# Patient Record
Sex: FEMALE | Race: WHITE | NOT HISPANIC OR LATINO | Employment: OTHER | URBAN - METROPOLITAN AREA
[De-identification: names, ages, dates, MRNs, and addresses within clinical notes are randomized per-mention and may not be internally consistent; named-entity substitution may affect disease eponyms.]

---

## 2017-01-13 ENCOUNTER — OFFICE VISIT (OUTPATIENT)
Dept: INTERNAL MEDICINE | Facility: CLINIC | Age: 72
End: 2017-01-13

## 2017-01-13 VITALS
DIASTOLIC BLOOD PRESSURE: 78 MMHG | WEIGHT: 191 LBS | BODY MASS INDEX: 31.82 KG/M2 | RESPIRATION RATE: 24 BRPM | SYSTOLIC BLOOD PRESSURE: 130 MMHG | HEART RATE: 98 BPM | HEIGHT: 65 IN | OXYGEN SATURATION: 98 % | TEMPERATURE: 97.9 F

## 2017-01-13 DIAGNOSIS — J06.9 UPPER RESPIRATORY TRACT INFECTION, UNSPECIFIED TYPE: Primary | ICD-10-CM

## 2017-01-13 LAB
EXPIRATION DATE: NORMAL
FLUAV AG NPH QL: NEGATIVE
FLUBV AG NPH QL: NEGATIVE
INTERNAL CONTROL: NORMAL
Lab: NORMAL

## 2017-01-13 PROCEDURE — 87804 INFLUENZA ASSAY W/OPTIC: CPT | Performed by: PHYSICIAN ASSISTANT

## 2017-01-13 PROCEDURE — 99213 OFFICE O/P EST LOW 20 MIN: CPT | Performed by: PHYSICIAN ASSISTANT

## 2017-01-13 RX ORDER — AZITHROMYCIN 250 MG/1
TABLET, FILM COATED ORAL
Qty: 6 TABLET | Refills: 0 | Status: SHIPPED | OUTPATIENT
Start: 2017-01-13 | End: 2017-03-29

## 2017-01-13 NOTE — PATIENT INSTRUCTIONS
Take mucinex with plenty water to thin out chest secretions.  Can start zithromax when sputum becomes colored, otherwise this is likely a viral illness at this stage.

## 2017-01-13 NOTE — PROGRESS NOTES
Subjective   Nely Grider is a 71 y.o. female.   Chief Complaint   Patient presents with   • URI     States symptoms started yesterday        History of Present Illness   Pt complains of chest congestion since yesterday and cough is productive but is clear.  Felt hot last night.    Drank tea but no other OTC taken.  Son had flu 2-3 weeks ago.  The following portions of the patient's history were reviewed and updated as appropriate: allergies, current medications and problem list.    Review of Systems   Constitutional: Positive for chills and diaphoresis. Negative for fever.   HENT: Negative for congestion and sore throat.    Respiratory: Positive for cough (sputum is clear) and shortness of breath (when laying down).    Psychiatric/Behavioral: Positive for sleep disturbance.       Objective   Physical Exam   Constitutional: She appears well-developed and well-nourished.   HENT:   Head: Normocephalic and atraumatic.   Right Ear: Tympanic membrane and external ear normal.   Left Ear: Tympanic membrane and external ear normal.   Nose: Right sinus exhibits no maxillary sinus tenderness and no frontal sinus tenderness. Left sinus exhibits no maxillary sinus tenderness and no frontal sinus tenderness.   Mouth/Throat: Oropharynx is clear and moist.   Eyes: Conjunctivae are normal.   Cardiovascular: Normal rate, regular rhythm and normal heart sounds.  Exam reveals no gallop and no friction rub.    No murmur heard.  Pulmonary/Chest: Effort normal and breath sounds normal. No respiratory distress. She has no wheezes. She has no rales.   Musculoskeletal: She exhibits no edema.   Psychiatric: She has a normal mood and affect.   Vitals reviewed.      Assessment/Plan   Nely was seen today for uri.    Diagnoses and all orders for this visit:    Upper respiratory tract infection, unspecified type  -     POC Influenza A / B

## 2017-01-13 NOTE — MR AVS SNAPSHOT
Nely MCELROY Loth[place]   1/13/2017 10:30 AM   Office Visit    Provider:  VALERY Eldridge   Department:  [facility] INTER[facility] MEDICINE AND PED[facility]ATRICS   Dept Phone:  [phone]                Your Full Care Plan              Today's Medication Changes          These changes are accurate as of: 1/13/17 11:15 AM.  If you have any questions, ask your nurse or doctor.               New Medication(s)Ordered:     azithromycin 250 MG tablet   Commonly known as:  ZITHROMAX   Take 2 tablets the first day, then 1 tablet daily for 4 days.   Started by:  VALERY Eldridge            Where to Get Your Medications      These medications were sent to [address] - [phone] [place] [id number] [address]     Phone:  [phone]     azithromycin 250 MG tablet                  Your Updated Medication List          This list is accurate as of: 1/13/17 11:15 AM.  Always use your most recent med list.                ACCU-CHEK CARLTON device       ACCU-CHEK CARLTON PLUS test strip   Generic drug:  glucose blood   TEST TWO TIMES DAILY       ACCU-CHEK SOFTCLIX LANCETS lancets   USE AS DIRECTED       azithromycin 250 MG tablet   Commonly known as:  ZITHROMAX   Take 2 tablets the first day, then 1 tablet daily for 4 days.       gabapentin 400 MG capsule   Commonly known as:  NEURONTIN   TAKE 1 CAPSULE THREE TIMES DAILY       glimepiride 2 MG tablet   Commonly known as:  AMARYL   Take 1 tablet by mouth every morning before breakfast.       losartan-hydrochlorothiazide 100-25 MG per tablet   Commonly known as:  HYZAAR       omeprazole 20 MG capsule   Commonly known as:  priLOSEC   TAKE 1 CAPSULE TWICE DAILY       simvastatin 10 MG tablet   Commonly known as:  ZOCOR       vitamin E 1000 UNIT capsule               We Performed the Following     POC Influenza A / B       You Were Diagnosed With        Codes Comments    "Upper respiratory tract infection, unspecified type    -  Primary ICD-10-CM: J06.9  ICD-9-CM: 465.9       Instructions    Take mucinex with plenty water to thin out chest secretions.  Can start zithromax when sputum becomes colored, otherwise this is likely a viral illness at this stage.     Patient Instructions History      MyChart Signup     Our records indicate that you have declined [facility] MyChart signup. If you would like to sign up for [email or web address], please email [email or web address] or call [phone] to obtain an activation code.             Other Info from Your Visit           Your Appointments     Apr 28, 2017  8:45 AM EDT   Follow Up with Raimundo Ibarra MD   [facility] INTER[facility]AT[facility] (--)    [address] [place] [id number] [phone]           Arrive 15 minutes prior to appointment.              Allergies     Codeine        Reason for Visit     URI States symptoms started yesterday       Vital Signs     Blood Pressure Pulse Temperature Respirations Height Weight    130/78 (BP Location: Left arm, Patient Position: Sitting) 98 97.9 °F (36.6 °C) (Temporal Artery ) 24 65\" (165.1 cm) 191 lb (86.6 kg)    Oxygen Saturation Body Mass Index Smoking Status             98% 31.78 kg/m2 Never Smoker         Problems and Diagnoses Noted     Upper respiratory infection      Results     POC Influenza A / B      Component Value Standard Range & Units    Rapid Influenza A Ag NEGATIVE     Rapid Influenza B Ag NEGATIVE     Internal Control Passed Passed    Lot Number [id number]     Expiration Date 4-17                   "

## 2017-01-24 RX ORDER — LOSARTAN POTASSIUM AND HYDROCHLOROTHIAZIDE 25; 100 MG/1; MG/1
TABLET ORAL
Qty: 90 TABLET | Refills: 3 | Status: SHIPPED | OUTPATIENT
Start: 2017-01-24 | End: 2017-11-27 | Stop reason: SDUPTHER

## 2017-01-24 RX ORDER — GABAPENTIN 400 MG/1
CAPSULE ORAL
Qty: 270 CAPSULE | Refills: 1 | Status: SHIPPED | OUTPATIENT
Start: 2017-01-24 | End: 2017-07-05 | Stop reason: SDUPTHER

## 2017-02-07 RX ORDER — LANCETS
EACH MISCELLANEOUS
Qty: 200 EACH | Refills: 3 | Status: SHIPPED | OUTPATIENT
Start: 2017-02-07 | End: 2017-02-08 | Stop reason: SDUPTHER

## 2017-02-08 RX ORDER — LANCETS
200 EACH MISCELLANEOUS DAILY
Qty: 200 EACH | Refills: 3 | Status: SHIPPED | OUTPATIENT
Start: 2017-02-08 | End: 2019-01-28

## 2017-02-21 ENCOUNTER — TELEPHONE (OUTPATIENT)
Dept: INTERNAL MEDICINE | Facility: CLINIC | Age: 72
End: 2017-02-21

## 2017-02-21 RX ORDER — SIMVASTATIN 10 MG
10 TABLET ORAL NIGHTLY
Qty: 90 TABLET | Refills: 1 | Status: SHIPPED | OUTPATIENT
Start: 2017-02-21 | End: 2017-07-10 | Stop reason: SDUPTHER

## 2017-02-21 NOTE — TELEPHONE ENCOUNTER
----- Message from Ilana Smith sent at 2/21/2017 10:44 AM EST -----  Contact: BRIGITTE CHOW PH:[phone]  BRIGITTE CHOW CALLING FOR A REFILL FOR SIMVASTATIN. SHE USES THE [employer] MAIL ORDER, SHE ALSO SAID SHE NO LONGER USES THE METFORMIN AND WOULD LIKE THIS TAKEN OFF HER LIST SO THEY WILL STOP SENDING IT. SHE CAN BE REACHED [phone]

## 2017-02-27 RX ORDER — BLOOD SUGAR DIAGNOSTIC
STRIP MISCELLANEOUS
Qty: 100 EACH | Refills: 5 | Status: SHIPPED | OUTPATIENT
Start: 2017-02-27 | End: 2017-09-13 | Stop reason: SDUPTHER

## 2017-03-29 ENCOUNTER — OFFICE VISIT (OUTPATIENT)
Dept: INTERNAL MEDICINE | Facility: CLINIC | Age: 72
End: 2017-03-29

## 2017-03-29 VITALS
DIASTOLIC BLOOD PRESSURE: 60 MMHG | SYSTOLIC BLOOD PRESSURE: 98 MMHG | WEIGHT: 191 LBS | TEMPERATURE: 98.4 F | BODY MASS INDEX: 31.78 KG/M2 | RESPIRATION RATE: 16 BRPM | OXYGEN SATURATION: 97 % | HEART RATE: 74 BPM

## 2017-03-29 DIAGNOSIS — H10.9 CONJUNCTIVITIS, UNSPECIFIED CONJUNCTIVITIS TYPE, UNSPECIFIED LATERALITY: ICD-10-CM

## 2017-03-29 DIAGNOSIS — J20.9 ACUTE BRONCHITIS, UNSPECIFIED ORGANISM: Primary | ICD-10-CM

## 2017-03-29 PROCEDURE — 99213 OFFICE O/P EST LOW 20 MIN: CPT | Performed by: PHYSICIAN ASSISTANT

## 2017-03-29 RX ORDER — AZITHROMYCIN 250 MG/1
TABLET, FILM COATED ORAL
Qty: 6 TABLET | Refills: 0 | Status: SHIPPED | OUTPATIENT
Start: 2017-03-29 | End: 2017-08-31

## 2017-03-29 RX ORDER — POLYMYXIN B SULFATE AND TRIMETHOPRIM 1; 10000 MG/ML; [USP'U]/ML
1 SOLUTION OPHTHALMIC EVERY 4 HOURS
Qty: 10 ML | Refills: 0 | Status: SHIPPED | OUTPATIENT
Start: 2017-03-29 | End: 2018-01-18

## 2017-03-29 NOTE — PROGRESS NOTES
Subjective   Nely Grider is a 71 y.o. female.   Chief Complaint   Patient presents with   • URI   • Cough   • Eye Drainage     History of Present Illness   PT complains of productive cough, nasal congestion, right eye redness x several days.  Using mucinex  The following portions of the patient's history were reviewed and updated as appropriate: allergies, current medications and problem list.    Review of Systems   Constitutional: Positive for fatigue. Negative for chills and fever.   HENT: Positive for congestion.    Respiratory: Positive for cough. Negative for shortness of breath.    Musculoskeletal: Positive for myalgias.   Neurological: Positive for headaches.       Objective   Physical Exam   Constitutional: She appears well-developed and well-nourished.   HENT:   Head: Normocephalic and atraumatic.   Right Ear: Tympanic membrane and external ear normal.   Left Ear: Tympanic membrane and external ear normal.   Nose: Right sinus exhibits no maxillary sinus tenderness and no frontal sinus tenderness. Left sinus exhibits no maxillary sinus tenderness and no frontal sinus tenderness.   Mouth/Throat: No posterior oropharyngeal erythema. No tonsillar exudate.   Eyes: Conjunctivae are normal.   Cardiovascular: Normal rate, regular rhythm and normal heart sounds.  Exam reveals no gallop and no friction rub.    No murmur heard.  Pulmonary/Chest: Effort normal and breath sounds normal.   Lymphadenopathy:     She has no cervical adenopathy.   Psychiatric: She has a normal mood and affect.   Vitals reviewed.      Assessment/Plan   Nely was seen today for uri, cough and eye drainage.    Diagnoses and all orders for this visit:    Acute bronchitis, unspecified organism  -     azithromycin (ZITHROMAX) 250 MG tablet; Take 2 tablets the first day, then 1 tablet daily for 4 days.    Conjunctivitis, unspecified conjunctivitis type, unspecified laterality  -     trimethoprim-polymyxin b (POLYTRIM) 00627-6.1 UNIT/ML-%  ophthalmic solution; Administer 1 drop to the right eye Every 4 (Four) Hours.

## 2017-04-28 ENCOUNTER — OFFICE VISIT (OUTPATIENT)
Dept: INTERNAL MEDICINE | Facility: CLINIC | Age: 72
End: 2017-04-28

## 2017-04-28 VITALS
HEART RATE: 72 BPM | BODY MASS INDEX: 31.95 KG/M2 | WEIGHT: 192 LBS | RESPIRATION RATE: 21 BRPM | DIASTOLIC BLOOD PRESSURE: 66 MMHG | SYSTOLIC BLOOD PRESSURE: 128 MMHG

## 2017-04-28 DIAGNOSIS — N18.30 CHRONIC KIDNEY DISEASE (CKD), STAGE III (MODERATE) (HCC): Chronic | ICD-10-CM

## 2017-04-28 DIAGNOSIS — I10 ESSENTIAL HYPERTENSION: Primary | ICD-10-CM

## 2017-04-28 DIAGNOSIS — E11.9 CONTROLLED TYPE 2 DIABETES MELLITUS WITHOUT COMPLICATION, WITHOUT LONG-TERM CURRENT USE OF INSULIN (HCC): ICD-10-CM

## 2017-04-28 DIAGNOSIS — E78.2 MIXED HYPERLIPIDEMIA: ICD-10-CM

## 2017-04-28 DIAGNOSIS — E11.42 DIABETIC POLYNEUROPATHY ASSOCIATED WITH TYPE 2 DIABETES MELLITUS (HCC): ICD-10-CM

## 2017-04-28 LAB
EXPIRATION DATE: NORMAL
HBA1C MFR BLD: 7.8 %
Lab: NORMAL

## 2017-04-28 PROCEDURE — 83036 HEMOGLOBIN GLYCOSYLATED A1C: CPT | Performed by: INTERNAL MEDICINE

## 2017-04-28 PROCEDURE — 99214 OFFICE O/P EST MOD 30 MIN: CPT | Performed by: INTERNAL MEDICINE

## 2017-04-28 RX ORDER — GLIMEPIRIDE 4 MG/1
4 TABLET ORAL
Qty: 90 TABLET | Refills: 3 | Status: SHIPPED | OUTPATIENT
Start: 2017-04-28 | End: 2018-01-31 | Stop reason: SDUPTHER

## 2017-04-28 NOTE — PROGRESS NOTES
Subjective   Nely Grider is a 71 y.o. female.     History of Present Illness   For follow up of  HTN on meds no chest pain, no sob, no headaches.  NIDDM she checks blood sugars in am only and mostly are below 150.  Renal insufficiency is stable.  Hyperlipidemia is stable on meds, no muscle aches.        The following portions of the patient's history were reviewed and updated as appropriate: allergies, current medications, past medical history and problem list.    Review of Systems   Constitutional: Negative.    Eyes: Negative.    Respiratory: Negative.  Negative for cough, chest tightness, shortness of breath and wheezing.    Cardiovascular: Negative.  Negative for chest pain and leg swelling.   Gastrointestinal: Negative.  Negative for abdominal distention and abdominal pain.   Genitourinary: Negative.    Neurological: Positive for numbness.       Objective   Physical Exam   Constitutional: She appears well-developed and well-nourished.   Neck: Normal range of motion. Neck supple.   Cardiovascular: Normal rate, regular rhythm and normal heart sounds.  Exam reveals no gallop and no friction rub.    No murmur heard.  Pulmonary/Chest: Effort normal and breath sounds normal. No respiratory distress. She has no wheezes. She has no rales. She exhibits no tenderness.   Abdominal: Soft. Bowel sounds are normal. She exhibits no distension. There is no tenderness.   Nursing note and vitals reviewed.      Assessment/Plan   Nely was seen today for hypertension.    Diagnoses and all orders for this visit:    Essential hypertension    Mixed hyperlipidemia    Controlled type 2 diabetes mellitus without complication, without long-term current use of insulin    Diabetic polyneuropathy associated with type 2 diabetes mellitus    Chronic kidney disease (CKD), stage III (moderate)    Other orders  -     glimepiride (AMARYL) 4 MG tablet; Take 1 tablet by mouth Every Morning Before Breakfast.    A1c = 7.8.  Will increase  amaryl.  Watch diet, check sugars.  Rest of problems are stable.  Same meds.  Recheck 4 months.

## 2017-07-06 RX ORDER — OMEPRAZOLE 20 MG/1
CAPSULE, DELAYED RELEASE ORAL
Qty: 180 CAPSULE | Refills: 3 | Status: SHIPPED | OUTPATIENT
Start: 2017-07-06 | End: 2018-04-18 | Stop reason: SDUPTHER

## 2017-07-06 RX ORDER — GABAPENTIN 400 MG/1
CAPSULE ORAL
Qty: 270 CAPSULE | Refills: 1 | Status: SHIPPED | OUTPATIENT
Start: 2017-07-06 | End: 2017-07-13 | Stop reason: SDUPTHER

## 2017-07-06 RX ORDER — OMEPRAZOLE 20 MG/1
CAPSULE, DELAYED RELEASE ORAL
Qty: 180 CAPSULE | Refills: 3 | Status: SHIPPED | OUTPATIENT
Start: 2017-07-06 | End: 2017-07-06 | Stop reason: SDUPTHER

## 2017-07-11 RX ORDER — SIMVASTATIN 10 MG
TABLET ORAL
Qty: 90 TABLET | Refills: 1 | Status: SHIPPED | OUTPATIENT
Start: 2017-07-11 | End: 2017-11-27 | Stop reason: SDUPTHER

## 2017-07-13 ENCOUNTER — TELEPHONE (OUTPATIENT)
Dept: INTERNAL MEDICINE | Facility: CLINIC | Age: 72
End: 2017-07-13

## 2017-07-13 RX ORDER — GABAPENTIN 400 MG/1
400 CAPSULE ORAL 3 TIMES DAILY
Qty: 270 CAPSULE | Refills: 1 | OUTPATIENT
Start: 2017-07-13 | End: 2019-01-02 | Stop reason: SDUPTHER

## 2017-07-13 NOTE — TELEPHONE ENCOUNTER
Called and spoke with [facility] pharmacy. They stated they recevied Rx via electronically after it went controlled in our state and the Rx stated there would be a new Rx to follow but never received one. I have called in to pharmacist with [facility].

## 2017-07-13 NOTE — TELEPHONE ENCOUNTER
----- Message from Kala Bai sent at 7/13/2017 10:24 AM EDT -----  [facility] [phone]  GABAPENTIN WAS DENIED AND NEW RX TO FOLLOW BUT THEY HAVEN'T RECEIVED ANYTHING

## 2017-08-31 ENCOUNTER — OFFICE VISIT (OUTPATIENT)
Dept: INTERNAL MEDICINE | Facility: CLINIC | Age: 72
End: 2017-08-31

## 2017-08-31 VITALS
RESPIRATION RATE: 20 BRPM | DIASTOLIC BLOOD PRESSURE: 68 MMHG | BODY MASS INDEX: 31.65 KG/M2 | HEART RATE: 70 BPM | HEIGHT: 65 IN | SYSTOLIC BLOOD PRESSURE: 126 MMHG | WEIGHT: 190 LBS

## 2017-08-31 DIAGNOSIS — N18.30 CHRONIC KIDNEY DISEASE (CKD), STAGE III (MODERATE) (HCC): Chronic | ICD-10-CM

## 2017-08-31 DIAGNOSIS — I10 ESSENTIAL HYPERTENSION: Primary | ICD-10-CM

## 2017-08-31 DIAGNOSIS — E78.2 MIXED HYPERLIPIDEMIA: ICD-10-CM

## 2017-08-31 DIAGNOSIS — E11.9 CONTROLLED TYPE 2 DIABETES MELLITUS WITHOUT COMPLICATION, WITHOUT LONG-TERM CURRENT USE OF INSULIN (HCC): ICD-10-CM

## 2017-08-31 DIAGNOSIS — E11.42 DIABETIC POLYNEUROPATHY ASSOCIATED WITH TYPE 2 DIABETES MELLITUS (HCC): ICD-10-CM

## 2017-08-31 DIAGNOSIS — M25.551 PAIN OF RIGHT HIP JOINT: ICD-10-CM

## 2017-08-31 DIAGNOSIS — K21.9 GASTROESOPHAGEAL REFLUX DISEASE WITHOUT ESOPHAGITIS: ICD-10-CM

## 2017-08-31 LAB
ALBUMIN SERPL-MCNC: 4.2 G/DL (ref 3.2–4.8)
ALBUMIN/GLOB SERPL: 1.4 G/DL (ref 1.5–2.5)
ALP SERPL-CCNC: 98 U/L (ref 25–100)
ALT SERPL-CCNC: 23 U/L (ref 7–40)
AST SERPL-CCNC: 24 U/L (ref 0–33)
BILIRUB SERPL-MCNC: 0.4 MG/DL (ref 0.3–1.2)
BUN SERPL-MCNC: 25 MG/DL (ref 9–23)
BUN/CREAT SERPL: 19.2 (ref 7–25)
CALCIUM SERPL-MCNC: 10 MG/DL (ref 8.7–10.4)
CHLORIDE SERPL-SCNC: 104 MMOL/L (ref 99–109)
CHOLEST SERPL-MCNC: 190 MG/DL (ref 0–200)
CO2 SERPL-SCNC: 29 MMOL/L (ref 20–31)
CREAT SERPL-MCNC: 1.3 MG/DL (ref 0.6–1.3)
ERYTHROCYTE [DISTWIDTH] IN BLOOD BY AUTOMATED COUNT: 13.1 % (ref 11.3–14.5)
GLOBULIN SER CALC-MCNC: 2.9 GM/DL
GLUCOSE SERPL-MCNC: 146 MG/DL (ref 70–100)
HBA1C MFR BLD: 8.4 % (ref 4.8–5.6)
HCT VFR BLD AUTO: 39.4 % (ref 34.5–44)
HDLC SERPL-MCNC: 38 MG/DL (ref 40–60)
HGB BLD-MCNC: 12.4 G/DL (ref 11.5–15.5)
LDLC SERPL CALC-MCNC: 103 MG/DL (ref 0–100)
MCH RBC QN AUTO: 29.9 PG (ref 27–31)
MCHC RBC AUTO-ENTMCNC: 31.5 G/DL (ref 32–36)
MCV RBC AUTO: 94.9 FL (ref 80–99)
PLATELET # BLD AUTO: 441 10*3/MM3 (ref 150–450)
POTASSIUM SERPL-SCNC: 4.2 MMOL/L (ref 3.5–5.5)
PROT SERPL-MCNC: 7.1 G/DL (ref 5.7–8.2)
RBC # BLD AUTO: 4.15 10*6/MM3 (ref 3.89–5.14)
SODIUM SERPL-SCNC: 138 MMOL/L (ref 132–146)
TRIGL SERPL-MCNC: 247 MG/DL (ref 0–150)
VLDLC SERPL CALC-MCNC: 49.4 MG/DL
WBC # BLD AUTO: 9.82 10*3/MM3 (ref 3.5–10.8)

## 2017-08-31 PROCEDURE — G0438 PPPS, INITIAL VISIT: HCPCS | Performed by: INTERNAL MEDICINE

## 2017-08-31 PROCEDURE — 99397 PER PM REEVAL EST PAT 65+ YR: CPT | Performed by: INTERNAL MEDICINE

## 2017-08-31 PROCEDURE — 96160 PT-FOCUSED HLTH RISK ASSMT: CPT | Performed by: INTERNAL MEDICINE

## 2017-08-31 PROCEDURE — 36415 COLL VENOUS BLD VENIPUNCTURE: CPT | Performed by: INTERNAL MEDICINE

## 2017-09-13 RX ORDER — BLOOD SUGAR DIAGNOSTIC
STRIP MISCELLANEOUS
Qty: 200 EACH | Refills: 5 | Status: SHIPPED | OUTPATIENT
Start: 2017-09-13 | End: 2019-01-28

## 2017-10-05 ENCOUNTER — OFFICE VISIT (OUTPATIENT)
Dept: INTERNAL MEDICINE | Facility: CLINIC | Age: 72
End: 2017-10-05

## 2017-10-05 VITALS
HEART RATE: 70 BPM | TEMPERATURE: 97.8 F | DIASTOLIC BLOOD PRESSURE: 74 MMHG | SYSTOLIC BLOOD PRESSURE: 122 MMHG | WEIGHT: 190 LBS | RESPIRATION RATE: 19 BRPM | BODY MASS INDEX: 31.62 KG/M2

## 2017-10-05 DIAGNOSIS — Z23 NEED FOR INFLUENZA VACCINATION: Primary | ICD-10-CM

## 2017-10-05 DIAGNOSIS — H83.09 ACUTE VIRAL LABYRINTHITIS, UNSPECIFIED LATERALITY: ICD-10-CM

## 2017-10-05 PROCEDURE — 99213 OFFICE O/P EST LOW 20 MIN: CPT | Performed by: INTERNAL MEDICINE

## 2017-10-05 PROCEDURE — G0008 ADMIN INFLUENZA VIRUS VAC: HCPCS | Performed by: INTERNAL MEDICINE

## 2017-10-05 PROCEDURE — 90662 IIV NO PRSV INCREASED AG IM: CPT | Performed by: INTERNAL MEDICINE

## 2017-10-05 RX ORDER — NEOMYCIN SULFATE, POLYMYXIN B SULFATE AND DEXAMETHASONE 3.5; 10000; 1 MG/ML; [USP'U]/ML; MG/ML
SUSPENSION/ DROPS OPHTHALMIC
COMMUNITY
Start: 2017-10-04 | End: 2018-01-18

## 2017-10-05 NOTE — PROGRESS NOTES
Subjective   Nely Grider is a 71 y.o. female.     History of Present Illness   Has been dizzy since yesterday.  Worse if she turns head or gets up.  Is some better today. No nausea, no fever, no sore throat or ear ache.      The following portions of the patient's history were reviewed and updated as appropriate: allergies, current medications, past medical history and problem list.    Review of Systems   Constitutional: Negative.  Negative for chills and fever.   HENT: Negative for congestion, ear discharge, ear pain and sore throat.    Eyes: Negative.    Respiratory: Negative.  Negative for cough, chest tightness, shortness of breath and wheezing.    Cardiovascular: Negative.  Negative for chest pain, palpitations and leg swelling.   Gastrointestinal: Negative for abdominal distention, abdominal pain and nausea.   Neurological: Positive for dizziness.       Objective   Physical Exam   Constitutional: She appears well-developed and well-nourished.   HENT:   Right Ear: External ear normal.   Left Ear: External ear normal.   Mouth/Throat: Oropharynx is clear and moist.   TMs are normal.     Eyes:   No nystagmus.   Neck: Normal range of motion. Neck supple.   Cardiovascular: Normal rate, regular rhythm and normal heart sounds.    Pulmonary/Chest: Effort normal and breath sounds normal. She has no wheezes. She has no rales.   Lymphadenopathy:     She has no cervical adenopathy.   Neurological: No cranial nerve deficit.   Nursing note and vitals reviewed.      Assessment/Plan   Nely was seen today for dizziness.    Diagnoses and all orders for this visit:    Need for influenza vaccination  -     Flu Vaccine High Dose PF 65YR+    Acute viral labyrinthitis, unspecified laterality    She will take meclizine OTC.  Call if not better or worse.

## 2017-10-09 ENCOUNTER — TRANSCRIBE ORDERS (OUTPATIENT)
Dept: INTERNAL MEDICINE | Facility: CLINIC | Age: 72
End: 2017-10-09

## 2017-10-09 DIAGNOSIS — Z12.31 VISIT FOR SCREENING MAMMOGRAM: Primary | ICD-10-CM

## 2017-11-15 ENCOUNTER — HOSPITAL ENCOUNTER (OUTPATIENT)
Dept: MAMMOGRAPHY | Facility: HOSPITAL | Age: 72
Discharge: HOME | End: 2017-11-15
Attending: INTERNAL MEDICINE | Admitting: INTERNAL MEDICINE

## 2017-11-15 DIAGNOSIS — Z12.31 VISIT FOR SCREENING MAMMOGRAM: ICD-10-CM

## 2017-11-15 PROCEDURE — 77063 BREAST TOMOSYNTHESIS BI: CPT

## 2017-11-15 PROCEDURE — G0202 SCR MAMMO BI INCL CAD: HCPCS

## 2017-11-16 PROCEDURE — G0202 SCR MAMMO BI INCL CAD: HCPCS | Performed by: RADIOLOGY

## 2017-11-16 PROCEDURE — 77063 BREAST TOMOSYNTHESIS BI: CPT | Performed by: RADIOLOGY

## 2017-11-27 ENCOUNTER — OFFICE VISIT (OUTPATIENT)
Dept: INTERNAL MEDICINE | Facility: CLINIC | Age: 72
End: 2017-11-27

## 2017-11-27 VITALS
RESPIRATION RATE: 19 BRPM | BODY MASS INDEX: 31.45 KG/M2 | SYSTOLIC BLOOD PRESSURE: 126 MMHG | HEART RATE: 80 BPM | WEIGHT: 189 LBS | DIASTOLIC BLOOD PRESSURE: 72 MMHG

## 2017-11-27 DIAGNOSIS — E78.2 MIXED HYPERLIPIDEMIA: ICD-10-CM

## 2017-11-27 DIAGNOSIS — E11.9 CONTROLLED TYPE 2 DIABETES MELLITUS WITHOUT COMPLICATION, WITHOUT LONG-TERM CURRENT USE OF INSULIN (HCC): Primary | ICD-10-CM

## 2017-11-27 DIAGNOSIS — E11.42 DIABETIC POLYNEUROPATHY ASSOCIATED WITH TYPE 2 DIABETES MELLITUS (HCC): ICD-10-CM

## 2017-11-27 DIAGNOSIS — I10 ESSENTIAL HYPERTENSION: ICD-10-CM

## 2017-11-27 DIAGNOSIS — R05.9 COUGH: ICD-10-CM

## 2017-11-27 LAB
EXPIRATION DATE: NORMAL
HBA1C MFR BLD: 8.7 %
Lab: NORMAL

## 2017-11-27 PROCEDURE — 99214 OFFICE O/P EST MOD 30 MIN: CPT | Performed by: INTERNAL MEDICINE

## 2017-11-27 PROCEDURE — 83036 HEMOGLOBIN GLYCOSYLATED A1C: CPT | Performed by: INTERNAL MEDICINE

## 2017-11-27 NOTE — PROGRESS NOTES
Subjective   Nely Grider is a 71 y.o. female.     History of Present Illness   For follow up of  HTN on meds, no chest pain, sob or headaches.  NIDDM she is not watching well.  Diabetic neuropathy is about the same on meds.  GERD is stable on meds.    The following portions of the patient's history were reviewed and updated as appropriate: allergies, current medications, past medical history and problem list.    Review of Systems   Constitutional: Negative.    HENT: Negative.    Eyes: Negative.    Respiratory: Negative.  Negative for cough, chest tightness and wheezing.    Cardiovascular: Negative.  Negative for chest pain, palpitations and leg swelling.   Gastrointestinal: Negative.  Negative for abdominal distention and abdominal pain.   Genitourinary: Negative.        Objective   Physical Exam   Constitutional: She appears well-developed and well-nourished.   Neck: Normal range of motion. Neck supple.   Cardiovascular: Normal rate, regular rhythm and normal heart sounds.  Exam reveals no gallop and no friction rub.    No murmur heard.  Pulmonary/Chest: Effort normal and breath sounds normal. No respiratory distress. She has no wheezes. She has no rales.   Abdominal: Soft. Bowel sounds are normal. She exhibits no distension and no mass. There is no tenderness.   Musculoskeletal: She exhibits no edema.   Nursing note and vitals reviewed.      Assessment/Plan   Nely was seen today for diabetes.    Diagnoses and all orders for this visit:    Controlled type 2 diabetes mellitus without complication, without long-term current use of insulin  -     POC Glycosylated Hemoglobin (Hb A1C)  -     metFORMIN (GLUCOPHAGE) 500 MG tablet; Take 1 tablet by mouth 2 (Two) Times a Day With Meals.    Essential hypertension    Mixed hyperlipidemia    Cough    Diabetic polyneuropathy associated with type 2 diabetes mellitus    A1C = 8.7.  Discussed diet with her and will add back the metformin.  Rest of problems are stable.  Same  meds.  Recheck 4 months.

## 2017-11-28 RX ORDER — SIMVASTATIN 10 MG
TABLET ORAL
Qty: 90 TABLET | Refills: 1 | Status: SHIPPED | OUTPATIENT
Start: 2017-11-28 | End: 2018-04-18 | Stop reason: SDUPTHER

## 2017-11-28 RX ORDER — LOSARTAN POTASSIUM AND HYDROCHLOROTHIAZIDE 25; 100 MG/1; MG/1
TABLET ORAL
Qty: 90 TABLET | Refills: 3 | Status: SHIPPED | OUTPATIENT
Start: 2017-11-28 | End: 2018-04-21 | Stop reason: HOSPADM

## 2018-01-18 ENCOUNTER — OFFICE VISIT (OUTPATIENT)
Dept: INTERNAL MEDICINE | Facility: CLINIC | Age: 73
End: 2018-01-18

## 2018-01-18 VITALS
SYSTOLIC BLOOD PRESSURE: 92 MMHG | TEMPERATURE: 97.6 F | WEIGHT: 188.2 LBS | HEIGHT: 65 IN | HEART RATE: 80 BPM | BODY MASS INDEX: 31.36 KG/M2 | DIASTOLIC BLOOD PRESSURE: 60 MMHG | RESPIRATION RATE: 20 BRPM

## 2018-01-18 DIAGNOSIS — E11.9 CONTROLLED TYPE 2 DIABETES MELLITUS WITHOUT COMPLICATION, WITHOUT LONG-TERM CURRENT USE OF INSULIN (HCC): ICD-10-CM

## 2018-01-18 DIAGNOSIS — Z00.00 INITIAL MEDICARE ANNUAL WELLNESS VISIT: Primary | ICD-10-CM

## 2018-01-18 PROCEDURE — G0439 PPPS, SUBSEQ VISIT: HCPCS | Performed by: NURSE PRACTITIONER

## 2018-01-18 PROCEDURE — 96160 PT-FOCUSED HLTH RISK ASSMT: CPT | Performed by: NURSE PRACTITIONER

## 2018-01-18 RX ORDER — DIPHENHYDRAMINE HYDROCHLORIDE 25 MG/1
1 TABLET ORAL DAILY
COMMUNITY
End: 2018-12-17

## 2018-01-18 NOTE — PROGRESS NOTES
QUICK REFERENCE INFORMATION:  The ABCs of the Annual Wellness Visit    Initial Medicare Wellness Visit    HEALTH RISK ASSESSMENT    1945    Recent Hospitalizations:  No hospitalization(s) within the last year..        Current Medical Providers:  Patient Care Team:  Raimundo Ibarra MD as PCP - General        Smoking Status:  History   Smoking Status   • Never Smoker   Smokeless Tobacco   • Never Used       Alcohol Consumption:  History   Alcohol Use No       Depression Screen:   PHQ-2/PHQ-9 Depression Screening 1/18/2018   Little interest or pleasure in doing things 0   Feeling down, depressed, or hopeless 0   Total Score 0       Health Habits and Functional and Cognitive Screening:  Functional & Cognitive Status 1/18/2018   Do you have difficulty preparing food and eating? No   Do you have difficulty bathing yourself, getting dressed or grooming yourself? No   Do you have difficulty using the toilet? No   Do you have difficulty moving around from place to place? Yes   Do you have trouble with steps or getting out of a bed or a chair? No   In the past year have you fallen or experienced a near fall? No   Current Diet Unhealthy Diet   Exercise (times per week) 0 times per week   Current Exercise Activities Include None   Do you need help using the phone?  No   Are you deaf or do you have serious difficulty hearing?  No   Do you need help with transportation? Yes   Do you need help preparing meals?  Yes   Do you need help with housework?  No   Do you need help with laundry? No   Do you need help taking your medications? No   Do you need help managing money? No   Have you felt unusual stress, anger or loneliness in the last month? No   Who do you live with? Spouse   If you need help, do you have trouble finding someone available to you? No   Have you been bothered in the last four weeks by sexual problems? No   Do you have difficulty concentrating, remembering or making decisions? No           Does the patient  have evidence of cognitive impairment? No    Asiprin use counseling: Does not need ASA (and currently is not on it)      Recent Lab Results:    Visual Acuity:  No exam data present    Age-appropriate Screening Schedule:  Refer to the list below for future screening recommendations based on patient's age, sex and/or medical conditions. Orders for these recommended tests are listed in the plan section. The patient has been provided with a written plan.    Health Maintenance   Topic Date Due   • TDAP/TD VACCINES (1 - Tdap) 12/30/1962   • URINE MICROALBUMIN  05/25/2016   • COLONOSCOPY  05/25/2016   • PNEUMOCOCCAL VACCINES (65+ LOW/MEDIUM RISK) (2 of 2 - PPSV23) 11/03/2016   • DIABETIC EYE EXAM  12/01/2017   • DIABETIC FOOT EXAM  03/15/2018   • HEMOGLOBIN A1C  05/27/2018   • LIPID PANEL  08/31/2018   • MAMMOGRAM  11/15/2019   • INFLUENZA VACCINE  Completed   • ZOSTER VACCINE  Completed        Subjective   History of Present Illness    Nely Grider is a 72 y.o. female who presents for an Annual Wellness Visit.    The following portions of the patient's history were reviewed and updated as appropriate: allergies, current medications, past family history, past medical history, past social history, past surgical history and problem list.    Outpatient Medications Prior to Visit   Medication Sig Dispense Refill   • ACCU-CHEK CARLTON PLUS test strip TEST TWO TIMES DAILY 200 each 5   • ACCU-CHEK SOFTCLIX LANCETS lancets 200 each by Other route Daily. Use as instructed 200 each 3   • Blood Glucose Monitoring Suppl (ACCU-CHEK CARLTON) device Use as directed; For: Diabetes mellitus     • gabapentin (NEURONTIN) 400 MG capsule Take 1 capsule by mouth 3 (Three) Times a Day. 270 capsule 1   • glimepiride (AMARYL) 4 MG tablet Take 1 tablet by mouth Every Morning Before Breakfast. 90 tablet 3   • losartan-hydrochlorothiazide (HYZAAR) 100-25 MG per tablet TAKE 1 TABLET EVERY DAY 90 tablet 3   • metFORMIN (GLUCOPHAGE) 500 MG tablet Take 1  "tablet by mouth 2 (Two) Times a Day With Meals. 180 tablet 3   • omeprazole (priLOSEC) 20 MG capsule TAKE 1 CAPSULE TWICE DAILY 180 capsule 3   • simvastatin (ZOCOR) 10 MG tablet TAKE 1 TABLET AT BEDTIME 90 tablet 1   • neomycin-polymyxin-dexamethasone (MAXITROL) 3.5-56729-0.1 ophthalmic suspension      • trimethoprim-polymyxin b (POLYTRIM) 69107-5.1 UNIT/ML-% ophthalmic solution Administer 1 drop to the right eye Every 4 (Four) Hours. 10 mL 0   • vitamin E 1000 UNIT capsule Take 1,000 Units by mouth daily.       No facility-administered medications prior to visit.        Patient Active Problem List   Diagnosis   • Atypical chest pain   • Neck sprain   • Cough   • Dysuria   • Gastroesophageal reflux disease without esophagitis   • Mixed hyperlipidemia   • Essential hypertension   • Pain of right hip joint   • Sebaceous cyst   • Cervical sprain   • Controlled type 2 diabetes mellitus without complication, without long-term current use of insulin   • Sepsis   • Chronic kidney disease (CKD), stage III (moderate)   • Elevated LFTs   • Leukocytosis   • Mass of right hip region   • Anemia   • Constipation   • Diabetic polyneuropathy associated with type 2 diabetes mellitus       Advance Care Planning:  has an advance directive - a copy HAS NOT been provided. Have asked the patient to send this to us to add to record.  Living will at [facility]  Identification of Risk Factors:  Risk factors include: weight , increased fall risk and polypharmacy.    Review of Systems    Compared to one year ago, the patient feels her physical health is worse.  Compared to one year ago, the patient feels her mental health is the same.    Objective     Physical Exam    Vitals:    01/18/18 0924   BP: 92/60   BP Location: Right arm   Pulse: 80   Resp: 20   Temp: 97.6 °F (36.4 °C)   TempSrc: Temporal Artery    Weight: 85.4 kg (188 lb 3.2 oz)   Height: 165.1 cm (65\")   PainSc:   6   PainLoc: Foot       Body mass index is 31.32 kg/(m^2).  Discussed " the patient's BMI with her. BMI is above normal parameters. Follow-up plan includes:  referral to a nutritionist, exercise counseling and nutrition counseling.    Finger Rub Hearing{Test (right ear):passed  Finger Rub Hearing{Test (left ear):passed        Assessment/Plan   Patient Self-Management and Personalized Health Advice  The patient has been provided with information about: diet, exercise, weight management, fall prevention and supplements and preventive services including:   · none.    Visit Diagnoses:    ICD-10-CM ICD-9-CM   1. Initial Medicare annual wellness visit Z00.00 V70.0   2. Controlled type 2 diabetes mellitus without complication, without long-term current use of insulin E11.9 250.00       Orders Placed This Encounter   Procedures   • Ambulatory Referral to Diabetic Education     Referral Priority:   Routine     Referral Type:   Health Education     Referral Reason:   Specialty Services Required     Requested Specialty:   Dietitian     Number of Visits Requested:   1       Outpatient Encounter Prescriptions as of 1/18/2018   Medication Sig Dispense Refill   • ACCU-CHEK CARLTON PLUS test strip TEST TWO TIMES DAILY 200 each 5   • ACCU-CHEK SOFTCLIX LANCETS lancets 200 each by Other route Daily. Use as instructed 200 each 3   • Biotin (BIOTIN 5000) 5 MG capsule Take 1 tablet by mouth.     • Blood Glucose Monitoring Suppl (ACCU-CHEK CARLTON) device Use as directed; For: Diabetes mellitus     • gabapentin (NEURONTIN) 400 MG capsule Take 1 capsule by mouth 3 (Three) Times a Day. 270 capsule 1   • glimepiride (AMARYL) 4 MG tablet Take 1 tablet by mouth Every Morning Before Breakfast. 90 tablet 3   • losartan-hydrochlorothiazide (HYZAAR) 100-25 MG per tablet TAKE 1 TABLET EVERY DAY 90 tablet 3   • metFORMIN (GLUCOPHAGE) 500 MG tablet Take 1 tablet by mouth 2 (Two) Times a Day With Meals. 180 tablet 3   • omeprazole (priLOSEC) 20 MG capsule TAKE 1 CAPSULE TWICE DAILY 180 capsule 3   • simvastatin (ZOCOR) 10 MG  tablet TAKE 1 TABLET AT BEDTIME 90 tablet 1   • [DISCONTINUED] neomycin-polymyxin-dexamethasone (MAXITROL) 3.5-62007-5.1 ophthalmic suspension      • [DISCONTINUED] trimethoprim-polymyxin b (POLYTRIM) 16846-6.1 UNIT/ML-% ophthalmic solution Administer 1 drop to the right eye Every 4 (Four) Hours. 10 mL 0   • [DISCONTINUED] vitamin E 1000 UNIT capsule Take 1,000 Units by mouth daily.       No facility-administered encounter medications on file as of 1/18/2018.      +++Need colon report [facility] - sent by prior pcp Dr Amaro opt 12/17--requested records  Reviewed use of high risk medication in the elderly: yes  Reviewed for potential of harmful drug interactions in the elderly: yes    Follow Up:  Return in about 1 year (around 1/18/2019) for Medicare Wellness subseq.     An After Visit Summary and PPPS with all of these plans were given to the patient.

## 2018-01-31 ENCOUNTER — OFFICE VISIT (OUTPATIENT)
Dept: ORTHOPEDIC SURGERY | Facility: CLINIC | Age: 73
End: 2018-01-31

## 2018-01-31 VITALS
HEIGHT: 65 IN | SYSTOLIC BLOOD PRESSURE: 161 MMHG | WEIGHT: 185.19 LBS | BODY MASS INDEX: 30.85 KG/M2 | DIASTOLIC BLOOD PRESSURE: 93 MMHG | HEART RATE: 73 BPM

## 2018-01-31 DIAGNOSIS — M16.0 PRIMARY OSTEOARTHRITIS OF BOTH HIPS: Primary | ICD-10-CM

## 2018-01-31 PROCEDURE — 99203 OFFICE O/P NEW LOW 30 MIN: CPT | Performed by: ORTHOPAEDIC SURGERY

## 2018-01-31 RX ORDER — MELOXICAM 7.5 MG/1
15 TABLET ORAL ONCE
Status: CANCELLED | OUTPATIENT
Start: 2018-01-31 | End: 2018-01-31

## 2018-01-31 RX ORDER — GLIMEPIRIDE 4 MG/1
TABLET ORAL
Qty: 90 TABLET | Refills: 3 | Status: ON HOLD | OUTPATIENT
Start: 2018-01-31 | End: 2018-04-21

## 2018-01-31 RX ORDER — ACETAMINOPHEN 325 MG/1
1000 TABLET ORAL ONCE
Status: CANCELLED | OUTPATIENT
Start: 2018-01-31 | End: 2018-01-31

## 2018-01-31 RX ORDER — PREGABALIN 150 MG/1
150 CAPSULE ORAL ONCE
Status: CANCELLED | OUTPATIENT
Start: 2018-01-31 | End: 2018-01-31

## 2018-01-31 NOTE — PROGRESS NOTES
[facility]    Subjective     Chief Complaint   Patient presents with   • Left Hip - Pain   • Right Hip - Follow-up, Pain        HPI    Nely Grider is a 72 y.o. female. She presents today for evaluation of bilateral hip pain, right greater than left.  The pain is been present for several years, worsening over the past year.  Previous injection in the right hip with brief relief.  The pain is sharp and stabbing, moderate to severe, no history of trauma, worse with walking, standing, sitting and climbing stairs.  She would like to proceed with right total hip arthroplasty surgery at this time.    The patient has been considering right hip total joint replacement surgery.  The pain has been severe and has been worsening in spite of medications, physical therapy, and joint injections (steroid).The pain interferes with walking, sleeping, work and leisure activities, and the patient uses a cane and walker as an ambulatory aid.  No previous history of blood clots, but 2 family members had blood clots, to include his sister and her nephew.      Patient Active Problem List   Diagnosis   • Atypical chest pain   • Neck sprain   • Cough   • Dysuria   • Gastroesophageal reflux disease without esophagitis   • Mixed hyperlipidemia   • Essential hypertension   • Pain of right hip joint   • Sebaceous cyst   • Cervical sprain   • Controlled type 2 diabetes mellitus without complication, without long-term current use of insulin   • Sepsis   • Chronic kidney disease (CKD), stage III (moderate)   • Elevated LFTs   • Leukocytosis   • Mass of right hip region   • Anemia   • Constipation   • Diabetic polyneuropathy associated with type 2 diabetes mellitus     Past Medical History:   Diagnosis Date   • Arthritis    • Cancer     cervical   • Cataract    • Diabetes mellitus    • Hypertension    • Neuropathy       Past Surgical History:   Procedure Laterality Date   • CARDIAC CATHETERIZATION     •  CHOLECYSTECTOMY     • EYE SURGERY     • HYSTERECTOMY      partial   • OOPHORECTOMY      one removed   • TONSILLECTOMY     • WRIST SURGERY      metal plate      Family History   Problem Relation Age of Onset   • Arthritis Mother    • Heart attack Mother    • Hypertension Mother    • Hyperlipidemia Mother    • Osteoporosis Mother    • Heart disease Sister    • Diabetes Sister    • Arthritis Sister    • Heart attack Sister    • Hypertension Sister    • Hyperlipidemia Sister    • Bleeding Disorder Sister    • Heart disease Brother    • Cancer Brother    • Diabetes Brother    • Arthritis Brother    • Heart attack Brother    • Hypertension Brother    • Hyperlipidemia Brother    • Ovarian cancer Daughter    • Cancer Daughter    • Ovarian cancer Other      grandaughter   • Ovarian cancer Other      granddaughter   • Heart attack Father    • Hypertension Father    • Stroke Father    • Kidney disease Paternal Uncle    • Liver disease Paternal Uncle    • Cancer Other    • Stroke Other    • Diabetes Other    • Breast cancer Neg Hx      Social History     Social History   • Marital status:      Spouse name: N/A   • Number of children: N/A   • Years of education: N/A     Occupational History   • Not on file.     Social History Main Topics   • Smoking status: Never Smoker   • Smokeless tobacco: Never Used   • Alcohol use No   • Drug use: No   • Sexual activity: Defer      Comment:      Other Topics Concern   • Not on file     Social History Narrative      Current Outpatient Prescriptions on File Prior to Visit   Medication Sig Dispense Refill   • ACCU-CHEK CARLTON PLUS test strip TEST TWO TIMES DAILY 200 each 5   • ACCU-CHEK SOFTCLIX LANCETS lancets 200 each by Other route Daily. Use as instructed 200 each 3   • Biotin (BIOTIN 5000) 5 MG capsule Take 1 tablet by mouth.     • Blood Glucose Monitoring Suppl (ACCU-CHEK CARLTON) device Use as directed; For: Diabetes mellitus     • gabapentin (NEURONTIN) 400 MG capsule Take 1  capsule by mouth 3 (Three) Times a Day. 270 capsule 1   • glimepiride (AMARYL) 4 MG tablet Take 1 tablet by mouth Every Morning Before Breakfast. 90 tablet 3   • losartan-hydrochlorothiazide (HYZAAR) 100-25 MG per tablet TAKE 1 TABLET EVERY DAY 90 tablet 3   • metFORMIN (GLUCOPHAGE) 500 MG tablet Take 1 tablet by mouth 2 (Two) Times a Day With Meals. 180 tablet 3   • omeprazole (priLOSEC) 20 MG capsule TAKE 1 CAPSULE TWICE DAILY 180 capsule 3   • simvastatin (ZOCOR) 10 MG tablet TAKE 1 TABLET AT BEDTIME 90 tablet 1     No current facility-administered medications on file prior to visit.       Allergies   Allergen Reactions   • Codeine         Review of Systems   Constitutional: Positive for activity change. Negative for appetite change, chills, diaphoresis, fatigue, fever and unexpected weight change.   HENT: Negative for congestion, dental problem, drooling, ear discharge, ear pain, facial swelling, hearing loss, mouth sores, nosebleeds, postnasal drip, rhinorrhea, sinus pressure, sneezing, sore throat, tinnitus, trouble swallowing and voice change.    Eyes: Positive for itching. Negative for photophobia, pain, discharge, redness and visual disturbance.   Respiratory: Positive for cough. Negative for apnea, choking, chest tightness, shortness of breath, wheezing and stridor.    Cardiovascular: Positive for palpitations. Negative for chest pain and leg swelling.   Gastrointestinal: Negative for abdominal distention, abdominal pain, anal bleeding, blood in stool, constipation, diarrhea, nausea, rectal pain and vomiting.   Endocrine: Negative for cold intolerance, heat intolerance, polydipsia, polyphagia and polyuria.   Genitourinary: Positive for pelvic pain. Negative for decreased urine volume, difficulty urinating, dysuria, enuresis, flank pain, frequency, genital sores, hematuria and urgency.   Musculoskeletal: Positive for back pain. Negative for arthralgias, gait problem, joint swelling, myalgias, neck pain and  "neck stiffness.        Joint Pain    Skin: Negative for color change, pallor, rash and wound.   Allergic/Immunologic: Negative for environmental allergies, food allergies and immunocompromised state.   Neurological: Negative for dizziness, tremors, seizures, syncope, facial asymmetry, speech difficulty, weakness, light-headedness, numbness and headaches.   Hematological: Negative for adenopathy. Does not bruise/bleed easily.   Psychiatric/Behavioral: Negative for agitation, behavioral problems, confusion, decreased concentration, dysphoric mood, hallucinations, self-injury, sleep disturbance and suicidal ideas. The patient is not nervous/anxious and is not hyperactive.         Objective      Physical Exam  /93  Pulse 73  Ht 165 cm (64.96\")  Wt 84 kg (185 lb 3 oz)  BMI 30.85 kg/m2    Body mass index is 30.85 kg/(m^2).    General:   Mental Status:  Alert   Appearance: Cooperative, in no acute distress   Build and Nutrition: Well-nourished and well developed female   Orientation: Alert and oriented to person, place and time   Posture: Normal   Gait: Limping on both lower extremities    Integument:   Right hip: No skin lesions, no rash, no ecchymosis   Left hip: No skin lesions, no rash, no ecchymosis    Neurologic:   Sensation:    Right foot: Intact to light touch on the dorsal and plantar aspect    Left foot: Intact to light touch on the dorsal and plantar aspect   Motor:  Right lower extremity: 5/5 quadriceps, hamstrings, ankle dorsiflexors, and ankle plantar flexors  Left lower extremity: 5/5 quadriceps, hamstrings, ankle dorsiflexors, and ankle plantar flexors  Vascular:   Right lower extremity: 2+ dorsalis pedis pulse, prompt capillary refill   Left lower extremity: 2+ dorsalis pedis pulse, prompt capillary refill    Lower Extremities:   Right Hip:    Tenderness:  None    Swelling:  None    Crepitus: Positive    Atrophy:  None    Range of motion:  External Rotation: 30°       Internal " Rotation: 20°       Flexion:  100°       Extension:  0°   Instability:  None  Deformities:  None  Functional testing: Negative Stinchfield  No leg length discrepancy   Left Hip:    Tenderness:  None    Swelling:  None    Crepitus:  None    Atrophy:  None    Range of motion:  External Rotation: 30°       Internal Rotation: 20°       Flexion:  100°       Extension:  0°   Instability:  None  Deformities:  None  Functional testing: Negative Stinchfield    No leg length discrepancy      Imaging/Studies  Imaging Results (last 24 hours)     Procedure Component Value Units Date/Time    XR Hips Bilateral With or Without Pelvis 2 View [[id number]] Resulted:  01/31/18 1022     Updated:  01/31/18 1023    Narrative:       Right Hip Radiographs  Indication: right hip pain  Views: low AP pelvis and lateral of the right hip    Comparison: 6/23/2016    Findings:   Advanced arthritic changes are seen, with bone-on-bone contact, and   osteophytes on both femoral and acetabular aspects.    Left Hip Radiographs  Indication: left hip pain  Views: low AP pelvis and lateral of the left hip    Comparison: 6/23/2016    Findings:   Advanced arthritic changes are seen, with bone-on-bone contact, and   osteophytes on both femoral and acetabular aspects.              Assessment and Plan     Nely was seen today for pain, follow-up and pain.    Diagnoses and all orders for this visit:    Primary osteoarthritis of both hips  -     XR Hips Bilateral With or Without Pelvis 2 View  -     Case Request; Standing  -     CBC and Differential; Future  -     Comprehensive metabolic panel; Future  -     Protime-INR; Future  -     APTT; Future  -     Hemoglobin A1c; Future  -     Sedimentation rate; Future  -     C-reactive protein; Future  -     Urinalysis With / Culture If Indicated - Urine, Clean Catch; Future  -     ECG 12 Lead; Future  -     ceFAZolin (ANCEF) 2 g in sodium chloride 0.9 % 100 mL IVPB; Infuse 2 g into a venous catheter 1 (One) Time.  -      acetaminophen (TYLENOL) tablet 975 mg; Take 3 tablets by mouth 1 (One) Time.  -     meloxicam (MOBIC) tablet 15 mg; Take 2 tablets by mouth 1 (One) Time.  -     pregabalin (LYRICA) capsule 150 mg; Take 1 capsule by mouth 1 (One) Time.  -     mupirocin (BACTROBAN) 2 % nasal ointment 1 application; 1 application by Each Nare route 1 (One) Time.  -     Tranexamic Acid 1,000 mg in sodium chloride 0.9 % 100 mL; Infuse 1,000 mg into a venous catheter 1 (One) Time.  -     Tranexamic Acid 1,000 mg in sodium chloride 0.9 % 100 mL; Infuse 1,000 mg into a venous catheter 1 (One) Time.  -     Case Request    Other orders  -     Cancel: XR Hip With or Without Pelvis 1 View Left  -     Cancel: XR Hip With or Without Pelvis 1 View Right  -     mupirocin (BACTROBAN NASAL) 2 % nasal ointment; into each nostril 2 (Two) Times a Day. Apply pea-sized amount tot each nostril twice daily for 5 days prior to surgery  -     chlorhexidine (HIBICLENS) 4 % external liquid; Apply  topically Daily. Shower with hibiclens solution as directed for 5 days prior to surgery  -     Inpatient Admission; Standing  -     Follow Anesthesia Guidelines / Standing Orders; Future  -     Orthopedic Discharge Planning for PT & Case Management - Inpatient With Post-Op Day 2 or 3 Discharge  -     Obtain informed consent  -     Provide instructions to patient regarding NPO status  -     Clorhexidine skin prep  -     Follow Anesthesia Guidelines / Standing Orders; Standing  -     Verify NPO Status; Standing  -     NILDA hose- To be placed on patient in pre-op; Standing  -     SCD (sequential compression device)- to be placed on patient in Pre-op; Standing  -     Clip operative site; Standing  -     Obtain informed consent (if not collected inpatient or PAT); Standing  -     Notify Physician - Standard; Standing        I reviewed my findings with patient today.  She has advanced bilateral hip arthritis, and would like to proceed with a right total hip arthroplasty at  this time.  Please see my counseling note for details.  She has failed conservative treatment long-term.    Surgical Counseling     I have informed the patient of the diagnosis and the prognosis.  Exhaustive conservative treatment modalities have not resulted in long term pain relief.  The symptoms have progressed to the point of daily pain and inability to perform activities of daily living without significant pain.  The patient has reached the point of desiring to proceed with total hip arthroplasty after discussing the risks, benefits and alternatives to the procedure.  The surgical procedure itself was discussed in detail.  Risks of the procedure were discussed, which included but are not limited to, bleeding, infection, damage to blood vessels and nerves, incomplete pain relief, loosening of the prosthesis, deep infection, need for further surgery, leg length discrepancy, hip dislocation, loss of limb, deep venous thrombosis, pulmonary embolus, death, heart attack, stroke, kidney failure, liver failure, and anesthetic complications.  In addition, the potential for deep infection developing in the future was discussed, which could require further surgery.  The hip would have to be re-opened, debrided, and potentially remove the prosthesis, which may or may not be replaced in the future.  Also, the possibility for loosening of the prosthesis has been mentioned.  If the prosthesis loosened, a revision arthroplasty could be performed, with results that are not as predictable compared to the original procedure.  The typical rehabilitative course has also been discussed, and full recovery may take up to a year to see the maximum benefit.  The importance of patient cooperation in the rehabilitative efforts has also been discussed.  No guarantees whatsoever were given.  The patient understands the potential risks versus the benefits and desires to proceed with total hip arthroplasty at a mutually convenient  time.    Return for For surgery as planned.      Medical Decision Making  Management Options : major surgery with risk factors  Data/Risk: radiology tests and independent visualization of imaging, lab tests, or EMG/NCV      Carlos Pollack MD  01/31/18  11:09 AM

## 2018-02-07 ENCOUNTER — APPOINTMENT (OUTPATIENT)
Dept: DIABETES SERVICES | Facility: HOSPITAL | Age: 73
End: 2018-02-07

## 2018-03-01 ENCOUNTER — APPOINTMENT (OUTPATIENT)
Dept: PREADMISSION TESTING | Facility: HOSPITAL | Age: 73
End: 2018-03-01

## 2018-03-01 VITALS — HEIGHT: 66 IN | BODY MASS INDEX: 30.12 KG/M2 | WEIGHT: 187.39 LBS

## 2018-03-01 DIAGNOSIS — M16.0 PRIMARY OSTEOARTHRITIS OF BOTH HIPS: ICD-10-CM

## 2018-03-01 LAB
ALBUMIN SERPL-MCNC: 3.8 G/DL (ref 3.2–4.8)
ALBUMIN/GLOB SERPL: 1.3 G/DL (ref 1.5–2.5)
ALP SERPL-CCNC: 78 U/L (ref 25–100)
ALT SERPL W P-5'-P-CCNC: 17 U/L (ref 7–40)
ANION GAP SERPL CALCULATED.3IONS-SCNC: 7 MMOL/L (ref 3–11)
APTT PPP: 33 SECONDS (ref 24–31)
AST SERPL-CCNC: 21 U/L (ref 0–33)
BASOPHILS # BLD AUTO: 0.03 10*3/MM3 (ref 0–0.2)
BASOPHILS NFR BLD AUTO: 0.3 % (ref 0–1)
BILIRUB SERPL-MCNC: 0.2 MG/DL (ref 0.3–1.2)
BILIRUB UR QL STRIP: NEGATIVE
BUN BLD-MCNC: 25 MG/DL (ref 9–23)
BUN/CREAT SERPL: 12.5 (ref 7–25)
CALCIUM SPEC-SCNC: 9.1 MG/DL (ref 8.7–10.4)
CHLORIDE SERPL-SCNC: 101 MMOL/L (ref 99–109)
CLARITY UR: CLEAR
CO2 SERPL-SCNC: 28 MMOL/L (ref 20–31)
COLOR UR: YELLOW
CREAT BLD-MCNC: 2 MG/DL (ref 0.6–1.3)
CRP SERPL-MCNC: 5.64 MG/DL (ref 0–1)
DEPRECATED RDW RBC AUTO: 44.5 FL (ref 37–54)
EOSINOPHIL # BLD AUTO: 0.09 10*3/MM3 (ref 0–0.3)
EOSINOPHIL NFR BLD AUTO: 0.8 % (ref 0–3)
ERYTHROCYTE [DISTWIDTH] IN BLOOD BY AUTOMATED COUNT: 13.1 % (ref 11.3–14.5)
ERYTHROCYTE [SEDIMENTATION RATE] IN BLOOD: 55 MM/HR (ref 0–30)
GFR SERPL CREATININE-BSD FRML MDRD: 24 ML/MIN/1.73
GLOBULIN UR ELPH-MCNC: 3 GM/DL
GLUCOSE BLD-MCNC: 190 MG/DL (ref 70–100)
GLUCOSE UR STRIP-MCNC: NEGATIVE MG/DL
HBA1C MFR BLD: 7 % (ref 4.8–5.6)
HCT VFR BLD AUTO: 33.7 % (ref 34.5–44)
HGB BLD-MCNC: 10.8 G/DL (ref 11.5–15.5)
HGB UR QL STRIP.AUTO: NEGATIVE
IMM GRANULOCYTES # BLD: 0.02 10*3/MM3 (ref 0–0.03)
IMM GRANULOCYTES NFR BLD: 0.2 % (ref 0–0.6)
INR PPP: 1.02 (ref 0.91–1.09)
KETONES UR QL STRIP: NEGATIVE
LEUKOCYTE ESTERASE UR QL STRIP.AUTO: NEGATIVE
LYMPHOCYTES # BLD AUTO: 3.23 10*3/MM3 (ref 0.6–4.8)
LYMPHOCYTES NFR BLD AUTO: 27.3 % (ref 24–44)
MCH RBC QN AUTO: 29.9 PG (ref 27–31)
MCHC RBC AUTO-ENTMCNC: 32 G/DL (ref 32–36)
MCV RBC AUTO: 93.4 FL (ref 80–99)
MONOCYTES # BLD AUTO: 0.8 10*3/MM3 (ref 0–1)
MONOCYTES NFR BLD AUTO: 6.8 % (ref 0–12)
NEUTROPHILS # BLD AUTO: 7.66 10*3/MM3 (ref 1.5–8.3)
NEUTROPHILS NFR BLD AUTO: 64.6 % (ref 41–71)
NITRITE UR QL STRIP: NEGATIVE
PH UR STRIP.AUTO: 5.5 [PH] (ref 5–8)
PLATELET # BLD AUTO: 477 10*3/MM3 (ref 150–450)
PMV BLD AUTO: 9.2 FL (ref 6–12)
POTASSIUM BLD-SCNC: 4.3 MMOL/L (ref 3.5–5.5)
PROT SERPL-MCNC: 6.8 G/DL (ref 5.7–8.2)
PROT UR QL STRIP: NEGATIVE
PROTHROMBIN TIME: 10.7 SECONDS (ref 9.6–11.5)
RBC # BLD AUTO: 3.61 10*6/MM3 (ref 3.89–5.14)
SODIUM BLD-SCNC: 136 MMOL/L (ref 132–146)
SP GR UR STRIP: 1.01 (ref 1–1.03)
UROBILINOGEN UR QL STRIP: NORMAL
WBC NRBC COR # BLD: 11.83 10*3/MM3 (ref 3.5–10.8)

## 2018-03-01 PROCEDURE — 85025 COMPLETE CBC W/AUTO DIFF WBC: CPT | Performed by: ORTHOPAEDIC SURGERY

## 2018-03-01 PROCEDURE — 36415 COLL VENOUS BLD VENIPUNCTURE: CPT

## 2018-03-01 PROCEDURE — 85652 RBC SED RATE AUTOMATED: CPT | Performed by: ORTHOPAEDIC SURGERY

## 2018-03-01 PROCEDURE — 86140 C-REACTIVE PROTEIN: CPT | Performed by: ORTHOPAEDIC SURGERY

## 2018-03-01 PROCEDURE — 93010 ELECTROCARDIOGRAM REPORT: CPT | Performed by: INTERNAL MEDICINE

## 2018-03-01 PROCEDURE — 81003 URINALYSIS AUTO W/O SCOPE: CPT | Performed by: ORTHOPAEDIC SURGERY

## 2018-03-01 PROCEDURE — 85610 PROTHROMBIN TIME: CPT | Performed by: ORTHOPAEDIC SURGERY

## 2018-03-01 PROCEDURE — 80053 COMPREHEN METABOLIC PANEL: CPT | Performed by: ORTHOPAEDIC SURGERY

## 2018-03-01 PROCEDURE — 85730 THROMBOPLASTIN TIME PARTIAL: CPT | Performed by: ORTHOPAEDIC SURGERY

## 2018-03-01 PROCEDURE — 83036 HEMOGLOBIN GLYCOSYLATED A1C: CPT | Performed by: ORTHOPAEDIC SURGERY

## 2018-03-01 PROCEDURE — 93005 ELECTROCARDIOGRAM TRACING: CPT

## 2018-03-01 RX ORDER — SODIUM PHOSPHATE,MONO-DIBASIC 19G-7G/118
1 ENEMA (ML) RECTAL 2 TIMES DAILY
COMMUNITY
End: 2018-03-07

## 2018-03-01 ASSESSMENT — HOOS JR
HOOS JR SCORE: 25.103
HOOS JR SCORE: 20

## 2018-03-01 NOTE — PAT
MEASURED FOR TEDS/SCDS IN PAT    CALF MEASUREMENT : 12.75    LENGTH MEASUREMENT: 17    Patient instructed to drink 20 ounces (or until full) of Gatorade or 20 ounces of G2 (if diabetic) and complete 3 hours before your surgery start time. (NO RED Gatorade or G2)    Patient verbalized understanding.     Bactroban and Chlorhexidine Prescription given during PAT visit, as well as written and verbal instructions given to patient during PAT visit.

## 2018-03-05 ENCOUNTER — TELEPHONE (OUTPATIENT)
Dept: INTERNAL MEDICINE | Facility: CLINIC | Age: 73
End: 2018-03-05

## 2018-03-05 NOTE — TELEPHONE ENCOUNTER
----- Message from Cha Montes V sent at 3/2/2018  3:38 PM EST -----  PT CALLED STATING SHE IS GETTING HIP REPLACEMENT ON 3/15 AND NEEDS TO BE SEEN BEFORE THAT.     SHE CAN BE REACHED [phone]

## 2018-03-07 ENCOUNTER — OFFICE VISIT (OUTPATIENT)
Dept: INTERNAL MEDICINE | Facility: CLINIC | Age: 73
End: 2018-03-07

## 2018-03-07 VITALS
SYSTOLIC BLOOD PRESSURE: 104 MMHG | HEART RATE: 78 BPM | RESPIRATION RATE: 19 BRPM | WEIGHT: 184 LBS | DIASTOLIC BLOOD PRESSURE: 62 MMHG | BODY MASS INDEX: 30.15 KG/M2

## 2018-03-07 DIAGNOSIS — K21.9 GASTROESOPHAGEAL REFLUX DISEASE WITHOUT ESOPHAGITIS: ICD-10-CM

## 2018-03-07 DIAGNOSIS — N18.30 CHRONIC KIDNEY DISEASE (CKD), STAGE III (MODERATE) (HCC): Chronic | ICD-10-CM

## 2018-03-07 DIAGNOSIS — I10 ESSENTIAL HYPERTENSION: ICD-10-CM

## 2018-03-07 DIAGNOSIS — M16.0 PRIMARY OSTEOARTHRITIS OF BOTH HIPS: ICD-10-CM

## 2018-03-07 DIAGNOSIS — E78.2 MIXED HYPERLIPIDEMIA: ICD-10-CM

## 2018-03-07 DIAGNOSIS — N28.9 RENAL INSUFFICIENCY: Primary | ICD-10-CM

## 2018-03-07 DIAGNOSIS — E11.9 CONTROLLED TYPE 2 DIABETES MELLITUS WITHOUT COMPLICATION, WITHOUT LONG-TERM CURRENT USE OF INSULIN (HCC): ICD-10-CM

## 2018-03-07 DIAGNOSIS — E11.42 DIABETIC POLYNEUROPATHY ASSOCIATED WITH TYPE 2 DIABETES MELLITUS (HCC): ICD-10-CM

## 2018-03-07 PROCEDURE — 99214 OFFICE O/P EST MOD 30 MIN: CPT | Performed by: INTERNAL MEDICINE

## 2018-03-07 PROCEDURE — 36415 COLL VENOUS BLD VENIPUNCTURE: CPT | Performed by: INTERNAL MEDICINE

## 2018-03-07 NOTE — PROGRESS NOTES
Subjective   Nely Grider is a 72 y.o. female.     History of Present Illness   She was sent here by Dr. Pollack's office because she needs hip replacement and pre op labs showed a creatinine of 2.0 which is significantly higher than previous levels.  She has a hx of fairly well controlled diabetes, HTN and hyperlipidemia.  She feels well but for her hips. No chest pain, sob or edema.  Rest of labs did show elevation of sed rate and CRP.  No protein on urinalysis.  CBC did show a mild anemia.    The following portions of the patient's history were reviewed and updated as appropriate: allergies, current medications, past medical history and problem list.    Review of Systems   Constitutional: Negative.    Respiratory: Negative for cough, chest tightness, shortness of breath and wheezing.    Cardiovascular: Negative.  Negative for chest pain, palpitations and leg swelling.   Gastrointestinal: Negative.  Negative for abdominal distention and abdominal pain.   Musculoskeletal:        As noted.       Objective   Physical Exam   Neck: Normal range of motion. Neck supple.   Cardiovascular: Normal rate, regular rhythm, normal heart sounds and intact distal pulses.  Exam reveals no gallop and no friction rub.    No murmur heard.  Pulmonary/Chest: Effort normal and breath sounds normal. No respiratory distress. She has no wheezes. She has no rales.   Abdominal: Soft. Bowel sounds are normal. She exhibits no distension. There is no tenderness.   Musculoskeletal: She exhibits no edema.   Nursing note and vitals reviewed.      Assessment/Plan   Nely was seen today for discuss bloodwork.    Diagnoses and all orders for this visit:    Renal insufficiency  -     Renal Function Panel  -     Comprehensive Metabolic Panel  -     CBC & Differential    Mixed hyperlipidemia    Essential hypertension    Gastroesophageal reflux disease without esophagitis    Controlled type 2 diabetes mellitus without complication, without long-term  current use of insulin    Diabetic polyneuropathy associated with type 2 diabetes mellitus    Primary osteoarthritis of both hips    Chronic kidney disease (CKD), stage III (moderate)    I cannot explain the new elevation of her creatinine and inflammatory markers.  Will repeat some labs.  If still show renal insufficiency, will send to nephrology.  Spent 30 minutes with patient 1/2 of time in discussion with patient and her daughter.

## 2018-03-08 LAB
ALBUMIN SERPL-MCNC: 4 G/DL (ref 3.2–4.8)
ALBUMIN/GLOB SERPL: 1.4 G/DL (ref 1.5–2.5)
ALP SERPL-CCNC: 78 U/L (ref 25–100)
ALT SERPL-CCNC: 23 U/L (ref 7–40)
AST SERPL-CCNC: 21 U/L (ref 0–33)
BASOPHILS # BLD AUTO: 0.04 10*3/MM3 (ref 0–0.2)
BASOPHILS NFR BLD AUTO: 0.3 % (ref 0–1)
BILIRUB SERPL-MCNC: 0.2 MG/DL (ref 0.3–1.2)
BUN SERPL-MCNC: 23 MG/DL (ref 9–23)
BUN/CREAT SERPL: 17.7 (ref 7–25)
CALCIUM SERPL-MCNC: 9.3 MG/DL (ref 8.7–10.4)
CHLORIDE SERPL-SCNC: 106 MMOL/L (ref 99–109)
CO2 SERPL-SCNC: 28 MMOL/L (ref 20–31)
CREAT SERPL-MCNC: 1.3 MG/DL (ref 0.6–1.3)
EOSINOPHIL # BLD AUTO: 0.19 10*3/MM3 (ref 0–0.3)
EOSINOPHIL NFR BLD AUTO: 1.6 % (ref 0–3)
ERYTHROCYTE [DISTWIDTH] IN BLOOD BY AUTOMATED COUNT: 13.3 % (ref 11.3–14.5)
GFR SERPLBLD CREATININE-BSD FMLA CKD-EPI: 40 ML/MIN/1.73
GFR SERPLBLD CREATININE-BSD FMLA CKD-EPI: 49 ML/MIN/1.73
GLOBULIN SER CALC-MCNC: 2.8 GM/DL
GLUCOSE SERPL-MCNC: 150 MG/DL (ref 70–100)
HCT VFR BLD AUTO: 36 % (ref 34.5–44)
HGB BLD-MCNC: 11 G/DL (ref 11.5–15.5)
IMM GRANULOCYTES # BLD: 0.04 10*3/MM3 (ref 0–0.03)
IMM GRANULOCYTES NFR BLD: 0.3 % (ref 0–0.6)
LYMPHOCYTES # BLD AUTO: 4.36 10*3/MM3 (ref 0.6–4.8)
LYMPHOCYTES NFR BLD AUTO: 36.9 % (ref 24–44)
MCH RBC QN AUTO: 29.5 PG (ref 27–31)
MCHC RBC AUTO-ENTMCNC: 30.6 G/DL (ref 32–36)
MCV RBC AUTO: 96.5 FL (ref 80–99)
MONOCYTES # BLD AUTO: 0.91 10*3/MM3 (ref 0–1)
MONOCYTES NFR BLD AUTO: 7.7 % (ref 0–12)
NEUTROPHILS # BLD AUTO: 6.29 10*3/MM3 (ref 1.5–8.3)
NEUTROPHILS NFR BLD AUTO: 53.2 % (ref 41–71)
PHOSPHATE SERPL-MCNC: 3 MG/DL (ref 2.4–5.1)
PLATELET # BLD AUTO: 519 10*3/MM3 (ref 150–450)
POTASSIUM SERPL-SCNC: 4.7 MMOL/L (ref 3.5–5.5)
PROT SERPL-MCNC: 6.8 G/DL (ref 5.7–8.2)
RBC # BLD AUTO: 3.73 10*6/MM3 (ref 3.89–5.14)
SODIUM SERPL-SCNC: 140 MMOL/L (ref 132–146)
WBC # BLD AUTO: 11.83 10*3/MM3 (ref 3.5–10.8)

## 2018-03-30 ENCOUNTER — TELEPHONE (OUTPATIENT)
Dept: INTERNAL MEDICINE | Facility: CLINIC | Age: 73
End: 2018-03-30

## 2018-03-30 NOTE — TELEPHONE ENCOUNTER
----- Message from Irene Dong sent at 3/27/2018  1:53 PM EDT -----  Contact: BRIGITTE  PATIENT CALLED AND STATED THE [facility] CALLED HER TWICE STATING THAT ONE OF HER MEDS NEEDS A REFILL. THEY TOLD HER THEY HAD FAXED OVER REFILL REQUEST TWICE BUT HAVE NOT RECEIVED ANYTHING. PATIENT DOES NOT REMEMBER WHICH MEDICINE THEY CALLED ABOUT NEEDING A REFILL. THEY GAVE HER A FAX NUMBER OF [phone].     CALL THE PATIENT [phone] IF YOU HAVE ANY QUESTIONS

## 2018-03-30 NOTE — TELEPHONE ENCOUNTER
LMOV @ 12:15 FOR PT TO RETURN CALL. NEED PT TO CHECK MED BOTTLE AND SEE WHICH SAYS SHE HAS NO MORE REFILLS.

## 2018-04-06 ENCOUNTER — APPOINTMENT (OUTPATIENT)
Dept: PREADMISSION TESTING | Facility: HOSPITAL | Age: 73
End: 2018-04-06

## 2018-04-06 ENCOUNTER — HOSPITAL ENCOUNTER (OUTPATIENT)
Dept: GENERAL RADIOLOGY | Facility: HOSPITAL | Age: 73
Discharge: HOME | End: 2018-04-06
Admitting: ORTHOPAEDIC SURGERY

## 2018-04-06 VITALS — HEIGHT: 66 IN | BODY MASS INDEX: 29.57 KG/M2 | WEIGHT: 184 LBS

## 2018-04-06 DIAGNOSIS — Z01.89 LABORATORY TEST: Primary | ICD-10-CM

## 2018-04-06 LAB
ABO GROUP BLD: NORMAL
ANION GAP SERPL CALCULATED.3IONS-SCNC: 7 MMOL/L (ref 3–11)
APTT PPP: 31.9 SECONDS (ref 24–31)
BACTERIA UR QL AUTO: ABNORMAL /HPF
BASOPHILS # BLD AUTO: 0.03 10*3/MM3 (ref 0–0.2)
BASOPHILS NFR BLD AUTO: 0.2 % (ref 0–1)
BILIRUB UR QL STRIP: NEGATIVE
BILIRUB UR QL STRIP: NEGATIVE
BUN BLD-MCNC: 22 MG/DL (ref 9–23)
BUN/CREAT SERPL: 14.7 (ref 7–25)
CALCIUM SPEC-SCNC: 9.6 MG/DL (ref 8.7–10.4)
CHLORIDE SERPL-SCNC: 101 MMOL/L (ref 99–109)
CLARITY UR: ABNORMAL
CLARITY UR: CLEAR
CO2 SERPL-SCNC: 28 MMOL/L (ref 20–31)
COLOR UR: YELLOW
COLOR UR: YELLOW
CREAT BLD-MCNC: 1.5 MG/DL (ref 0.6–1.3)
CRP SERPL-MCNC: 9 MG/DL (ref 0–1)
DEPRECATED RDW RBC AUTO: 48 FL (ref 37–54)
EOSINOPHIL # BLD AUTO: 0.05 10*3/MM3 (ref 0–0.3)
EOSINOPHIL NFR BLD AUTO: 0.3 % (ref 0–3)
ERYTHROCYTE [DISTWIDTH] IN BLOOD BY AUTOMATED COUNT: 13.8 % (ref 11.3–14.5)
ERYTHROCYTE [SEDIMENTATION RATE] IN BLOOD: 78 MM/HR (ref 0–30)
GFR SERPL CREATININE-BSD FRML MDRD: 34 ML/MIN/1.73
GLUCOSE BLD-MCNC: 287 MG/DL (ref 70–100)
GLUCOSE UR STRIP-MCNC: ABNORMAL MG/DL
GLUCOSE UR STRIP-MCNC: NEGATIVE MG/DL
HBA1C MFR BLD: 7 % (ref 4.8–5.6)
HCT VFR BLD AUTO: 33.5 % (ref 34.5–44)
HGB BLD-MCNC: 10.6 G/DL (ref 11.5–15.5)
HGB UR QL STRIP.AUTO: NEGATIVE
HGB UR QL STRIP.AUTO: NEGATIVE
HYALINE CASTS UR QL AUTO: ABNORMAL /LPF
IMM GRANULOCYTES # BLD: 0.04 10*3/MM3 (ref 0–0.03)
IMM GRANULOCYTES NFR BLD: 0.3 % (ref 0–0.6)
INR PPP: 1.05 (ref 0.91–1.09)
KETONES UR QL STRIP: ABNORMAL
KETONES UR QL STRIP: NEGATIVE
LEUKOCYTE ESTERASE UR QL STRIP.AUTO: ABNORMAL
LEUKOCYTE ESTERASE UR QL STRIP.AUTO: NEGATIVE
LYMPHOCYTES # BLD AUTO: 2.5 10*3/MM3 (ref 0.6–4.8)
LYMPHOCYTES NFR BLD AUTO: 16.7 % (ref 24–44)
MCH RBC QN AUTO: 29.8 PG (ref 27–31)
MCHC RBC AUTO-ENTMCNC: 31.6 G/DL (ref 32–36)
MCV RBC AUTO: 94.1 FL (ref 80–99)
MONOCYTES # BLD AUTO: 0.73 10*3/MM3 (ref 0–1)
MONOCYTES NFR BLD AUTO: 4.9 % (ref 0–12)
NEUTROPHILS # BLD AUTO: 11.64 10*3/MM3 (ref 1.5–8.3)
NEUTROPHILS NFR BLD AUTO: 77.6 % (ref 41–71)
NITRITE UR QL STRIP: NEGATIVE
NITRITE UR QL STRIP: NEGATIVE
PH UR STRIP.AUTO: 5.5 [PH] (ref 5–8)
PH UR STRIP.AUTO: 6 [PH] (ref 5–8)
PLAT MORPH BLD: NORMAL
PLATELET # BLD AUTO: 424 10*3/MM3 (ref 150–450)
PMV BLD AUTO: 9.2 FL (ref 6–12)
POTASSIUM BLD-SCNC: 4.3 MMOL/L (ref 3.5–5.5)
PROT UR QL STRIP: NEGATIVE
PROT UR QL STRIP: NEGATIVE
PROTHROMBIN TIME: 11 SECONDS (ref 9.6–11.5)
RBC # BLD AUTO: 3.56 10*6/MM3 (ref 3.89–5.14)
RBC # UR: ABNORMAL /HPF
RBC MORPH BLD: NORMAL
REF LAB TEST METHOD: ABNORMAL
RH BLD: POSITIVE
SODIUM BLD-SCNC: 136 MMOL/L (ref 132–146)
SP GR UR STRIP: 1.01 (ref 1–1.03)
SP GR UR STRIP: 1.02 (ref 1–1.03)
SQUAMOUS #/AREA URNS HPF: ABNORMAL /HPF
UROBILINOGEN UR QL STRIP: ABNORMAL
UROBILINOGEN UR QL STRIP: NORMAL
WBC MORPH BLD: NORMAL
WBC NRBC COR # BLD: 14.99 10*3/MM3 (ref 3.5–10.8)
WBC UR QL AUTO: ABNORMAL /HPF

## 2018-04-06 PROCEDURE — 86140 C-REACTIVE PROTEIN: CPT | Performed by: ORTHOPAEDIC SURGERY

## 2018-04-06 PROCEDURE — 85730 THROMBOPLASTIN TIME PARTIAL: CPT | Performed by: ORTHOPAEDIC SURGERY

## 2018-04-06 PROCEDURE — 81003 URINALYSIS AUTO W/O SCOPE: CPT | Performed by: ORTHOPAEDIC SURGERY

## 2018-04-06 PROCEDURE — 83036 HEMOGLOBIN GLYCOSYLATED A1C: CPT | Performed by: ORTHOPAEDIC SURGERY

## 2018-04-06 PROCEDURE — 86901 BLOOD TYPING SEROLOGIC RH(D): CPT

## 2018-04-06 PROCEDURE — 85025 COMPLETE CBC W/AUTO DIFF WBC: CPT | Performed by: ORTHOPAEDIC SURGERY

## 2018-04-06 PROCEDURE — 36415 COLL VENOUS BLD VENIPUNCTURE: CPT

## 2018-04-06 PROCEDURE — 85652 RBC SED RATE AUTOMATED: CPT | Performed by: ORTHOPAEDIC SURGERY

## 2018-04-06 PROCEDURE — 86900 BLOOD TYPING SEROLOGIC ABO: CPT

## 2018-04-06 PROCEDURE — 85610 PROTHROMBIN TIME: CPT | Performed by: ORTHOPAEDIC SURGERY

## 2018-04-06 PROCEDURE — 85007 BL SMEAR W/DIFF WBC COUNT: CPT | Performed by: ORTHOPAEDIC SURGERY

## 2018-04-06 PROCEDURE — 81001 URINALYSIS AUTO W/SCOPE: CPT | Performed by: ORTHOPAEDIC SURGERY

## 2018-04-06 PROCEDURE — 80048 BASIC METABOLIC PNL TOTAL CA: CPT | Performed by: ORTHOPAEDIC SURGERY

## 2018-04-06 PROCEDURE — 71046 X-RAY EXAM CHEST 2 VIEWS: CPT

## 2018-04-06 ASSESSMENT — HOOS JR
HOOS JR SCORE: 20.805
HOOS JR SCORE: 21

## 2018-04-06 NOTE — PAT
Dr Pollack notified of WBC 14.99,ESR 78 and A1C of 7.00. He will contact her to have cbc repeated prior to surgery

## 2018-04-06 NOTE — PAT
Patient to apply to surgical area (as instructed) the night before procedure and the AM of procedure.  ERAS instructions given to patient,  Bactroban and Chlorhexidine Prescription given during PAT visit, as well as written and verbal instructions given to patient during PAT visit.

## 2018-04-06 NOTE — DISCHARGE INSTRUCTIONS

## 2018-04-12 ENCOUNTER — OFFICE VISIT (OUTPATIENT)
Dept: INTERNAL MEDICINE | Facility: CLINIC | Age: 73
End: 2018-04-12

## 2018-04-12 VITALS
SYSTOLIC BLOOD PRESSURE: 100 MMHG | RESPIRATION RATE: 19 BRPM | DIASTOLIC BLOOD PRESSURE: 68 MMHG | HEART RATE: 88 BPM | WEIGHT: 184 LBS | BODY MASS INDEX: 30.15 KG/M2

## 2018-04-12 DIAGNOSIS — I10 ESSENTIAL HYPERTENSION: ICD-10-CM

## 2018-04-12 DIAGNOSIS — K21.9 GASTROESOPHAGEAL REFLUX DISEASE WITHOUT ESOPHAGITIS: ICD-10-CM

## 2018-04-12 DIAGNOSIS — M25.551 PAIN OF RIGHT HIP JOINT: ICD-10-CM

## 2018-04-12 DIAGNOSIS — N18.30 CHRONIC KIDNEY DISEASE (CKD), STAGE III (MODERATE) (HCC): Chronic | ICD-10-CM

## 2018-04-12 DIAGNOSIS — E78.2 MIXED HYPERLIPIDEMIA: ICD-10-CM

## 2018-04-12 DIAGNOSIS — E11.42 DIABETIC POLYNEUROPATHY ASSOCIATED WITH TYPE 2 DIABETES MELLITUS (HCC): ICD-10-CM

## 2018-04-12 DIAGNOSIS — M16.0 PRIMARY OSTEOARTHRITIS OF BOTH HIPS: ICD-10-CM

## 2018-04-12 DIAGNOSIS — E11.9 CONTROLLED TYPE 2 DIABETES MELLITUS WITHOUT COMPLICATION, WITHOUT LONG-TERM CURRENT USE OF INSULIN (HCC): Primary | ICD-10-CM

## 2018-04-12 LAB
EXPIRATION DATE: NORMAL
HBA1C MFR BLD: 6.8 %
Lab: NORMAL

## 2018-04-12 PROCEDURE — 99214 OFFICE O/P EST MOD 30 MIN: CPT | Performed by: INTERNAL MEDICINE

## 2018-04-12 PROCEDURE — 83036 HEMOGLOBIN GLYCOSYLATED A1C: CPT | Performed by: INTERNAL MEDICINE

## 2018-04-12 NOTE — PROGRESS NOTES
Subjective   Nely Grider is a 72 y.o. female.     History of Present Illness   For follow up of  HTN on meds, no chest pain sob or headaches.  Hyperlipidemia on meds, no muscle aches.  GERD is fine on meds.  Mild renal insufficiency with the last creatinine of 1.5.  Mild anemia secondary.  NIDDM with fairly good control last A1C  = 7.0.  Diabetic peripheral neuropathy.  Has severe DJD of right hip and is to have surgery soon.    The following portions of the patient's history were reviewed and updated as appropriate: allergies, current medications, past medical history and problem list.    Review of Systems   Constitutional: Negative.    Eyes: Negative.    Respiratory: Negative.  Negative for cough, chest tightness, shortness of breath and wheezing.    Cardiovascular: Negative.  Negative for chest pain, palpitations and leg swelling.   Gastrointestinal: Negative.  Negative for abdominal distention and abdominal pain.   Genitourinary: Negative.    Musculoskeletal: Positive for arthralgias.       Objective   Physical Exam   Constitutional: She appears well-developed and well-nourished.   Neck: Normal range of motion. Neck supple.   Cardiovascular: Normal rate, regular rhythm and normal heart sounds.  Exam reveals no gallop and no friction rub.    No murmur heard.  Pulmonary/Chest: Effort normal and breath sounds normal. No respiratory distress. She has no wheezes. She has no rales. She exhibits no tenderness.   Abdominal: Soft. Bowel sounds are normal. She exhibits no distension and no mass. There is no tenderness. There is no guarding.   Musculoskeletal:   Hip does have limited motion.   Nursing note and vitals reviewed.        Assessment/Plan   Nely was seen today for diabetes.    Diagnoses and all orders for this visit:    Controlled type 2 diabetes mellitus without complication, without long-term current use of insulin  -     POC Glycosylated Hemoglobin (Hb A1C)    Mixed hyperlipidemia    Essential  hypertension    Gastroesophageal reflux disease without esophagitis    Diabetic polyneuropathy associated with type 2 diabetes mellitus    Pain of right hip joint    Primary osteoarthritis of both hips    Chronic kidney disease (CKD), stage III (moderate)    All problems are stable.  Labs recently are stable, creatinine = 1.5.  Same meds.  Recheck 4 months.

## 2018-04-18 RX ORDER — OMEPRAZOLE 20 MG/1
CAPSULE, DELAYED RELEASE ORAL
Qty: 180 CAPSULE | Refills: 3 | Status: SHIPPED | OUTPATIENT
Start: 2018-04-18 | End: 2019-02-04 | Stop reason: SDUPTHER

## 2018-04-18 RX ORDER — SIMVASTATIN 10 MG
TABLET ORAL
Qty: 90 TABLET | Refills: 1 | Status: SHIPPED | OUTPATIENT
Start: 2018-04-18 | End: 2018-09-05 | Stop reason: SDUPTHER

## 2018-04-19 ENCOUNTER — APPOINTMENT (OUTPATIENT)
Dept: GENERAL RADIOLOGY | Facility: HOSPITAL | Age: 73
End: 2018-04-19

## 2018-04-19 ENCOUNTER — HOSPITAL ENCOUNTER (INPATIENT)
Facility: HOSPITAL | Age: 73
LOS: 2 days | Discharge: HOME | End: 2018-04-21
Attending: ORTHOPAEDIC SURGERY | Admitting: ORTHOPAEDIC SURGERY

## 2018-04-19 ENCOUNTER — ANESTHESIA EVENT (OUTPATIENT)
Dept: PERIOP | Facility: HOSPITAL | Age: 73
End: 2018-04-19

## 2018-04-19 ENCOUNTER — ANESTHESIA (OUTPATIENT)
Dept: PERIOP | Facility: HOSPITAL | Age: 73
End: 2018-04-19

## 2018-04-19 DIAGNOSIS — Z78.9 IMPAIRED MOBILITY AND ADLS: ICD-10-CM

## 2018-04-19 DIAGNOSIS — M16.0 PRIMARY OSTEOARTHRITIS OF BOTH HIPS: ICD-10-CM

## 2018-04-19 DIAGNOSIS — Z96.641 STATUS POST TOTAL REPLACEMENT OF RIGHT HIP: ICD-10-CM

## 2018-04-19 DIAGNOSIS — Z74.09 IMPAIRED MOBILITY AND ADLS: ICD-10-CM

## 2018-04-19 DIAGNOSIS — Z74.09 IMPAIRED FUNCTIONAL MOBILITY, BALANCE, GAIT, AND ENDURANCE: Primary | ICD-10-CM

## 2018-04-19 DIAGNOSIS — M25.551 PAIN OF RIGHT HIP JOINT: ICD-10-CM

## 2018-04-19 DIAGNOSIS — R13.10 DYSPHAGIA, UNSPECIFIED TYPE: ICD-10-CM

## 2018-04-19 DIAGNOSIS — E11.9 CONTROLLED TYPE 2 DIABETES MELLITUS WITHOUT COMPLICATION, WITHOUT LONG-TERM CURRENT USE OF INSULIN (HCC): ICD-10-CM

## 2018-04-19 LAB
DEPRECATED RDW RBC AUTO: 44.8 FL (ref 37–54)
ERYTHROCYTE [DISTWIDTH] IN BLOOD BY AUTOMATED COUNT: 13.5 % (ref 11.3–14.5)
GLUCOSE BLDC GLUCOMTR-MCNC: 105 MG/DL (ref 70–130)
GLUCOSE BLDC GLUCOMTR-MCNC: 129 MG/DL (ref 70–130)
GLUCOSE BLDC GLUCOMTR-MCNC: 327 MG/DL (ref 70–130)
GLUCOSE BLDC GLUCOMTR-MCNC: 387 MG/DL (ref 70–130)
HCT VFR BLD AUTO: 33.2 % (ref 34.5–44)
HGB BLD-MCNC: 10.6 G/DL (ref 11.5–15.5)
MCH RBC QN AUTO: 29.1 PG (ref 27–31)
MCHC RBC AUTO-ENTMCNC: 31.9 G/DL (ref 32–36)
MCV RBC AUTO: 91.2 FL (ref 80–99)
PLATELET # BLD AUTO: 553 10*3/MM3 (ref 150–450)
PMV BLD AUTO: 8.6 FL (ref 6–12)
POTASSIUM BLDA-SCNC: 4.13 MMOL/L (ref 3.5–5.3)
RBC # BLD AUTO: 3.64 10*6/MM3 (ref 3.89–5.14)
WBC NRBC COR # BLD: 13.53 10*3/MM3 (ref 3.5–10.8)

## 2018-04-19 PROCEDURE — C1776 JOINT DEVICE (IMPLANTABLE): HCPCS | Performed by: ORTHOPAEDIC SURGERY

## 2018-04-19 PROCEDURE — 25010000003 CEFAZOLIN IN DEXTROSE 2-4 GM/100ML-% SOLUTION: Performed by: ORTHOPAEDIC SURGERY

## 2018-04-19 PROCEDURE — 85027 COMPLETE CBC AUTOMATED: CPT | Performed by: ORTHOPAEDIC SURGERY

## 2018-04-19 PROCEDURE — 63710000001 INSULIN LISPRO (HUMAN) PER 5 UNITS: Performed by: NURSE PRACTITIONER

## 2018-04-19 PROCEDURE — 84132 ASSAY OF SERUM POTASSIUM: CPT | Performed by: ANESTHESIOLOGY

## 2018-04-19 PROCEDURE — 25010000002 FENTANYL CITRATE (PF) 100 MCG/2ML SOLUTION: Performed by: NURSE ANESTHETIST, CERTIFIED REGISTERED

## 2018-04-19 PROCEDURE — 27130 TOTAL HIP ARTHROPLASTY: CPT | Performed by: ORTHOPAEDIC SURGERY

## 2018-04-19 PROCEDURE — 25010000002 ONDANSETRON PER 1 MG: Performed by: NURSE ANESTHETIST, CERTIFIED REGISTERED

## 2018-04-19 PROCEDURE — 82962 GLUCOSE BLOOD TEST: CPT

## 2018-04-19 PROCEDURE — 25010000002 PHENYLEPHRINE PER 1 ML: Performed by: NURSE ANESTHETIST, CERTIFIED REGISTERED

## 2018-04-19 PROCEDURE — 0SR904Z REPLACEMENT OF RIGHT HIP JOINT WITH CERAMIC ON POLYETHYLENE SYNTHETIC SUBSTITUTE, OPEN APPROACH: ICD-10-PCS | Performed by: ORTHOPAEDIC SURGERY

## 2018-04-19 PROCEDURE — 25010000002 PROPOFOL 1000 MG/ML EMULSION: Performed by: NURSE ANESTHETIST, CERTIFIED REGISTERED

## 2018-04-19 PROCEDURE — 97161 PT EVAL LOW COMPLEX 20 MIN: CPT

## 2018-04-19 PROCEDURE — 87075 CULTR BACTERIA EXCEPT BLOOD: CPT | Performed by: ORTHOPAEDIC SURGERY

## 2018-04-19 PROCEDURE — 87205 SMEAR GRAM STAIN: CPT | Performed by: ORTHOPAEDIC SURGERY

## 2018-04-19 PROCEDURE — 87176 TISSUE HOMOGENIZATION CULTR: CPT | Performed by: ORTHOPAEDIC SURGERY

## 2018-04-19 PROCEDURE — 87102 FUNGUS ISOLATION CULTURE: CPT | Performed by: ORTHOPAEDIC SURGERY

## 2018-04-19 PROCEDURE — 88307 TISSUE EXAM BY PATHOLOGIST: CPT | Performed by: ORTHOPAEDIC SURGERY

## 2018-04-19 PROCEDURE — 25010000002 DEXAMETHASONE PER 1 MG: Performed by: NURSE ANESTHETIST, CERTIFIED REGISTERED

## 2018-04-19 PROCEDURE — 87206 SMEAR FLUORESCENT/ACID STAI: CPT | Performed by: ORTHOPAEDIC SURGERY

## 2018-04-19 PROCEDURE — 88331 PATH CONSLTJ SURG 1 BLK 1SPC: CPT | Performed by: PATHOLOGY

## 2018-04-19 PROCEDURE — 25010000002 ROPIVACAINE PER 1 MG: Performed by: ORTHOPAEDIC SURGERY

## 2018-04-19 PROCEDURE — 25010000002 HYDROMORPHONE PER 4 MG: Performed by: INTERNAL MEDICINE

## 2018-04-19 PROCEDURE — 88311 DECALCIFY TISSUE: CPT | Performed by: ORTHOPAEDIC SURGERY

## 2018-04-19 PROCEDURE — 87116 MYCOBACTERIA CULTURE: CPT | Performed by: ORTHOPAEDIC SURGERY

## 2018-04-19 PROCEDURE — 87070 CULTURE OTHR SPECIMN AEROBIC: CPT | Performed by: ORTHOPAEDIC SURGERY

## 2018-04-19 PROCEDURE — 97116 GAIT TRAINING THERAPY: CPT

## 2018-04-19 PROCEDURE — 73502 X-RAY EXAM HIP UNI 2-3 VIEWS: CPT

## 2018-04-19 PROCEDURE — C1755 CATHETER, INTRASPINAL: HCPCS | Performed by: ORTHOPAEDIC SURGERY

## 2018-04-19 DEVICE — SUMMIT FEMORAL STEM 12/14 TAPER TAPER ED W/POROCOAT SIZE 5 HI 145MM
Type: IMPLANTABLE DEVICE | Site: HIP | Status: FUNCTIONAL
Brand: SUMMIT POROCOAT

## 2018-04-19 DEVICE — BIOLOX DELTA CERAMIC FEMORAL HEAD 32MM DIA +1 12/14 TAPER
Type: IMPLANTABLE DEVICE | Site: HIP | Status: FUNCTIONAL
Brand: BIOLOX DELTA

## 2018-04-19 DEVICE — TOTL HIP COA DEPUY 9641334: Type: IMPLANTABLE DEVICE | Site: HIP | Status: FUNCTIONAL

## 2018-04-19 DEVICE — PINNACLE HIP SOLUTIONS ALTRX POLYETHYLENE ACETABULAR LINER +4 NEUTRAL 32MM ID 50MM OD
Type: IMPLANTABLE DEVICE | Site: HIP | Status: FUNCTIONAL
Brand: PINNACLE ALTRX

## 2018-04-19 DEVICE — PINNACLE GRIPTION ACETABULAR SHELL SECTOR 50MM OD
Type: IMPLANTABLE DEVICE | Site: HIP | Status: FUNCTIONAL
Brand: PINNACLE GRIPTION

## 2018-04-19 DEVICE — TOTL HIP GRIPTION CUP DEPUY UPCHRG: Type: IMPLANTABLE DEVICE | Site: HIP | Status: FUNCTIONAL

## 2018-04-19 RX ORDER — DEXAMETHASONE SODIUM PHOSPHATE 4 MG/ML
INJECTION, SOLUTION INTRA-ARTICULAR; INTRALESIONAL; INTRAMUSCULAR; INTRAVENOUS; SOFT TISSUE AS NEEDED
Status: DISCONTINUED | OUTPATIENT
Start: 2018-04-19 | End: 2018-04-19 | Stop reason: SURG

## 2018-04-19 RX ORDER — PANTOPRAZOLE SODIUM 40 MG/1
40 TABLET, DELAYED RELEASE ORAL EVERY MORNING
Status: DISCONTINUED | OUTPATIENT
Start: 2018-04-19 | End: 2018-04-21 | Stop reason: HOSPADM

## 2018-04-19 RX ORDER — PREGABALIN 150 MG/1
150 CAPSULE ORAL ONCE
Status: COMPLETED | OUTPATIENT
Start: 2018-04-19 | End: 2018-04-19

## 2018-04-19 RX ORDER — MELOXICAM 7.5 MG/1
15 TABLET ORAL DAILY
Status: DISCONTINUED | OUTPATIENT
Start: 2018-04-19 | End: 2018-04-20

## 2018-04-19 RX ORDER — FAMOTIDINE 20 MG/1
20 TABLET, FILM COATED ORAL
Status: DISCONTINUED | OUTPATIENT
Start: 2018-04-19 | End: 2018-04-19 | Stop reason: HOSPADM

## 2018-04-19 RX ORDER — BISACODYL 5 MG/1
10 TABLET, DELAYED RELEASE ORAL DAILY PRN
Status: DISCONTINUED | OUTPATIENT
Start: 2018-04-19 | End: 2018-04-21 | Stop reason: HOSPADM

## 2018-04-19 RX ORDER — SODIUM CHLORIDE 0.9 % (FLUSH) 0.9 %
1-10 SYRINGE (ML) INJECTION AS NEEDED
Status: DISCONTINUED | OUTPATIENT
Start: 2018-04-19 | End: 2018-04-21 | Stop reason: HOSPADM

## 2018-04-19 RX ORDER — ONDANSETRON 2 MG/ML
4 INJECTION INTRAMUSCULAR; INTRAVENOUS ONCE AS NEEDED
Status: DISCONTINUED | OUTPATIENT
Start: 2018-04-19 | End: 2018-04-19 | Stop reason: HOSPADM

## 2018-04-19 RX ORDER — MAGNESIUM HYDROXIDE 1200 MG/15ML
LIQUID ORAL AS NEEDED
Status: DISCONTINUED | OUTPATIENT
Start: 2018-04-19 | End: 2018-04-19 | Stop reason: HOSPADM

## 2018-04-19 RX ORDER — EPHEDRINE SULFATE 50 MG/ML
5 INJECTION, SOLUTION INTRAVENOUS ONCE AS NEEDED
Status: DISCONTINUED | OUTPATIENT
Start: 2018-04-19 | End: 2018-04-19 | Stop reason: HOSPADM

## 2018-04-19 RX ORDER — NALOXONE HCL 0.4 MG/ML
0.4 VIAL (ML) INJECTION AS NEEDED
Status: DISCONTINUED | OUTPATIENT
Start: 2018-04-19 | End: 2018-04-19 | Stop reason: HOSPADM

## 2018-04-19 RX ORDER — SODIUM CHLORIDE 0.9 % (FLUSH) 0.9 %
1-10 SYRINGE (ML) INJECTION AS NEEDED
Status: DISCONTINUED | OUTPATIENT
Start: 2018-04-19 | End: 2018-04-19 | Stop reason: HOSPADM

## 2018-04-19 RX ORDER — SODIUM CHLORIDE 9 MG/ML
120 INJECTION, SOLUTION INTRAVENOUS CONTINUOUS
Status: DISCONTINUED | OUTPATIENT
Start: 2018-04-19 | End: 2018-04-21 | Stop reason: HOSPADM

## 2018-04-19 RX ORDER — HYDRALAZINE HYDROCHLORIDE 20 MG/ML
10 INJECTION INTRAMUSCULAR; INTRAVENOUS EVERY 6 HOURS PRN
Status: DISCONTINUED | OUTPATIENT
Start: 2018-04-19 | End: 2018-04-21 | Stop reason: HOSPADM

## 2018-04-19 RX ORDER — MELOXICAM 7.5 MG/1
15 TABLET ORAL ONCE
Status: COMPLETED | OUTPATIENT
Start: 2018-04-19 | End: 2018-04-19

## 2018-04-19 RX ORDER — LABETALOL HYDROCHLORIDE 5 MG/ML
5 INJECTION, SOLUTION INTRAVENOUS
Status: DISCONTINUED | OUTPATIENT
Start: 2018-04-19 | End: 2018-04-19 | Stop reason: HOSPADM

## 2018-04-19 RX ORDER — FENTANYL CITRATE 50 UG/ML
INJECTION, SOLUTION INTRAMUSCULAR; INTRAVENOUS AS NEEDED
Status: DISCONTINUED | OUTPATIENT
Start: 2018-04-19 | End: 2018-04-19 | Stop reason: SURG

## 2018-04-19 RX ORDER — BISACODYL 10 MG
10 SUPPOSITORY, RECTAL RECTAL DAILY PRN
Status: DISCONTINUED | OUTPATIENT
Start: 2018-04-19 | End: 2018-04-21 | Stop reason: HOSPADM

## 2018-04-19 RX ORDER — FAMOTIDINE 20 MG/1
20 TABLET, FILM COATED ORAL ONCE
Status: DISCONTINUED | OUTPATIENT
Start: 2018-04-19 | End: 2018-04-19 | Stop reason: HOSPADM

## 2018-04-19 RX ORDER — HYDROCODONE BITARTRATE AND ACETAMINOPHEN 7.5; 325 MG/1; MG/1
1 TABLET ORAL EVERY 4 HOURS PRN
Status: DISCONTINUED | OUTPATIENT
Start: 2018-04-19 | End: 2018-04-19

## 2018-04-19 RX ORDER — OXYCODONE AND ACETAMINOPHEN 7.5; 325 MG/1; MG/1
1 TABLET ORAL EVERY 4 HOURS PRN
Status: DISCONTINUED | OUTPATIENT
Start: 2018-04-19 | End: 2018-04-21

## 2018-04-19 RX ORDER — DOCUSATE SODIUM 100 MG/1
100 CAPSULE, LIQUID FILLED ORAL 2 TIMES DAILY PRN
Status: DISCONTINUED | OUTPATIENT
Start: 2018-04-19 | End: 2018-04-21 | Stop reason: HOSPADM

## 2018-04-19 RX ORDER — MEPERIDINE HYDROCHLORIDE 25 MG/ML
12.5 INJECTION INTRAMUSCULAR; INTRAVENOUS; SUBCUTANEOUS
Status: DISCONTINUED | OUTPATIENT
Start: 2018-04-19 | End: 2018-04-19 | Stop reason: HOSPADM

## 2018-04-19 RX ORDER — SODIUM CHLORIDE, SODIUM LACTATE, POTASSIUM CHLORIDE, CALCIUM CHLORIDE 600; 310; 30; 20 MG/100ML; MG/100ML; MG/100ML; MG/100ML
100 INJECTION, SOLUTION INTRAVENOUS CONTINUOUS
Status: DISCONTINUED | OUTPATIENT
Start: 2018-04-19 | End: 2018-04-21 | Stop reason: HOSPADM

## 2018-04-19 RX ORDER — BUPIVACAINE HYDROCHLORIDE 5 MG/ML
INJECTION, SOLUTION EPIDURAL; INTRACAUDAL AS NEEDED
Status: DISCONTINUED | OUTPATIENT
Start: 2018-04-19 | End: 2018-04-19 | Stop reason: SURG

## 2018-04-19 RX ORDER — CEFAZOLIN SODIUM 2 G/100ML
2 INJECTION, SOLUTION INTRAVENOUS EVERY 8 HOURS
Status: COMPLETED | OUTPATIENT
Start: 2018-04-19 | End: 2018-04-20

## 2018-04-19 RX ORDER — SODIUM CHLORIDE, SODIUM LACTATE, POTASSIUM CHLORIDE, CALCIUM CHLORIDE 600; 310; 30; 20 MG/100ML; MG/100ML; MG/100ML; MG/100ML
9 INJECTION, SOLUTION INTRAVENOUS CONTINUOUS PRN
Status: DISCONTINUED | OUTPATIENT
Start: 2018-04-19 | End: 2018-04-19 | Stop reason: HOSPADM

## 2018-04-19 RX ORDER — ONDANSETRON 4 MG/1
4 TABLET, FILM COATED ORAL EVERY 6 HOURS PRN
Status: DISCONTINUED | OUTPATIENT
Start: 2018-04-19 | End: 2018-04-21 | Stop reason: HOSPADM

## 2018-04-19 RX ORDER — ROPIVACAINE HYDROCHLORIDE 5 MG/ML
INJECTION, SOLUTION EPIDURAL; INFILTRATION; PERINEURAL AS NEEDED
Status: DISCONTINUED | OUTPATIENT
Start: 2018-04-19 | End: 2018-04-19 | Stop reason: HOSPADM

## 2018-04-19 RX ORDER — LIDOCAINE HYDROCHLORIDE 10 MG/ML
0.5 INJECTION, SOLUTION EPIDURAL; INFILTRATION; INTRACAUDAL; PERINEURAL ONCE AS NEEDED
Status: COMPLETED | OUTPATIENT
Start: 2018-04-19 | End: 2018-04-19

## 2018-04-19 RX ORDER — ACETAMINOPHEN 500 MG
1000 TABLET ORAL ONCE
Status: COMPLETED | OUTPATIENT
Start: 2018-04-19 | End: 2018-04-19

## 2018-04-19 RX ORDER — CEFAZOLIN SODIUM 2 G/100ML
2 INJECTION, SOLUTION INTRAVENOUS ONCE
Status: COMPLETED | OUTPATIENT
Start: 2018-04-19 | End: 2018-04-19

## 2018-04-19 RX ORDER — SODIUM CHLORIDE, SODIUM LACTATE, POTASSIUM CHLORIDE, CALCIUM CHLORIDE 600; 310; 30; 20 MG/100ML; MG/100ML; MG/100ML; MG/100ML
9 INJECTION, SOLUTION INTRAVENOUS CONTINUOUS
Status: DISCONTINUED | OUTPATIENT
Start: 2018-04-19 | End: 2018-04-19

## 2018-04-19 RX ORDER — LOSARTAN POTASSIUM 25 MG/1
100 TABLET ORAL
Status: DISCONTINUED | OUTPATIENT
Start: 2018-04-19 | End: 2018-04-20

## 2018-04-19 RX ORDER — ONDANSETRON 2 MG/ML
4 INJECTION INTRAMUSCULAR; INTRAVENOUS EVERY 6 HOURS PRN
Status: DISCONTINUED | OUTPATIENT
Start: 2018-04-19 | End: 2018-04-21 | Stop reason: HOSPADM

## 2018-04-19 RX ORDER — ONDANSETRON 2 MG/ML
INJECTION INTRAMUSCULAR; INTRAVENOUS AS NEEDED
Status: DISCONTINUED | OUTPATIENT
Start: 2018-04-19 | End: 2018-04-19 | Stop reason: SURG

## 2018-04-19 RX ORDER — ATORVASTATIN CALCIUM 10 MG/1
10 TABLET, FILM COATED ORAL NIGHTLY
Status: DISCONTINUED | OUTPATIENT
Start: 2018-04-19 | End: 2018-04-21 | Stop reason: HOSPADM

## 2018-04-19 RX ORDER — FENTANYL CITRATE 50 UG/ML
50 INJECTION, SOLUTION INTRAMUSCULAR; INTRAVENOUS
Status: DISCONTINUED | OUTPATIENT
Start: 2018-04-19 | End: 2018-04-19 | Stop reason: HOSPADM

## 2018-04-19 RX ORDER — NICOTINE POLACRILEX 4 MG
15 LOZENGE BUCCAL
Status: DISCONTINUED | OUTPATIENT
Start: 2018-04-19 | End: 2018-04-21 | Stop reason: HOSPADM

## 2018-04-19 RX ORDER — HYDROMORPHONE HYDROCHLORIDE 1 MG/ML
0.5 INJECTION, SOLUTION INTRAMUSCULAR; INTRAVENOUS; SUBCUTANEOUS
Status: DISCONTINUED | OUTPATIENT
Start: 2018-04-19 | End: 2018-04-21

## 2018-04-19 RX ORDER — DEXTROSE MONOHYDRATE 25 G/50ML
25 INJECTION, SOLUTION INTRAVENOUS
Status: DISCONTINUED | OUTPATIENT
Start: 2018-04-19 | End: 2018-04-21 | Stop reason: HOSPADM

## 2018-04-19 RX ADMIN — OXYCODONE HYDROCHLORIDE AND ACETAMINOPHEN 1 TABLET: 7.5; 325 TABLET ORAL at 15:36

## 2018-04-19 RX ADMIN — ONDANSETRON 4 MG: 2 INJECTION INTRAMUSCULAR; INTRAVENOUS at 11:48

## 2018-04-19 RX ADMIN — MELOXICAM 15 MG: 7.5 TABLET ORAL at 14:37

## 2018-04-19 RX ADMIN — LIDOCAINE HYDROCHLORIDE 0.5 ML: 10 INJECTION, SOLUTION EPIDURAL; INFILTRATION; INTRACAUDAL; PERINEURAL at 08:00

## 2018-04-19 RX ADMIN — SODIUM CHLORIDE, POTASSIUM CHLORIDE, SODIUM LACTATE AND CALCIUM CHLORIDE: 600; 310; 30; 20 INJECTION, SOLUTION INTRAVENOUS at 10:26

## 2018-04-19 RX ADMIN — TRANEXAMIC ACID 1000 MG: 100 INJECTION, SOLUTION INTRAVENOUS at 10:41

## 2018-04-19 RX ADMIN — ACETAMINOPHEN 1000 MG: 500 TABLET, FILM COATED ORAL at 08:03

## 2018-04-19 RX ADMIN — FENTANYL CITRATE 50 MCG: 50 INJECTION, SOLUTION INTRAMUSCULAR; INTRAVENOUS at 13:02

## 2018-04-19 RX ADMIN — CEFAZOLIN SODIUM 2 G: 2 INJECTION, SOLUTION INTRAVENOUS at 18:17

## 2018-04-19 RX ADMIN — CEFAZOLIN SODIUM 2 G: 2 INJECTION, SOLUTION INTRAVENOUS at 10:28

## 2018-04-19 RX ADMIN — MELOXICAM 15 MG: 7.5 TABLET ORAL at 08:03

## 2018-04-19 RX ADMIN — DEXAMETHASONE SODIUM PHOSPHATE 4 MG: 4 INJECTION, SOLUTION INTRAMUSCULAR; INTRAVENOUS at 11:48

## 2018-04-19 RX ADMIN — TRANEXAMIC ACID 1000 MG: 100 INJECTION, SOLUTION INTRAVENOUS at 11:42

## 2018-04-19 RX ADMIN — PHENYLEPHRINE HYDROCHLORIDE 100 MCG: 10 INJECTION INTRAVENOUS at 11:10

## 2018-04-19 RX ADMIN — LIDOCAINE HYDROCHLORIDE 3 ML: 10 INJECTION, SOLUTION EPIDURAL; INFILTRATION; INTRACAUDAL; PERINEURAL at 10:40

## 2018-04-19 RX ADMIN — OXYCODONE HYDROCHLORIDE AND ACETAMINOPHEN 1 TABLET: 7.5; 325 TABLET ORAL at 19:34

## 2018-04-19 RX ADMIN — PREGABALIN 150 MG: 150 CAPSULE ORAL at 08:03

## 2018-04-19 RX ADMIN — PHENYLEPHRINE HYDROCHLORIDE 100 MCG: 10 INJECTION INTRAVENOUS at 11:22

## 2018-04-19 RX ADMIN — FENTANYL CITRATE 100 MCG: 50 INJECTION, SOLUTION INTRAMUSCULAR; INTRAVENOUS at 10:29

## 2018-04-19 RX ADMIN — PHENYLEPHRINE HYDROCHLORIDE 100 MCG: 10 INJECTION INTRAVENOUS at 11:26

## 2018-04-19 RX ADMIN — FAMOTIDINE 20 MG: 20 TABLET ORAL at 08:03

## 2018-04-19 RX ADMIN — BUPIVACAINE HYDROCHLORIDE 2 ML: 5 INJECTION, SOLUTION EPIDURAL; INTRACAUDAL at 10:39

## 2018-04-19 RX ADMIN — FENTANYL CITRATE 50 MCG: 50 INJECTION, SOLUTION INTRAMUSCULAR; INTRAVENOUS at 12:40

## 2018-04-19 RX ADMIN — ATORVASTATIN CALCIUM 10 MG: 10 TABLET, FILM COATED ORAL at 19:34

## 2018-04-19 RX ADMIN — INSULIN LISPRO 7 UNITS: 100 INJECTION, SOLUTION INTRAVENOUS; SUBCUTANEOUS at 19:33

## 2018-04-19 RX ADMIN — HYDROCODONE BITARTRATE AND ACETAMINOPHEN 1 TABLET: 7.5; 325 TABLET ORAL at 14:37

## 2018-04-19 RX ADMIN — PROPOFOL 75 MCG/KG/MIN: 10 INJECTION, EMULSION INTRAVENOUS at 10:40

## 2018-04-19 RX ADMIN — HYDROMORPHONE HYDROCHLORIDE 0.5 MG: 1 INJECTION, SOLUTION INTRAMUSCULAR; INTRAVENOUS; SUBCUTANEOUS at 14:05

## 2018-04-19 RX ADMIN — SODIUM CHLORIDE, POTASSIUM CHLORIDE, SODIUM LACTATE AND CALCIUM CHLORIDE: 600; 310; 30; 20 INJECTION, SOLUTION INTRAVENOUS at 11:55

## 2018-04-19 RX ADMIN — SODIUM CHLORIDE 120 ML/HR: 9 INJECTION, SOLUTION INTRAVENOUS at 13:48

## 2018-04-19 RX ADMIN — MUPIROCIN 1 APPLICATION: 20 OINTMENT TOPICAL at 08:03

## 2018-04-19 RX ADMIN — SODIUM CHLORIDE, POTASSIUM CHLORIDE, SODIUM LACTATE AND CALCIUM CHLORIDE 9 ML/HR: 600; 310; 30; 20 INJECTION, SOLUTION INTRAVENOUS at 08:00

## 2018-04-19 NOTE — ANESTHESIA POSTPROCEDURE EVALUATION
Patient: Nely Grider    Procedure Summary     Date:  04/19/18 [facility]    Anesthesia Start:  1026 Anesthesia Stop:      Procedure:  RIGHT TOTAL HIP ARTHROPLASTY (Right Hip) Diagnosis:       Primary osteoarthritis of both hips      (Primary osteoarthritis of both hips [M16.0])    Surgeon:  Carlos Pollack MD Provider:  Trey Lewis MD    Anesthesia Type:  spinal ASA Status:  3          Anesthesia Type: spinal  Last vitals  BP   106/61   Temp   98.1   Pulse   66   Resp   18   SpO2   100     Post Anesthesia Care and Evaluation    Patient location during evaluation: PACU  Patient participation: complete - patient participated  Level of consciousness: awake and alert  Pain score: 0  Pain management: adequate  Airway patency: patent  Anesthetic complications: No anesthetic complications  PONV Status: none  Cardiovascular status: hemodynamically stable and acceptable  Respiratory status: nonlabored ventilation, acceptable and nasal cannula  Hydration status: acceptable

## 2018-04-19 NOTE — H&P
Patient Care Team:      Chief complaint: Hip pain    Subjective:  Nely Grider is a 72 y.o. female. She presents with worsening pain in her right hip for surgical intervention with Dr. Pollack.   evaluation of bilateral hip Pain is bilateral with right greater than left.  The pain is been present for several years, worsening over the past year.  Previous injection in the right hip with brief relief.  The pain is sharp and stabbing, moderate to severe, no history of trauma, worse with walking, standing, sitting and climbing stairs.       The patient has been considering right hip total joint replacement surgery.  The pain has been severe and has been worsening in spite of medications, physical therapy, and joint injections (steroid).The pain interferes with walking, sleeping, work and leisure activities, and the patient uses a cane and walker as an ambulatory aid.  No previous history of blood clots, but 2 family members had blood clots, to include his sister and her nephew.     Review of Systems:  General ROS: negative for - chills, fatigue or fever. Fever blister on left lip.  Cardiovascular ROS: no chest pain or dyspnea on exertion  Respiratory ROS: no cough, shortness of breath, or wheezing      Allergies:   Allergies   Allergen Reactions   • Codeine Hives          Latex: Denies  Contrast Dye: Denies    Home Meds    Prescriptions Prior to Admission   Medication Sig Dispense Refill Last Dose   • Biotin (BIOTIN 5000) 5 MG capsule Take 1 tablet by mouth Daily.   4/18/2018 at 0800   • Chlorhexidine Gluconate 4 % solution  Shower with hibiclens solution as directed for 5 days prior to surgery 237 mL 0 4/18/2018 at 2030   • gabapentin (NEURONTIN) 400 MG capsule Take 1 capsule by mouth 3 (Three) Times a Day. 270 capsule 1 4/18/2018 at 0800   • glimepiride (AMARYL) 4 MG tablet TAKE 1 TABLET BY MOUTH EVERY MORNING BEFORE BREAKFAST. 90 tablet 3 4/18/2018 at 0800   • losartan-hydrochlorothiazide (HYZAAR) 100-25 MG per  "tablet TAKE 1 TABLET EVERY DAY 90 tablet 3 4/18/2018 at 0800   • metFORMIN (GLUCOPHAGE) 500 MG tablet Take 1 tablet by mouth 2 (Two) Times a Day With Meals. 180 tablet 3 4/18/2018 at 2030   • mupirocin (BACTROBAN) 2 % ointment Apply pea-sized amount tot each nostril twice daily for 5 days prior to surgery 22 g 0 4/18/2018 at 2030   • omeprazole (priLOSEC) 20 MG capsule TAKE 1 CAPSULE TWICE DAILY 180 capsule 3 4/18/2018 at 2030   • simvastatin (ZOCOR) 10 MG tablet TAKE 1 TABLET AT BEDTIME 90 tablet 1 4/18/2018 at 2030   • ACCU-CHEK CARLTON PLUS test strip TEST TWO TIMES DAILY 200 each 5 Taking   • ACCU-CHEK SOFTCLIX LANCETS lancets 200 each by Other route Daily. Use as instructed 200 each 3 Taking   • Blood Glucose Monitoring Suppl (ACCU-CHEK CARLTON) device Use as directed; For: Diabetes mellitus   Taking     PMH:   Past Medical History:   Diagnosis Date   • Arthritis    • Cancer     cervical   • Cataract    • Diabetes mellitus     type 2 dm, check blood sugar every morning, diagnosed 3 or 4 years   • Full dentures    • GERD (gastroesophageal reflux disease)    • High cholesterol    • History of IBS    • Hypertension    • Neuropathy    • Wears glasses      PSH:    Past Surgical History:   Procedure Laterality Date   • CARDIAC CATHETERIZATION     • CHOLECYSTECTOMY     • COLONOSCOPY     • EYE SURGERY      bilateral cataract   • HYSTERECTOMY      partial   • OOPHORECTOMY      one removed   • TONSILLECTOMY     • WRIST SURGERY      metal plate     Immunization History: pneumo: UTD  Flu 2017   Tetanus: Unknown    Social History:   Tobacco: Never   Alcohol: No      Physical Exam:/69 (BP Location: Right arm, Patient Position: Lying)   Pulse 82   Temp 98.3 °F (36.8 °C) (Temporal Artery )   Resp 18   Ht 166.4 cm (65.5\")   Wt 83.5 kg (184 lb)   SpO2 97%   BMI 30.15 kg/m²       General Appearance:    Alert, cooperative, no distress, appears stated age   Head:    Normocephalic, without obvious abnormality, atraumatic "   Lungs:     Clear to auscultation bilaterally, respirations unlabored    Heart: Regular rate and rhythm, S1 and S2 normal, no murmur, rub    or gallop    Abdomen:    Soft without tenderness   Breast Exam:    deferred   Genitalia:    deferred   Extremities:   Extremities normal, atraumatic, no cyanosis or edema   Skin:   Skin color, texture, turgor normal, no rashes or lesions   Neurologic:   Grossly intact     Results Review: Current labs were reviewed. Elevated WBC of 13.53 noted. Patient denies any recent infections or use of steroids. UA was negative. Afebrile        Results for BRIGITTE CHOW (MRN [id number]) as of 4/19/2018 08:34   Ref. Range 4/6/2018 10:42 4/12/2018 13:44 4/19/2018 07:51 4/19/2018 07:54   Glucose Latest Ref Range: 70 - 130 mg/dL 287 (H)  105    Sodium Latest Ref Range: 132 - 146 mmol/L 136      Potassium Latest Ref Range: 3.5 - 5.5 mmol/L 4.3      CO2 Latest Ref Range: 20.0 - 31.0 mmol/L 28.0      Chloride Latest Ref Range: 99 - 109 mmol/L 101      Anion Gap Latest Ref Range: 3.0 - 11.0 mmol/L 7.0      Creatinine Latest Ref Range: 0.60 - 1.30 mg/dL 1.50 (H)      BUN Latest Ref Range: 9 - 23 mg/dL 22      BUN/Creatinine Ratio Latest Ref Range: 7.0 - 25.0  14.7      Calcium Latest Ref Range: 8.7 - 10.4 mg/dL 9.6      eGFR Non African Amer Latest Ref Range: >60 mL/min/1.73 34 (L)      Hemoglobin A1C Latest Units: % 7.00 (H) 6.8     C-Reactive Protein Latest Ref Range: 0.00 - 1.00 mg/dL 9.00 (H)      Protime Latest Ref Range: 9.6 - 11.5 Seconds 11.0      INR Latest Ref Range: 0.91 - 1.09  1.05      aPTT Latest Ref Range: 24.0 - 31.0 seconds 31.9 (H)      WBC Latest Ref Range: 3.50 - 10.80 10*3/mm3 14.99 (H)   13.53 (H)   RBC Latest Ref Range: 3.89 - 5.14 10*6/mm3 3.56 (L)   3.64 (L)   Hemoglobin Latest Ref Range: 11.5 - 15.5 g/dL 10.6 (L)   10.6 (L)   Hematocrit Latest Ref Range: 34.5 - 44.0 % 33.5 (L)   33.2 (L)   RDW Latest Ref Range: 11.3 - 14.5 % 13.8   13.5   MCV Latest Ref Range: 80.0 -  99.0 fL 94.1   91.2   MCH Latest Ref Range: 27.0 - 31.0 pg 29.8   29.1   MCHC Latest Ref Range: 32.0 - 36.0 g/dL 31.6 (L)   31.9 (L)   MPV Latest Ref Range: 6.0 - 12.0 fL 9.2   8.6   Platelets Latest Ref Range: 150 - 450 10*3/mm3 424   553 (H)   RDW-SD Latest Ref Range: 37.0 - 54.0 fl 48.0   44.8   Neutrophil % Latest Ref Range: 41.0 - 71.0 % 77.6 (H)      Lymphocyte % Latest Ref Range: 24.0 - 44.0 % 16.7 (L)      Monocyte % Latest Ref Range: 0.0 - 12.0 % 4.9      Eosinophil % Latest Ref Range: 0.0 - 3.0 % 0.3      Basophil % Latest Ref Range: 0.0 - 1.0 % 0.2      Immature Grans % Latest Ref Range: 0.0 - 0.6 % 0.3      Neutrophils, Absolute Latest Ref Range: 1.50 - 8.30 10*3/mm3 11.64 (H)      Lymphocytes, Absolute Latest Ref Range: 0.60 - 4.80 10*3/mm3 2.50      Monocytes, Absolute Latest Ref Range: 0.00 - 1.00 10*3/mm3 0.73      Eosinophils, Absolute Latest Ref Range: 0.00 - 0.30 10*3/mm3 0.05      Basophils, Absolute Latest Ref Range: 0.00 - 0.20 10*3/mm3 0.03      Immature Grans, Absolute Latest Ref Range: 0.00 - 0.03 10*3/mm3 0.04 (H)        Impression: right hip osteoarthritis    Plan: right DOROTHY    Ashwini Brock, SKYLAR 4/19/2018 8:30 AM      Agree with above.  Plan for right total hip arthroplasty.

## 2018-04-19 NOTE — OP NOTE
DATE OF PROCEDURE:  04/19/18    PREOPERATIVE DIAGNOSIS: Right hip arthritis    POSTOPERATIVE DIAGNOSIS:  Right hip arthritis    PROCEDURE PERFORMED:  Right total hip arthroplasty with DePuy components (# 50 mm Press-Fit Nathalie cup with + 4 neutral polyethylene liner with # 5 hi Offset Press-Fit Crenshaw Stem with + 1 x 32 mm ceramic head).     SURGEON: Carlos Pollack MD    ASSISTANT: Jadyn Coyle PA-C     SPECIMENS: None    ANESTHESIA:  Spinal    STAFF:  Circulator: Ruben Camacho RN; Jessica Cardoza RN  Scrub Person: Ángel Cruz  Vendor Representative: Hair Schroeder  Nursing Assistant: Sung Rubin  Assistant: Jadyn Coyle PA-C       ESTIMATED BLOOD LOSS: 100ml     COMPLICATIONS: None    PREOPERATIVE ANTIBIOTICS: Ancef 2G    INDICATIONS: The patient is a 72 y.o. female  with a history of debilitating right hip pain secondary to osteoarthritis, that failed to improve in spite of conservative treatment. The patient opted for a right total hip arthroplasty at this time and consented for the procedure. Please see my office notes for details with regard to preoperative counseling and operative rationale.     DESCRIPTION OF PROCEDURE: The patient was positively identified in the preoperative holding area, brought to the operative suite, and placed in a supine position. After adequate spinal anesthetic had been achieved, the patient was placed in lateral decubitus position with the right side up. The patient was well padded on the pegboard table. After sterile prep and drape of the right hip and lower extremity, a timeout procedure was performed to confirm the operative site, as well as the other parameters. A skin incision was made on the lateral aspect of the hip for a modified direct lateral approach. Following a sharp skin incision, dissection was carried down to the level of the fascia which was incised in line with its fibers. The underlying interval between the anterior one third and  posterior two thirds of the gluteus medius was then entered after the bursa had been reflected posteriorly. This was elevated as a full thickness flap and preserved for later repair. The hip capsule was then incised in a T-shaped fashion and the head dislocated from the acetabulum. Description of femoral head: Complete eburnation, with deformity and osteophyte formation. The head was then resected with the aid of an oscillating saw blade. Description of acetabulum: Complete wear, with thickening of the labrum around the periphery, with a periacetabular cyst in the superior region extra-articular, adjacent to the labrum superiorly, which a specimen was sent for frozen tissue examination and tissue culture.  This was a fibrous white colored semisolid cyst fluid approximately 5 cc in volume.  The acetabulum was sequentially reamed up to 49 to accommodate a 50 mm Press-Fit Chunky Cup, which had excellent press-fit characteristics and did not require any screw fixation. A trial +4 neutral polyethylene liner was placed. Attention was then redirected towards the femoral aspect. Sequential reaming and broaching was performed on the femur to accommodate a # 5 broach with a + 1 x 32 mm head.  Trial reduction was performed, with no dislocation throughout the full arc of motion, with appropriate limb lengths.  The hip was again dislocated, and the final +4 neutral polyethylene liner was placed and the final # 5 hi offset Press-Fit Nelson Stem was placed, followed by + 1 x 32 mm ceramic head, and the same reduction characteristics were noted as with the trial with no dislocation throughout the full arc of motion, and appropriate limb lengths.  Therefore, the hip was copiously irrigated and attention was directed towards closure. The hip capsule was closed with #1 Vicryl in an interrupted figure-of-eight fashion, followed by closure of the gluteus medius and vastus lateralis sleeve as a full thickness sleeve repair with #1  Vicryl in an interrupted figure-of-eight fashion, followed by closure of the fascia nestor with #1 Vicryl in an interrupted figure-of-eight fashion in 3 strategic locations both proximally, distally and in the central portion, followed by oversewing this from distal to proximal with a #1 StrataFix symmetric, which nicely sealed that layer, followed by closure of the deep fascial layer with #1 Vicryl in a buried interrupted fashion, followed by closure of the subcutaneous layer with 2-0 Vicryl and the skin with 3-0 StrataFix in a running subcuticular fashion. Prineo wound closure dressing was applied followed by a sterile dressing with 4 x 4s secured with micropore tape.  The patient tolerated the procedure well and was brought to the recovery room in good condition.     POSTOPERATIVE PLAN:  1. The patient will begin early range of motion and weight-bearing per the post total hip arthroplasty protocol.   2. I anticipate brief hospitalization for initial rehabilitation and pain control followed by continued rehabilitation in a home health setting.   3. Postoperative medical management with Dr. Melton.  4. Postoperative DVT prophylaxis with aspirin unless there is a contraindication.       Carlos Pollack MD  04/19/18  12:13 PM

## 2018-04-19 NOTE — ANESTHESIA PREPROCEDURE EVALUATION
Anesthesia Evaluation     Patient summary reviewed and Nursing notes reviewed   NPO Solid Status: > 8 hours  NPO Liquid Status: > 8 hours           Airway   Mallampati: I  TM distance: >3 FB  Neck ROM: full  No difficulty expected  Dental    (+) edentulous    Pulmonary - normal exam   Cardiovascular   Exercise tolerance: good (4-7 METS)    Rhythm: regular  Rate: normal        Neuro/Psych  GI/Hepatic/Renal/Endo      Musculoskeletal     Abdominal    Substance History      OB/GYN          Other                      Anesthesia Plan    ASA 3     spinal     intravenous induction   Anesthetic plan and risks discussed with patient.

## 2018-04-19 NOTE — PLAN OF CARE
Problem: Patient Care Overview  Goal: Plan of Care Review  Outcome: Ongoing (interventions implemented as appropriate)   04/19/18 5551   Coping/Psychosocial   Plan of Care Reviewed With patient   OTHER   Outcome Summary Pt ambulated 14 feet with CGAx2 and RW, limited by pain. PT will follow to increase strength and progress functional mobility with hip precautions. Plan is home with HHPT at discharge, may need rehab depending on progress. PADD score= 7

## 2018-04-19 NOTE — H&P
Patient Name: Nely Grider  MRN: [id number]  : 1945  DOS: 2018    Attending: Carlos Pollack MD    Primary Care Provider: Raimundo Ibarra MD      Chief complaint:  Right hip pain    Subjective   Patient is a 72 y.o. female presented for right total hip arthroplasty by Dr. Pollack under spinal anesthesia. She tolerated surgery well and is admitted for further medical management. Her hip has been painful for many years. She denies use of assistive device for ambulation or recent falls.    When seen postop she is having moderate pain, RN at bedside with pain medication. She denies nausea, shortness of breath or chest pain.     She reports history of blood clot 30-40 years ago, but does not recall treatment. ( a sister of hers had LE DVTs and had IVC filters and other complications as well)wy     ( Above is noted/agree. Seen in her room afterwards, continues to do well.  Pain control is okay.  Spinal anesthesia effects are subsiding.  Awaits ambulation with PT.)wy    Allergies:  Allergies   Allergen Reactions   • Codeine Hives       Meds:  Prescriptions Prior to Admission   Medication Sig Dispense Refill Last Dose   • Biotin (BIOTIN 5000) 5 MG capsule Take 1 tablet by mouth Daily.   2018 at 0800   • gabapentin (NEURONTIN) 400 MG capsule Take 1 capsule by mouth 3 (Three) Times a Day. 270 capsule 1 2018 at 0800   • glimepiride (AMARYL) 4 MG tablet TAKE 1 TABLET BY MOUTH EVERY MORNING BEFORE BREAKFAST. 90 tablet 3 2018 at 0800   • losartan-hydrochlorothiazide (HYZAAR) 100-25 MG per tablet TAKE 1 TABLET EVERY DAY 90 tablet 3 2018 at 0800   • metFORMIN (GLUCOPHAGE) 500 MG tablet Take 1 tablet by mouth 2 (Two) Times a Day With Meals. 180 tablet 3 2018 at 2030   • omeprazole (priLOSEC) 20 MG capsule TAKE 1 CAPSULE TWICE DAILY 180 capsule 3 2018 at 2030   • simvastatin (ZOCOR) 10 MG tablet TAKE 1 TABLET AT BEDTIME 90 tablet 1 2018 at 2030   • ACCU-CHEK CARLTON PLUS test strip  TEST TWO TIMES DAILY 200 each 5 Taking   • ACCU-CHEK SOFTCLIX LANCETS lancets 200 each by Other route Daily. Use as instructed 200 each 3 Taking   • Blood Glucose Monitoring Suppl (ACCU-CHEK CARLTON) device Use as directed; For: Diabetes mellitus   Taking       History:   Past Medical History:   Diagnosis Date   • Arthritis    • Cancer     cervical   • Cataract    • Diabetes mellitus     type 2 dm, check blood sugar every morning, diagnosed 3 or 4 years   • Full dentures    • GERD (gastroesophageal reflux disease)    • High cholesterol    • History of IBS    • Hypertension    • Neuropathy    • Wears glasses      Past Surgical History:   Procedure Laterality Date   • CARDIAC CATHETERIZATION     • CHOLECYSTECTOMY     • COLONOSCOPY     • EYE SURGERY      bilateral cataract   • HYSTERECTOMY      partial   • OOPHORECTOMY      one removed   • TONSILLECTOMY     • WRIST SURGERY      metal plate     Family History   Problem Relation Age of Onset   • Arthritis Mother    • Heart attack Mother    • Hypertension Mother    • Hyperlipidemia Mother    • Osteoporosis Mother    • Heart disease Sister    • Diabetes Sister    • Arthritis Sister    • Heart attack Sister    • Hypertension Sister    • Hyperlipidemia Sister    • Bleeding Disorder Sister    • Heart disease Brother    • Cancer Brother    • Diabetes Brother    • Arthritis Brother    • Heart attack Brother    • Hypertension Brother    • Hyperlipidemia Brother    • Ovarian cancer Daughter    • Cancer Daughter    • Ovarian cancer Other      grandaughter   • Ovarian cancer Other      granddaughter   • Heart attack Father    • Hypertension Father    • Stroke Father    • Kidney disease Paternal Uncle    • Liver disease Paternal Uncle    • Cancer Other    • Stroke Other    • Diabetes Other    • Breast cancer Neg Hx      Social History   Substance Use Topics   • Smoking status: Never Smoker   • Smokeless tobacco: Never Used   • Alcohol use No   She is  with 4 children. She is  "retired from [employer] .( lives with  in a single level house. Has one step to go in)wy    Review of Systems  Pertinent items are noted in HPI, all other systems reviewed and negative    Vital Signs  /57 (BP Location: Right arm, Patient Position: Lying)   Pulse 74   Temp 96.9 °F (36.1 °C) (Temporal Artery )   Resp 17   Ht 166.4 cm (65.5\")   Wt 83.5 kg (184 lb)   SpO2 97%   BMI 30.15 kg/m²     Physical Exam:    General Appearance:    Alert, cooperative, in no acute distress   Head:    Normocephalic, without obvious abnormality, atraumatic   Eyes:            Lids and lashes normal, conjunctivae and sclerae normal, no   icterus, no pallor, corneas clear    Ears:    Ears appear intact with no abnormalities noted   Neck:   No adenopathy, supple, trachea midline, no thyromegaly, no     carotid bruit, no JVD   Lungs:     Clear to auscultation,respirations regular, even and                   unlabored    Heart:    Regular rhythm and normal rate, normal S1 and S2, no            murmur, no gallop, no rub, no click   Abdomen:     Normal bowel sounds, no masses, no organomegaly, soft        non-tender, non-distended, no guarding, no rebound                 tenderness   Genitalia:    Deferred   Extremities:   Right hip with bulky surgical dressing CDI.   Pulses:   Pulses palpable and equal bilaterally   Skin:   No bleeding, bruising or rash   Neurologic:   Cranial nerves 2 - 12 grossly intact, sensation intact. Flexion and dorsiflexion intact bilateral feet.        I reviewed the patient's new clinical results.         Results from last 7 days  Lab Units 04/19/18  0754   WBC 10*3/mm3 13.53*   HEMOGLOBIN g/dL 10.6*   HEMATOCRIT % 33.2*   PLATELETS 10*3/mm3 553*     Results for BRIGITTE CHOW (MRN [id number]) as of 4/19/2018 14:26   Ref. Range 4/6/2018 10:42   Glucose Latest Ref Range: 70 - 100 mg/dL 287 (H)   Sodium Latest Ref Range: 132 - 146 mmol/L 136   Potassium Latest Ref Range: 3.5 - 5.5 mmol/L 4.3 "   CO2 Latest Ref Range: 20.0 - 31.0 mmol/L 28.0   Chloride Latest Ref Range: 99 - 109 mmol/L 101   Anion Gap Latest Ref Range: 3.0 - 11.0 mmol/L 7.0   Creatinine Latest Ref Range: 0.60 - 1.30 mg/dL 1.50 (H)   BUN Latest Ref Range: 9 - 23 mg/dL 22   BUN/Creatinine Ratio Latest Ref Range: 7.0 - 25.0  14.7   Calcium Latest Ref Range: 8.7 - 10.4 mg/dL 9.6   eGFR Non African Amer Latest Ref Range: >60 mL/min/1.73 34 (L)             Invalid input(s): LABALBU, PROT  Lab Results   Component Value Date    HGBA1C 6.8 04/12/2018       Assessment and Plan:   Principal Problem:    Status post total replacement of right hip  Active Problems:    Mixed hyperlipidemia    Essential hypertension    Controlled type 2 diabetes mellitus without complication, without long-term current use of insulin    Primary osteoarthritis of both hips      Plan  1. PT/OT- early ambulation post op  2. Pain control-prns   3. IS-encourage  4. DVT proph- mechs/Eliquis  5. Bowel regimen  6. Resume home medications as appropriate  7. Monitor post-op labs  8. DC planning for home when ready.     HTN, HLD  - Continue home losartan and statin  - Hold HCTZ for now  - Monitor BP q4 hrs  - Holding parameters for BP meds  - PRN hydralazine for SBP >170    DM  - hgb A1c on 4/12/18 6.8  - Hold OHA while inpatient  - Accuchecks ACHS with moderate dose SSI    I have personally performed the evaluation on this patient. My history is consistant  with HPI obtained. My exam finding are listed above. I have personally reviewed and discussed the above formulated treatment plan with patient, family,  and APRN.      Prisca Melton MD  04/19/18  2:26 PM

## 2018-04-19 NOTE — ANESTHESIA PROCEDURE NOTES
Spinal Block    Patient location during procedure: OR  Indication:at surgeon's request  Performed By  Anesthesiologist: MERHEEN LANDA  CRNA: AMANDA TOLEDO  Preanesthetic Checklist  Completed: patient identified, site marked, surgical consent, pre-op evaluation, timeout performed, IV checked, risks and benefits discussed and monitors and equipment checked  Spinal Block Prep:  Patient Position:sitting  Sterile Tech:cap, gloves, sterile barriers and mask  Prep:Chloraprep  Patient Monitoring:blood pressure monitoring, continuous pulse oximetry and EKG  Spinal Block Procedure  Approach:midline  Guidance:landmark technique and palpation technique  Location:L4-L5  Needle Type:Belkys  Needle Gauge:22 G  Placement of Spinal needle event:cerebrospinal fluid aspirated    Fluid Appearance:clear  Post Assessment  Patient Tolerance:patient tolerated the procedure well with no apparent complications  Complications no  Additional Notes  Procedure:  Pt assisted to sitting position, with legs in position of comfort over side of bed.  Pt. instructed in optimal spine presentation, the spine was prepped/ Draped and the skin at insertion site was anesthetized with 1% Lidocaine 2 ml.  The spinal needle was then advanced until CSF flow was obtained and LA was injected:      Marcaine 0.5% PSF injected 10 Mg.     Marcaine 0.75% PSF injected 0 Mg

## 2018-04-20 ENCOUNTER — APPOINTMENT (OUTPATIENT)
Dept: GENERAL RADIOLOGY | Facility: HOSPITAL | Age: 73
End: 2018-04-20
Attending: ORTHOPAEDIC SURGERY

## 2018-04-20 PROBLEM — E87.1 HYPONATREMIA: Status: ACTIVE | Noted: 2018-04-20

## 2018-04-20 PROBLEM — N28.9 RENAL INSUFFICIENCY: Status: ACTIVE | Noted: 2018-04-20

## 2018-04-20 PROBLEM — G89.18 ACUTE POSTOPERATIVE PAIN: Status: ACTIVE | Noted: 2018-04-20

## 2018-04-20 LAB
ANION GAP SERPL CALCULATED.3IONS-SCNC: 10 MMOL/L (ref 3–11)
ANION GAP SERPL CALCULATED.3IONS-SCNC: 9 MMOL/L (ref 3–11)
BUN BLD-MCNC: 28 MG/DL (ref 9–23)
BUN BLD-MCNC: 29 MG/DL (ref 9–23)
BUN/CREAT SERPL: 13.2 (ref 7–25)
BUN/CREAT SERPL: 13.3 (ref 7–25)
CALCIUM SPEC-SCNC: 8.4 MG/DL (ref 8.7–10.4)
CALCIUM SPEC-SCNC: 8.5 MG/DL (ref 8.7–10.4)
CHLORIDE SERPL-SCNC: 94 MMOL/L (ref 99–109)
CHLORIDE SERPL-SCNC: 96 MMOL/L (ref 99–109)
CO2 SERPL-SCNC: 22 MMOL/L (ref 20–31)
CO2 SERPL-SCNC: 22 MMOL/L (ref 20–31)
CREAT BLD-MCNC: 2.1 MG/DL (ref 0.6–1.3)
CREAT BLD-MCNC: 2.2 MG/DL (ref 0.6–1.3)
DEPRECATED RDW RBC AUTO: 45.3 FL (ref 37–54)
ERYTHROCYTE [DISTWIDTH] IN BLOOD BY AUTOMATED COUNT: 13.4 % (ref 11.3–14.5)
GFR SERPL CREATININE-BSD FRML MDRD: 22 ML/MIN/1.73
GFR SERPL CREATININE-BSD FRML MDRD: 23 ML/MIN/1.73
GLUCOSE BLD-MCNC: 143 MG/DL (ref 70–100)
GLUCOSE BLD-MCNC: 214 MG/DL (ref 70–100)
GLUCOSE BLDC GLUCOMTR-MCNC: 137 MG/DL (ref 70–130)
GLUCOSE BLDC GLUCOMTR-MCNC: 194 MG/DL (ref 70–130)
GLUCOSE BLDC GLUCOMTR-MCNC: 198 MG/DL (ref 70–130)
HCT VFR BLD AUTO: 29.6 % (ref 34.5–44)
HGB BLD-MCNC: 9.4 G/DL (ref 11.5–15.5)
MCH RBC QN AUTO: 29.4 PG (ref 27–31)
MCHC RBC AUTO-ENTMCNC: 31.8 G/DL (ref 32–36)
MCV RBC AUTO: 92.5 FL (ref 80–99)
PLATELET # BLD AUTO: 549 10*3/MM3 (ref 150–450)
PMV BLD AUTO: 9.5 FL (ref 6–12)
POTASSIUM BLD-SCNC: 3.9 MMOL/L (ref 3.5–5.5)
POTASSIUM BLD-SCNC: 4.7 MMOL/L (ref 3.5–5.5)
RBC # BLD AUTO: 3.2 10*6/MM3 (ref 3.89–5.14)
SODIUM BLD-SCNC: 126 MMOL/L (ref 132–146)
SODIUM BLD-SCNC: 127 MMOL/L (ref 132–146)
WBC NRBC COR # BLD: 19.26 10*3/MM3 (ref 3.5–10.8)

## 2018-04-20 PROCEDURE — 82962 GLUCOSE BLOOD TEST: CPT

## 2018-04-20 PROCEDURE — 25010000003 CEFAZOLIN IN DEXTROSE 2-4 GM/100ML-% SOLUTION: Performed by: ORTHOPAEDIC SURGERY

## 2018-04-20 PROCEDURE — 25010000002 ONDANSETRON PER 1 MG: Performed by: NURSE PRACTITIONER

## 2018-04-20 PROCEDURE — 74230 X-RAY XM SWLNG FUNCJ C+: CPT

## 2018-04-20 PROCEDURE — 97116 GAIT TRAINING THERAPY: CPT

## 2018-04-20 PROCEDURE — 99024 POSTOP FOLLOW-UP VISIT: CPT | Performed by: ORTHOPAEDIC SURGERY

## 2018-04-20 PROCEDURE — 92611 MOTION FLUOROSCOPY/SWALLOW: CPT

## 2018-04-20 PROCEDURE — 97530 THERAPEUTIC ACTIVITIES: CPT | Performed by: OCCUPATIONAL THERAPIST

## 2018-04-20 PROCEDURE — 80048 BASIC METABOLIC PNL TOTAL CA: CPT | Performed by: NURSE PRACTITIONER

## 2018-04-20 PROCEDURE — 80048 BASIC METABOLIC PNL TOTAL CA: CPT | Performed by: INTERNAL MEDICINE

## 2018-04-20 PROCEDURE — 97110 THERAPEUTIC EXERCISES: CPT

## 2018-04-20 PROCEDURE — 97166 OT EVAL MOD COMPLEX 45 MIN: CPT | Performed by: OCCUPATIONAL THERAPIST

## 2018-04-20 PROCEDURE — 85027 COMPLETE CBC AUTOMATED: CPT | Performed by: ORTHOPAEDIC SURGERY

## 2018-04-20 PROCEDURE — 92610 EVALUATE SWALLOWING FUNCTION: CPT

## 2018-04-20 RX ORDER — DOCOSANOL 100 MG/G
CREAM TOPICAL
Status: DISCONTINUED | OUTPATIENT
Start: 2018-04-20 | End: 2018-04-21 | Stop reason: HOSPADM

## 2018-04-20 RX ORDER — PSEUDOEPHEDRINE HCL 30 MG
100 TABLET ORAL 2 TIMES DAILY PRN
Qty: 60 CAPSULE | Refills: 0 | Status: SHIPPED | OUTPATIENT
Start: 2018-04-20 | End: 2018-11-12

## 2018-04-20 RX ORDER — OXYCODONE AND ACETAMINOPHEN 7.5; 325 MG/1; MG/1
1 TABLET ORAL EVERY 4 HOURS PRN
Qty: 40 TABLET | Refills: 0 | Status: SHIPPED | OUTPATIENT
Start: 2018-04-20 | End: 2018-04-21 | Stop reason: HOSPADM

## 2018-04-20 RX ADMIN — DOCOSANOL: 100 CREAM TOPICAL at 21:17

## 2018-04-20 RX ADMIN — OXYCODONE HYDROCHLORIDE AND ACETAMINOPHEN 1 TABLET: 7.5; 325 TABLET ORAL at 10:12

## 2018-04-20 RX ADMIN — DOCOSANOL: 100 CREAM TOPICAL at 12:45

## 2018-04-20 RX ADMIN — OXYCODONE HYDROCHLORIDE AND ACETAMINOPHEN 1 TABLET: 7.5; 325 TABLET ORAL at 00:45

## 2018-04-20 RX ADMIN — APIXABAN 2.5 MG: 2.5 TABLET, FILM COATED ORAL at 08:24

## 2018-04-20 RX ADMIN — PANTOPRAZOLE SODIUM 40 MG: 40 TABLET, DELAYED RELEASE ORAL at 05:30

## 2018-04-20 RX ADMIN — LOSARTAN POTASSIUM 100 MG: 25 TABLET, FILM COATED ORAL at 08:25

## 2018-04-20 RX ADMIN — CEFAZOLIN SODIUM 2 G: 2 INJECTION, SOLUTION INTRAVENOUS at 02:27

## 2018-04-20 RX ADMIN — BARIUM SULFATE 100 ML: 0.81 POWDER, FOR SUSPENSION ORAL at 15:01

## 2018-04-20 RX ADMIN — ONDANSETRON 4 MG: 2 INJECTION INTRAMUSCULAR; INTRAVENOUS at 11:17

## 2018-04-20 RX ADMIN — APIXABAN 2.5 MG: 2.5 TABLET, FILM COATED ORAL at 21:17

## 2018-04-20 RX ADMIN — ONDANSETRON 4 MG: 2 INJECTION INTRAMUSCULAR; INTRAVENOUS at 17:15

## 2018-04-20 RX ADMIN — INSULIN LISPRO 2 UNITS: 100 INJECTION, SOLUTION INTRAVENOUS; SUBCUTANEOUS at 08:26

## 2018-04-20 RX ADMIN — OXYCODONE HYDROCHLORIDE AND ACETAMINOPHEN 1 TABLET: 7.5; 325 TABLET ORAL at 18:26

## 2018-04-20 RX ADMIN — SODIUM CHLORIDE 120 ML/HR: 9 INJECTION, SOLUTION INTRAVENOUS at 02:27

## 2018-04-20 RX ADMIN — ATORVASTATIN CALCIUM 10 MG: 10 TABLET, FILM COATED ORAL at 21:17

## 2018-04-20 RX ADMIN — OXYCODONE HYDROCHLORIDE AND ACETAMINOPHEN 1 TABLET: 7.5; 325 TABLET ORAL at 05:30

## 2018-04-20 RX ADMIN — BARIUM SULFATE 20 ML: 400 PASTE ORAL at 15:01

## 2018-04-20 RX ADMIN — INSULIN LISPRO 2 UNITS: 100 INJECTION, SOLUTION INTRAVENOUS; SUBCUTANEOUS at 12:45

## 2018-04-20 RX ADMIN — DOCOSANOL: 100 CREAM TOPICAL at 17:15

## 2018-04-20 RX ADMIN — MELOXICAM 15 MG: 7.5 TABLET ORAL at 08:24

## 2018-04-20 NOTE — PLAN OF CARE
Problem: Patient Care Overview  Goal: Plan of Care Review  Outcome: Ongoing (interventions implemented as appropriate)   04/20/18 1619   Coping/Psychosocial   Plan of Care Reviewed With patient   OTHER   Outcome Summary Pt s/p DOROTHY via posterior approach. Pt with good overall pain control this date and SBA for bed mobility and CGA for transfers & steps to chair. Cuing required to maintain THP during ADLs; appropriate teach back when using AE. Pending d/c home this date with HHPT

## 2018-04-20 NOTE — PROGRESS NOTES
Discharge Planning Assessment  [facility]     Patient Name: Nely Grider  MRN: [id number]  Today's Date: 4/20/2018    Admit Date: 4/19/2018          Discharge Needs Assessment     Row Name 04/20/18 0912       Living Environment    Lives With spouse    Name(s) of Who Lives With Patient Hester    Current Living Arrangements home/apartment/condo    Primary Care Provided by self    Provides Primary Care For no one    Family Caregiver if Needed spouse    Family Caregiver Names Hester    Quality of Family Relationships helpful;supportive;involved    Able to Return to Prior Arrangements yes    Living Arrangement Comments Lives with spouse in a one story home in [place].  He will assist her at home.         Resource/Environmental Concerns    Transportation Concerns car, none       Transition Planning    Patient/Family Anticipates Transition to home with family    Patient/Family Anticipated Services at Transition home health care    Transportation Anticipated family or friend will provide       Discharge Needs Assessment    Readmission Within the Last 30 Days no previous admission in last 30 days    Concerns to be Addressed adjustment to diagnosis/illness;discharge planning    Equipment Currently Used at Home none    Equipment Needed After Discharge walker, rolling    Outpatient/Agency/Support Group Needs homecare agency    Discharge Facility/Level of Care Needs home with home health    Patient's Choice of Community Agency(s) [facility]            Discharge Plan     Row Name 04/20/18 0914       Plan    Plan Home with [facility] Care & spouse    Plan Comments Spoke with patient at bedside.  She lives with spouse in a one story home in [place].  Spouse will help her at home.  She has been independent prior to surgery.  Denies HH.  Denies use of DME although she has a bedside commode at home that she can use if needed.  Has Rx drug coverage through [employer] with reasonable co-pays.  Uses [facility] or [email or web address]  pharmacy in [place].  Plan is home with home health PT.  Home health referral called to [facility] per patient request.  Rolling walker ordered through [place] and will be delivered to room prior to discharge.  Please call CM for any further DC needs.      Final Discharge Disposition Code 06 - home with home health care        Destination     No service coordination in this encounter.      Durable Medical Equipment - Selection Complete     Service Request Status Selected Specialties Address Phone Number Fax Number    [facility] Selected DME Services [address] [address] [phone]      Dialysis/Infusion     No service coordination in this encounter.      Home Medical Care - Selection Complete     Service Request Status Selected Specialties Address Phone Number Fax Number    [facility] Selected Home Health Services [address] [address] [phone]      Social Care     No service coordination in this encounter.                Demographic Summary     Row Name 04/20/18 0911       General Information    Admission Type inpatient    Referral Source physician    Reason for Consult discharge planning;other (see comments)   HHPT    Preferred Language English            Functional Status    No documentation.           Psychosocial    No documentation.           Abuse/Neglect    No documentation.           Legal    No documentation.           Substance Abuse    No documentation.           Patient Forms    No documentation.         Lidia Chu

## 2018-04-20 NOTE — PLAN OF CARE
Problem: Patient Care Overview  Goal: Plan of Care Review  Outcome: Ongoing (interventions implemented as appropriate)   04/20/18 1913   Coping/Psychosocial   Plan of Care Reviewed With patient   OTHER   Outcome Summary Pt increased gait distance to 170 feet with CGA and RW, limited by fatigue. Pt progressed to stair training. Pt anticipates discharge home tomorrow, will continue to progress mobility as able   Plan of Care Review   Progress improving

## 2018-04-20 NOTE — THERAPY TREATMENT NOTE
Acute Care - Physical Therapy Treatment Note  [facility]     Patient Name: Nely Grider  : 1945  MRN: [id number]  Today's Date: 2018  Onset of Illness/Injury or Date of Surgery: 18  Date of Referral to PT: 18  Referring Physician: MD Ranjeet    Admit Date: 2018    Visit Dx:    ICD-10-CM ICD-9-CM   1. Impaired functional mobility, balance, gait, and endurance Z74.09 V49.89   2. Primary osteoarthritis of both hips M16.0 715.15   3. Status post total replacement of right hip Z96.641 V43.64   4. Pain of right hip joint M25.551 719.45   5. Impaired mobility and ADLs Z74.09 799.89     Patient Active Problem List   Diagnosis   • Atypical chest pain   • Neck sprain   • Cough   • Dysuria   • Gastroesophageal reflux disease without esophagitis   • Mixed hyperlipidemia   • Essential hypertension   • Pain of right hip joint   • Sebaceous cyst   • Cervical sprain   • Controlled type 2 diabetes mellitus without complication, without long-term current use of insulin   • Sepsis   • Chronic kidney disease (CKD), stage III (moderate)   • Elevated LFTs   • Leukocytosis   • Mass of right hip region   • Anemia   • Constipation   • Diabetic polyneuropathy associated with type 2 diabetes mellitus   • Primary osteoarthritis of both hips   • Status post total replacement of right hip   • Hyponatremia   • Renal insufficiency   • Acute postoperative pain       Therapy Treatment          Rehabilitation Treatment Summary     Row Name 18 1410 18 1056          Treatment Time/Intention    Discipline physical therapist  -MJ physical therapist  -MJ     Document Type therapy note (daily note)  - therapy note (daily note)  -     Subjective Information no complaints  -MJ no complaints  -     Mode of Treatment physical therapy  -MJ physical therapy  -MJ     Patient/Family Observations Pt supine in bed, IV. Family present  -MJ Pt supine in bed, IV. Family present  -MJ     Care Plan Review  --  patient/other agree to care plan  -MJ     Patient Effort good  -MJ good  -MJ     Existing Precautions/Restrictions fall;right;hip, posterior  -MJ fall;right;hip, posterior  -MJ     Patient Response to Treatment Nausea after gait, RN notified  -MJ2 Nausea, increased pain. Offered emesis basin, cold wash cloth, RN present with nausea meds  -MJ     Recorded by [MJ] Jay Jay Carr, PT 04/20/18 1458 04/20/18 1458  [MJ2] Jay Jay Carr, PT 04/20/18 1459 04/20/18 1459 [MJ] Jay Jay Carr, PT 04/20/18 1155 04/20/18 1155     Row Name 04/20/18 1056             Cognitive Assessment/Intervention    Additional Documentation Cognitive Assessment/Intervention (Group)  -MJ      Recorded by [MJ] Jay Jay Carr, PT 04/20/18 1155 04/20/18 1155      Row Name 04/20/18 1410 04/20/18 1056          Cognitive Assessment/Intervention- PT/OT    Affect/Mental Status (Cognitive) WFL  -MJ WFL  -MJ     Orientation Status (Cognition) oriented x 4  -MJ oriented x 4  -MJ     Follows Commands (Cognition) WFL  -MJ WFL  -MJ     Cognitive Function (Cognitive)  -- safety deficit  -MJ     Safety Deficit (Cognitive) mild deficit   cues for hip precautions  -MJ mild deficit   cues for recall of hip precautions  -MJ     Recorded by [MJ] Jay Jay Carr, PT 04/20/18 1458 04/20/18 1458 [MJ] Jay Jay Carr, PT 04/20/18 1155 04/20/18 1155     Row Name 04/20/18 1056             Safety Issues, Functional Mobility    Safety Issues Affecting Function (Mobility) safety precaution awareness  -MJ      Impairments Affecting Function (Mobility) pain;range of motion (ROM);strength  -MJ      Recorded by [MJ] Jay Jay Carr, PT 04/20/18 1155 04/20/18 1155      Row Name 04/20/18 1056             Mobility Assessment/Intervention    Extremity Weight-bearing Status right lower extremity  -MJ      Right Lower Extremity (Weight-bearing Status) weight-bearing as tolerated (WBAT)  -MJ      Recorded by [MJ] Jay Jay Carr, PT 04/20/18 1155 04/20/18 1155      Row Name 04/20/18 1410 04/20/18 1056          Bed  Mobility Assessment/Treatment    Bed Mobility Assessment/Treatment supine-sit;sit-supine  -MJ supine-sit;sit-supine  -MJ     Supine-Sit [place] (Bed Mobility) supervision;verbal cues  -MJ supervision;verbal cues  -MJ     Sit-Supine [place] (Bed Mobility) supervision;verbal cues  -MJ supervision;verbal cues  -MJ     Bed Mobility, Safety Issues decreased use of legs for bridging/pushing  -MJ decreased use of legs for bridging/pushing  -MJ     Assistive Device (Bed Mobility) bed rails;head of bed elevated;leg   -MJ bed rails;head of bed elevated;leg   -MJ     Comment (Bed Mobility) Verbal cues for sequencing with leg , increased time and effort t perform  -MJ Verbal cues for sequencing with leg , increased time and effort to perform  -MJ     Recorded by [MJ] Jay Jay Carr, PT 04/20/18 1458 04/20/18 1458 [MJ] Jay Jay Carr, PT 04/20/18 1155 04/20/18 1155     Row Name 04/20/18 1410 04/20/18 1056          Transfer Assessment/Treatment    Transfer Assessment/Treatment sit-stand transfer;stand-sit transfer  -MJ sit-stand transfer;stand-sit transfer  -MJ     Comment (Transfers) Verbal cues for correct hand placement and to step R LE out prior to t/f  -MJ Verbal cues for correct hand placement and to step R LE out prior to t/f  -MJ     Sit-Stand [place] (Transfers) contact guard;verbal cues  -MJ contact guard;verbal cues  -MJ     Stand-Sit [place] (Transfers) contact guard;verbal cues  -MJ contact guard;verbal cues  -MJ     Recorded by [MJ] Jay Jay Carr, PT 04/20/18 1458 04/20/18 1458 [MJ] Jay Jay Carr, PT 04/20/18 1155 04/20/18 1155     Row Name 04/20/18 1410 04/20/18 1056          Sit-Stand Transfer    Assistive Device (Sit-Stand Transfers) walker, front-wheeled  -MJ walker, front-wheeled  -MJ     Recorded by [MJ] Jay Jay Carr, PT 04/20/18 1458 04/20/18 1458 [MJ] Jay Jay Carr, PT 04/20/18 1155 04/20/18 1155     Row Name 04/20/18 1410 04/20/18 1056          Stand-Sit Transfer    Assistive  Device (Stand-Sit Transfers) walker, front-wheeled  -MJ walker, front-wheeled  -MJ     Recorded by [MJ] Jay Jay Carr, PT 04/20/18 1458 04/20/18 1458 [MJ] Jay Jay Carr, PT 04/20/18 1155 04/20/18 1155     Row Name 04/20/18 1410 04/20/18 1056          Gait/Stairs Assessment/Training    [place] Level (Gait) contact guard;verbal cues  -MJ contact guard;verbal cues  -MJ     Assistive Device (Gait) walker, front-wheeled  -MJ walker, front-wheeled  -MJ     Distance in Feet (Gait) 170  -  -MJ     Pattern (Gait) step-through  -MJ step-through  -MJ     Deviations/Abnormal Patterns (Gait) right sided deviations;antalgic;gait speed decreased;stride length decreased  -MJ right sided deviations;antalgic;gait speed decreased;stride length decreased  -MJ     Right Sided Gait Deviations heel strike decreased;weight shift ability decreased  -MJ forward flexed posture;heel strike decreased;weight shift ability decreased  -MJ     [place] Level (Stairs) contact guard;verbal cues  -MJ  --     Assistive Device (Stairs) walker, front-wheeled  -MJ  --     Handrail Location (Stairs) none  -MJ  --     Number of Steps (Stairs) 1  -MJ  --     Ascending Technique (Stairs) step-to-step  -MJ  --     Descending Technique (Stairs) step-to-step  -MJ  --     Stairs, Safety Issues sequencing ability decreased;weight-shifting ability decreased  -MJ  --     Stairs, Impairments ROM decreased;strength decreased;pain  -MJ  --     Comment (Gait/Stairs) Pt demo step through gait pattern at slow pace. Cues for upright posture and to increase LE weight bearing, improvement noted. Cues to  feet during turn. Pt performed 1 step backward with RW. Cues to ascend L LE and to descend forward with R LE  -MJ Pt intially demo step to gait pattern at slow pace, progressed to step through with verbal cues and practice. Cues for upright posture, for even step length and to increase LE weight bearing. Cues to  feet during turn. Pt required 2 short  standing rest breaks. Gait limited by pain and fatigue  -MJ     Recorded by [MJ] Jay Jay Carr, PT 04/20/18 1458 04/20/18 1458 [MJ] Jay Jay Carr, PT 04/20/18 1155 04/20/18 1155     Row Name 04/20/18 1410 04/20/18 1056          Therapeutic Exercise    Lower Extremity (Therapeutic Exercise) gluteal sets;heel slides, right;LAQ (long arc quad), right;marching while seated;quad sets, right;SAQ (short arc quad), right;SLR (straight leg raise), right  -MJ gluteal sets;heel slides, right;LAQ (long arc quad), right;quad sets, right;SAQ (short arc quad), right;SLR (straight leg raise), right;marching while seated  -MJ     Lower Extremity Range of Motion (Therapeutic Exercise) hip abduction/adduction, right;ankle dorsiflexion/plantar flexion, right  -MJ hip abduction/adduction, right;ankle dorsiflexion/plantar flexion, right  -MJ     Position (Therapeutic Exercise) seated;supine  -MJ seated;supine  -MJ     Sets/Reps (Therapeutic Exercise) 15  -MJ 15  -MJ     Comment (Therapeutic Exercise) DOROTHY protocol: cues for technique. Patricia w/ SLR  -MJ DOROTHY protocol: cues for technique. Patricia w/ SLR  -MJ     Recorded by [MJ] Jay Jay Carr, PT 04/20/18 1458 04/20/18 1458 [MJ] Jay Jay Carr, PT 04/20/18 1155 04/20/18 1155     Row Name 04/20/18 1410 04/20/18 1056          Positioning and Restraints    Pre-Treatment Position in bed  -MJ in bed  -MJ     Post Treatment Position bed  -MJ bed  -MJ     In Bed notified nsg;supine;call light within reach;encouraged to call for assist;with family/caregiver  -MJ notified nsg;supine;call light within reach;encouraged to call for assist;with family/caregiver  -MJ     Recorded by [MJ] Jay Jay Carr, PT 04/20/18 1458 04/20/18 1458 [MJ] Jay Jay Carr, PT 04/20/18 1155 04/20/18 1155     Row Name 04/20/18 1410 04/20/18 1056          Pain Scale: Numbers Pre/Post-Treatment    Pain Scale: Numbers, Pretreatment 0/10 - no pain  -MJ 0/10 - no pain  -MJ     Pain Scale: Numbers, Post-Treatment 2/10  -MJ 7/10  -MJ     Pain Location  - Side Right  -MJ Right  -MJ     Pain Location hip  -MJ hip  -MJ     Pain Intervention(s) Repositioned;Ambulation/increased activity  -MJ Repositioned;Ambulation/increased activity  -MJ     Recorded by [MJ] Jay Jay Carr, PT 04/20/18 1458 04/20/18 1458 [MJ] Jay Jay Carr, PT 04/20/18 1155 04/20/18 1155     Row Name                Wound 04/19/18 1013 Right hip incision    Wound - Properties Group Date first assessed: 04/19/18 [JA] Time first assessed: 1013 [JA] Side: Right [JA] Location: hip [JA] Type: incision [JA] Recorded by:  [JA] Ruben Camacho RN 04/19/18 1013 04/19/18 1013    Row Name 04/20/18 1410 04/20/18 1056          Plan of Care Review    Plan of Care Reviewed With patient;family  -MJ patient  -MJ     Recorded by [MJ] Jay Jay Carr, PT 04/20/18 1458 04/20/18 1458 [MJ] Jay Jay Carr, PT 04/20/18 1155 04/20/18 1155     Row Name 04/20/18 1410 04/20/18 1056          Outcome Summary/Treatment Plan (PT)    Daily Summary of Progress (PT) progress toward functional goals is good  -MJ progress toward functional goals is good  -MJ     Recorded by [MJ] Jay Jay Carr, PT 04/20/18 1458 04/20/18 1458 [MJ] Jay Jay Carr, PT 04/20/18 1155 04/20/18 1155       User Key  (r) = Recorded By, (t) = Taken By, (c) = Cosigned By    Initials Name Effective Dates Discipline    CIPRIANO Camacho RN 06/16/16 -  Nurse    MARIAH Carr, PT 04/03/18 -  PT          Wound 04/19/18 1013 Right hip incision (Active)   Dressing Appearance no drainage;intact;dry 4/20/2018  8:24 AM   Drainage Amount none 4/20/2018  8:24 AM   Dressing Care, Wound gauze 4/20/2018  8:24 AM             Physical Therapy Education     Title: PT OT SLP Therapies (Done)     Topic: Physical Therapy (Done)     Point: Mobility training (Done)    Learning Progress Summary     Learner Status Readiness Method Response Comment Documented by    Patient Done Acceptance E,D VU,NR Reviewed HEP, hip precautions, gait mechanics, stairs sequencing  04/20/18 1459     Done Acceptance  JULIANE PERLA NR Reviewed hip precautions, HEP, gait mechanics, benefits of mobility MJ 04/20/18 1156     Done Acceptance JULIANE PERLA NR Reviewed hip precautions, benefits of mobility MJ 04/19/18 1547    Family Done Acceptance JULIANE PERLA NR Reviewed HEP, hip precautions, gait mechanics, stairs sequencing MJ 04/20/18 1459    Significant Other Done Acceptance JULIANE PERLA NR Reviewed hip precautions, benefits of mobility MJ 04/19/18 1547          Point: Home exercise program (Done)    Learning Progress Summary     Learner Status Readiness Method Response Comment Documented by    Patient Done Acceptance JULIANE PERLANR Reviewed HEP, hip precautions, gait mechanics, stairs sequencing MJ 04/20/18 1459     Done Acceptance JULIANE PERLA NR Reviewed hip precautions, HEP, gait mechanics, benefits of mobility MJ 04/20/18 1156    Family Done Acceptance JULIANE PERLA NR Reviewed HEP, hip precautions, gait mechanics, stairs sequencing MJ 04/20/18 1459          Point: Body mechanics (Done)    Learning Progress Summary     Learner Status Readiness Method Response Comment Documented by    Patient Done Acceptance JULIANE PERLA NR Reviewed HEP, hip precautions, gait mechanics, stairs sequencing MJ 04/20/18 1459     Done Acceptance JULIANE PERLA NR Reviewed hip precautions, HEP, gait mechanics, benefits of mobility MJ 04/20/18 1156     Done Acceptance JULIANE PERLA NR Reviewed hip precautions, benefits of mobility MJ 04/19/18 1547    Family Done Acceptance JULIANE PERLA NR Reviewed HEP, hip precautions, gait mechanics, stairs sequencing MJ 04/20/18 1459    Significant Other Done Acceptance JULIANE PERLA NR Reviewed hip precautions, benefits of mobility MJ 04/19/18 1547          Point: Precautions (Done)    Learning Progress Summary     Learner Status Readiness Method Response Comment Documented by    Patient Done Acceptance JULIANE PERLA NR Reviewed HEP, hip precautions, gait mechanics, stairs sequencing MJ 04/20/18 1459     Done Acceptance JULIANE PERLA NR Reviewed hip precautions, HEP, gait mechanics, benefits of mobility   04/20/18 1156     Done Acceptance JULIANE PERLA NR Reviewed hip precautions, benefits of mobility  04/19/18 1547    Family Done Acceptance JULIANE PERLA NR Reviewed HEP, hip precautions, gait mechanics, stairs sequencing  04/20/18 1459    Significant Other Done Acceptance JULIANE PERLA NR Reviewed hip precautions, benefits of mobility  04/19/18 1547                      User Key     Initials Effective Dates Name Provider Type Discipline     04/03/18 -  Jay Jay Carr, PT Physical Therapist PT                    PT Recommendation and Plan  Anticipated Discharge Disposition (PT): home or self care, home with home health care (vs rehab, pending progress)  Planned Therapy Interventions (PT Eval): balance training, bed mobility training, gait training, home exercise program, patient/family education, stair training, strengthening, transfer training  Therapy Frequency (PT Clinical Impression): 2 times/day  Outcome Summary/Treatment Plan (PT)  Daily Summary of Progress (PT): progress toward functional goals is good  Anticipated Equipment Needs at Discharge (PT): front wheeled walker  Anticipated Discharge Disposition (PT): home or self care, home with home health care (vs rehab, pending progress)  Plan of Care Reviewed With: patient  Progress: improving  Outcome Summary: Pt increased gait distance to 170 feet with CGA and RW, limited by fatigue. Pt progressed to stair training. Pt anticipates discharge home tomorrow, will continue to progress mobility as able          Outcome Measures     Row Name 04/20/18 1410 04/20/18 1056 04/20/18 0709       How much help from another person do you currently need...    Turning from your back to your side while in flat bed without using bedrails? 3  -MJ 3  -MJ  --    Moving from lying on back to sitting on the side of a flat bed without bedrails? 3  -MJ 3  -MJ  --    Moving to and from a bed to a chair (including a wheelchair)? 3  -MJ 3  -MJ  --    Standing up from a chair using your arms (e.g.,  wheelchair, bedside chair)? 3  -MJ 3  -MJ  --    Climbing 3-5 steps with a railing? 3  -MJ 2  -MJ  --    To walk in hospital room? 3  -MJ 3  -MJ  --    AM-PAC 6 Clicks Score 18  -MJ 17  -MJ  --       How much help from another is currently needed...    Putting on and taking off regular lower body clothing?  --  -- 3  -ST    Bathing (including washing, rinsing, and drying)  --  -- 3  -ST    Toileting (which includes using toilet bed pan or urinal)  --  -- 3  -ST    Putting on and taking off regular upper body clothing  --  -- 4  -ST    Taking care of personal grooming (such as brushing teeth)  --  -- 3  -ST    Eating meals  --  -- 4  -ST    Score  --  -- 20  -ST       Functional Assessment    Outcome Measure Options AM-PAC 6 Clicks Basic Mobility (PT)  -MJ AM-PAC 6 Clicks Basic Mobility (PT)  - AM-[facility] 6 Clicks Daily Activity (OT)  -ST    Row Name 04/19/18 1502             How much help from another person do you currently need...    Turning from your back to your side while in flat bed without using bedrails? 2  -MJ      Moving from lying on back to sitting on the side of a flat bed without bedrails? 2  -MJ      Moving to and from a bed to a chair (including a wheelchair)? 3  -MJ      Standing up from a chair using your arms (e.g., wheelchair, bedside chair)? 2  -MJ      Climbing 3-5 steps with a railing? 1  -MJ      To walk in hospital room? 3  -MJ      AM-PAC 6 Clicks Score 13  -MJ         Functional Assessment    Outcome Measure Options AM-[facility] 6 Clicks Basic Mobility (PT)  -        User Key  (r) = Recorded By, (t) = Taken By, (c) = Cosigned By    Initials Name Provider Type    ST Salima Lainez OTR Occupational Therapist    MARIAH Carr PT Physical Therapist           Time Calculation:         PT Charges     Row Name 04/20/18 1458 04/20/18 1157          Time Calculation    Start Time 1410  -MJ 1056  -MJ     PT Received On 04/20/18  -MJ 04/20/18  -MJ     PT Goal Re-Cert Due Date 04/29/18  -MJ 04/29/18   -MJ        Time Calculation- PT    Total Timed Code Minutes- PT 24 minute(s)  -MJ 24 minute(s)  -MJ       User Key  (r) = Recorded By, (t) = Taken By, (c) = Cosigned By    Initials Name Provider Type    MARIAH Carr, PT Physical Therapist          Therapy Charges for Today     Code Description Service Date Service Provider Modifiers Qty    29595322118 HC PT EVAL LOW COMPLEXITY 2 4/19/2018 Jay Jay Carr, PT GP 1    68633904210 HC GAIT TRAINING EA 15 MIN 4/19/2018 Jay Jay Carr, PT GP 1    22887357287 HC PT THER SUPP EA 15 MIN 4/19/2018 Jay Jay Carr, PT GP 2    15664697331 HC GAIT TRAINING EA 15 MIN 4/20/2018 Jay Jay Carr, PT GP 1    56169121683 HC PT THER PROC EA 15 MIN 4/20/2018 Jay Jay Carr, PT GP 1    27191271444 HC GAIT TRAINING EA 15 MIN 4/20/2018 Jay Jay Carr, PT GP 1    21869445626 HC PT THER PROC EA 15 MIN 4/20/2018 Jay Jay Carr, PT GP 1          PT G-Codes  Outcome Measure Options: AM-PAC 6 Clicks Basic Mobility (PT)    Jay Jay Carr, PT  4/20/2018

## 2018-04-20 NOTE — PLAN OF CARE
Problem: Patient Care Overview  Goal: Plan of Care Review  Outcome: Ongoing (interventions implemented as appropriate)   04/20/18 6218   Coping/Psychosocial   Plan of Care Reviewed With patient;family   Plan of Care Review   Progress (eval)     SLP clinical dysphagia evaluation completed. Will address dysphagia concerns w/ MBS to r/o pharyngeal dysfunction. Rec: NPO pending MBS results. Please see note for further details and recommendations.

## 2018-04-20 NOTE — PROGRESS NOTES
"IM progress note      Nely Grider  [id number]  1945     LOS: 1 day     Attending: Carlos Pollack MD    Primary Care Provider: Raimundo Ibarra MD      Chief Complaint/Reason for visit:  Right hip pain    Subjective   Doing well. Good pain control. Complains of difficulty swallowing. Denies f/c/n/v/sob/cp.    Objective     Vital Signs  Blood pressure 105/55, pulse 77, temperature 98 °F (36.7 °C), temperature source Oral, resp. rate 16, height 166.4 cm (65.5\"), weight 83.5 kg (184 lb), SpO2 97 %.  Temp (24hrs), Av.1 °F (36.7 °C), Min:97.7 °F (36.5 °C), Max:98.6 °F (37 °C)      Nutrition: PO    Respiratory: RA    Physical Therapy: Pt increased gait distance to 170 feet with CGA and RW, limited by fatigue. Pt progressed to stair training. Pt anticipates discharge home tomorrow, will continue to progress mobility as able    Speech Therapy: Will sign-off as pt presents w/ functional oropharyngeal swallow. No aspiration observed w/ any trial or consistency. Pt noted w/ prominent cricopharyngeus that may somewhat be contributing to current symptoms. Pt also observed w/ min retrograde flow and retention at the level of the cervical esophagus. Pt began gagging and c/o of feeling that \"food is coming back up\" a few minutes after completion of the study. Given suspected esophageal dysphagia, pt may benefit from further esophageal workup after discharge per MD discretion (i.e UGI, EGD, etc.). RECS: Mech soft whole foods, thin liquids, meds whole in thins, alternate solids/liquids PRN. general aspiration/reflux precautions, no further ST dysphagia needs at this time. Please see note for further details and recommendations.    Physical Exam:     General Appearance:    Alert, cooperative, in no acute distress   Head:    Normocephalic, without obvious abnormality, atraumatic    Lungs:     Normal effort, symmetric chest rise, no crepitus, clear to      auscultation bilaterally             Heart:    Regular rhythm " and normal rate, normal S1 and S2   Abdomen:     Normal bowel sounds, no masses, no organomegaly, soft        non-tender, non-distended, no guarding, no rebound                tenderness   Extremities:   No clubbing, cyanosis or edema.  No deformities. Right hip Prineo CDI   Pulses:   Pulses palpable and equal bilaterally   Skin:   No bleeding, bruising or rash   Neurologic:   Moves all extremities with no obvious focal motor deficit.  Cranial nerves 2 - 12 grossly intact. Flexion and dorsiflexion intact bilateral feet.       Results Review:     I reviewed the patient's new clinical results.     Results from last 7 days  Lab Units 04/20/18  0518 04/19/18  0754   WBC 10*3/mm3 19.26* 13.53*   HEMOGLOBIN g/dL 9.4* 10.6*   HEMATOCRIT % 29.6* 33.2*   PLATELETS 10*3/mm3 549* 553*       Results from last 7 days  Lab Units 04/20/18  1551 04/20/18  0518   SODIUM mmol/L 127* 126*   POTASSIUM mmol/L 3.9 4.7   CHLORIDE mmol/L 96* 94*   CO2 mmol/L 22.0 22.0   BUN mg/dL 28* 29*   CREATININE mg/dL 2.10* 2.20*   CALCIUM mg/dL 8.4* 8.5*   GLUCOSE mg/dL 143* 214*     I reviewed the patient's new imaging including images and reports.    All medications reviewed.     apixaban 2.5 mg Oral Q12H   atorvastatin 10 mg Oral Nightly   docosanol  Topical 5x Daily   insulin lispro 0-9 Units Subcutaneous 4x Daily With Meals & Nightly   pantoprazole 40 mg Oral QAM       Assessment/Plan   Principal Problem:    Status post total replacement of right hip  Active Problems:    Primary osteoarthritis of both hips    Mixed hyperlipidemia    Essential hypertension    Controlled type 2 diabetes mellitus without complication, without long-term current use of insulin    Leukocytosis    Anemia    Hyponatremia    Renal insufficiency    Acute postoperative pain    Difficulty swallowing    Plan  1. PT/OT- WBAT RLE  2. Pain control-prns   3. IS-encouraged  4. DVT proph- mechs/ASA  5. Bowel regimen  6. Monitor post-op labs  7. DC planning for home, likely  tomorrow    HTN, HLD  - Continue home statin  - Hold HCTZ for now  - Monitor BP q4 hrs  - Holding parameters for BP meds  - PRN hydralazine for SBP >170     DM  - hgb A1c on 4/12/18 6.8  - Hold OHA while inpatient  - Accuchecks ACHS with moderate dose SSI    RI, hyponatremia  - Continue IVF  - Repeat labs in AM  - follow-up with PCP at discharge  - DC losartan and mobic    Speech eval- completed  - [facility] soft whole foods, thin liquids, meds whole in thins, alternate solids/liquids PRN. general aspiration/reflux precautions, no further ST dysphagia needs at this time. Please see note for further details and recommendations.    ( with regard to RI, seems to be A/C, will watch overnight with hydration, holding ACE I and NSAIDS. Home tomorrow if trending in the right direction)wy    I have personally performed the evaluation on this patient. My history is consistant  with HPI obtained. My exam finding are listed above. I have personally reviewed and discussed the above formulated treatment plan with patient, family and APRN)SKYLAR Ibrahim  04/20/18  5:01 PM

## 2018-04-20 NOTE — THERAPY TREATMENT NOTE
Acute Care - Physical Therapy Treatment Note  [facility]     Patient Name: Nely Grider  : 1945  MRN: [id number]  Today's Date: 2018  Onset of Illness/Injury or Date of Surgery: 18  Date of Referral to PT: 18  Referring Physician: MD Ranjeet    Admit Date: 2018    Visit Dx:    ICD-10-CM ICD-9-CM   1. Impaired functional mobility, balance, gait, and endurance Z74.09 V49.89   2. Primary osteoarthritis of both hips M16.0 715.15   3. Status post total replacement of right hip Z96.641 V43.64   4. Pain of right hip joint M25.551 719.45   5. Impaired mobility and ADLs Z74.09 799.89     Patient Active Problem List   Diagnosis   • Atypical chest pain   • Neck sprain   • Cough   • Dysuria   • Gastroesophageal reflux disease without esophagitis   • Mixed hyperlipidemia   • Essential hypertension   • Pain of right hip joint   • Sebaceous cyst   • Cervical sprain   • Controlled type 2 diabetes mellitus without complication, without long-term current use of insulin   • Sepsis   • Chronic kidney disease (CKD), stage III (moderate)   • Elevated LFTs   • Leukocytosis   • Mass of right hip region   • Anemia   • Constipation   • Diabetic polyneuropathy associated with type 2 diabetes mellitus   • Primary osteoarthritis of both hips   • Status post total replacement of right hip       Therapy Treatment          Rehabilitation Treatment Summary     Row Name 18 1056             Treatment Time/Intention    Discipline physical therapist  -MJ      Document Type therapy note (daily note)  -MJ      Subjective Information no complaints  -MJ      Mode of Treatment physical therapy  -MJ      Patient/Family Observations Pt supine in bed, IV. Family present  -      Care Plan Review patient/other agree to care plan  -MJ      Patient Effort good  -      Existing Precautions/Restrictions fall;right;hip, posterior  -MJ      Patient Response to Treatment Nausea, increased pain. Offered emesis basin, cold  wash cloth, RN present with nausea meds  -MJ      Recorded by [MJ] Jay Jay Carr, PT 04/20/18 1155 04/20/18 1155      Row Name 04/20/18 1056             Cognitive Assessment/Intervention    Additional Documentation Cognitive Assessment/Intervention (Group)  -MJ      Recorded by [MJ] Jay Jay Carr, PT 04/20/18 1155 04/20/18 1155      Row Name 04/20/18 1056             Cognitive Assessment/Intervention- PT/OT    Affect/Mental Status (Cognitive) WFL  -MJ      Orientation Status (Cognition) oriented x 4  -MJ      Follows Commands (Cognition) WFL  -MJ      Cognitive Function (Cognitive) safety deficit  -MJ      Safety Deficit (Cognitive) mild deficit   cues for recall of hip precautions  -MJ      Recorded by [MJ] Jay Jay Carr, PT 04/20/18 1155 04/20/18 1155      Row Name 04/20/18 1056             Safety Issues, Functional Mobility    Safety Issues Affecting Function (Mobility) safety precaution awareness  -MJ      Impairments Affecting Function (Mobility) pain;range of motion (ROM);strength  -MJ      Recorded by [MJ] Jay Jay Carr, PT 04/20/18 1155 04/20/18 1155      Row Name 04/20/18 1056             Mobility Assessment/Intervention    Extremity Weight-bearing Status right lower extremity  -MJ      Right Lower Extremity (Weight-bearing Status) weight-bearing as tolerated (WBAT)  -MJ      Recorded by [MJ] Jay Jay Carr, PT 04/20/18 1155 04/20/18 1155      Row Name 04/20/18 1056             Bed Mobility Assessment/Treatment    Bed Mobility Assessment/Treatment supine-sit;sit-supine  -MJ      Supine-Sit [place] (Bed Mobility) supervision;verbal cues  -MJ      Sit-Supine [place] (Bed Mobility) supervision;verbal cues  -MJ      Bed Mobility, Safety Issues decreased use of legs for bridging/pushing  -MJ      Assistive Device (Bed Mobility) bed rails;head of bed elevated;leg   -MJ      Comment (Bed Mobility) Verbal cues for sequencing with leg , increased time and effort to perform  -MJ      Recorded by [MJ]  Jay Jay Carr, PT 04/20/18 1155 04/20/18 1155      Row Name 04/20/18 1056             Transfer Assessment/Treatment    Transfer Assessment/Treatment sit-stand transfer;stand-sit transfer  -MJ      Comment (Transfers) Verbal cues for correct hand placement and to step R LE out prior to t/f  -MJ      Sit-Stand [place] (Transfers) contact guard;verbal cues  -MJ      Stand-Sit [place] (Transfers) contact guard;verbal cues  -MJ      Recorded by [MJ] Jay Jay Carr, PT 04/20/18 1155 04/20/18 1155      Row Name 04/20/18 1056             Sit-Stand Transfer    Assistive Device (Sit-Stand Transfers) walker, front-wheeled  -MJ      Recorded by [MJ] Jay Jay Carr, PT 04/20/18 1155 04/20/18 1155      Row Name 04/20/18 1056             Stand-Sit Transfer    Assistive Device (Stand-Sit Transfers) walker, front-wheeled  -MJ      Recorded by [MJ] Jay Jay Carr, PT 04/20/18 1155 04/20/18 1155      Row Name 04/20/18 1056             Gait/Stairs Assessment/Training    [place] Level (Gait) contact guard;verbal cues  -MJ      Assistive Device (Gait) walker, front-wheeled  -MJ      Distance in Feet (Gait) 160  -MJ      Pattern (Gait) step-through  -MJ      Deviations/Abnormal Patterns (Gait) right sided deviations;antalgic;gait speed decreased;stride length decreased  -MJ      Right Sided Gait Deviations forward flexed posture;heel strike decreased;weight shift ability decreased  -MJ      Comment (Gait/Stairs) Pt intially demo step to gait pattern at slow pace, progressed to step through with verbal cues and practice. Cues for upright posture, for even step length and to increase LE weight bearing. Cues to  feet during turn. Pt required 2 short standing rest breaks. Gait limited by pain and fatigue  -MJ      Recorded by [MJ] Jay Jay Carr, PT 04/20/18 1155 04/20/18 1155      Row Name 04/20/18 1056             Therapeutic Exercise    Lower Extremity (Therapeutic Exercise) gluteal sets;heel slides, right;LAQ (long arc quad),  right;quad sets, right;SAQ (short arc quad), right;SLR (straight leg raise), right;marching while seated  -MJ      Lower Extremity Range of Motion (Therapeutic Exercise) hip abduction/adduction, right;ankle dorsiflexion/plantar flexion, right  -MJ      Position (Therapeutic Exercise) seated;supine  -MJ      Sets/Reps (Therapeutic Exercise) 15  -MJ      Comment (Therapeutic Exercise) DOROTHY protocol: cues for technique. Patricia w/ SLR  -MJ      Recorded by [MJ] Jay Jay Carr, PT 04/20/18 1155 04/20/18 1155      Row Name 04/20/18 1056             Positioning and Restraints    Pre-Treatment Position in bed  -MJ      Post Treatment Position bed  -MJ      In Bed notified nsg;supine;call light within reach;encouraged to call for assist;with family/caregiver  -MJ      Recorded by [MJ] Jay Jay Carr, PT 04/20/18 1155 04/20/18 1155      Row Name 04/20/18 1056             Pain Scale: Numbers Pre/Post-Treatment    Pain Scale: Numbers, Pretreatment 0/10 - no pain  -MJ      Pain Scale: Numbers, Post-Treatment 7/10  -MJ      Pain Location - Side Right  -MJ      Pain Location hip  -MJ      Pain Intervention(s) Repositioned;Ambulation/increased activity  -MJ      Recorded by [MJ] Jay Jay Carr, PT 04/20/18 1155 04/20/18 1155      Row Name                Wound 04/19/18 1013 Right hip incision    Wound - Properties Group Date first assessed: 04/19/18 [JA] Time first assessed: 1013 [JA] Side: Right [JA] Location: hip [JA] Type: incision [JA] Recorded by:  [JA] Ruben Camacho RN 04/19/18 1013 04/19/18 1013    Row Name 04/20/18 1056             Plan of Care Review    Plan of Care Reviewed With patient  -MJ      Recorded by [MJ] Jay Jay Carr, PT 04/20/18 1155 04/20/18 1155      Row Name 04/20/18 1056             Outcome Summary/Treatment Plan (PT)    Daily Summary of Progress (PT) progress toward functional goals is good  -MJ      Recorded by [MJ] Jay Jay Carr, PT 04/20/18 1155 04/20/18 1155        User Key  (r) = Recorded By, (t) = Taken By, (c)  = Cosigned By    Initials Name Effective Dates Discipline    CIPRIANO Camacho, RN 06/16/16 -  Nurse    MARIAH Carr, PT 04/03/18 -  PT          Wound 04/19/18 1013 Right hip incision (Active)   Dressing Appearance no drainage;intact;dry 4/20/2018  8:24 AM   Drainage Amount none 4/20/2018  8:24 AM   Dressing Care, Wound gauze 4/20/2018  8:24 AM             Physical Therapy Education     Title: PT OT SLP Therapies (Done)     Topic: Physical Therapy (Done)     Point: Mobility training (Done)    Learning Progress Summary     Learner Status Readiness Method Response Comment Documented by    Patient Done Acceptance JULIANE PERLA NR Reviewed hip precautions, HEP, gait mechanics, benefits of mobility MJ 04/20/18 1156     Done Acceptance JULIANE PERLA NR Reviewed hip precautions, benefits of mobility MJ 04/19/18 1547    Significant Other Done Acceptance JULIANE PERLA NR Reviewed hip precautions, benefits of mobility MJ 04/19/18 1547          Point: Home exercise program (Done)    Learning Progress Summary     Learner Status Readiness Method Response Comment Documented by    Patient Done Acceptance JULIANE PERLA NR Reviewed hip precautions, HEP, gait mechanics, benefits of mobility MJ 04/20/18 1156          Point: Body mechanics (Done)    Learning Progress Summary     Learner Status Readiness Method Response Comment Documented by    Patient Done Acceptance JULIANE PERLA NR Reviewed hip precautions, HEP, gait mechanics, benefits of mobility MJ 04/20/18 1156     Done Acceptance JULIANE PERLA NR Reviewed hip precautions, benefits of mobility MJ 04/19/18 1547    Significant Other Done Acceptance JULIANE PERLA NR Reviewed hip precautions, benefits of mobility MJ 04/19/18 1547          Point: Precautions (Done)    Learning Progress Summary     Learner Status Readiness Method Response Comment Documented by    Patient Done Acceptance JULIANE PERLA NR Reviewed hip precautions, HEP, gait mechanics, benefits of mobility MJ 04/20/18 1156     Done Acceptance JULIANE PERLA NR Reviewed hip  precautions, benefits of mobility  04/19/18 1547    Significant Other Done Acceptance E,D CAROLIN,NR Reviewed hip precautions, benefits of mobility  04/19/18 1547                      User Key     Initials Effective Dates Name Provider Type Discipline     04/03/18 -  Jay Jay Carr, PT Physical Therapist PT                    PT Recommendation and Plan  Anticipated Discharge Disposition (PT): home or self care, home with home health care (vs rehab, pending progress)  Planned Therapy Interventions (PT Eval): balance training, bed mobility training, gait training, home exercise program, patient/family education, stair training, strengthening, transfer training  Therapy Frequency (PT Clinical Impression): 2 times/day  Outcome Summary/Treatment Plan (PT)  Daily Summary of Progress (PT): progress toward functional goals is good  Anticipated Equipment Needs at Discharge (PT): front wheeled walker  Anticipated Discharge Disposition (PT): home or self care, home with home health care (vs rehab, pending progress)  Plan of Care Reviewed With: patient  Progress: improving  Outcome Summary: Pt increased gait distance to 160 feet with CGA and RW, limited by pain and fatigue. Pt still requires cues for hip precautions, but required less assist for all functional mobility. Will progress to stair training in PM as able          Outcome Measures     Row Name 04/20/18 1056 04/20/18 0709 04/19/18 1502       How much help from another person do you currently need...    Turning from your back to your side while in flat bed without using bedrails? 3  -MJ  -- 2  -MJ    Moving from lying on back to sitting on the side of a flat bed without bedrails? 3  -MJ  -- 2  -MJ    Moving to and from a bed to a chair (including a wheelchair)? 3  -MJ  -- 3  -MJ    Standing up from a chair using your arms (e.g., wheelchair, bedside chair)? 3  -MJ  -- 2  -MJ    Climbing 3-5 steps with a railing? 2  -MJ  -- 1  -MJ    To walk in hospital room? 3  -MJ  -- 3   -MJ    AM-PAC 6 Clicks Score 17  -MJ  -- 13  -MJ       How much help from another is currently needed...    Putting on and taking off regular lower body clothing?  -- 3  -ST  --    Bathing (including washing, rinsing, and drying)  -- 3  -ST  --    Toileting (which includes using toilet bed pan or urinal)  -- 3  -ST  --    Putting on and taking off regular upper body clothing  -- 4  -ST  --    Taking care of personal grooming (such as brushing teeth)  -- 3  -ST  --    Eating meals  -- 4  -ST  --    Score  -- 20  -ST  --       Functional Assessment    Outcome Measure Options AM-PAC 6 Clicks Basic Mobility (PT)  -MJ AM-PAC 6 Clicks Daily Activity (OT)  -ST AM-PAC 6 Clicks Basic Mobility (PT)  -      User Key  (r) = Recorded By, (t) = Taken By, (c) = Cosigned By    Initials Name Provider Type     Salima Lainez, OTR Occupational Therapist    MARIAH Carr PT Physical Therapist           Time Calculation:         PT Charges     Row Name 04/20/18 1157             Time Calculation    Start Time 1056  -MJ      PT Received On 04/20/18  -      PT Goal Re-Cert Due Date 04/29/18  -         Time Calculation- PT    Total Timed Code Minutes- PT 24 minute(s)  -        User Key  (r) = Recorded By, (t) = Taken By, (c) = Cosigned By    Initials Name Provider Type    MARIAH Carr, JEREMIE Physical Therapist          Therapy Charges for Today     Code Description Service Date Service Provider Modifiers Qty    28072221600 HC PT EVAL LOW COMPLEXITY 2 4/19/2018 Jay Jay Carr, PT GP 1    00601249970 HC GAIT TRAINING EA 15 MIN 4/19/2018 Jay Jay Carr, PT GP 1    35263364448 HC PT THER SUPP EA 15 MIN 4/19/2018 Jay Jay Carr PT GP 2    17939801515 HC GAIT TRAINING EA 15 MIN 4/20/2018 Jay Jay Carr, PT GP 1    44555572631 HC PT THER PROC EA 15 MIN 4/20/2018 Jay Jay Carr, PT GP 1          PT G-Codes  Outcome Measure Options: AM-PAC 6 Clicks Basic Mobility (PT)    Jay Jay Carr PT  4/20/2018

## 2018-04-20 NOTE — DISCHARGE SUMMARY
Patient Name: Nely Grider  MRN: [id number]  : 1945  DOS: 2018    Attending: Carlos Pollack MD    Primary Care Provider: Raimundo Ibarra MD    Date of Admission:.2018  7:18 AM    Date of Discharge:  2018    Discharge Diagnosis: Principal Problem:    Status post total replacement of right hip  Active Problems:    Mixed hyperlipidemia    Essential hypertension    Controlled type 2 diabetes mellitus without complication, without long-term current use of insulin    Leukocytosis, reactive, improving.    Anemia    Primary osteoarthritis of both hips    Hyponatremia, mild stable.    Renal insufficiency, acute on chronic, mild, improving    Acute postoperative pain      Hospital Course  Patient is a 72 y.o. female presented for right total hip arthroplasty by Dr. Pollack under spinal anesthesia. She tolerated surgery well and was admitted for further medical management. Her hip has been painful for many years. She denies use of assistive device for ambulation or recent falls.     She reports history of blood clot 30-40 years ago, but does not recall treatment. A sister of hers had LE DVTs and had IVC filters and other complications as well.     The patient has done well postop. The patient has been able to ambulate 160 feet with PT.    The patient has had good pain control with PO pain medications.  Adjustments were made to pain medications to optimize postop pain management. Risks and benefits of opiate medications discussed with patient.    Blood glucose was monitored and remained acceptable during her stay.    She had a slight increase in her Cr after surgery c/w mild akf, she was maintained on IVF, her ARB/HCTZ was on hold, NSAIDs stopped. Cr improving. Na low but stable. Will need mild restriction of fluids and f/u BMP this week.    The patient was placed on DVT prophylaxis including Eliquis. The patient was encouraged to use IS for atelectasis prophylaxis.   The patient was placed on a  bowel regimen to prevent constipation while on pain medication.   The patient's H/H was monitored with a slight decrease that remained asymptomatic.    It is felt by all involved that the patient can discharge home at this time, and the patient has no further questions      Procedures Performed  DATE OF PROCEDURE:  04/19/18     PREOPERATIVE DIAGNOSIS: Right hip arthritis     POSTOPERATIVE DIAGNOSIS:  Right hip arthritis     PROCEDURE PERFORMED:  Right total hip arthroplasty with DePuy components (# 50 mm Press-Fit [place] cup with + 4 neutral polyethylene liner with # 5 hi Offset Press-Fit [place] Stem with + 1 x 32 mm ceramic head).      SURGEON: Carlos Pollack MD       Pertinent Test Results:    I reviewed the patient's new clinical results.     Results from last 7 days  Lab Units 04/21/18  0439 04/20/18  0518 04/19/18  0754   WBC 10*3/mm3 16.79* 19.26* 13.53*   HEMOGLOBIN g/dL 8.5* 9.4* 10.6*   HEMATOCRIT % 26.3* 29.6* 33.2*   PLATELETS 10*3/mm3 470* 549* 553*       Results from last 7 days  Lab Units 04/21/18  0439 04/20/18  1551 04/20/18  0518   SODIUM mmol/L 127* 127* 126*   POTASSIUM mmol/L 4.1 3.9 4.7   CHLORIDE mmol/L 99 96* 94*   CO2 mmol/L 22.0 22.0 22.0   BUN mg/dL 25* 28* 29*   CREATININE mg/dL 1.70* 2.10* 2.20*   CALCIUM mg/dL 7.8* 8.4* 8.5*   GLUCOSE mg/dL 120* 143* 214*     Results for GERALDO BRIGITTE K (MRN [id number]) as of 4/21/2018 10:48   Ref. Range 4/20/2018 15:51 4/20/2018 20:25 4/21/2018 04:39 4/21/2018 07:43   Glucose Latest Ref Range: 70 - 130 mg/dL 143 (H) 137 (H) 120 (H) 169 (H)     I reviewed the patient's new imaging including images and reports.      Physical therapy: Pt increased gait distance to 160 feet with CGA and RW, limited by pain and fatigue. Pt still requires cues for hip precautions, but required less assist for all functional mobility. Will progress to stair training in PM as able    Discharge Assessment:    Vital Signs  BP 95/50 (BP Location: Left arm, Patient Position:  "Lying)   Pulse 89   Temp 98.3 °F (36.8 °C) (Tympanic)   Resp 16   Ht 166.4 cm (65.5\")   Wt 83.5 kg (184 lb)   SpO2 95%   BMI 30.15 kg/m²   Temp (24hrs), Av.5 °F (36.9 °C), Min:98 °F (36.7 °C), Max:99.3 °F (37.4 °C)      General Appearance:    Alert, cooperative, in no acute distress   Lungs:     Clear to auscultation,respirations regular, even and                   unlabored    Heart:    Regular rhythm and normal rate, normal S1 and S2   Abdomen:     Normal bowel sounds, no masses, no organomegaly, soft        non-tender, non-distended, no guarding, no rebound                 tenderness   Extremities:   Moves all extremities well, no edema, no cyanosis, no              Redness. Right hip Prineo CDI   Pulses:   Pulses palpable and equal bilaterally   Skin:   No bleeding, bruising or rash   Neurologic:   Cranial nerves 2 - 12 grossly intact, sensation intact. Flexion and dorsiflexion intact bilateral feet.       Discharge Disposition: Home    Discharge Medications   Nely Grider   Home Medication Instructions DEMETRSI:[id number]    Printed on:18 1042   Medication Information                      ACCU-CHEK CARLTON PLUS test strip  TEST TWO TIMES DAILY             ACCU-CHEK SOFTCLIX LANCETS lancets  200 each by Other route Daily. Use as instructed             apixaban (ELIQUIS) 2.5 MG tablet tablet  Take 1 tablet by mouth Every 12 (Twelve) Hours for 35 days.             Biotin (BIOTIN 5000) 5 MG capsule  Take 1 tablet by mouth Daily.             Blood Glucose Monitoring Suppl (ACCU-CHEK CARLTON) device  Use as directed; For: Diabetes mellitus             docusate sodium 100 MG capsule  Take 100 mg by mouth 2 (Two) Times a Day As Needed for Constipation.             gabapentin (NEURONTIN) 400 MG capsule  Take 1 capsule by mouth 3 (Three) Times a Day.             glimepiride (AMARYL) 4 MG tablet  Take 1 tablet by mouth Every Morning Before Breakfast. Resume tomorrow.             metFORMIN (GLUCOPHAGE) 500 " MG tablet  Take 1 tablet by mouth 2 (Two) Times a Day With Meals. Resume tomorrow.             omeprazole (priLOSEC) 20 MG capsule  TAKE 1 CAPSULE TWICE DAILY             oxyCODONE-acetaminophen (PERCOCET) 5-325 MG per tablet  Take 1 tablet by mouth Every 6 (Six) Hours As Needed for Severe Pain  for up to 10 days.             simvastatin (ZOCOR) 10 MG tablet  TAKE 1 TABLET AT BEDTIME             Resume Losartan/HCTZ Wednesday, provided that BP is above 120. ( discussed with patient and daughters)wy    Discharge Diet: Consistent carb diet    Activity at Discharge: WBAT RLE    Follow-up Appointments  Dr. Pollack per his orders  PCP   BMP this week.    Discharge took over 30 min.    Prisca Melton MD  04/21/18  10:42 AM

## 2018-04-20 NOTE — PROGRESS NOTES
"      [facility] [place] [facility] Progress Note    Subjective      LOS: 1 day   Patient Care Team:  Raimundo Ibarra MD as PCP - General  Luis Gustafson OD (Optometry)    Interval History:   No complaints this morning.  Pain is controlled.  She would like to go home today if possible.    Objective      Vital Signs  Temp (24hrs), Av.8 °F (36.6 °C), Min:96.4 °F (35.8 °C), Max:98.6 °F (37 °C)      /52   Pulse 74   Temp 98.6 °F (37 °C) (Oral)   Resp 16   Ht 166.4 cm (65.5\")   Wt 83.5 kg (184 lb)   SpO2 97%   BMI 30.15 kg/m²     Examination:   Examination of the right hip: The wound is clean, dry, and intact.  Ankle dorsiflexion, ankle plantar flexion, and EHL are intact.  Sensation intact in the foot to light touch.   Thigh is soft and nontender.      Labs:    Results from last 7 days  Lab Units 18  0518   WBC 10*3/mm3 19.26*   RBC 10*6/mm3 3.20*   HEMOGLOBIN g/dL 9.4*   HEMATOCRIT % 29.6*   PLATELETS 10*3/mm3 549*       Radiology:  Imaging Results (last 24 hours)     Procedure Component Value Units Date/Time    XR Hip With or Without Pelvis 2 - 3 View Right [[id number]] Collected:  18 152     Updated:  18    Narrative:          EXAMINATION: XR RIGHT HIP, W OR WO PELVIS, 2-3 VIEWS  - 2018     INDICATION: M16.0-Bilateral primary osteoarthritis of hip.      COMPARISON: None.     FINDINGS:   1. A right total hip arthroplasty has been performed. The hardware and  appliances are well positioned and well aligned.     2. Native bone structures of the right hip and right proximal femur are  intact. The overall alignment and attitude of the right hip is  appropriate.     3. Ischial rami are intact on the right.     Pelvic bone structures are otherwise intact. There is only minimal  arthropathy in the left hip.           Impression:       Well-positioned, well-aligned right total hip arthroplasty  with intact native bone structures. Mild arthropathy left hip.     DICTATED:     " 04/19/2018  EDITED/ls :     04/19/2018              PT:    Note from yesterday:   Pt ambulated 14 feet with CGAx2 and RW, limited by pain. PT will follow to increase strength and progress functional mobility with hip precautions. Plan is home with HHPT at discharge, may need rehab depending on progress. PADD score= 7            Results Review:     I reviewed the patient's new clinical results.    Assessment and Plan     Assessment:   Status post right total hip arthroplasty-doing well    Principal Problem:    Status post total replacement of right hip  Active Problems:    Mixed hyperlipidemia    Essential hypertension    Controlled type 2 diabetes mellitus without complication, without long-term current use of insulin    Primary osteoarthritis of both hips      Plan for disposition: Plan for discharge to home today.  Follow-up with me on 5/9/2018 as planned.      Carlos Pollack MD  04/20/18  8:55 AM

## 2018-04-20 NOTE — PLAN OF CARE
Problem: Patient Care Overview  Goal: Plan of Care Review  Outcome: Ongoing (interventions implemented as appropriate)   04/20/18 8266   Coping/Psychosocial   Plan of Care Reviewed With patient   OTHER   Outcome Summary Pt increased gait distance to 160 feet with CGA and RW, limited by pain and fatigue. Pt still requires cues for hip precautions, but required less assist for all functional mobility. Will progress to stair training in PM as able   Plan of Care Review   Progress improving

## 2018-04-20 NOTE — PLAN OF CARE
"Problem: Patient Care Overview  Goal: Plan of Care Review  Outcome: Ongoing (interventions implemented as appropriate)   04/20/18 2476   Coping/Psychosocial   Plan of Care Reviewed With patient;family   Plan of Care Review   Progress no change   SLP MBS evaluation completed. Will sign-off as pt presents w/ functional oropharyngeal swallow. No aspiration observed w/ any trial or consistency. Pt noted w/ prominent cricopharyngeus that may somewhat be contributing to current symptoms. Pt also observed w/ min retrograde flow and retention at the level of the cervical esophagus. Pt began gagging and c/o of feeling that \"food is coming back up\" a few minutes after completion of the study. Given suspected esophageal dysphagia, pt may benefit from further esophageal workup after discharge per MD discretion (i.e UGI, EGD, etc.). RECS: Mech soft whole foods, thin liquids, meds whole in thins, alternate solids/liquids PRN. general aspiration/reflux precautions, no further ST dysphagia needs at this time. Please see note for further details and recommendations.        "

## 2018-04-20 NOTE — THERAPY EVALUATION
Acute Care - Speech Language Pathology   Swallow Initial Evaluation [facility]\  Clinical Swallow Evaluation     Patient Name: Nely Grider  : 1945  MRN: [id number]  Today's Date: 2018  Onset of Illness/Injury or Date of Surgery: 18     Referring Physician: MD Ranjeet      Admit Date: 2018    Visit Dx:     ICD-10-CM ICD-9-CM   1. Impaired functional mobility, balance, gait, and endurance Z74.09 V49.89   2. Primary osteoarthritis of both hips M16.0 715.15   3. Status post total replacement of right hip Z96.641 V43.64   4. Pain of right hip joint M25.551 719.45   5. Impaired mobility and ADLs Z74.09 799.89     Patient Active Problem List   Diagnosis   • Atypical chest pain   • Neck sprain   • Cough   • Dysuria   • Gastroesophageal reflux disease without esophagitis   • Mixed hyperlipidemia   • Essential hypertension   • Pain of right hip joint   • Sebaceous cyst   • Cervical sprain   • Controlled type 2 diabetes mellitus without complication, without long-term current use of insulin   • Sepsis   • Chronic kidney disease (CKD), stage III (moderate)   • Elevated LFTs   • Leukocytosis   • Mass of right hip region   • Anemia   • Constipation   • Diabetic polyneuropathy associated with type 2 diabetes mellitus   • Primary osteoarthritis of both hips   • Status post total replacement of right hip   • Hyponatremia   • Renal insufficiency   • Acute postoperative pain     Past Medical History:   Diagnosis Date   • Arthritis    • Cancer     cervical   • Cataract    • Diabetes mellitus     type 2 dm, check blood sugar every morning, diagnosed 3 or 4 years   • Full dentures    • GERD (gastroesophageal reflux disease)    • High cholesterol    • History of IBS    • Hypertension    • Neuropathy    • Wears glasses      Past Surgical History:   Procedure Laterality Date   • CARDIAC CATHETERIZATION     • CHOLECYSTECTOMY     • COLONOSCOPY     • EYE SURGERY      bilateral cataract   • HYSTERECTOMY       partial   • OOPHORECTOMY      one removed   • TONSILLECTOMY     • TOTAL HIP ARTHROPLASTY Right 4/19/2018    Procedure: RIGHT TOTAL HIP ARTHROPLASTY;  Surgeon: Carlos Pollack MD;  Location[facility];  Service: Orthopedics   • WRIST SURGERY      metal plate          SWALLOW EVALUATION (last 72 hours)      SLP Adult Swallow Evaluation     Row Name 04/20/18 1345                   Rehab Evaluation    Document Type (P)  evaluation  -MD        Subjective Information (P)  no complaints  -MD        Patient Observations (P)  alert;cooperative  -MD        Patient/Family Observations (P)  Family at bedside  -MD        Patient Effort (P)  good  -MD           General Information    Patient Profile Reviewed (P)  yes  -MD        Pertinent History Of Current Problem (P)  s/p total hip replacement; c/o difficulty swallowing/food sticking following surgery  -MD        Current Method of Nutrition (P)  regular textures;thin liquids  -MD        Precautions/Limitations, Vision (P)  WFL;for purposes of eval  -MD        Precautions/Limitations, Hearing (P)  WFL;for purposes of eval  -MD        Prior Level of Function-Swallowing (P)  no diet consistency restrictions  -MD        Plans/Goals Discussed with (P)  patient and family;agreed upon  -MD        Barriers to Rehab (P)  none identified  -MD        Patient's Goals for Discharge (P)  eat/drink without coughing/choking  -MD        Family Goals for Discharge (P)  patient able to eat/drink without coughing/choking  -MD           Pain Assessment    Additional Documentation (P)  Pain Scale: FACES Pre/Post-Treatment (Group)  -MD           Pain Scale: FACES Pre/Post-Treatment    Pain: FACES Scale, Pretreatment (P)  0-->no hurt  -MD        Pain: FACES Scale, Post-Treatment (P)  0-->no hurt  -MD           Oral Motor and Function    Dentition Assessment (P)  natural, present and adequate  -MD        Secretion Management (P)  WNL/WFL  -MD        Mucosal Quality (P)  moist, healthy  -MD         "Volitional Swallow (P)  weak  -MD        Volitional Cough (P)  WFL  -MD           Oral Musculature and Cranial Nerve Assessment    Oral Motor General Assessment (P)  WFL  -MD           General Eating/Swallowing Observations    Eating/Swallowing Skills (P)  fed by SLP;self-fed  -MD        Positioning During Eating (P)  upright 90 degree;upright in bed  -MD        Utensils Used (P)  spoon;cup;straw  -MD        Consistencies Trialed (P)  thin liquids;pureed;regular textures  -MD           Clinical Swallow Eval    Oral Prep Phase (P)  WFL  -MD        Oral Transit (P)  WFL  -MD        Oral Residue (P)  WFL  -MD        Pharyngeal Phase (P)  --   r/o pharyngela impairment  -MD        Esophageal Phase (P)  --   pt reports hx of GERD  -MD        Clinical Swallow Evaluation Summary (P)  Clinical dys eval complete. Pt w/ c/o difficulty swallowing and food sticking following surgery. Trialed thins via tsp/cup/straw, puree, and solid. Inconsistent multiple swallows observed w/ all consistencies, pt reported needing to swallow multiple times to \"push food down.\" Pt began gagging w/ solid trial and reported feeling as if cracker was stuck in her throat, was able to clear w/ subsequent swallows and liquid wash. Rec: MBS to r/o pharyngeal dysfunction, NPO pending MBS results.   -MD           Clinical Impression    SLP Swallowing Diagnosis (P)  other (see comments)   r/o pharyngeal dysfunction  -MD        Functional Impact (P)  risk of aspiration/pneumonia;risk of malnutrition;risk of dehydration  -MD        Criteria for Skilled Therapeutic Interventions Met (P)  demonstrates skilled criteria  -MD           Recommendations    SLP Diet Recommendation (P)  NPO   pending MBS results  -MD        Recommended Diagnostics (P)  VFSS (MBS)  -MD          User Key  (r) = Recorded By, (t) = Taken By, (c) = Cosigned By    Initials Name Effective Dates    MD Karol Kapoor, Speech Therapy Student 03/07/18 -         EDUCATION  The patient has " been educated in the following areas:   Dysphagia (Swallowing Impairment) Oral Care/Hydration.    SLP Recommendation and Plan  SLP Swallowing Diagnosis: (P) other (see comments) (r/o pharyngeal dysfunction)  SLP Diet Recommendation: (P) NPO (pending MBS results)           Recommended Diagnostics: (P) VFSS (MBS)  Criteria for Skilled Therapeutic Interventions Met: (P) demonstrates skilled criteria                   Plan of Care Reviewed With: (P) patient, family  Plan of Care Review  Plan of Care Reviewed With: (P) patient, family  Progress: (P)  (eval)             Time Calculation:         Time Calculation- SLP     Row Name 04/20/18 1428             Time Calculation- SLP    SLP Start Time (P)  1345  -MD      SLP Received On (P)  04/20/18  -MD        User Key  (r) = Recorded By, (t) = Taken By, (c) = Cosigned By    Initials Name Provider Type    MD Karol Kapoor, Speech Therapy Student Speech Therapy Student          Therapy Charges for Today     Code Description Service Date Service Provider Modifiers Qty    [id number] HC ST EVAL ORAL PHARYNG SWALLOW 4 4/20/2018 Karol Kapoor, Speech Therapy Student GN 1               Karol Kapoor, Speech Therapy Student  4/20/2018

## 2018-04-20 NOTE — THERAPY EVALUATION
Acute Care - Occupational Therapy Initial Evaluation  [facility]     Patient Name: Nely Grider  : 1945  MRN: [id number]  Today's Date: 2018  Onset of Illness/Injury or Date of Surgery: 18  Date of Referral to OT: 18  Referring Physician: MD Ranjeet    Admit Date: 2018       ICD-10-CM ICD-9-CM   1. Impaired functional mobility, balance, gait, and endurance Z74.09 V49.89   2. Primary osteoarthritis of both hips M16.0 715.15   3. Status post total replacement of right hip Z96.641 V43.64   4. Pain of right hip joint M25.551 719.45   5. Impaired mobility and ADLs Z74.09 799.89     Patient Active Problem List   Diagnosis   • Atypical chest pain   • Neck sprain   • Cough   • Dysuria   • Gastroesophageal reflux disease without esophagitis   • Mixed hyperlipidemia   • Essential hypertension   • Pain of right hip joint   • Sebaceous cyst   • Cervical sprain   • Controlled type 2 diabetes mellitus without complication, without long-term current use of insulin   • Sepsis   • Chronic kidney disease (CKD), stage III (moderate)   • Elevated LFTs   • Leukocytosis   • Mass of right hip region   • Anemia   • Constipation   • Diabetic polyneuropathy associated with type 2 diabetes mellitus   • Primary osteoarthritis of both hips   • Status post total replacement of right hip     Past Medical History:   Diagnosis Date   • Arthritis    • Cancer     cervical   • Cataract    • Diabetes mellitus     type 2 dm, check blood sugar every morning, diagnosed 3 or 4 years   • Full dentures    • GERD (gastroesophageal reflux disease)    • High cholesterol    • History of IBS    • Hypertension    • Neuropathy    • Wears glasses      Past Surgical History:   Procedure Laterality Date   • CARDIAC CATHETERIZATION     • CHOLECYSTECTOMY     • COLONOSCOPY     • EYE SURGERY      bilateral cataract   • HYSTERECTOMY      partial   • OOPHORECTOMY      one removed   • TONSILLECTOMY     • WRIST SURGERY      metal plate           OT ASSESSMENT FLOWSHEET (last 72 hours)      Occupational Therapy Evaluation     Row Name 04/20/18 0709                   OT Evaluation Time/Intention    Subjective Information no complaints  -ST        Document Type evaluation;therapy note (daily note);discharge evaluation/summary  -ST        Mode of Treatment individual therapy;occupational therapy  -ST        Patient Effort good  -ST        Symptoms Noted During/After Treatment none  -ST           General Information    Patient Profile Reviewed? yes  -ST        Onset of Illness/Injury or Date of Surgery 04/19/18  -ST        Referring Physician MD Ranjeet  -ST        Patient Observations alert;cooperative;agree to therapy  -ST        Patient/Family Observations family present for teaching/education  -ST        General Observations of Patient Pt supine upon arrival; IV intavt  -ST        Prior Level of Function independent:;all household mobility;transfer;bed mobility;feeding;grooming;min assist:;community mobility;dressing;bathing;home management   difficulty with LBD/LBB, HM tasks, mobility  -ST        Equipment Currently Used at Home cane, straight;bath bench;commode, bedside  -ST        Pertinent History of Current Functional Problem Pt presents for sx management of long-standing R hip pain and dyfunction that failed to improve with conservative measures and impacted pt's ability to perform mobility and daily self-care tasks. Pt now POD 1 and s/p R DOROTHY via posterior approach.   -ST        Existing Precautions/Restrictions fall;hip, posterior;right  -ST        Risks Reviewed patient:;LOB;nausea/vomiting;increased discomfort  -ST        Benefits Reviewed patient:;improve function;increase independence;increase strength;increase balance;decrease pain;increase knowledge  -ST        Barriers to Rehab previous functional deficit  -ST           Relationship/Environment    Primary Source of Support/Comfort spouse  -ST        Lives With spouse  -ST        Family  Caregiver if Needed spouse  -        Concerns About Impact on Relationships has  24/7 along with daughter and other family to assist as needed  -           Resource/Environmental Concerns    Current Living Arrangements home/apartment/condo  -ST           Cognitive Assessment/Interventions    Additional Documentation Cognitive Assessment/Intervention (Group)  -           Cognitive Assessment/Intervention- PT/OT    Affect/Mental Status (Cognitive) WNL  -ST        Orientation Status (Cognition) oriented x 4  -ST        Follows Commands (Cognition) WNL  -ST        Cognitive Function (Cognitive) WNL  -ST        Personal Safety Interventions fall prevention program maintained;gait belt;nonskid shoes/slippers when out of bed  -ST           Mobility Assessment/Treatment    Extremity Weight-bearing Status right lower extremity  -ST        Right Lower Extremity (Weight-bearing Status) weight-bearing as tolerated (WBAT)  -ST           Bed Mobility Assessment/Treatment    Supine-Sit [place] (Bed Mobility) supervision  -ST        Bed Mobility, Safety Issues decreased use of legs for bridging/pushing  -ST        Assistive Device (Bed Mobility) head of bed elevated;leg   -ST        Comment (Bed Mobility) education provided on leg  use to increase independence; cuing for sequencing tasks and hand placement  -           Functional Mobility    Functional Mobility- Ind. Level contact guard assist  -        Functional Mobility- Device rolling walker  -        Functional Mobility-Distance (Feet) 8  -ST        Functional Mobility- Comment from bed to chair  -ST           Transfer Assessment/Treatment    Comment (Transfers) vc'ing for extension of sx LE to maintain THP; cuing for hand placement  -ST        Sit-Stand [place] (Transfers) supervision  -ST        Stand-Sit [place] (Transfers) supervision  -ST           Sit-Stand Transfer    Assistive Device (Sit-Stand Transfers) walker,  front-wheeled  -ST           Stand-Sit Transfer    Assistive Device (Stand-Sit Transfers) walker, front-wheeled  -ST           ADL Assessment/Intervention    BADL Assessment/Intervention lower body dressing;feeding  -ST           Lower Body Dressing Assessment/Training    Lower Body Dressing [place] Level doff;don;shoes/slippers;undergarment;conditional independence  -ST        Assistive Devices (Lower Body Dressing) reacher;sock-aid  -ST        Lower Body Dressing Position unsupported sitting  -ST        Comment (Lower Body Dressing) completed donning underwear using reacher w/appropriate teaching along with donning/doffing socks with sock-aid; maintained good balance while pulling up underwear  -ST           Self-Feeding Assessment/Training    [place] Level (Feeding) liquids to mouth;scoop food and bring to mouth;independent  -ST        Position (Self-Feeding) supported sitting  -           General ROM    GENERAL ROM COMMENTS [facility]  -           General Assessment (Manual Muscle Testing)    Comment, General Manual Muscle Testing (MMT) Assessment [facility]  -           Motor Assessment/Interventions    Additional Documentation Balance (Group)  -           Balance    Balance dynamic sitting balance;dynamic standing balance  -           Dynamic Sitting Balance    Level of [place], Reaches Outside Midline (Sitting, Dynamic Balance) independent  -ST        Sitting Position, Reaches Outside Midline (Sitting, Dynamic Balance) sitting on edge of bed  -ST           Dynamic Standing Balance    Level of [place], Reaches Outside Midline (Standing, Dynamic Balance) contact guard assist  -        Time Able to Maintain Position, Reaches Outside Midline (Standing, Dynamic Balance) 3 to 4 minutes  -           Sensory Assessment/Intervention    Sensory General Assessment no sensation deficits identified  -ST           Vision Assessment/Intervention    Visual Impairment/Limitations WNL  -ST            Positioning and Restraints    Pre-Treatment Position in bed  -ST        Post Treatment Position chair  -ST        In Chair notified nsg;reclined;call light within reach;encouraged to call for assist;exit alarm on;with family/caregiver   towel roll b/t LE's   -ST           Pain Scale: Numbers Pre/Post-Treatment    Pain Scale: Numbers, Pretreatment 2/10  -ST        Pain Scale: Numbers, Post-Treatment 2/10  -ST        Pain Location - Side Right  -ST        Pain Location hip  -ST           Wound 04/19/18 1013 Right hip incision    Wound - Properties Group Date first assessed: 04/19/18  -JA Time first assessed: 1013  -JA Side: Right  -JA Location: hip  -JA Type: incision  -JA       Clinical Impression (OT)    Date of Referral to OT 04/20/18  -ST        OT Diagnosis impaired ADLs  -ST        Prognosis (OT Eval) good  -ST        Patient/Family Goals Statement (OT Eval) return home  -ST        Criteria for Skilled Therapeutic Interventions Met (OT Eval) yes  -ST        Rehab Potential (OT Eval) good, to achieve stated therapy goals  -ST        Therapy Frequency (OT Eval) daily  -ST        Care Plan Review (OT) evaluation/treatment results reviewed;care plan/treatment goals reviewed;risks/benefits reviewed;current/potential barriers reviewed;patient/other agree to care plan  -ST        Care Plan Review, Other Participant (OT Eval) family  -ST        Anticipated Equipment Needs at Discharge (OT) two wheeled walker  -ST        Anticipated Discharge Disposition (OT) home with home health;home or self care  -ST           Planned OT Interventions    Planned Therapy Interventions (OT Eval) adaptive equipment training;occupation/activity based interventions;transfer/mobility retraining  -ST           OT Goals    Transfer Goal Selection (OT) transfer, OT goal 1  -ST        Toileting Goal Selection (OT) toileting, OT goal 1  -ST           Transfer Goal 1 (OT)    Activity/Assistive Device (Transfer Goal 1, OT) toilet;tub;walker,  rolling  -ST        [place] Level/Cues Needed (Transfer Goal 1, OT) supervision required  -ST        Time Frame (Transfer Goal 1, OT) long term goal (LTG);3 days  -ST           Toileting Goal 1 (OT)    Activity/Device (Toileting Goal 1, OT) adjust/manage clothing;perform perineal hygiene  -ST        [place] Level/Cues Needed (Toileting Goal 1, OT) supervision required  -ST        Time Frame (Toileting Goal 1, OT) long term goal (LTG);3 days  -ST           Living Environment    Home Accessibility tub/shower is not walk in  -ST          User Key  (r) = Recorded By, (t) = Taken By, (c) = Cosigned By    Initials Name Effective Dates     Salima Lainez OTR 02/20/17 -     CIPRIANO Camacho RN 06/16/16 -            Occupational Therapy Education     Title: PT OT SLP Therapies (Active)     Topic: Occupational Therapy (Done)     Point: ADL training (Done)     Description: Instruct learner(s) on proper safety adaptation and remediation techniques during self care or transfers.   Instruct in proper use of assistive devices.   Learning Progress Summary     Learner Status Readiness Method Response Comment Documented by    Patient Done Acceptance E,TBJULIANE,H COLLEEN COE role of OT, benefits of activity, THP, bed mobility, AE, DME, home safety, LBD, AE management, home/activity mods ST 04/20/18 0937          Point: Home exercise program (Done)     Description: Instruct learner(s) on appropriate technique for monitoring, assisting and/or progressing therapeutic exercises/activities.   Learning Progress Summary     Learner Status Readiness Method Response Comment Documented by    Patient Done Acceptance E,TB,D,H COLLEEN COE role of OT, benefits of activity, THP, bed mobility, AE, DME, home safety, LBD, AE management, home/activity mods ST 04/20/18 0937          Point: Precautions (Done)     Description: Instruct learner(s) on prescribed precautions during self-care and functional transfers.   Learning Progress Summary      Learner Status Readiness Method Response Comment Documented by    Patient Done Acceptance E,YESSI,ELANNA CARDOZO DU role of OT, benefits of activity, THP, bed mobility, AE, DME, home safety, LBD, AE management, home/activity mods ST 04/20/18 0937          Point: Body mechanics (Done)     Description: Instruct learner(s) on proper positioning and spine alignment during self-care, functional mobility activities and/or exercises.   Learning Progress Summary     Learner Status Readiness Method Response Comment Documented by    Patient Done Acceptance E,YESSI,JULIANEH COLLEEN COE role of OT, benefits of activity, THP, bed mobility, AE, DME, home safety, LBD, AE management, home/activity mods ST 04/20/18 0937                      User Key     Initials Effective Dates Name Provider Type Discipline    ST 02/20/17 -  Salima Lainez OTR Occupational Therapist OT                  OT Recommendation and Plan  Outcome Summary/Treatment Plan (OT)  Anticipated Equipment Needs at Discharge (OT): two wheeled walker  Anticipated Discharge Disposition (OT): home with home health, home or self care  Planned Therapy Interventions (OT Eval): adaptive equipment training, occupation/activity based interventions, transfer/mobility retraining  Therapy Frequency (OT Eval): daily  Plan of Care Review  Plan of Care Reviewed With: patient  Plan of Care Reviewed With: patient  Outcome Summary: Pt s/p DOROTHY via posterior approach. Pt with good overall pain control this date and SBA for bed mobility and CGA for transfers & steps to chair. Cuing required to maintain THP during ADLs; appropriate teach back when using AE. Pending d/c home this date with HHPT           Outcome Measures     Row Name 04/20/18 0709 04/19/18 1504          How much help from another person do you currently need...    Turning from your back to your side while in flat bed without using bedrails?  -- 2  -MJ     Moving from lying on back to sitting on the side of a flat bed without bedrails?  -- 2  -MJ      Moving to and from a bed to a chair (including a wheelchair)?  -- 3  -MJ     Standing up from a chair using your arms (e.g., wheelchair, bedside chair)?  -- 2  -MJ     Climbing 3-5 steps with a railing?  -- 1  -MJ     To walk in hospital room?  -- 3  -MJ     AM-PAC 6 Clicks Score  -- 13  -MJ        How much help from another is currently needed...    Putting on and taking off regular lower body clothing? 3  -ST  --     Bathing (including washing, rinsing, and drying) 3  -ST  --     Toileting (which includes using toilet bed pan or urinal) 3  -ST  --     Putting on and taking off regular upper body clothing 4  -ST  --     Taking care of personal grooming (such as brushing teeth) 3  -ST  --     Eating meals 4  -ST  --     Score 20  -ST  --        Functional Assessment    Outcome Measure Options AM-PAC 6 Clicks Daily Activity (OT)  -ST AM-PAC 6 Clicks Basic Mobility (PT)  -       User Key  (r) = Recorded By, (t) = Taken By, (c) = Cosigned By    Initials Name Provider Type     TAMANNA Wu Occupational Therapist    MARIAH Carr, PT Physical Therapist          Time Calculation:   OT Start Time: 0709    Therapy Charges for Today     Code Description Service Date Service Provider Modifiers Qty    [id number]  OT THERAPEUTIC ACT EA 15 MIN 4/20/2018 TAMANNA Wu GO 2    [id number]  OT EVAL MOD COMPLEXITY 2 4/20/2018 TAMANNA Wu GO 1               TAMANNA Becerra  4/20/2018

## 2018-04-21 VITALS
HEART RATE: 89 BPM | OXYGEN SATURATION: 95 % | TEMPERATURE: 98.3 F | BODY MASS INDEX: 29.57 KG/M2 | HEIGHT: 66 IN | DIASTOLIC BLOOD PRESSURE: 50 MMHG | SYSTOLIC BLOOD PRESSURE: 95 MMHG | WEIGHT: 184 LBS | RESPIRATION RATE: 16 BRPM

## 2018-04-21 LAB
ANION GAP SERPL CALCULATED.3IONS-SCNC: 6 MMOL/L (ref 3–11)
BUN BLD-MCNC: 25 MG/DL (ref 9–23)
BUN/CREAT SERPL: 14.7 (ref 7–25)
CALCIUM SPEC-SCNC: 7.8 MG/DL (ref 8.7–10.4)
CHLORIDE SERPL-SCNC: 99 MMOL/L (ref 99–109)
CO2 SERPL-SCNC: 22 MMOL/L (ref 20–31)
CREAT BLD-MCNC: 1.7 MG/DL (ref 0.6–1.3)
DEPRECATED RDW RBC AUTO: 44.6 FL (ref 37–54)
ERYTHROCYTE [DISTWIDTH] IN BLOOD BY AUTOMATED COUNT: 13.5 % (ref 11.3–14.5)
GFR SERPL CREATININE-BSD FRML MDRD: 29 ML/MIN/1.73
GLUCOSE BLD-MCNC: 120 MG/DL (ref 70–100)
GLUCOSE BLDC GLUCOMTR-MCNC: 124 MG/DL (ref 70–130)
GLUCOSE BLDC GLUCOMTR-MCNC: 169 MG/DL (ref 70–130)
HCT VFR BLD AUTO: 26.3 % (ref 34.5–44)
HGB BLD-MCNC: 8.5 G/DL (ref 11.5–15.5)
MCH RBC QN AUTO: 29.1 PG (ref 27–31)
MCHC RBC AUTO-ENTMCNC: 32.3 G/DL (ref 32–36)
MCV RBC AUTO: 90.1 FL (ref 80–99)
PLATELET # BLD AUTO: 470 10*3/MM3 (ref 150–450)
PMV BLD AUTO: 9.2 FL (ref 6–12)
POTASSIUM BLD-SCNC: 4.1 MMOL/L (ref 3.5–5.5)
RBC # BLD AUTO: 2.92 10*6/MM3 (ref 3.89–5.14)
SODIUM BLD-SCNC: 127 MMOL/L (ref 132–146)
WBC NRBC COR # BLD: 16.79 10*3/MM3 (ref 3.5–10.8)

## 2018-04-21 PROCEDURE — 82962 GLUCOSE BLOOD TEST: CPT

## 2018-04-21 PROCEDURE — 97110 THERAPEUTIC EXERCISES: CPT | Performed by: PHYSICAL THERAPIST

## 2018-04-21 PROCEDURE — 85027 COMPLETE CBC AUTOMATED: CPT | Performed by: ORTHOPAEDIC SURGERY

## 2018-04-21 PROCEDURE — 80048 BASIC METABOLIC PNL TOTAL CA: CPT | Performed by: NURSE PRACTITIONER

## 2018-04-21 PROCEDURE — 97116 GAIT TRAINING THERAPY: CPT | Performed by: PHYSICAL THERAPIST

## 2018-04-21 PROCEDURE — 99024 POSTOP FOLLOW-UP VISIT: CPT | Performed by: ORTHOPAEDIC SURGERY

## 2018-04-21 PROCEDURE — 25010000002 DIPHENHYDRAMINE PER 50 MG: Performed by: INTERNAL MEDICINE

## 2018-04-21 RX ORDER — OXYCODONE HYDROCHLORIDE AND ACETAMINOPHEN 5; 325 MG/1; MG/1
1 TABLET ORAL EVERY 6 HOURS PRN
Qty: 40 TABLET | Refills: 0 | Status: SHIPPED | OUTPATIENT
Start: 2018-04-21 | End: 2018-05-01

## 2018-04-21 RX ORDER — GLIMEPIRIDE 4 MG/1
4 TABLET ORAL
Qty: 90 TABLET | Refills: 3
Start: 2018-04-22 | End: 2018-11-21 | Stop reason: SDUPTHER

## 2018-04-21 RX ORDER — DIPHENHYDRAMINE HYDROCHLORIDE 50 MG/ML
12.5 INJECTION INTRAMUSCULAR; INTRAVENOUS ONCE
Status: COMPLETED | OUTPATIENT
Start: 2018-04-21 | End: 2018-04-21

## 2018-04-21 RX ORDER — OXYCODONE HYDROCHLORIDE AND ACETAMINOPHEN 5; 325 MG/1; MG/1
1 TABLET ORAL EVERY 6 HOURS PRN
Status: DISCONTINUED | OUTPATIENT
Start: 2018-04-21 | End: 2018-04-21 | Stop reason: HOSPADM

## 2018-04-21 RX ADMIN — APIXABAN 2.5 MG: 2.5 TABLET, FILM COATED ORAL at 09:00

## 2018-04-21 RX ADMIN — DIPHENHYDRAMINE HYDROCHLORIDE 12.5 MG: 50 INJECTION INTRAMUSCULAR; INTRAVENOUS at 01:42

## 2018-04-21 RX ADMIN — SODIUM CHLORIDE 120 ML/HR: 9 INJECTION, SOLUTION INTRAVENOUS at 05:30

## 2018-04-21 RX ADMIN — OXYCODONE AND ACETAMINOPHEN 1 TABLET: 5; 325 TABLET ORAL at 12:04

## 2018-04-21 RX ADMIN — PANTOPRAZOLE SODIUM 40 MG: 40 TABLET, DELAYED RELEASE ORAL at 05:30

## 2018-04-21 RX ADMIN — INSULIN LISPRO 2 UNITS: 100 INJECTION, SOLUTION INTRAVENOUS; SUBCUTANEOUS at 09:00

## 2018-04-21 RX ADMIN — DOCOSANOL: 100 CREAM TOPICAL at 05:30

## 2018-04-21 RX ADMIN — OXYCODONE HYDROCHLORIDE AND ACETAMINOPHEN 1 TABLET: 7.5; 325 TABLET ORAL at 05:37

## 2018-04-21 NOTE — THERAPY TREATMENT NOTE
Acute Care - Physical Therapy Treatment Note  [facility]     Patient Name: Nely Grider  : 1945  MRN: [id number]  Today's Date: 2018  Onset of Illness/Injury or Date of Surgery: 18  Date of Referral to PT: 18  Referring Physician: MD Ranjeet    Admit Date: 2018    Visit Dx:    ICD-10-CM ICD-9-CM   1. Impaired functional mobility, balance, gait, and endurance Z74.09 V49.89   2. Primary osteoarthritis of both hips M16.0 715.15   3. Status post total replacement of right hip Z96.641 V43.64   4. Pain of right hip joint M25.551 719.45   5. Impaired mobility and ADLs Z74.09 799.89   6. Dysphagia, unspecified type R13.10 787.20     Patient Active Problem List   Diagnosis   • Atypical chest pain   • Neck sprain   • Cough   • Dysuria   • Gastroesophageal reflux disease without esophagitis   • Mixed hyperlipidemia   • Essential hypertension   • Pain of right hip joint   • Sebaceous cyst   • Cervical sprain   • Controlled type 2 diabetes mellitus without complication, without long-term current use of insulin   • Sepsis   • Chronic kidney disease (CKD), stage III (moderate)   • Elevated LFTs   • Leukocytosis   • Mass of right hip region   • Anemia   • Constipation   • Diabetic polyneuropathy associated with type 2 diabetes mellitus   • Primary osteoarthritis of both hips   • Status post total replacement of right hip   • Hyponatremia   • Renal insufficiency   • Acute postoperative pain       Therapy Treatment          Rehabilitation Treatment Summary     Row Name 18 0822             Treatment Time/Intention    Discipline physical therapist  -      Document Type therapy note (daily note)  -HAMIDA      Subjective Information no complaints  -HAMIDA      Mode of Treatment physical therapy  -HAMIDA      Patient/Family Observations Pt up in chair; family present and involved  -HAMIDA      Care Plan Review risks/benefits reviewed  -HAMIDA      Therapy Frequency (PT Clinical Impression) 2 times/day  -HAMIDA       Patient Effort excellent  -JM      Existing Precautions/Restrictions fall;hip, posterior  -JM      Patient Response to Treatment No c/o during or after tx today  -JM      Recorded by [HAMIDA] Ezio Tena, PT 04/21/18 0913 04/21/18 0913      Row Name 04/21/18 0822             Cognitive Assessment/Intervention- PT/OT    Affect/Mental Status (Cognitive) WFL  -JM      Orientation Status (Cognition) oriented x 4  -JM      Follows Commands (Cognition) WFL  -JM      Recorded by [HAMIDA] Ezio Tena, PT 04/21/18 0913 04/21/18 0913      Row Name 04/21/18 0822             Mobility Assessment/Intervention    Extremity Weight-bearing Status right lower extremity  -JM      Right Lower Extremity (Weight-bearing Status) weight-bearing as tolerated (WBAT)  -JM      Recorded by [HAMIDA] Ezio Tena, PT 04/21/18 0913 04/21/18 0913      Row Name 04/21/18 0822             Bed Mobility Assessment/Treatment    Comment (Bed Mobility) Deferred; up in chair  -JM      Recorded by [HAMIDA] Ezio Tena, PT 04/21/18 0913 04/21/18 0913      Row Name 04/21/18 0822             Transfer Assessment/Treatment    Transfer Assessment/Treatment sit-stand transfer  -JM      Comment (Transfers) VCs for hand placement to initiate transfer  -JM      Sit-Stand [place] (Transfers) supervision  -JM      Stand-Sit [place] (Transfers) supervision  -JM      Recorded by [HAMIDA] Ezio Tena, PT 04/21/18 0913 04/21/18 0913      Row Name 04/21/18 0822             Sit-Stand Transfer    Assistive Device (Sit-Stand Transfers) walker, front-wheeled  -JM      Recorded by [HAMIDA] Ezio Tena, PT 04/21/18 0913 04/21/18 0913      Row Name 04/21/18 0822             Stand-Sit Transfer    Assistive Device (Stand-Sit Transfers) walker, front-wheeled  -JM      Recorded by [HAMIDA] Ezio Tena, PT 04/21/18 0913 04/21/18 0913      Row Name 04/21/18 0822             Gait/Stairs Assessment/Training    Gait/Stairs Assessment/Training gait/ambulation independence;gait/ambulation  assistive device  -JM      [place] Level (Gait) contact guard  -JM      Assistive Device (Gait) walker, front-wheeled  -JM      Distance in Feet (Gait) 200  -JM      Pattern (Gait) step-to  -JM      Deviations/Abnormal Patterns (Gait) antalgic;right sided deviations  -JM      Right Sided Gait Deviations forward flexed posture;heel strike decreased  -JM      Recorded by [JM] Ezio Tena, PT 04/21/18 0913 04/21/18 0913      Row Name 04/21/18 0822             Motor Skills Assessment/Interventions    Additional Documentation Therapeutic Exercise (Group);Therapeutic Exercise Interventions (Group)  -JM      Recorded by [JM] Ezio Tena, PT 04/21/18 0913 04/21/18 0913      Row Name 04/21/18 0822             Therapeutic Exercise    Lower Extremity (Therapeutic Exercise) gluteal sets;heel slides, right;LAQ (long arc quad), right;marching while seated;quad sets, bilateral;SLR (straight leg raise), right  -JM      Lower Extremity Range of Motion (Therapeutic Exercise) hip flexion/extension, right;hip abduction/adduction, right;knee flexion/extension, right;ankle dorsiflexion/plantar flexion, right  -JM      Position (Therapeutic Exercise) seated;supine  -JM      Sets/Reps (Therapeutic Exercise) 10  -JM      Recorded by [] Ezio Tena, PT 04/21/18 0913 04/21/18 0913      Row Name 04/21/18 0822             Positioning and Restraints    Pre-Treatment Position sitting in chair/recliner  -JM      Post Treatment Position chair  -JM      In Chair notified nsg;reclined;call light within reach;encouraged to call for assist;exit alarm on;with family/caregiver  -JM      Recorded by [JM] Ezio Tena, PT 04/21/18 0913 04/21/18 0913      Row Name 04/21/18 0822             Pain Scale: Numbers Pre/Post-Treatment    Pain Scale: Numbers, Pretreatment 0/10 - no pain  -      Pain Scale: Numbers, Post-Treatment 0/10 - no pain  -JM      Recorded by [JM] Ezio Tena, PT 04/21/18 0913 04/21/18 0913      Row Name                 Wound 04/19/18 1013 Right hip incision    Wound - Properties Group Date first assessed: 04/19/18 [JA] Time first assessed: 1013 [JA] Side: Right [JA] Location: hip [JA] Type: incision [JA] Recorded by:  [CIPRIANO] Ruben Camacho RN 04/19/18 1013 04/19/18 1013    Row Name 04/21/18 0822             Outcome Summary/Treatment Plan (PT)    Daily Summary of Progress (PT) progress toward functional goals as expected  -      Recorded by [HAMIDA] Ezio Tena, PT 04/21/18 0913 04/21/18 0913        User Key  (r) = Recorded By, (t) = Taken By, (c) = Cosigned By    Initials Name Effective Dates Discipline    HAMIDA Tena, PT 06/19/15 -  PT    CIPRIANO Camacho RN 06/16/16 -  Nurse          Wound 04/19/18 1013 Right hip incision (Active)   Drainage Amount none 4/20/2018  8:30 PM   Dressing Care, Wound other (see comments) 4/20/2018  2:08 PM             Physical Therapy Education     Title: PT OT SLP Therapies (Done)     Topic: Physical Therapy (Done)     Point: Mobility training (Done)    Learning Progress Summary     Learner Status Readiness Method Response Comment Documented by    Patient Done Acceptance E CAROLIN   04/21/18 0915     Done Acceptance JULIANE PERLA NR Reviewed HEP, hip precautions, gait mechanics, stairs sequencing  04/20/18 1459     Done Acceptance JULIANE PERLA NR Reviewed hip precautions, HEP, gait mechanics, benefits of mobility  04/20/18 1156     Done Acceptance JULIANE PERLA NR Reviewed hip precautions, benefits of mobility  04/19/18 1547    Family Done Acceptance JULIANE PERLA NR Reviewed HEP, hip precautions, gait mechanics, stairs sequencing  04/20/18 1459    Significant Other Done Acceptance JULIANE PERLA NR Reviewed hip precautions, benefits of mobility  04/19/18 1547          Point: Home exercise program (Done)    Learning Progress Summary     Learner Status Readiness Method Response Comment Documented by    Patient Done Acceptance E CAROLIN   04/21/18 0915     Done Acceptance JULIANE PERLA NR Reviewed HEP, hip precautions, gait  mechanics, stairs sequencing  04/20/18 1459     Done Acceptance JULIANE PERLA NR Reviewed hip precautions, HEP, gait mechanics, benefits of mobility  04/20/18 1156    Family Done Acceptance JULIANE PERLA NR Reviewed HEP, hip precautions, gait mechanics, stairs sequencing  04/20/18 1459          Point: Body mechanics (Done)    Learning Progress Summary     Learner Status Readiness Method Response Comment Documented by    Patient Done Acceptance E VU   04/21/18 0915     Done Acceptance JULIANE PERLANR Reviewed HEP, hip precautions, gait mechanics, stairs sequencing  04/20/18 1459     Done Acceptance JULIANE PERLANR Reviewed hip precautions, HEP, gait mechanics, benefits of mobility  04/20/18 1156     Done Acceptance JULIANE PERLA NR Reviewed hip precautions, benefits of mobility  04/19/18 1547    Family Done Acceptance JULIANE PERLANR Reviewed HEP, hip precautions, gait mechanics, stairs sequencing  04/20/18 1459    Significant Other Done Acceptance JULIANE PERLA NR Reviewed hip precautions, benefits of mobility  04/19/18 1547          Point: Precautions (Done)    Learning Progress Summary     Learner Status Readiness Method Response Comment Documented by    Patient Done Acceptance E VU   04/21/18 0915     Done Acceptance JULIANE PERLA NR Reviewed HEP, hip precautions, gait mechanics, stairs sequencing  04/20/18 1459     Done Acceptance JULIANE PERLA NR Reviewed hip precautions, HEP, gait mechanics, benefits of mobility  04/20/18 1156     Done Acceptance JULIANE PERLA NR Reviewed hip precautions, benefits of mobility  04/19/18 1547    Family Done Acceptance JULIANE PERLA NR Reviewed HEP, hip precautions, gait mechanics, stairs sequencing  04/20/18 1459    Significant Other Done Acceptance JULIANE PERLA NR Reviewed hip precautions, benefits of mobility  04/19/18 1547                      User Key     Initials Effective Dates Name Provider Type Discipline     06/19/15 -  Ezio Tena, PT Physical Therapist PT     04/03/18 -  Jay Jay Carr, PT Physical Therapist PT                     PT Recommendation and Plan  Therapy Frequency (PT Clinical Impression): 2 times/day  Outcome Summary/Treatment Plan (PT)  Daily Summary of Progress (PT): progress toward functional goals as expected  Plan of Care Reviewed With: patient, family  Progress: improving  Outcome Summary: Pt able to ambulate 200' with SBA and FWW with no significant increase in pain.            Outcome Measures     Row Name 04/21/18 0822 04/20/18 1410 04/20/18 1056       How much help from another person do you currently need...    Turning from your back to your side while in flat bed without using bedrails? 3  -JM 3  -MJ 3  -MJ    Moving from lying on back to sitting on the side of a flat bed without bedrails? 3  -JM 3  -MJ 3  -MJ    Moving to and from a bed to a chair (including a wheelchair)? 3  -JM 3  -MJ 3  -MJ    Standing up from a chair using your arms (e.g., wheelchair, bedside chair)? 3  -JM 3  -MJ 3  -MJ    Climbing 3-5 steps with a railing? 3  -JM 3  -MJ 2  -MJ    To walk in hospital room? 3  -JM 3  -MJ 3  -MJ    AM-PAC 6 Clicks Score 18  -JM 18  -MJ 17  -MJ       Functional Assessment    Outcome Measure Options AM-PAC 6 Clicks Basic Mobility (PT)  -JM AM-PAC 6 Clicks Basic Mobility (PT)  -MJ AM-PAC 6 Clicks Basic Mobility (PT)  -MJ    Row Name 04/20/18 0709 04/19/18 1502          How much help from another person do you currently need...    Turning from your back to your side while in flat bed without using bedrails?  -- 2  -MJ     Moving from lying on back to sitting on the side of a flat bed without bedrails?  -- 2  -MJ     Moving to and from a bed to a chair (including a wheelchair)?  -- 3  -MJ     Standing up from a chair using your arms (e.g., wheelchair, bedside chair)?  -- 2  -MJ     Climbing 3-5 steps with a railing?  -- 1  -MJ     To walk in hospital room?  -- 3  -MJ     AM-PAC 6 Clicks Score  -- 13  -MJ        How much help from another is currently needed...    Putting on and taking off regular lower  body clothing? 3  -ST  --     Bathing (including washing, rinsing, and drying) 3  -ST  --     Toileting (which includes using toilet bed pan or urinal) 3  -ST  --     Putting on and taking off regular upper body clothing 4  -ST  --     Taking care of personal grooming (such as brushing teeth) 3  -ST  --     Eating meals 4  -ST  --     Score 20  -ST  --        Functional Assessment    Outcome Measure Options AM-PAC 6 Clicks Daily Activity (OT)  -ST AM-PAC 6 Clicks Basic Mobility (PT)  -MARIAH       User Key  (r) = Recorded By, (t) = Taken By, (c) = Cosigned By    Initials Name Provider Type     Salima Lainez, OTR Occupational Therapist    HAMIDA Tena, PT Physical Therapist    MARIAH Carr, JEREMIE Physical Therapist           Time Calculation:         PT Charges     Row Name 04/21/18 0917             Time Calculation    Start Time 0822  -      PT Received On 04/21/18  -HAMIDA      PT Goal Re-Cert Due Date 04/29/18  -HAMIDA         Time Calculation- PT    Total Timed Code Minutes- PT 32 minute(s)  -HAMIDA        User Key  (r) = Recorded By, (t) = Taken By, (c) = Cosigned By    Initials Name Provider Type    HMAIDA Tena, PT Physical Therapist          Therapy Charges for Today     Code Description Service Date Service Provider Modifiers Qty    61165519346 HC GAIT TRAINING EA 15 MIN 4/21/2018 Ezio Tena, PT GP 1    89081033716 HC PT THER PROC EA 15 MIN 4/21/2018 Ezio Tena, PT GP 1          PT G-Codes  Outcome Measure Options: AM-PAC 6 Clicks Basic Mobility (PT)    Ezio Tena, PT  4/21/2018

## 2018-04-21 NOTE — NURSING NOTE
Pt. Discharged home with assist of family.  Discharge education complete and instructions/packet given to patient/family along with prescription for Oxycodone.   Other medication ( Eliquis) called into pharmacy.  IV removed, Dsg checked CDI, and no questions or concerns at this time.  Eb Allen RN

## 2018-04-21 NOTE — PLAN OF CARE
Problem: Patient Care Overview  Goal: Plan of Care Review  Outcome: Ongoing (interventions implemented as appropriate)   04/21/18 7991   Coping/Psychosocial   Plan of Care Reviewed With patient;family   OTHER   Outcome Summary up with asssist . voiding without difficulty. ivf's cont ns at 120cc/hr. c/o generalizerd itching worsening as night goes on. benadryl x1 given

## 2018-04-21 NOTE — PROGRESS NOTES
"Orthopaedic Surgery Progress Note      LOS: 2 days   Patient Care Team:  Raimundo Ibarra MD as PCP - General  Luis Gustafson OD (Optometry)    POD 2    Subjective     Interval History:   Pt with mild confusion overnight.  Multiple family members at the bedside.  Able to eat but no BM as of yet.      Objective     Vital Signs:  Temp (24hrs), Av.5 °F (36.9 °C), Min:98 °F (36.7 °C), Max:99.3 °F (37.4 °C)    BP 95/50 (BP Location: Left arm, Patient Position: Lying)   Pulse 89   Temp 98.3 °F (36.8 °C) (Tympanic)   Resp 16   Ht 166.4 cm (65.5\")   Wt 83.5 kg (184 lb)   SpO2 95%   BMI 30.15 kg/m²     Labs:  Lab Results (last 24 hours)     Procedure Component Value Units Date/Time    Tissue / Bone Culture - Tissue, Hip, Right [[id number]]  (Normal) Collected:  18 1120    Specimen:  Tissue from Hip, Right Updated:  18 0807     Tissue Culture No growth at 2 days     Gram Stain Result No WBCs or organisms seen    POC Glucose Once [[id number]]  (Abnormal) Collected:  18 0743    Specimen:  Blood Updated:  18 0745     Glucose 169 (H) mg/dL     Narrative:       Meter: [id number] : [id number] Tesfaye Reeder    Basic Metabolic Panel [[id number]]  (Abnormal) Collected:  18 043    Specimen:  Blood Updated:  18 0601     Glucose 120 (H) mg/dL      BUN 25 (H) mg/dL      Creatinine 1.70 (H) mg/dL      Sodium 127 (L) mmol/L      Potassium 4.1 mmol/L      Chloride 99 mmol/L      CO2 22.0 mmol/L      Calcium 7.8 (L) mg/dL      eGFR Non African Amer 29 (L) mL/min/1.73      BUN/Creatinine Ratio 14.7     Anion Gap 6.0 mmol/L     Narrative:       National Kidney [employer] Guidelines    Stage     Description        GFR  1         Normal or High     90+  2         Mild decrease      60-89  3         Moderate decrease  30-59  4         Severe decrease    15-29  5         Kidney failure     <15    CBC (No Diff) [[id number]]  (Abnormal) Collected:  18    Specimen:  Blood Updated:  18 " "0538     WBC 16.79 (H) 10*3/mm3      RBC 2.92 (L) 10*6/mm3      Hemoglobin 8.5 (L) g/dL      Hematocrit 26.3 (L) %      MCV 90.1 fL      MCH 29.1 pg      MCHC 32.3 g/dL      RDW 13.5 %      RDW-SD 44.6 fl      MPV 9.2 fL      Platelets 470 (H) 10*3/mm3     POC Glucose Once [[id number]]  (Abnormal) Collected:  04/20/18 2025    Specimen:  Blood Updated:  04/20/18 2027     Glucose 137 (H) mg/dL     Narrative:       Meter: [id number] : [id number] Jadyn Pickard    Tissue Pathology Exam [[id number]] Collected:  04/19/18 1120    Specimen:  Cyst from Hip, Right Updated:  04/20/18 1756     Case Report --     Surgical Pathology Report                         Case: [id number]                                  Authorizing Provider:  Carlos Pollack MD         Collected:           04/19/2018 11:20 AM          Ordering Location:     [facility]   Received:            04/19/2018 11:33 AM                                 OR                                                                           Pathologist:           Macario Anderson MD                                                            Intraop:               Macario Anderson MD                                                            Specimen:    Hip, Right, Right acetabular cyst                                                           Clinical Information --     The working history is osteoarthritis.       Final Diagnosis --     BONE TISSUE FROM RIGHT ACETABULUM:  Bone with degenerative change; negative for neoplasm.   JFJ/klb        Intraoperative Consultation --     Frozen section: Verbal report given to Dr. Pollack via speakerphone on 4/19/2018 at 11:36 AM.  FROZEN SECTION DIAGNOSIS:FS1 Spec\"A\"-right acetabular \"cyst\" - paucicellular fibrous tissue with myxoid microcystic spaces, no tumor or acute inflammation identified. DGD               Gross Description --     Received fresh for frozen section labeled as right acetabular cyst is a 1.0 x 1.0 x 1.0 cm " gray/tan, glistening, solid soft tissue mass which is bisected and submitted entirely for frozen section, now resubmitted in one cassette.  HBM/dlb        Microscopic Description --     Sections show an area of cystic degenerative change within fatty marrow. There is no evidence of neoplasm. The tissue appears totally devitalized.        Embedded Images --    Basic Metabolic Panel [[id number]]  (Abnormal) Collected:  04/20/18 1551    Specimen:  Blood Updated:  04/20/18 1648     Glucose 143 (H) mg/dL      BUN 28 (H) mg/dL      Creatinine 2.10 (H) mg/dL      Sodium 127 (L) mmol/L      Potassium 3.9 mmol/L      Chloride 96 (L) mmol/L      CO2 22.0 mmol/L      Calcium 8.4 (L) mg/dL      eGFR Non African Amer 23 (L) mL/min/1.73      BUN/Creatinine Ratio 13.3     Anion Gap 9.0 mmol/L     Narrative:       National Kidney Foundation Guidelines    Stage     Description        GFR  1         Normal or High     90+  2         Mild decrease      60-89  3         Moderate decrease  30-59  4         Severe decrease    15-29  5         Kidney failure     <15    Anaerobic Culture - Tissue, Hip, Right [[id number]]  (Normal) Collected:  04/19/18 1120    Specimen:  Tissue from Hip, Right Updated:  04/20/18 1300     Culture No anaerobes isolated    AFB Culture - Tissue, Hip, Right [[id number]] Collected:  04/19/18 1120    Specimen:  Tissue from Hip, Right Updated:  04/20/18 1233     AFB Stain No acid fast bacilli seen on concentrated smear    POC Glucose Once [[id number]]  (Abnormal) Collected:  04/20/18 1053    Specimen:  Blood Updated:  04/20/18 1054     Glucose 194 (H) mg/dL     Narrative:       Meter: [id number] : [id number] Rodolfo Gray          Physical Exam:  Dressing clean  Calf soft  Toes pink and warm  LL =    Assessment/Plan   POD #2 s/p Right DOROTHY (Ranjeet)  Cr improved overnight  Will reduce dosage of pain meds and see if confusion improves.  D/C per Dr RAMIREZ  F/U as planned before    Bud Barrios MD  04/21/18  9:21  AM

## 2018-04-21 NOTE — PLAN OF CARE
Problem: Patient Care Overview  Goal: Plan of Care Review   04/21/18 0913   Coping/Psychosocial   Plan of Care Reviewed With patient;family   OTHER   Outcome Summary Pt able to ambulate 200' with SBA and FWW with no significant increase in pain.    Plan of Care Review   Progress improving

## 2018-04-21 NOTE — THERAPY TREATMENT NOTE
Acute Care - Physical Therapy Treatment Note  [facility]     Patient Name: Nely Grider  : 1945  MRN: [id number]  Today's Date: 2018  Onset of Illness/Injury or Date of Surgery: 18  Date of Referral to PT: 18  Referring Physician: MD Ranjeet    Admit Date: 2018    Visit Dx:    ICD-10-CM ICD-9-CM   1. Impaired functional mobility, balance, gait, and endurance Z74.09 V49.89   2. Primary osteoarthritis of both hips M16.0 715.15   3. Status post total replacement of right hip Z96.641 V43.64   4. Pain of right hip joint M25.551 719.45   5. Impaired mobility and ADLs Z74.09 799.89   6. Dysphagia, unspecified type R13.10 787.20   7. Controlled type 2 diabetes mellitus without complication, without long-term current use of insulin E11.9 250.00     Patient Active Problem List   Diagnosis   • Atypical chest pain   • Neck sprain   • Cough   • Dysuria   • Gastroesophageal reflux disease without esophagitis   • Mixed hyperlipidemia   • Essential hypertension   • Pain of right hip joint   • Sebaceous cyst   • Cervical sprain   • Controlled type 2 diabetes mellitus without complication, without long-term current use of insulin   • Sepsis   • Chronic kidney disease (CKD), stage III (moderate)   • Elevated LFTs   • Leukocytosis   • Mass of right hip region   • Anemia   • Constipation   • Diabetic polyneuropathy associated with type 2 diabetes mellitus   • Primary osteoarthritis of both hips   • Status post total replacement of right hip   • Hyponatremia   • Renal insufficiency   • Acute postoperative pain       Therapy Treatment          Rehabilitation Treatment Summary     Row Name 18 1300 18 0822          Treatment Time/Intention    Discipline physical therapist  -HAMIDA physical therapist  -HAMIDA     Document Type therapy note (daily note)  -HAMIDA therapy note (daily note)  -HAMIDA     Subjective Information complains of;pain  -HAMIDA no complaints  -HAMIDA     Mode of Treatment physical therapy  -HAMIDA  physical therapy  -JM     Patient/Family Observations Lying in bed; family present  -JM Pt up in chair; family present and involved  -     Care Plan Review  -- risks/benefits reviewed  -JM     Therapy Frequency (PT Clinical Impression)  -- 2 times/day  -JM     Patient Effort excellent  -JM excellent  -JM     Existing Precautions/Restrictions fall;hip, posterior;right  -JM fall;hip, posterior  -JM     Patient Response to Treatment  -- No c/o during or after tx today  -JM     Recorded by [JM] Ezio Tena, PT 04/21/18 1328 04/21/18 1328 [JM] Ezio Tena, PT 04/21/18 0913 04/21/18 0913     Row Name 04/21/18 0822             Cognitive Assessment/Intervention- PT/OT    Affect/Mental Status (Cognitive) WFL  -JM      Orientation Status (Cognition) oriented x 4  -JM      Follows Commands (Cognition) WFL  -JM      Recorded by [JM] Ezio Tena, PT 04/21/18 0913 04/21/18 0913      Row Name 04/21/18 0822             Mobility Assessment/Intervention    Extremity Weight-bearing Status right lower extremity  -      Right Lower Extremity (Weight-bearing Status) weight-bearing as tolerated (WBAT)  -JM      Recorded by [JM] Ezio Tena, PT 04/21/18 0913 04/21/18 0913      Row Name 04/21/18 1300 04/21/18 0822          Bed Mobility Assessment/Treatment    Supine-Sit [place] (Bed Mobility) minimum assist (75% patient effort);verbal cues  -  --     Bed Mobility, Safety Issues decreased use of legs for bridging/pushing  -  --     Assistive Device (Bed Mobility) --   from flat bed  -  --     Comment (Bed Mobility)  -- Deferred; up in chair  -     Recorded by [JM] Ezio Tena, PT 04/21/18 1328 04/21/18 1328 [JM] Ezio Tena, PT 04/21/18 0913 04/21/18 0913     Row Name 04/21/18 1300 04/21/18 0822          Transfer Assessment/Treatment    Transfer Assessment/Treatment  -- sit-stand transfer  -     Comment (Transfers)  -- VCs for hand placement to initiate transfer  -     Sit-Stand [place] (Transfers)  supervision  - supervision  -     Stand-Sit [place] (Transfers)  -- supervision  -JM     Recorded by [JM] Ezio Tena, PT 04/21/18 1328 04/21/18 1328 [JM] Ezio Tena, PT 04/21/18 0913 04/21/18 0913     Row Name 04/21/18 1300 04/21/18 0822          Sit-Stand Transfer    Assistive Device (Sit-Stand Transfers) walker, front-wheeled  - walker, front-wheeled  -JM     Recorded by [JM] Ezio Tena, PT 04/21/18 1328 04/21/18 1328 [JM] Ezio Tena, PT 04/21/18 0913 04/21/18 0913     Row Name 04/21/18 0822             Stand-Sit Transfer    Assistive Device (Stand-Sit Transfers) walker, front-wheeled  -      Recorded by [JM] Ezio Tena, PT 04/21/18 0913 04/21/18 0913      Row Name 04/21/18 0822             Gait/Stairs Assessment/Training    Gait/Stairs Assessment/Training gait/ambulation independence;gait/ambulation assistive device  -      [place] Level (Gait) contact guard  -      Assistive Device (Gait) walker, front-wheeled  -      Distance in Feet (Gait) 200  -JM      Pattern (Gait) step-to  -JM      Deviations/Abnormal Patterns (Gait) antalgic;right sided deviations  -      Right Sided Gait Deviations forward flexed posture;heel strike decreased  -JM      Recorded by [JM] Ezio Tena, PT 04/21/18 0913 04/21/18 0913      Row Name 04/21/18 0822             Motor Skills Assessment/Interventions    Additional Documentation Therapeutic Exercise (Group);Therapeutic Exercise Interventions (Group)  -      Recorded by [JM] Ezio Tena, PT 04/21/18 0913 04/21/18 0913      Row Name 04/21/18 0822             Therapeutic Exercise    Lower Extremity (Therapeutic Exercise) gluteal sets;heel slides, right;LAQ (long arc quad), right;marching while seated;quad sets, bilateral;SLR (straight leg raise), right  -      Lower Extremity Range of Motion (Therapeutic Exercise) hip flexion/extension, right;hip abduction/adduction, right;knee flexion/extension, right;ankle dorsiflexion/plantar flexion,  right  -JM      Position (Therapeutic Exercise) seated;supine  -JM      Sets/Reps (Therapeutic Exercise) 10  -JM      Recorded by [JM] Ezio Tena, PT 04/21/18 0913 04/21/18 0913      Row Name 04/21/18 1300 04/21/18 0822          Positioning and Restraints    Pre-Treatment Position in bed  -JM sitting in chair/recliner  -JM     Post Treatment Position chair  -JM chair  -JM     In Chair sitting;call light within reach;encouraged to call for assist;with family/caregiver  -JM notified nsg;reclined;call light within reach;encouraged to call for assist;exit alarm on;with family/caregiver  -JM     Recorded by [JM] Ezio Tena, PT 04/21/18 1328 04/21/18 1328 [JM] Ezio Tena, PT 04/21/18 0913 04/21/18 0913     Row Name 04/21/18 1300 04/21/18 0822          Pain Scale: Numbers Pre/Post-Treatment    Pain Scale: Numbers, Pretreatment 1/10  -JM 0/10 - no pain  -JM     Pain Scale: Numbers, Post-Treatment 2/10  -JM 0/10 - no pain  -JM     Pain Location - Side Right  -JM  --     Pain Location hip  -JM  --     Recorded by [HAMIDA] Ezio Tena, PT 04/21/18 1328 04/21/18 1328 [JM] Ezio Tena, PT 04/21/18 0913 04/21/18 0913     Row Name                Wound 04/19/18 1013 Right hip incision    Wound - Properties Group Date first assessed: 04/19/18 [JA] Time first assessed: 1013 [JA] Side: Right [JA] Location: hip [JA] Type: incision [JA] Recorded by:  [JA] Ruben Camacho RN 04/19/18 1013 04/19/18 1013    Row Name 04/21/18 1300 04/21/18 0822          Outcome Summary/Treatment Plan (PT)    Daily Summary of Progress (PT) progress toward functional goals as expected  -JM progress toward functional goals as expected  -JM     Recorded by [JM] Ezio Tena, PT 04/21/18 1328 04/21/18 1328 [JM] Ezio Tena, PT 04/21/18 0913 04/21/18 0913       User Key  (r) = Recorded By, (t) = Taken By, (c) = Cosigned By    Initials Name Effective Dates Discipline    HAMIDA Tena, PT 06/19/15 -  PT    CIPRIANO Camacho, RN 06/16/16 -   Nurse          Wound 04/19/18 1013 Right hip incision (Active)   Dressing Appearance no drainage;intact;dry 4/21/2018 12:04 PM   Closure Liquid skin adhesive 4/21/2018 12:04 PM   Drainage Amount none 4/21/2018  8:05 AM   Dressing Care, Wound other (see comments) 4/21/2018  8:05 AM             Physical Therapy Education     Title: PT OT SLP Therapies (Done)     Topic: Physical Therapy (Done)     Point: Mobility training (Done)    Learning Progress Summary     Learner Status Readiness Method Response Comment Documented by    Patient Done Acceptance E,H VU   04/21/18 1328     Done Acceptance E [facility] 04/21/18 0915     Done Acceptance JULIANE PERLANR Reviewed HEP, hip precautions, gait mechanics, stairs sequencing  04/20/18 1459     Done Acceptance JULIANE PERLANR Reviewed hip precautions, HEP, gait mechanics, benefits of mobility  04/20/18 1156     Done Acceptance JULIANE PERLANR Reviewed hip precautions, benefits of mobility  04/19/18 1547    Family Done Acceptance E,H VU   04/21/18 1328     Done Acceptance JULIANE PERLANR Reviewed HEP, hip precautions, gait mechanics, stairs sequencing  04/20/18 1459    Significant Other Done Acceptance EJULIANENR Reviewed hip precautions, benefits of mobility  04/19/18 1547          Point: Home exercise program (Done)    Learning Progress Summary     Learner Status Readiness Method Response Comment Documented by    Patient Done Acceptance E,H VU   04/21/18 1328     Done Acceptance E VU   04/21/18 0915     Done Acceptance JULIANE PERLANR Reviewed HEP, hip precautions, gait mechanics, stairs sequencing  04/20/18 1459     Done Acceptance JULIANE PERLANR Reviewed hip precautions, HEP, gait mechanics, benefits of mobility  04/20/18 1156    Family Done Acceptance E,H VU   04/21/18 1328     Done Acceptance EJULIANENR Reviewed HEP, hip precautions, gait mechanics, stairs sequencing  04/20/18 1459          Point: Body mechanics (Done)    Learning Progress Summary     Learner Status Readiness Method  Response Comment Documented by    Patient Done Acceptance E,H VU   04/21/18 1328     Done Acceptance E VU   04/21/18 0915     Done Acceptance E,JULIANE VU,NR Reviewed HEP, hip precautions, gait mechanics, stairs sequencing  04/20/18 1459     Done Acceptance E,D VU,NR Reviewed hip precautions, HEP, gait mechanics, benefits of mobility  04/20/18 1156     Done Acceptance E,D VU,NR Reviewed hip precautions, benefits of mobility  04/19/18 1547    Family Done Acceptance E,H VU   04/21/18 1328     Done Acceptance E,D VU,NR Reviewed HEP, hip precautions, gait mechanics, stairs sequencing  04/20/18 1459    Significant Other Done Acceptance E,D CAROLIN,NR Reviewed hip precautions, benefits of mobility  04/19/18 1547          Point: Precautions (Done)    Learning Progress Summary     Learner Status Readiness Method Response Comment Documented by    Patient Done Acceptance E,H VU   04/21/18 1328     Done Acceptance E VU   04/21/18 0915     Done Acceptance JULIANE PERLA,NR Reviewed HEP, hip precautions, gait mechanics, stairs sequencing  04/20/18 1459     Done Acceptance ED CAROLIN,NR Reviewed hip precautions, HEP, gait mechanics, benefits of mobility  04/20/18 1156     Done Acceptance ED CAROLINNR Reviewed hip precautions, benefits of mobility  04/19/18 1547    Family Done Acceptance E,H VU   04/21/18 1328     Done Acceptance E,D CAROLIN,NR Reviewed HEP, hip precautions, gait mechanics, stairs sequencing  04/20/18 1459    Significant Other Done Acceptance EJULIANENR Reviewed hip precautions, benefits of mobility  04/19/18 1547                      User Key     Initials Effective Dates Name Provider Type Discipline     06/19/15 -  Ezio Tena, PT Physical Therapist PT     04/03/18 -  Jay Jay Carr PT Physical Therapist PT                    PT Recommendation and Plan  Therapy Frequency (PT Clinical Impression): 2 times/day  Outcome Summary/Treatment Plan (PT)  Daily Summary of Progress (PT): progress toward functional  goals as expected  Plan of Care Reviewed With: patient  Progress: improving  Outcome Summary: Pt prepared for DC home with HH; reviewed THP and HEP with pt and family.            Outcome Measures     Row Name 04/21/18 1300 04/21/18 0822 04/20/18 1410       How much help from another person do you currently need...    Turning from your back to your side while in flat bed without using bedrails? 3  -JM 3  -JM 3  -MJ    Moving from lying on back to sitting on the side of a flat bed without bedrails? 3  -JM 3  -JM 3  -MJ    Moving to and from a bed to a chair (including a wheelchair)? 3  -JM 3  -JM 3  -MJ    Standing up from a chair using your arms (e.g., wheelchair, bedside chair)? 3  -JM 3  -JM 3  -MJ    Climbing 3-5 steps with a railing? 3  -JM 3  -JM 3  -MJ    To walk in hospital room? 3  -JM 3  -JM 3  -MJ    AM-PAC 6 Clicks Score 18  -JM 18  -JM 18  -MJ       Functional Assessment    Outcome Measure Options AM-PAC 6 Clicks Basic Mobility (PT)  -JM AM-PAC 6 Clicks Basic Mobility (PT)  -JM AM-PAC 6 Clicks Basic Mobility (PT)  -MJ    Row Name 04/20/18 1056 04/20/18 0709 04/19/18 1502       How much help from another person do you currently need...    Turning from your back to your side while in flat bed without using bedrails? 3  -MJ  -- 2  -MJ    Moving from lying on back to sitting on the side of a flat bed without bedrails? 3  -MJ  -- 2  -MJ    Moving to and from a bed to a chair (including a wheelchair)? 3  -MJ  -- 3  -MJ    Standing up from a chair using your arms (e.g., wheelchair, bedside chair)? 3  -MJ  -- 2  -MJ    Climbing 3-5 steps with a railing? 2  -MJ  -- 1  -MJ    To walk in hospital room? 3  -MJ  -- 3  -MJ    AM-PAC 6 Clicks Score 17  -MJ  -- 13  -MJ       How much help from another is currently needed...    Putting on and taking off regular lower body clothing?  -- 3  -ST  --    Bathing (including washing, rinsing, and drying)  -- 3  -ST  --    Toileting (which includes using toilet bed pan or urinal)   -- 3  -ST  --    Putting on and taking off regular upper body clothing  -- 4  -ST  --    Taking care of personal grooming (such as brushing teeth)  -- 3  -ST  --    Eating meals  -- 4  -ST  --    Score  -- 20  -ST  --       Functional Assessment    Outcome Measure Options AM-PAC 6 Clicks Basic Mobility (PT)  -MJ AM-PAC 6 Clicks Daily Activity (OT)  -ST AM-PAC 6 Clicks Basic Mobility (PT)  -      User Key  (r) = Recorded By, (t) = Taken By, (c) = Cosigned By    Initials Name Provider Type     Salima Lainez, OTR Occupational Therapist    HAMIDA Tena, PT Physical Therapist    MARIAH Carr, PT Physical Therapist           Time Calculation:         PT Charges     Row Name 04/21/18 1329 04/21/18 0917          Time Calculation    Start Time 1300  - 0822  -     PT Received On 04/21/18  - 04/21/18  -HAMIDA     PT Goal Re-Cert Due Date 04/29/18  - 04/29/18  -        Time Calculation- PT    Total Timed Code Minutes- PT 25 minute(s)  -JM 32 minute(s)  -       User Key  (r) = Recorded By, (t) = Taken By, (c) = Cosigned By    Initials Name Provider Type    HAMIDA Tena, PT Physical Therapist          Therapy Charges for Today     Code Description Service Date Service Provider Modifiers Qty    53697040029 HC GAIT TRAINING EA 15 MIN 4/21/2018 Ezio Tena, PT GP 1    80647811385 HC PT THER PROC EA 15 MIN 4/21/2018 Ezio Tena, PT GP 1    88536052688 HC PT THER PROC EA 15 MIN 4/21/2018 Ezio Tena, PT GP 2          PT G-Codes  Outcome Measure Options: AM-PAC 6 Clicks Basic Mobility (PT)    Ezio Tena, PT  4/21/2018

## 2018-04-21 NOTE — PLAN OF CARE
Problem: Patient Care Overview  Goal: Plan of Care Review  Outcome: Ongoing (interventions implemented as appropriate)   04/21/18 1328   Coping/Psychosocial   Plan of Care Reviewed With patient   OTHER   Outcome Summary Pt prepared for DC home with HH; reviewed THP and HEP with pt and family.    Plan of Care Review   Progress improving

## 2018-04-23 ENCOUNTER — TELEPHONE (OUTPATIENT)
Dept: ORTHOPEDIC SURGERY | Facility: CLINIC | Age: 73
End: 2018-04-23

## 2018-04-23 DIAGNOSIS — Z96.641 STATUS POST TOTAL REPLACEMENT OF RIGHT HIP: Primary | ICD-10-CM

## 2018-04-23 LAB
BACTERIA SPEC AEROBE CULT: NORMAL
GRAM STN SPEC: NORMAL

## 2018-04-24 ENCOUNTER — TELEPHONE (OUTPATIENT)
Dept: ORTHOPEDIC SURGERY | Facility: CLINIC | Age: 73
End: 2018-04-24

## 2018-04-24 NOTE — TELEPHONE ENCOUNTER
I attempted to fax 3 times this morning to number provided yesterday with no success. I called [facility] and spoke with [place]- got a new number to send fax to.

## 2018-04-24 NOTE — TELEPHONE ENCOUNTER
AGAIN, SHE IS REQUESTING AN ORDER FOR INPATIENT REHAB.  FAX NUMBER AND CONTACT IN PREVIOUS MESSAGE

## 2018-05-03 LAB — BACTERIA SPEC ANAEROBE CULT: NORMAL

## 2018-05-09 ENCOUNTER — OFFICE VISIT (OUTPATIENT)
Dept: ORTHOPEDIC SURGERY | Facility: CLINIC | Age: 73
End: 2018-05-09

## 2018-05-09 DIAGNOSIS — Z47.89 ORTHOPEDIC AFTERCARE: ICD-10-CM

## 2018-05-09 DIAGNOSIS — Z96.641 STATUS POST TOTAL REPLACEMENT OF RIGHT HIP: Primary | ICD-10-CM

## 2018-05-09 PROCEDURE — 99024 POSTOP FOLLOW-UP VISIT: CPT | Performed by: ORTHOPAEDIC SURGERY

## 2018-05-09 RX ORDER — OXYCODONE HYDROCHLORIDE AND ACETAMINOPHEN 5; 325 MG/1; MG/1
1 TABLET ORAL AS NEEDED
COMMUNITY
End: 2018-07-05

## 2018-05-09 NOTE — PROGRESS NOTES
[facility] Note    Subjective     Chief Complaint   Patient presents with   • Post-op     3 weks s/p (R) DOROTHY 04/19/2018        HPI    Nely Grider is a 72 y.o. female. She follows up today for her right total hip arthroplasty.  She is doing well today.  Ambulating with the aid of a walker.  Almost 100% improvement compared to her preoperative symptoms.      Patient Active Problem List   Diagnosis   • Atypical chest pain   • Neck sprain   • Cough   • Dysuria   • Gastroesophageal reflux disease without esophagitis   • Mixed hyperlipidemia   • Essential hypertension   • Pain of right hip joint   • Sebaceous cyst   • Cervical sprain   • Controlled type 2 diabetes mellitus without complication, without long-term current use of insulin   • Sepsis   • Chronic kidney disease (CKD), stage III (moderate)   • Elevated LFTs   • Leukocytosis   • Mass of right hip region   • Anemia   • Constipation   • Diabetic polyneuropathy associated with type 2 diabetes mellitus   • Primary osteoarthritis of both hips   • Status post total replacement of right hip   • Hyponatremia   • Renal insufficiency   • Acute postoperative pain     Past Medical History:   Diagnosis Date   • Arthritis    • Cancer     cervical   • Cataract    • Diabetes mellitus     type 2 dm, check blood sugar every morning, diagnosed 3 or 4 years   • Full dentures    • GERD (gastroesophageal reflux disease)    • High cholesterol    • History of IBS    • Hypertension    • Neuropathy    • Wears glasses       Past Surgical History:   Procedure Laterality Date   • CARDIAC CATHETERIZATION     • CHOLECYSTECTOMY     • COLONOSCOPY     • EYE SURGERY      bilateral cataract   • HYSTERECTOMY      partial   • OOPHORECTOMY      one removed   • TONSILLECTOMY     • TOTAL HIP ARTHROPLASTY Right 4/19/2018    Procedure: RIGHT TOTAL HIP ARTHROPLASTY;  Surgeon: Carlos Pollack MD;  Location: [facility];  Service: Orthopedics   • WRIST SURGERY      metal plate       Family History   Problem Relation Age of Onset   • Arthritis Mother    • Heart attack Mother    • Hypertension Mother    • Hyperlipidemia Mother    • Osteoporosis Mother    • Heart disease Sister    • Diabetes Sister    • Arthritis Sister    • Heart attack Sister    • Hypertension Sister    • Hyperlipidemia Sister    • Bleeding Disorder Sister    • Heart disease Brother    • Cancer Brother    • Diabetes Brother    • Arthritis Brother    • Heart attack Brother    • Hypertension Brother    • Hyperlipidemia Brother    • Ovarian cancer Daughter    • Cancer Daughter    • Ovarian cancer Other      grandaughter   • Ovarian cancer Other      granddaughter   • Heart attack Father    • Hypertension Father    • Stroke Father    • Kidney disease Paternal Uncle    • Liver disease Paternal Uncle    • Cancer Other    • Stroke Other    • Diabetes Other    • Breast cancer Neg Hx      Social History     Social History   • Marital status:      Spouse name: N/A   • Number of children: N/A   • Years of education: N/A     Occupational History   • Not on file.     Social History Main Topics   • Smoking status: Never Smoker   • Smokeless tobacco: Never Used   • Alcohol use No   • Drug use: No   • Sexual activity: Defer      Comment:      Other Topics Concern   • Not on file     Social History Narrative   • No narrative on file      Current Outpatient Prescriptions on File Prior to Visit   Medication Sig Dispense Refill   • ACCU-CHEK CARLTON PLUS test strip TEST TWO TIMES DAILY 200 each 5   • ACCU-CHEK SOFTCLIX LANCETS lancets 200 each by Other route Daily. Use as instructed 200 each 3   • apixaban (ELIQUIS) 2.5 MG tablet tablet Take 1 tablet by mouth Every 12 (Twelve) Hours for 35 days. 70 tablet 0   • Biotin (BIOTIN 5000) 5 MG capsule Take 1 tablet by mouth Daily.     • Blood Glucose Monitoring Suppl (ACCU-CHEK CARLTON) device Use as directed; For: Diabetes mellitus     • docusate sodium 100 MG capsule Take 100 mg by mouth 2  (Two) Times a Day As Needed for Constipation. 60 capsule 0   • gabapentin (NEURONTIN) 400 MG capsule Take 1 capsule by mouth 3 (Three) Times a Day. 270 capsule 1   • glimepiride (AMARYL) 4 MG tablet Take 1 tablet by mouth Every Morning Before Breakfast. 90 tablet 3   • metFORMIN (GLUCOPHAGE) 500 MG tablet Take 1 tablet by mouth 2 (Two) Times a Day With Meals. 180 tablet 3   • omeprazole (priLOSEC) 20 MG capsule TAKE 1 CAPSULE TWICE DAILY 180 capsule 3   • simvastatin (ZOCOR) 10 MG tablet TAKE 1 TABLET AT BEDTIME 90 tablet 1     No current facility-administered medications on file prior to visit.       Allergies   Allergen Reactions   • Codeine Hives        Review of Systems   Constitutional: Negative.  Negative for activity change, appetite change, chills, diaphoresis, fatigue, fever and unexpected weight change.   HENT: Negative.  Negative for congestion, dental problem, drooling, ear discharge, ear pain, facial swelling, hearing loss, mouth sores, nosebleeds, postnasal drip, rhinorrhea, sinus pressure, sneezing, sore throat, tinnitus, trouble swallowing and voice change.    Eyes: Negative.  Negative for photophobia, pain, discharge, redness, itching and visual disturbance.   Respiratory: Negative.  Negative for apnea, cough, choking, chest tightness, shortness of breath, wheezing and stridor.    Cardiovascular: Negative.  Negative for chest pain, palpitations and leg swelling.   Gastrointestinal: Negative.  Negative for abdominal distention, abdominal pain, anal bleeding, blood in stool, constipation, diarrhea, nausea, rectal pain and vomiting.   Endocrine: Negative.  Negative for cold intolerance, heat intolerance, polydipsia, polyphagia and polyuria.   Genitourinary: Negative.  Negative for decreased urine volume, difficulty urinating, dysuria, enuresis, flank pain, frequency, genital sores, hematuria and urgency.   Musculoskeletal: Positive for arthralgias. Negative for back pain, gait problem, joint swelling,  myalgias, neck pain and neck stiffness.   Skin: Negative.  Negative for color change, pallor, rash and wound.   Allergic/Immunologic: Negative.  Negative for environmental allergies, food allergies and immunocompromised state.   Neurological: Negative.  Negative for dizziness, tremors, seizures, syncope, facial asymmetry, speech difficulty, weakness, light-headedness, numbness and headaches.   Hematological: Negative.  Negative for adenopathy. Does not bruise/bleed easily.   Psychiatric/Behavioral: Negative.  Negative for agitation, behavioral problems, confusion, decreased concentration, dysphoric mood, hallucinations, self-injury, sleep disturbance and suicidal ideas. The patient is not nervous/anxious and is not hyperactive.         Objective      Physical Exam  There were no vitals taken for this visit.    There is no height or weight on file to calculate BMI.    General:   Mental Status:  Alert   Appearance: Cooperative, in no acute distress   Build and Nutrition: Well-nourished and well developed female   Orientation: Alert and oriented to person, place and time   Posture: Normal   Gait: With a walker    Integument:   Right hip: Wound is well-healed with no signs of infection    Lower Extremity:   Right Hip:    Tenderness:  None    Swelling:  None    Crepitus:  None    Range of motion:  External Rotation: 30°       Internal Rotation: 30°       Flexion:  100°       Extension:  0°    Deformities:  None    No leg length discrepancy      Imaging/Studies      Imaging Results (last 24 hours)     Procedure Component Value Units Date/Time    XR Hip With or Without Pelvis 1 View Right [[id number]] Resulted:  05/09/18 1012     Updated:  05/09/18 1012    Narrative:       Right Hip Radiographs  Indication: status-post right total hip arthroplasty  Views: low AP pelvis and lateral of the right hip    Comparison: no change compared to prior study, 4/19/2018    Findings:   The components are well aligned, with no signs of  loosening or failure.          Assessment and Plan     Nely was seen today for post-op.    Diagnoses and all orders for this visit:    Status post total replacement of right hip  -     XR Hip With or Without Pelvis 1 View Right    Orthopedic aftercare        I reviewed my findings with patient today.  Her right total hip arthroplasty appears to be functioning well.  I will see her back in 2 months, no x-rays are required on that visit.  I will see her back sooner for any problems.    Return in about 2 months (around 7/9/2018) for Recheck without X-Rays.          Carlos Pollack MD  05/09/18  10:16 AM

## 2018-05-10 ENCOUNTER — TRANSITIONAL CARE MANAGEMENT TELEPHONE ENCOUNTER (OUTPATIENT)
Dept: INTERNAL MEDICINE | Facility: CLINIC | Age: 73
End: 2018-05-10

## 2018-05-11 ENCOUNTER — TELEPHONE (OUTPATIENT)
Dept: ORTHOPEDIC SURGERY | Facility: CLINIC | Age: 73
End: 2018-05-11

## 2018-05-11 NOTE — TELEPHONE ENCOUNTER
PT GRANDDAUGHTER CALLED AND SAID PT HAD SOME QUESTIONS ABOUT HER SURGERY. SHE WANTED TO KNOW WHEN SHE COULD DRIVE AGAIN AND WHAT THE BIOPSY SHOWED? HER NUMBER [phone].

## 2018-05-15 ENCOUNTER — OFFICE VISIT (OUTPATIENT)
Dept: INTERNAL MEDICINE | Facility: CLINIC | Age: 73
End: 2018-05-15

## 2018-05-15 VITALS
SYSTOLIC BLOOD PRESSURE: 108 MMHG | OXYGEN SATURATION: 98 % | DIASTOLIC BLOOD PRESSURE: 62 MMHG | HEART RATE: 92 BPM | WEIGHT: 178 LBS | BODY MASS INDEX: 28.61 KG/M2 | HEIGHT: 66 IN

## 2018-05-15 DIAGNOSIS — E11.42 DIABETIC POLYNEUROPATHY ASSOCIATED WITH TYPE 2 DIABETES MELLITUS (HCC): ICD-10-CM

## 2018-05-15 DIAGNOSIS — I10 ESSENTIAL HYPERTENSION: Primary | ICD-10-CM

## 2018-05-15 DIAGNOSIS — E11.9 CONTROLLED TYPE 2 DIABETES MELLITUS WITHOUT COMPLICATION, WITHOUT LONG-TERM CURRENT USE OF INSULIN (HCC): ICD-10-CM

## 2018-05-15 DIAGNOSIS — K21.9 GASTROESOPHAGEAL REFLUX DISEASE WITHOUT ESOPHAGITIS: ICD-10-CM

## 2018-05-15 DIAGNOSIS — D64.9 LOW HEMOGLOBIN: ICD-10-CM

## 2018-05-15 DIAGNOSIS — E78.2 MIXED HYPERLIPIDEMIA: ICD-10-CM

## 2018-05-15 DIAGNOSIS — N18.30 CHRONIC KIDNEY DISEASE (CKD), STAGE III (MODERATE) (HCC): Chronic | ICD-10-CM

## 2018-05-15 PROCEDURE — 99495 TRANSJ CARE MGMT MOD F2F 14D: CPT | Performed by: INTERNAL MEDICINE

## 2018-05-15 NOTE — PROGRESS NOTES
Subjective   Nely Grider is a 72 y.o. female.     History of Present Illness   She was hospitalized from 4/19 - 21 for a right hip replacement.  She did well and was then sent to [place] for rehab and got out recently.  She is able to walk with a walker and is feeling stronger.  She is some weak, her Hb when she left the hospital was 8.5 and her creatinine was 1.7.  They took her off her blood pressure meds because her blood pressure was low in the hospital.  She has not eaten well and is not hungry but is slowly improving.  Her sugars were pretty good at [facility].    The following portions of the patient's history were reviewed and updated as appropriate: allergies, current medications, past medical history and problem list.    Review of Systems   Constitutional: Positive for appetite change and fatigue. Negative for fever.   Eyes: Negative.    Respiratory: Negative for cough, chest tightness, shortness of breath and wheezing.    Cardiovascular: Negative.  Negative for chest pain, palpitations and leg swelling.   Gastrointestinal: Negative.  Negative for abdominal distention and abdominal pain.   Genitourinary: Negative.    Musculoskeletal:        As noted.       Objective   Physical Exam   Constitutional: She appears well-developed and well-nourished.   Neck: Normal range of motion. Neck supple.   Cardiovascular: Normal rate, regular rhythm and normal heart sounds.  Exam reveals no gallop and no friction rub.    No murmur heard.  Pulmonary/Chest: Effort normal and breath sounds normal. No respiratory distress. She has no wheezes. She has no rales. She exhibits no tenderness.   Abdominal: Soft. Bowel sounds are normal. She exhibits no distension. There is no tenderness. There is no guarding.   Musculoskeletal: She exhibits no edema.   Nursing note and vitals reviewed.        Assessment/Plan   Nely was seen today for transitional care management.    Diagnoses and all orders for this visit:    Essential  hypertension  -     Comprehensive Metabolic Panel    Mixed hyperlipidemia    Low hemoglobin  -     CBC (No Diff)    Gastroesophageal reflux disease without esophagitis    Controlled type 2 diabetes mellitus without complication, without long-term current use of insulin    Diabetic polyneuropathy associated with type 2 diabetes mellitus    Chronic kidney disease (CKD), stage III (moderate)    She seems to be improving slowly.  Will check labs as ordered.  Same meds.  Recheck here in August.      Transitional Care Management Certification  I certify that the following are true:  1. Communication was made within 2 business days of discharge.  2. Complexity of Medical Decision Making is moderate.  3. Face to face visit occurred within 14 days.    *Note: 06348 is for high complexity patients with a face to face visit within 7 days of discharge.  60118 is for high complexity patients with a face to face on days 8-14 post discharge or medium complexity with face to face visit within 14 days post discharge.

## 2018-05-16 ENCOUNTER — TELEPHONE (OUTPATIENT)
Dept: ORTHOPEDIC SURGERY | Facility: CLINIC | Age: 73
End: 2018-05-16

## 2018-05-16 LAB
ALBUMIN SERPL-MCNC: 3.8 G/DL (ref 3.2–4.8)
ALBUMIN/GLOB SERPL: 1.3 G/DL (ref 1.5–2.5)
ALP SERPL-CCNC: 90 U/L (ref 25–100)
ALT SERPL-CCNC: 15 U/L (ref 7–40)
AST SERPL-CCNC: 24 U/L (ref 0–33)
BILIRUB SERPL-MCNC: 0.2 MG/DL (ref 0.3–1.2)
BUN SERPL-MCNC: 18 MG/DL (ref 9–23)
BUN/CREAT SERPL: 15 (ref 7–25)
CALCIUM SERPL-MCNC: 9.3 MG/DL (ref 8.7–10.4)
CHLORIDE SERPL-SCNC: 103 MMOL/L (ref 99–109)
CO2 SERPL-SCNC: 26 MMOL/L (ref 20–31)
CREAT SERPL-MCNC: 1.2 MG/DL (ref 0.6–1.3)
ERYTHROCYTE [DISTWIDTH] IN BLOOD BY AUTOMATED COUNT: 14.9 % (ref 11.3–14.5)
GFR SERPLBLD CREATININE-BSD FMLA CKD-EPI: 44 ML/MIN/1.73
GFR SERPLBLD CREATININE-BSD FMLA CKD-EPI: 53 ML/MIN/1.73
GLOBULIN SER CALC-MCNC: 3 GM/DL
GLUCOSE SERPL-MCNC: 140 MG/DL (ref 70–100)
HCT VFR BLD AUTO: 33.4 % (ref 34.5–44)
HGB BLD-MCNC: 9.9 G/DL (ref 11.5–15.5)
MCH RBC QN AUTO: 28.5 PG (ref 27–31)
MCHC RBC AUTO-ENTMCNC: 29.6 G/DL (ref 32–36)
MCV RBC AUTO: 96.3 FL (ref 80–99)
PLATELET # BLD AUTO: 633 10*3/MM3 (ref 150–450)
POTASSIUM SERPL-SCNC: 4.2 MMOL/L (ref 3.5–5.5)
PROT SERPL-MCNC: 6.8 G/DL (ref 5.7–8.2)
RBC # BLD AUTO: 3.47 10*6/MM3 (ref 3.89–5.14)
SODIUM SERPL-SCNC: 136 MMOL/L (ref 132–146)
WBC # BLD AUTO: 12.02 10*3/MM3 (ref 3.5–10.8)

## 2018-05-16 NOTE — TELEPHONE ENCOUNTER
She can drive at 4 weeks post-op if she has good control of her leg and not on pain meds.  The biopsy was benign and the cultures are negative. Please call her back. Thanks.

## 2018-05-16 NOTE — TELEPHONE ENCOUNTER
I spoke with the patient and gave her the information below from Dr. Pollack as well as the info. From the phone message about driving and the biopsy that was done during surgery.  Noni

## 2018-05-16 NOTE — TELEPHONE ENCOUNTER
PT SAW PCP YESTERDAY AND SHE SAID THAT HER HEMOGLOBIN IS NOW 8. HER DAUGHTER WAS CALLING TO LET DR REEDER KNOW. ON 5/7/18 HER HEMOGLOBIN WAS 9.5. IF YOU HAVE ANY QUESTIONS PLEASE CALL [phone].

## 2018-05-16 NOTE — TELEPHONE ENCOUNTER
Not likely related to her hip. She should discuss with her PCP.  I can see her if there are any concerns.

## 2018-05-17 LAB
CYTO UR: NORMAL
LAB AP CASE REPORT: NORMAL
LAB AP CLINICAL INFORMATION: NORMAL
Lab: NORMAL
Lab: NORMAL
PATH REPORT.ADDENDUM SPEC: NORMAL
PATH REPORT.FINAL DX SPEC: NORMAL
PATH REPORT.GROSS SPEC: NORMAL

## 2018-05-17 NOTE — TELEPHONE ENCOUNTER
I called the patient to let them know the results and someone had called and let her know already.  Mindi

## 2018-05-21 RX ORDER — ZOLPIDEM TARTRATE 5 MG/1
5 TABLET ORAL NIGHTLY PRN
Qty: 30 TABLET | Refills: 1 | OUTPATIENT
Start: 2018-05-21 | End: 2018-12-17

## 2018-05-21 NOTE — TELEPHONE ENCOUNTER
----- Message from Michaela Fatima sent at 5/21/2018  1:28 PM EDT -----  [phone]    PT HAD HIP SURGERY ON 4/19.  SHE IS NOT GETTING ANY SLEEP AT ALL-CAN YOU GIVE MEDS TO HELP?    [facility]

## 2018-05-31 LAB
FUNGUS WND CULT: NORMAL
MYCOBACTERIUM SPEC CULT: NORMAL
NIGHT BLUE STAIN TISS: NORMAL

## 2018-07-05 ENCOUNTER — OFFICE VISIT (OUTPATIENT)
Dept: INTERNAL MEDICINE | Facility: CLINIC | Age: 73
End: 2018-07-05

## 2018-07-05 VITALS
DIASTOLIC BLOOD PRESSURE: 60 MMHG | BODY MASS INDEX: 27.48 KG/M2 | HEIGHT: 66 IN | TEMPERATURE: 97.2 F | WEIGHT: 171 LBS | HEART RATE: 83 BPM | SYSTOLIC BLOOD PRESSURE: 98 MMHG | RESPIRATION RATE: 16 BRPM | OXYGEN SATURATION: 99 %

## 2018-07-05 DIAGNOSIS — R59.0 PREAURICULAR ADENOPATHY: ICD-10-CM

## 2018-07-05 DIAGNOSIS — D64.9 ANEMIA, UNSPECIFIED TYPE: ICD-10-CM

## 2018-07-05 DIAGNOSIS — R42 DIZZINESS: Primary | ICD-10-CM

## 2018-07-05 LAB
ALBUMIN SERPL-MCNC: 3.91 G/DL (ref 3.2–4.8)
ALBUMIN/GLOB SERPL: 1.4 G/DL (ref 1.5–2.5)
ALP SERPL-CCNC: 84 U/L (ref 25–100)
ALT SERPL-CCNC: 9 U/L (ref 7–40)
AST SERPL-CCNC: 19 U/L (ref 0–33)
BASOPHILS # BLD AUTO: 0.05 10*3/MM3 (ref 0–0.2)
BASOPHILS NFR BLD AUTO: 0.4 % (ref 0–1)
BILIRUB SERPL-MCNC: 0.2 MG/DL (ref 0.3–1.2)
BUN SERPL-MCNC: 18 MG/DL (ref 9–23)
BUN/CREAT SERPL: 15.9 (ref 7–25)
CALCIUM SERPL-MCNC: 9.4 MG/DL (ref 8.7–10.4)
CHLORIDE SERPL-SCNC: 106 MMOL/L (ref 99–109)
CO2 SERPL-SCNC: 27 MMOL/L (ref 20–31)
CREAT SERPL-MCNC: 1.13 MG/DL (ref 0.6–1.3)
EOSINOPHIL # BLD AUTO: 0.18 10*3/MM3 (ref 0–0.3)
EOSINOPHIL NFR BLD AUTO: 1.6 % (ref 0–3)
ERYTHROCYTE [DISTWIDTH] IN BLOOD BY AUTOMATED COUNT: 15.4 % (ref 11.3–14.5)
FERRITIN SERPL-MCNC: 103 NG/ML (ref 10–291)
GLOBULIN SER CALC-MCNC: 2.9 GM/DL
GLUCOSE SERPL-MCNC: 122 MG/DL (ref 70–100)
HCT VFR BLD AUTO: 35.8 % (ref 34.5–44)
HGB BLD-MCNC: 10.7 G/DL (ref 11.5–15.5)
IMM GRANULOCYTES # BLD: 0.02 10*3/MM3 (ref 0–0.03)
IMM GRANULOCYTES NFR BLD: 0.2 % (ref 0–0.6)
IRON SATN MFR SERPL: 12 % (ref 15–50)
IRON SERPL-MCNC: 31 MCG/DL (ref 50–175)
LYMPHOCYTES # BLD AUTO: 4.16 10*3/MM3 (ref 0.6–4.8)
LYMPHOCYTES NFR BLD AUTO: 36 % (ref 24–44)
MAGNESIUM SERPL-MCNC: 1.8 MG/DL (ref 1.3–2.7)
MCH RBC QN AUTO: 27.1 PG (ref 27–31)
MCHC RBC AUTO-ENTMCNC: 29.9 G/DL (ref 32–36)
MCV RBC AUTO: 90.6 FL (ref 80–99)
MONOCYTES # BLD AUTO: 0.66 10*3/MM3 (ref 0–1)
MONOCYTES NFR BLD AUTO: 5.7 % (ref 0–12)
NEUTROPHILS # BLD AUTO: 6.49 10*3/MM3 (ref 1.5–8.3)
NEUTROPHILS NFR BLD AUTO: 56.3 % (ref 41–71)
PLATELET # BLD AUTO: 604 10*3/MM3 (ref 150–450)
POTASSIUM SERPL-SCNC: 4.1 MMOL/L (ref 3.5–5.5)
PROT SERPL-MCNC: 6.8 G/DL (ref 5.7–8.2)
RBC # BLD AUTO: 3.95 10*6/MM3 (ref 3.89–5.14)
SODIUM SERPL-SCNC: 142 MMOL/L (ref 132–146)
TIBC SERPL-MCNC: 252 MCG/DL (ref 250–450)
UIBC SERPL-MCNC: 221 MCG/DL
WBC # BLD AUTO: 11.54 10*3/MM3 (ref 3.5–10.8)

## 2018-07-05 PROCEDURE — 36415 COLL VENOUS BLD VENIPUNCTURE: CPT | Performed by: PHYSICIAN ASSISTANT

## 2018-07-05 PROCEDURE — 99214 OFFICE O/P EST MOD 30 MIN: CPT | Performed by: PHYSICIAN ASSISTANT

## 2018-07-05 NOTE — PROGRESS NOTES
Chief Complaint   Patient presents with   • Dizziness     dizzy spells. no origin, pt states just comes and goes, BS was 130 this morning.   • Mass     on L side of jaw right beside ear       Subjective       History of Present Illness     Nely Grider is a 72 y.o. female. She presents with 4 day history of intermittent dizziness as well as new lump near her left jaw. Regarding dizziness, patient states that she has had several episodes of feeling dizzy, like her head is spinning, over the last few days. She states that these last for less than a minute and resolve on their own. She sometimes feels like she might pass out when dizziness is severe, but no syncopal episodes noted. Patient says it occurs randomly and she can be sitting or standing. Does not occur more often with standing up or any other movement. Not worsened by moving her head quickly. Propping her feet up yesterday did help. Patient states she has chronically low BP and always runs low. She checks her blood pressure daily at home and typically runs /60s and this is normal for her. She does not take any HTN meds. She states she has been drinking plenty of fluids and avoiding the heat. She denies ear pain or pressure, spots in vision or blurred vision from baseline, headache, N/V/D, abdominal pain, sore throat, fever, chills, SOA, chest pain or any additional symptoms. Blood glucose 130 this AM per patient.     Patient also has a lump near L jaw line in front of L ear. She first noticed it yesterday. It is sore to touch but not painful at rest. She has never had anything like this before. No bug bites, not itchy. No trouble swallowing or sore throat. Patient does report fatigue but this is chronic x several months but continues to worsen.       The following portions of the patient's history were reviewed and updated as appropriate: allergies, current medications, past medical history, past social history and problem list.    Allergies    Allergen Reactions   • Codeine Hives     Social History   Substance Use Topics   • Smoking status: Never Smoker   • Smokeless tobacco: Never Used   • Alcohol use No         Current Outpatient Prescriptions:   •  ACCU-CHEK CARLTON PLUS test strip, TEST TWO TIMES DAILY, Disp: 200 each, Rfl: 5  •  ACCU-CHEK SOFTCLIX LANCETS lancets, 200 each by Other route Daily. Use as instructed, Disp: 200 each, Rfl: 3  •  Biotin (BIOTIN 5000) 5 MG capsule, Take 1 tablet by mouth Daily., Disp: , Rfl:   •  Blood Glucose Monitoring Suppl (ACCU-CHEK CARLTON) device, Use as directed; For: Diabetes mellitus, Disp: , Rfl:   •  docusate sodium 100 MG capsule, Take 100 mg by mouth 2 (Two) Times a Day As Needed for Constipation., Disp: 60 capsule, Rfl: 0  •  gabapentin (NEURONTIN) 400 MG capsule, Take 1 capsule by mouth 3 (Three) Times a Day., Disp: 270 capsule, Rfl: 1  •  glimepiride (AMARYL) 4 MG tablet, Take 1 tablet by mouth Every Morning Before Breakfast., Disp: 90 tablet, Rfl: 3  •  metFORMIN (GLUCOPHAGE) 500 MG tablet, Take 1 tablet by mouth 2 (Two) Times a Day With Meals., Disp: 180 tablet, Rfl: 3  •  omeprazole (priLOSEC) 20 MG capsule, TAKE 1 CAPSULE TWICE DAILY, Disp: 180 capsule, Rfl: 3  •  simvastatin (ZOCOR) 10 MG tablet, TAKE 1 TABLET AT BEDTIME, Disp: 90 tablet, Rfl: 1  •  zolpidem (AMBIEN) 5 MG tablet, Take 1 tablet by mouth At Night As Needed for Sleep., Disp: 30 tablet, Rfl: 1  •  ferrous sulfate 325 (65 FE) MG tablet, Take 1 tablet by mouth Daily With Breakfast., Disp: 30 tablet, Rfl: 5    Review of Systems   Constitutional: Positive for fatigue. Negative for chills and fever.   HENT: Negative for congestion, ear pain, hearing loss, sore throat, tinnitus and trouble swallowing.         +swollen glands   Eyes: Negative for pain.   Respiratory: Negative for cough, shortness of breath and wheezing.    Cardiovascular: Negative for chest pain and palpitations.   Gastrointestinal: Negative for abdominal pain, diarrhea, nausea and  vomiting.   Genitourinary: Negative for dysuria and hematuria.   Skin: Negative for rash.   Neurological: Positive for dizziness. Negative for syncope, weakness, numbness and headache.   Hematological: Does not bruise/bleed easily.       Objective   Vitals:    07/05/18 1312   BP: 98/60   Pulse: 83   Resp: 16   Temp: 97.2 °F (36.2 °C)   SpO2: 99%     Physical Exam   Constitutional: She appears well-developed and well-nourished.   HENT:   Head: Normocephalic and atraumatic.   Right Ear: Tympanic membrane, external ear and ear canal normal.   Left Ear: Tympanic membrane, external ear and ear canal normal.   Nose: Nose normal.   Mouth/Throat: Oropharynx is clear and moist and mucous membranes are normal.   Eyes: Conjunctivae are normal. Pupils are equal, round, and reactive to light.   Neck: Normal range of motion. Neck supple. Carotid bruit is not present. No thyromegaly present.   Cardiovascular: Normal rate, regular rhythm and intact distal pulses.    No murmur heard.  Pulmonary/Chest: Effort normal and breath sounds normal. She has no wheezes. She has no rales.   Abdominal: Soft. There is no hepatosplenomegaly. There is no tenderness.   Lymphadenopathy:        Head (right side): No preauricular adenopathy present.        Head (left side): Preauricular adenopathy present.     She has no cervical adenopathy.   +preauricular LN enlarged x1, approx 2.5cm x 1cm. +tenderness to palpation   Skin: No rash noted.   Psychiatric: She has a normal mood and affect. Her behavior is normal.         Assessment/Plan   Nely was seen today for dizziness and mass.    Diagnoses and all orders for this visit:    Dizziness  -     CBC & Differential  -     Comprehensive Metabolic Panel  -     Iron Profile  -     Ferritin  -     Magnesium    Anemia, unspecified type  -     CBC & Differential  -     Comprehensive Metabolic Panel  -     Iron Profile  -     Ferritin  -     Magnesium    Preauricular adenopathy    Will check labs today. May be  inner ear issue given dizziness and L preauricular adenopathy. Will consider Abx if no improvement.  Will review labs with pt when available.          Return for Next scheduled follow up.

## 2018-07-11 ENCOUNTER — OFFICE VISIT (OUTPATIENT)
Dept: ORTHOPEDIC SURGERY | Facility: CLINIC | Age: 73
End: 2018-07-11

## 2018-07-11 DIAGNOSIS — Z96.641 STATUS POST TOTAL REPLACEMENT OF RIGHT HIP: Primary | ICD-10-CM

## 2018-07-11 DIAGNOSIS — Z47.89 ORTHOPEDIC AFTERCARE: ICD-10-CM

## 2018-07-11 PROCEDURE — 99024 POSTOP FOLLOW-UP VISIT: CPT | Performed by: ORTHOPAEDIC SURGERY

## 2018-07-11 NOTE — PROGRESS NOTES
[facility] [place] [facility] Note    Subjective     Chief Complaint   Patient presents with   • Post-op     2 months f/u; 11 weeks status post Right Total Hip Arthroplasty 4/19/18        HPI    Nely Grider is a 72 y.o. female.  She follows up today for her right total hip arthroplasty.  She's doing well today.  100% improvement compared to her preoperative symptoms.      Patient Active Problem List   Diagnosis   • Atypical chest pain   • Neck sprain   • Cough   • Dysuria   • Gastroesophageal reflux disease without esophagitis   • Mixed hyperlipidemia   • Essential hypertension   • Pain of right hip joint   • Sebaceous cyst   • Cervical sprain   • Controlled type 2 diabetes mellitus without complication, without long-term current use of insulin (CMS/HCC)   • Sepsis (CMS/HCC)   • Chronic kidney disease (CKD), stage III (moderate)   • Elevated LFTs   • Leukocytosis   • Mass of right hip region   • Anemia   • Constipation   • Diabetic polyneuropathy associated with type 2 diabetes mellitus (CMS/HCC)   • Primary osteoarthritis of both hips   • Status post total replacement of right hip   • Hyponatremia   • Renal insufficiency   • Acute postoperative pain   • Orthopedic aftercare     Past Medical History:   Diagnosis Date   • Arthritis    • Cancer (CMS/HCC)     cervical   • Cataract    • Diabetes mellitus (CMS/HCC)     type 2 dm, check blood sugar every morning, diagnosed 3 or 4 years   • Full dentures    • GERD (gastroesophageal reflux disease)    • High cholesterol    • History of IBS    • Hypertension    • Neuropathy    • Wears glasses       Past Surgical History:   Procedure Laterality Date   • CARDIAC CATHETERIZATION     • CHOLECYSTECTOMY     • COLONOSCOPY     • EYE SURGERY      bilateral cataract   • HYSTERECTOMY      partial   • OOPHORECTOMY      one removed   • TONSILLECTOMY     • TOTAL HIP ARTHROPLASTY Right 4/19/2018    Procedure: RIGHT TOTAL HIP ARTHROPLASTY;  Surgeon: Carlos Pollack MD;  Location:  [facility];  Service: Orthopedics   • WRIST SURGERY      metal plate      Family History   Problem Relation Age of Onset   • Arthritis Mother    • Heart attack Mother    • Hypertension Mother    • Hyperlipidemia Mother    • Osteoporosis Mother    • Heart disease Sister    • Diabetes Sister    • Arthritis Sister    • Heart attack Sister    • Hypertension Sister    • Hyperlipidemia Sister    • Bleeding Disorder Sister    • Heart disease Brother    • Cancer Brother    • Diabetes Brother    • Arthritis Brother    • Heart attack Brother    • Hypertension Brother    • Hyperlipidemia Brother    • Ovarian cancer Daughter    • Cancer Daughter    • Ovarian cancer Other         grandaughter   • Ovarian cancer Other         granddaughter   • Heart attack Father    • Hypertension Father    • Stroke Father    • Kidney disease Paternal Uncle    • Liver disease Paternal Uncle    • Cancer Other    • Stroke Other    • Diabetes Other    • Breast cancer Neg Hx      Social History     Social History   • Marital status:      Spouse name: N/A   • Number of children: N/A   • Years of education: N/A     Occupational History   • Not on file.     Social History Main Topics   • Smoking status: Never Smoker   • Smokeless tobacco: Never Used   • Alcohol use No   • Drug use: No   • Sexual activity: Defer      Comment:      Other Topics Concern   • Not on file     Social History Narrative   • No narrative on file      Current Outpatient Prescriptions on File Prior to Visit   Medication Sig Dispense Refill   • ACCU-CHEK CARLTON PLUS test strip TEST TWO TIMES DAILY 200 each 5   • ACCU-CHEK SOFTCLIX LANCETS lancets 200 each by Other route Daily. Use as instructed 200 each 3   • Biotin (BIOTIN 5000) 5 MG capsule Take 1 tablet by mouth Daily.     • Blood Glucose Monitoring Suppl (ACCU-CHEK CARLTON) device Use as directed; For: Diabetes mellitus     • docusate sodium 100 MG capsule Take 100 mg by mouth 2 (Two) Times a Day As Needed for  Constipation. 60 capsule 0   • gabapentin (NEURONTIN) 400 MG capsule Take 1 capsule by mouth 3 (Three) Times a Day. 270 capsule 1   • glimepiride (AMARYL) 4 MG tablet Take 1 tablet by mouth Every Morning Before Breakfast. 90 tablet 3   • metFORMIN (GLUCOPHAGE) 500 MG tablet Take 1 tablet by mouth 2 (Two) Times a Day With Meals. 180 tablet 3   • omeprazole (priLOSEC) 20 MG capsule TAKE 1 CAPSULE TWICE DAILY 180 capsule 3   • simvastatin (ZOCOR) 10 MG tablet TAKE 1 TABLET AT BEDTIME 90 tablet 1   • zolpidem (AMBIEN) 5 MG tablet Take 1 tablet by mouth At Night As Needed for Sleep. 30 tablet 1     No current facility-administered medications on file prior to visit.       Allergies   Allergen Reactions   • Codeine Hives        Review of Systems   Constitutional: Negative for activity change, appetite change, chills, diaphoresis, fatigue, fever and unexpected weight change.   HENT: Negative for congestion, dental problem, drooling, ear discharge, ear pain, facial swelling, hearing loss, mouth sores, nosebleeds, postnasal drip, rhinorrhea, sinus pressure, sneezing, sore throat, tinnitus, trouble swallowing and voice change.    Eyes: Negative for photophobia, pain, discharge, redness, itching and visual disturbance.   Respiratory: Negative for apnea, cough, choking, chest tightness, shortness of breath, wheezing and stridor.    Cardiovascular: Negative for chest pain, palpitations and leg swelling.   Gastrointestinal: Negative for abdominal distention, abdominal pain, anal bleeding, blood in stool, constipation, diarrhea, nausea, rectal pain and vomiting.   Endocrine: Negative for cold intolerance, heat intolerance, polydipsia, polyphagia and polyuria.   Genitourinary: Negative for decreased urine volume, difficulty urinating, dysuria, enuresis, flank pain, frequency, genital sores, hematuria and urgency.   Musculoskeletal: Positive for joint swelling. Negative for arthralgias, back pain, gait problem, myalgias, neck pain  and neck stiffness.   Skin: Negative for color change, pallor, rash and wound.   Allergic/Immunologic: Negative for environmental allergies, food allergies and immunocompromised state.   Neurological: Negative for dizziness, tremors, seizures, syncope, facial asymmetry, speech difficulty, weakness, light-headedness, numbness and headaches.   Hematological: Negative for adenopathy. Does not bruise/bleed easily.   Psychiatric/Behavioral: Negative for agitation, behavioral problems, confusion, decreased concentration, dysphoric mood, hallucinations, self-injury, sleep disturbance and suicidal ideas. The patient is not nervous/anxious and is not hyperactive.         Objective      Physical Exam  There were no vitals taken for this visit.    There is no height or weight on file to calculate BMI.    General:   Mental Status:  Alert   Appearance: Cooperative, in no acute distress   Build and Nutrition: Well-nourished and well developed female   Orientation: Alert and oriented to person, place and time   Posture: Normal   Gait: Limping on the left, walking with a cane    Integument:   Right hip: Wound is well-healed with no signs of infection    Lower Extremity:   Right Hip:    Tenderness:  None    Swelling:  None    Crepitus:  None    Range of motion:  External Rotation: 30°       Internal Rotation: 30°       Flexion:  100°       Extension:  0°    Deformities:  None  [facility]    No leg length discrepancy        Assessment and Plan     Nely was seen today for post-op.    Diagnoses and all orders for this visit:    Status post total replacement of right hip    Orthopedic aftercare        I reviewed my findings with patient today.  Her right total hip arthroplasty is functioning well, she is pleased with results.  I will see her back in 4 months with x-rays, but sooner for any problems.    Of note, she is limping on the left side, and she will contact me if and when she would like to discuss  left total hip arthroplasty surgery.    Return in about 4 months (around 11/11/2018) for Recheck with X-Rays.          Carlos Pollack MD  07/11/18  2:20 PM

## 2018-07-13 RX ORDER — FERROUS SULFATE 325(65) MG
325 TABLET ORAL
Qty: 30 TABLET | Refills: 5 | Status: SHIPPED | OUTPATIENT
Start: 2018-07-13 | End: 2018-12-17

## 2018-08-13 ENCOUNTER — OFFICE VISIT (OUTPATIENT)
Dept: INTERNAL MEDICINE | Facility: CLINIC | Age: 73
End: 2018-08-13

## 2018-08-13 VITALS
BODY MASS INDEX: 28.83 KG/M2 | HEART RATE: 84 BPM | SYSTOLIC BLOOD PRESSURE: 106 MMHG | RESPIRATION RATE: 19 BRPM | WEIGHT: 176 LBS | DIASTOLIC BLOOD PRESSURE: 64 MMHG

## 2018-08-13 DIAGNOSIS — E11.8 CONTROLLED TYPE 2 DIABETES MELLITUS WITH COMPLICATION, WITHOUT LONG-TERM CURRENT USE OF INSULIN (HCC): ICD-10-CM

## 2018-08-13 DIAGNOSIS — I10 ESSENTIAL HYPERTENSION: ICD-10-CM

## 2018-08-13 DIAGNOSIS — E78.2 MIXED HYPERLIPIDEMIA: ICD-10-CM

## 2018-08-13 DIAGNOSIS — M16.0 PRIMARY OSTEOARTHRITIS OF BOTH HIPS: ICD-10-CM

## 2018-08-13 DIAGNOSIS — D50.8 OTHER IRON DEFICIENCY ANEMIA: Primary | ICD-10-CM

## 2018-08-13 DIAGNOSIS — E11.9 CONTROLLED TYPE 2 DIABETES MELLITUS WITHOUT COMPLICATION, WITHOUT LONG-TERM CURRENT USE OF INSULIN (HCC): ICD-10-CM

## 2018-08-13 DIAGNOSIS — K21.9 GASTROESOPHAGEAL REFLUX DISEASE WITHOUT ESOPHAGITIS: ICD-10-CM

## 2018-08-13 DIAGNOSIS — Z96.641 STATUS POST TOTAL REPLACEMENT OF RIGHT HIP: ICD-10-CM

## 2018-08-13 PROBLEM — E87.1 HYPONATREMIA: Status: RESOLVED | Noted: 2018-04-20 | Resolved: 2018-08-13

## 2018-08-13 LAB
ERYTHROCYTE [DISTWIDTH] IN BLOOD BY AUTOMATED COUNT: 16.5 % (ref 11.3–14.5)
EXPIRATION DATE: NORMAL
HBA1C MFR BLD: 6 %
HCT VFR BLD AUTO: 39.2 % (ref 34.5–44)
HGB BLD-MCNC: 11.8 G/DL (ref 11.5–15.5)
Lab: NORMAL
MCH RBC QN AUTO: 27.1 PG (ref 27–31)
MCHC RBC AUTO-ENTMCNC: 30.1 G/DL (ref 32–36)
MCV RBC AUTO: 90.1 FL (ref 80–99)
PLATELET # BLD AUTO: 514 10*3/MM3 (ref 150–450)
RBC # BLD AUTO: 4.35 10*6/MM3 (ref 3.89–5.14)
WBC # BLD AUTO: 12.73 10*3/MM3 (ref 3.5–10.8)

## 2018-08-13 PROCEDURE — 36415 COLL VENOUS BLD VENIPUNCTURE: CPT | Performed by: INTERNAL MEDICINE

## 2018-08-13 PROCEDURE — 99214 OFFICE O/P EST MOD 30 MIN: CPT | Performed by: INTERNAL MEDICINE

## 2018-08-13 PROCEDURE — 83036 HEMOGLOBIN GLYCOSYLATED A1C: CPT | Performed by: INTERNAL MEDICINE

## 2018-08-13 NOTE — PROGRESS NOTES
Subjective   Nely Grider is a 72 y.o. female.     History of Present Illness   For follow up of  HTN on meds, no headaches, sob or chest pain.  Hyperlipidemia on meds, no muscle aches.  NIDDM on meds, no new sx.  GERD is stable.  Iron deficiency anemia from recent hip surgery, now on iron.      The following portions of the patient's history were reviewed and updated as appropriate: allergies, current medications, past medical history and problem list.    Review of Systems   Constitutional: Negative.  Negative for fatigue and fever.   Eyes: Negative.    Respiratory: Negative.  Negative for cough, chest tightness, shortness of breath and wheezing.    Cardiovascular: Negative.  Negative for chest pain, palpitations and leg swelling.   Gastrointestinal: Negative.  Negative for abdominal distention and abdominal pain.   Genitourinary: Negative.    Musculoskeletal: Positive for arthralgias and back pain.        Hip is doing well, will need other hip done.       Objective   Physical Exam   Constitutional: She appears well-developed and well-nourished.   130/80 by me.   Neck: Normal range of motion. Neck supple.   Cardiovascular: Normal rate, regular rhythm and normal heart sounds.  Exam reveals no gallop and no friction rub.    No murmur heard.  Pulmonary/Chest: Effort normal and breath sounds normal. No respiratory distress. She has no wheezes. She has no rales. She exhibits no tenderness.   Abdominal: Soft. Bowel sounds are normal. She exhibits no distension and no mass. There is no tenderness. There is no guarding.   Musculoskeletal: Normal range of motion.   Nursing note and vitals reviewed.        Assessment/Plan   Nely was seen today for essential hypertension.    Diagnoses and all orders for this visit:    Other iron deficiency anemia  -     CBC (No Diff)    Status post total replacement of right hip    Controlled type 2 diabetes mellitus with complication, without long-term current use of insulin  (CMS/[facility])  -     POC Glycosylated Hemoglobin (Hb A1C)    Essential hypertension    Mixed hyperlipidemia    Gastroesophageal reflux disease without esophagitis    Controlled type 2 diabetes mellitus without complication, without long-term current use of insulin (CMS/[facility])    Primary osteoarthritis of both hips    A1C = 6.0.  All problems are stable.  Will check CBC.  Same meds.  Recheck 4 months.

## 2018-09-06 RX ORDER — LOSARTAN POTASSIUM AND HYDROCHLOROTHIAZIDE 25; 100 MG/1; MG/1
TABLET ORAL
Qty: 90 TABLET | Refills: 3 | Status: SHIPPED | OUTPATIENT
Start: 2018-09-06 | End: 2019-01-22

## 2018-09-06 RX ORDER — SIMVASTATIN 10 MG
TABLET ORAL
Qty: 90 TABLET | Refills: 1 | Status: SHIPPED | OUTPATIENT
Start: 2018-09-06 | End: 2019-01-28 | Stop reason: SDUPTHER

## 2018-09-25 ENCOUNTER — TELEPHONE (OUTPATIENT)
Dept: INTERNAL MEDICINE | Facility: CLINIC | Age: 73
End: 2018-09-25

## 2018-09-25 NOTE — TELEPHONE ENCOUNTER
DAINA informing Mahendra CURIEL with [facility] yes we plan on ordering DEXA, pt just had total hip surgery and we are waiting. Provided office number should he need to call me back.

## 2018-09-25 NOTE — TELEPHONE ENCOUNTER
----- Message from Ilana Smith sent at 9/25/2018 12:57 PM EDT -----  Contact: ASHLEY BOGGS RN WITH [facility] CALLING IN REGARDS TO BRIGITTE CHOW. HE SAID HE FAXED AN OSTEOPEROSIS FORM ON 8/22/18. HE WOULD LIKE TO CHECK THE STATUS OF THIS FORM OR SEE IF YOU PLAN TO ORDER A BONE SENSITY SCAN. AMBROSE CAN BE REACHED [phone]

## 2018-10-09 ENCOUNTER — TELEPHONE (OUTPATIENT)
Dept: INTERNAL MEDICINE | Facility: CLINIC | Age: 73
End: 2018-10-09

## 2018-10-09 NOTE — TELEPHONE ENCOUNTER
----- Message from Ilana Smith sent at 10/9/2018  9:15 AM EDT -----  ROSE MARIE, [facility], CALLING IN REGARDS TO [facility]ERALDO. SHE WOULD LIKE HER TO HAVE A DEXA SCAN ORDERED AND COMPLETED IN THE NEXT 27 DAYS DUE TO A FRACTURE SHE HAD IN April  IF NEEDED ROSE MARIE CAN BE REACHED [phone]

## 2018-10-15 ENCOUNTER — TRANSCRIBE ORDERS (OUTPATIENT)
Dept: ADMINISTRATIVE | Facility: HOSPITAL | Age: 73
End: 2018-10-15

## 2018-10-15 DIAGNOSIS — Z12.31 VISIT FOR SCREENING MAMMOGRAM: Primary | ICD-10-CM

## 2018-10-23 NOTE — TELEPHONE ENCOUNTER
Tell her that she never had a hip fracture.  She just had a hip replaced because of bad arthritis.

## 2018-10-27 ENCOUNTER — OFFICE VISIT (OUTPATIENT)
Dept: RETAIL CLINIC | Facility: CLINIC | Age: 73
End: 2018-10-27

## 2018-10-27 DIAGNOSIS — Z23 NEED FOR VACCINATION: Primary | ICD-10-CM

## 2018-10-27 NOTE — PROGRESS NOTES
Subjective   Nely Grider is a 72 y.o. female.     Reason for Appointment  Vaccine    History of Present Illness  Nely Grider requests Influenza High Dose vaccine.  She denies current acute illness, denies adverse reaction to vaccines in the past.  She denies allergy to eggs, latex and any component to vaccines.  She denies history of Guillain [place].    Assessments  Nely was seen today for immunizations.    Diagnoses and all orders for this visit:    Need for vaccination  -     Flu Vaccine High Dose PF 65YR+ ([id number])        VIS given.  Pt advised that the most common post vaccine complaint is that of a sore arm at the injection site.  Pt also advised that this is a normal reaction to this vaccine.  If there are other concerns that arise, the patient has been advised to call the clinic or return to the clinic. If the pt has difficulty breathing, the patient has been advised to go to the ER.  The pt has stated understanding.    This document has been electronically signed by SKYLAR Grewal October 27, 2018 11:36 AM

## 2018-11-12 ENCOUNTER — OFFICE VISIT (OUTPATIENT)
Dept: ORTHOPEDIC SURGERY | Facility: CLINIC | Age: 73
End: 2018-11-12

## 2018-11-12 VITALS — WEIGHT: 179.23 LBS | OXYGEN SATURATION: 98 % | BODY MASS INDEX: 28.81 KG/M2 | HEIGHT: 66 IN | HEART RATE: 78 BPM

## 2018-11-12 DIAGNOSIS — Z96.641 STATUS POST TOTAL REPLACEMENT OF RIGHT HIP: ICD-10-CM

## 2018-11-12 DIAGNOSIS — M16.12 PRIMARY OSTEOARTHRITIS OF LEFT HIP: Primary | ICD-10-CM

## 2018-11-12 PROCEDURE — 99214 OFFICE O/P EST MOD 30 MIN: CPT | Performed by: ORTHOPAEDIC SURGERY

## 2018-11-12 RX ORDER — PREGABALIN 150 MG/1
150 CAPSULE ORAL ONCE
Status: CANCELLED | OUTPATIENT
Start: 2018-11-12 | End: 2018-11-12

## 2018-11-12 RX ORDER — MELOXICAM 7.5 MG/1
15 TABLET ORAL ONCE
Status: CANCELLED | OUTPATIENT
Start: 2018-11-12 | End: 2018-11-12

## 2018-11-12 RX ORDER — CHLORHEXIDINE GLUCONATE 4 G/100ML
SOLUTION TOPICAL DAILY
Qty: 236 ML | Refills: 0 | Status: ON HOLD | OUTPATIENT
Start: 2018-11-12 | End: 2019-02-12

## 2018-11-12 RX ORDER — ACETAMINOPHEN 325 MG/1
1000 TABLET ORAL ONCE
Status: CANCELLED | OUTPATIENT
Start: 2018-11-12 | End: 2018-11-12

## 2018-11-12 NOTE — PROGRESS NOTES
[facility] [place] [facility] Note    Subjective     Chief Complaint   Patient presents with   • Left Hip - Pain   • Follow-up     4 months- 7 months s/p (R) DOROTHY 04/19/2018        HPI    Nely Grider is a 72 y.o. female.  She follows up today for her right hip.  Her right total hip arthroplasty is doing well, and she is pleased with results.  Fully ambulatory without external aids for the right hip.  100% relief compared to her preoperative symptoms.    However, her left hip is bothering her significantly, with severe pain, using a cane for ambulation, pain with activities daily living, and has progressed to the point where she would like to proceed with left total hip arthroplasty surgery.  The pain is shooting and stabbing, worse with walking, standing and climbing stairs.      Patient Active Problem List   Diagnosis   • Atypical chest pain   • Neck sprain   • Cough   • Dysuria   • Gastroesophageal reflux disease without esophagitis   • Mixed hyperlipidemia   • Essential hypertension   • Pain of right hip joint   • Sebaceous cyst   • Cervical sprain   • Controlled type 2 diabetes mellitus without complication, without long-term current use of insulin (CMS/HCC)   • Sepsis (CMS/HCC)   • Chronic kidney disease (CKD), stage III (moderate) (CMS/HCC)   • Elevated LFTs   • Leukocytosis   • Mass of right hip region   • Anemia   • Constipation   • Diabetic polyneuropathy associated with type 2 diabetes mellitus (CMS/HCC)   • Primary osteoarthritis of both hips   • Status post total replacement of right hip   • Renal insufficiency   • Acute postoperative pain   • Orthopedic aftercare     Past Medical History:   Diagnosis Date   • Arthritis    • Cancer (CMS/HCC)     cervical   • Cataract    • Diabetes mellitus (CMS/HCC)     type 2 dm, check blood sugar every morning, diagnosed 3 or 4 years   • Full dentures    • GERD (gastroesophageal reflux disease)    • High cholesterol    • History of IBS    • Hypertension    •  Neuropathy    • Wears glasses       Past Surgical History:   Procedure Laterality Date   • CARDIAC CATHETERIZATION     • CHOLECYSTECTOMY     • COLONOSCOPY     • EYE SURGERY      bilateral cataract   • HYSTERECTOMY      partial   • OOPHORECTOMY      one removed   • TONSILLECTOMY     • WRIST SURGERY      metal plate      Family History   Problem Relation Age of Onset   • Arthritis Mother    • Heart attack Mother    • Hypertension Mother    • Hyperlipidemia Mother    • Osteoporosis Mother    • Heart disease Sister    • Diabetes Sister    • Arthritis Sister    • Heart attack Sister    • Hypertension Sister    • Hyperlipidemia Sister    • Bleeding Disorder Sister    • Heart disease Brother    • Cancer Brother    • Diabetes Brother    • Arthritis Brother    • Heart attack Brother    • Hypertension Brother    • Hyperlipidemia Brother    • Ovarian cancer Daughter    • Cancer Daughter    • Ovarian cancer Other         grandaughter   • Ovarian cancer Other         granddaughter   • Heart attack Father    • Hypertension Father    • Stroke Father    • Kidney disease Paternal Uncle    • Liver disease Paternal Uncle    • Cancer Other    • Stroke Other    • Diabetes Other    • Breast cancer Neg Hx      Social History     Socioeconomic History   • Marital status:      Spouse name: Not on file   • Number of children: Not on file   • Years of education: Not on file   • Highest education level: Not on file   Social Needs   • Financial resource strain: Not on file   • Food insecurity - worry: Not on file   • Food insecurity - inability: Not on file   • Transportation needs - medical: Not on file   • Transportation needs - non-medical: Not on file   Occupational History   • Not on file   Tobacco Use   • Smoking status: Never Smoker   • Smokeless tobacco: Never Used   Substance and Sexual Activity   • Alcohol use: No   • Drug use: No   • Sexual activity: Defer     Comment:    Other Topics Concern   • Not on file   Social  History Narrative   • Not on file      Current Outpatient Medications on File Prior to Visit   Medication Sig Dispense Refill   • ACCU-CHEK CARLTON PLUS test strip TEST TWO TIMES DAILY 200 each 5   • ACCU-CHEK SOFTCLIX LANCETS lancets 200 each by Other route Daily. Use as instructed 200 each 3   • Biotin (BIOTIN 5000) 5 MG capsule Take 1 tablet by mouth Daily.     • Blood Glucose Monitoring Suppl (ACCU-CHEK CARLTON) device Use as directed; For: Diabetes mellitus     • ferrous sulfate 325 (65 FE) MG tablet Take 1 tablet by mouth Daily With Breakfast. 30 tablet 5   • gabapentin (NEURONTIN) 400 MG capsule Take 1 capsule by mouth 3 (Three) Times a Day. 270 capsule 1   • glimepiride (AMARYL) 4 MG tablet Take 1 tablet by mouth Every Morning Before Breakfast. 90 tablet 3   • losartan-hydrochlorothiazide (HYZAAR) 100-25 MG per tablet TAKE 1 TABLET EVERY DAY 90 tablet 3   • metFORMIN (GLUCOPHAGE) 500 MG tablet Take 1 tablet by mouth 2 (Two) Times a Day With Meals. 180 tablet 3   • omeprazole (priLOSEC) 20 MG capsule TAKE 1 CAPSULE TWICE DAILY 180 capsule 3   • simvastatin (ZOCOR) 10 MG tablet TAKE 1 TABLET AT BEDTIME 90 tablet 1   • zolpidem (AMBIEN) 5 MG tablet Take 1 tablet by mouth At Night As Needed for Sleep. 30 tablet 1   • [DISCONTINUED] docusate sodium 100 MG capsule Take 100 mg by mouth 2 (Two) Times a Day As Needed for Constipation. 60 capsule 0     No current facility-administered medications on file prior to visit.       Allergies   Allergen Reactions   • Codeine Hives        Review of Systems   Constitutional: Positive for appetite change and fatigue. Negative for activity change, chills, diaphoresis, fever and unexpected weight change.        Night sweats   HENT: Positive for congestion. Negative for dental problem, drooling, ear discharge, ear pain, facial swelling, hearing loss, mouth sores, nosebleeds, postnasal drip, rhinorrhea, sinus pressure, sneezing, sore throat, tinnitus, trouble swallowing and voice  "change.    Eyes: Negative for photophobia, pain, discharge, redness, itching and visual disturbance.   Respiratory: Negative for apnea, cough, choking, chest tightness, shortness of breath, wheezing and stridor.    Cardiovascular: Negative for chest pain, palpitations and leg swelling.   Gastrointestinal: Positive for constipation. Negative for abdominal distention, abdominal pain, anal bleeding, blood in stool, diarrhea, nausea, rectal pain and vomiting.   Endocrine: Negative for cold intolerance, heat intolerance, polydipsia, polyphagia and polyuria.   Genitourinary: Negative for decreased urine volume, difficulty urinating, dysuria, enuresis, flank pain, frequency, genital sores, hematuria and urgency.   Musculoskeletal: Positive for back pain, gait problem and joint swelling. Negative for arthralgias, myalgias, neck pain and neck stiffness.   Skin: Negative for color change, pallor, rash and wound.   Allergic/Immunologic: Negative for environmental allergies, food allergies and immunocompromised state.   Neurological: Negative for dizziness, tremors, seizures, syncope, facial asymmetry, speech difficulty, weakness, light-headedness, numbness and headaches.   Hematological: Negative for adenopathy. Does not bruise/bleed easily.   Psychiatric/Behavioral: Positive for decreased concentration and sleep disturbance. Negative for agitation, behavioral problems, confusion, dysphoric mood, hallucinations, self-injury and suicidal ideas. The patient is not nervous/anxious and is not hyperactive.         Objective      Physical Exam  Pulse 78   Ht 166.4 cm (65.51\")   Wt 81.3 kg (179 lb 3.7 oz)   SpO2 98%   BMI 29.36 kg/m²     Body mass index is 29.36 kg/m².    General:   Mental Status:  Alert   Appearance: Cooperative, in no acute distress   Build and Nutrition: Well-nourished and well developed female   Orientation: Alert and oriented to person, place and time   Posture: Normal   Gait: Normal    Integument:   Right " hip: Wound is well-healed with no signs of infection    Lower Extremity:   Right Hip:    Tenderness:  None    Swelling:  None    Crepitus:  None    Range of motion:  External Rotation: 30°       Internal Rotation: 30°       Flexion:  100°       Extension:  0°    Deformities:  None  Functional testing: Negative [facility]    No leg length discrepancy  Integument:   Left hip: No skin lesions, no rash, no ecchymosis    Lower Extremity:   Left Hip:    Tenderness:  None    Swelling:  None    Crepitus:  None    Range of motion:  External Rotation: 30°       Internal Rotation: 20°       Flexion:  90°       Extension:  0°    Deformities:  None  Functional testing: Positive [facility]    No leg length discrepancy        Imaging/Studies      Imaging Results (last 24 hours)     Procedure Component Value Units Date/Time    XR Hip With or Without Pelvis 1 View Right [[id number]] Resulted:  11/12/18 0900     Updated:  11/12/18 0901    Narrative:       Right Hip Radiographs  Indication: status-post right total hip arthroplasty  Views: low AP pelvis and lateral of the right hip    Comparison: no change compared to prior study, 5/9/2018    Findings:   The components are well aligned, with no signs of loosening or failure.          Assessment and Plan     Nely was seen today for follow-up and pain.    Diagnoses and all orders for this visit:    Primary osteoarthritis of left hip  -     Case Request; Standing  -     Instructions on coughing, deep breathing, and incentive spirometry.; Future  -     CBC and Differential; Future  -     Basic metabolic panel; Future  -     Protime-INR; Future  -     APTT; Future  -     Hemoglobin A1c; Future  -     Sedimentation rate; Future  -     C-reactive protein; Future  -     Urinalysis With Culture If Indicated - Urine, Clean Catch; Future  -     Tranexamic Acid 1,000 mg in sodium chloride 0.9 % 100 mL; Infuse 1,000 mg into a venous catheter 1 (One) Time.  -     Tranexamic Acid 1,000 mg in sodium  chloride 0.9 % 100 mL; Infuse 1,000 mg into a venous catheter 1 (One) Time.  -     ceFAZolin (ANCEF) 2 g in sodium chloride 0.9 % 100 mL IVPB; Infuse 2 g into a venous catheter 1 (One) Time.  -     acetaminophen (TYLENOL) tablet 975 mg; Take 3 tablets by mouth 1 (One) Time.  -     meloxicam (MOBIC) tablet 15 mg; Take 2 tablets by mouth 1 (One) Time.  -     pregabalin (LYRICA) capsule 150 mg; Take 1 capsule by mouth 1 (One) Time.  -     mupirocin (BACTROBAN) 2 % nasal ointment 1 application; 1 application by Each Nare route 1 (One) Time.    Status post total replacement of right hip  -     Cancel: XR Hip With or Without Pelvis 1 View Right; Future  -     XR Hip With or Without Pelvis 1 View Right    Other orders  -     Outpatient In A Bed; Standing  -     Follow Anesthesia Guidelines / Standing Orders; Future  -     Obtain informed consent  -     Provide instructions to patient regarding NPO status  -     Clorhexidine skin prep  -     Care Order / Instruction for all Female Patients  -     Follow Anesthesia Guidelines / Standing Orders; Standing  -     Verify NPO Status; Standing  -     SCD (sequential compression device)- to be placed on patient in Pre-op; Standing  -     Clip operative site; Standing  -     Obtain informed consent (if not collected inpatient or PAT); Standing  -     Notify Physician - Standard; Standing  -     NPO After Midnight  -     mupirocin (BACTROBAN NASAL) 2 % nasal ointment; into the nostril(s) as directed by provider 2 (Two) Times a Day.  -     chlorhexidine (HIBICLENS) 4 % external liquid; Apply  topically to the appropriate area as directed Daily. Shower with hibiclens solution as directed for 5 days prior to surgery        I reviewed my findings with patient today.  Her right total hip arthroplasty is functioning well, she is pleased with results.    However, her left hip is bothering her, and she would like to proceed with total hip arthroplasty surgery.  She has exhausted conservative  treatment options.  Please see my counseling note for details.    Surgical Counseling     I have informed the patient of the diagnosis and the prognosis.  Exhaustive conservative treatment modalities have not resulted in long term pain relief.  The symptoms have progressed to the point of daily pain and inability to perform activities of daily living without significant pain.  The patient has reached the point of desiring to proceed with total hip arthroplasty after discussing the risks, benefits and alternatives to the procedure.  The surgical procedure itself was discussed in detail.  Risks of the procedure were discussed, which included but are not limited to, bleeding, infection, damage to blood vessels and nerves, incomplete pain relief, loosening of the prosthesis, deep infection, need for further surgery, leg length discrepancy, hip dislocation, loss of limb, deep venous thrombosis, pulmonary embolus, death, heart attack, stroke, kidney failure, liver failure, and anesthetic complications.  In addition, the potential for deep infection developing in the future was discussed, which could require further surgery.  The hip would have to be re-opened, debrided, and potentially remove the prosthesis, which may or may not be replaced in the future.  Also, the possibility for loosening of the prosthesis has been mentioned.  If the prosthesis loosened, a revision arthroplasty could be performed, with results that are not as predictable compared to the original procedure.  The typical rehabilitative course has also been discussed, and full recovery may take up to a year to see the maximum benefit.  The importance of patient cooperation in the rehabilitative efforts has also been discussed.  No guarantees whatsoever were given.  The patient understands the potential risks versus the benefits and desires to proceed with total hip arthroplasty at a mutually convenient time.    Return for For surgery as  planned.      Medical Decision Making  Management Options : major surgery with risk factors  Data/Risk: radiology tests and independent visualization of imaging, lab tests, or EMG/NCV      Carlos Pollack MD  11/12/18  9:47 AM

## 2018-11-26 RX ORDER — GLIMEPIRIDE 4 MG/1
TABLET ORAL
Qty: 90 TABLET | Refills: 3 | Status: SHIPPED | OUTPATIENT
Start: 2018-11-26 | End: 2019-09-11 | Stop reason: SDUPTHER

## 2018-12-04 ENCOUNTER — HOSPITAL ENCOUNTER (OUTPATIENT)
Dept: MAMMOGRAPHY | Facility: HOSPITAL | Age: 73
Discharge: HOME | End: 2018-12-04
Attending: INTERNAL MEDICINE | Admitting: INTERNAL MEDICINE

## 2018-12-04 DIAGNOSIS — Z12.31 VISIT FOR SCREENING MAMMOGRAM: ICD-10-CM

## 2018-12-04 PROCEDURE — 77067 SCR MAMMO BI INCL CAD: CPT | Performed by: RADIOLOGY

## 2018-12-04 PROCEDURE — 77067 SCR MAMMO BI INCL CAD: CPT

## 2018-12-04 PROCEDURE — 77063 BREAST TOMOSYNTHESIS BI: CPT

## 2018-12-04 PROCEDURE — 77063 BREAST TOMOSYNTHESIS BI: CPT | Performed by: RADIOLOGY

## 2018-12-17 ENCOUNTER — OFFICE VISIT (OUTPATIENT)
Dept: INTERNAL MEDICINE | Facility: CLINIC | Age: 73
End: 2018-12-17

## 2018-12-17 VITALS
SYSTOLIC BLOOD PRESSURE: 108 MMHG | BODY MASS INDEX: 30.63 KG/M2 | DIASTOLIC BLOOD PRESSURE: 68 MMHG | HEART RATE: 84 BPM | RESPIRATION RATE: 21 BRPM | WEIGHT: 187 LBS

## 2018-12-17 DIAGNOSIS — K21.9 GASTROESOPHAGEAL REFLUX DISEASE WITHOUT ESOPHAGITIS: ICD-10-CM

## 2018-12-17 DIAGNOSIS — E11.42 DIABETIC POLYNEUROPATHY ASSOCIATED WITH TYPE 2 DIABETES MELLITUS (HCC): ICD-10-CM

## 2018-12-17 DIAGNOSIS — E78.2 MIXED HYPERLIPIDEMIA: ICD-10-CM

## 2018-12-17 DIAGNOSIS — I10 ESSENTIAL HYPERTENSION: Primary | ICD-10-CM

## 2018-12-17 DIAGNOSIS — E11.9 CONTROLLED TYPE 2 DIABETES MELLITUS WITHOUT COMPLICATION, WITHOUT LONG-TERM CURRENT USE OF INSULIN (HCC): ICD-10-CM

## 2018-12-17 PROBLEM — M16.12 PRIMARY OSTEOARTHRITIS OF LEFT HIP: Status: RESOLVED | Noted: 2018-11-12 | Resolved: 2018-12-17

## 2018-12-17 PROBLEM — G89.18 ACUTE POSTOPERATIVE PAIN: Status: RESOLVED | Noted: 2018-04-20 | Resolved: 2018-12-17

## 2018-12-17 PROBLEM — Z47.89 ORTHOPEDIC AFTERCARE: Status: RESOLVED | Noted: 2018-07-11 | Resolved: 2018-12-17

## 2018-12-17 LAB
EXPIRATION DATE: NORMAL
HBA1C MFR BLD: 6.5 %
Lab: NORMAL

## 2018-12-17 PROCEDURE — 99214 OFFICE O/P EST MOD 30 MIN: CPT | Performed by: INTERNAL MEDICINE

## 2018-12-17 PROCEDURE — 83036 HEMOGLOBIN GLYCOSYLATED A1C: CPT | Performed by: INTERNAL MEDICINE

## 2018-12-17 NOTE — PROGRESS NOTES
Subjective   Nely Grider is a 72 y.o. female.     History of Present Illness   For follow up of  HTN on meds, no chest pain, sob or headaches.  NIDDM on meds, no new sx.  Hyperlipidemia, no muscle aches.  DJD of hips, to have second hip done in February.    The following portions of the patient's history were reviewed and updated as appropriate: allergies, current medications, past medical history and problem list.    Review of Systems   Constitutional: Negative.  Negative for fatigue and fever.   Eyes: Negative.    Respiratory: Negative.  Negative for cough, chest tightness, shortness of breath and wheezing.    Cardiovascular: Negative.  Negative for chest pain, palpitations and leg swelling.   Gastrointestinal: Negative.  Negative for abdominal distention and abdominal pain.   Genitourinary: Negative.    Musculoskeletal: Positive for arthralgias.       Objective   Physical Exam   Constitutional: She appears well-developed and well-nourished.   Neck: Normal range of motion. Neck supple.   Cardiovascular: Normal rate, regular rhythm and normal heart sounds. Exam reveals no gallop and no friction rub.   No murmur heard.  Pulmonary/Chest: Effort normal and breath sounds normal. No stridor. No respiratory distress. She has no wheezes. She has no rales.   Abdominal: Soft. Bowel sounds are normal. She exhibits no distension and no mass. There is no tenderness. There is no guarding.   Musculoskeletal: She exhibits no edema.         Assessment/Plan   Nely was seen today for essential hypertension.    Diagnoses and all orders for this visit:    Essential hypertension    Mixed hyperlipidemia    Gastroesophageal reflux disease without esophagitis    Controlled type 2 diabetes mellitus without complication, without long-term current use of insulin (CMS/[facility])    Diabetic polyneuropathy associated with type 2 diabetes mellitus (CMS/[facility])    A1C = 6.5.  All problems are stable.  Same meds.  Recheck 4 months.

## 2019-01-02 RX ORDER — GABAPENTIN 400 MG/1
400 CAPSULE ORAL 3 TIMES DAILY
Qty: 270 CAPSULE | Refills: 1 | Status: SHIPPED | OUTPATIENT
Start: 2019-01-02 | End: 2019-07-22 | Stop reason: SDUPTHER

## 2019-01-02 RX ORDER — GABAPENTIN 400 MG/1
400 CAPSULE ORAL 3 TIMES DAILY
Qty: 270 CAPSULE | Refills: 1 | OUTPATIENT
Start: 2019-01-02 | End: 2019-01-02 | Stop reason: SDUPTHER

## 2019-01-22 ENCOUNTER — TELEPHONE (OUTPATIENT)
Dept: INTERNAL MEDICINE | Facility: CLINIC | Age: 74
End: 2019-01-22

## 2019-01-22 ENCOUNTER — OFFICE VISIT (OUTPATIENT)
Dept: INTERNAL MEDICINE | Facility: CLINIC | Age: 74
End: 2019-01-22

## 2019-01-22 VITALS
DIASTOLIC BLOOD PRESSURE: 64 MMHG | WEIGHT: 190.5 LBS | SYSTOLIC BLOOD PRESSURE: 140 MMHG | HEART RATE: 88 BPM | BODY MASS INDEX: 31.74 KG/M2 | TEMPERATURE: 97.1 F | HEIGHT: 65 IN

## 2019-01-22 DIAGNOSIS — E78.5 HYPERLIPIDEMIA, UNSPECIFIED HYPERLIPIDEMIA TYPE: ICD-10-CM

## 2019-01-22 DIAGNOSIS — E11.9 CONTROLLED TYPE 2 DIABETES MELLITUS WITHOUT COMPLICATION, WITHOUT LONG-TERM CURRENT USE OF INSULIN (HCC): ICD-10-CM

## 2019-01-22 DIAGNOSIS — Z00.00 MEDICARE ANNUAL WELLNESS VISIT, SUBSEQUENT: Primary | ICD-10-CM

## 2019-01-22 DIAGNOSIS — Z78.0 POST-MENOPAUSAL: ICD-10-CM

## 2019-01-22 DIAGNOSIS — Z12.11 SCREEN FOR COLON CANCER: ICD-10-CM

## 2019-01-22 PROCEDURE — G0439 PPPS, SUBSEQ VISIT: HCPCS | Performed by: NURSE PRACTITIONER

## 2019-01-22 PROCEDURE — 96160 PT-FOCUSED HLTH RISK ASSMT: CPT | Performed by: NURSE PRACTITIONER

## 2019-01-22 NOTE — TELEPHONE ENCOUNTER
Please check pneumonia vaccinations and how they are entered in epic.  I think they need to change.

## 2019-01-22 NOTE — PATIENT INSTRUCTIONS
Medicare Wellness  Personal Prevention Plan of Service     Date of Office Visit:  2019  Encounter Provider:  SKYLAR Leon  Place of Service:  [facility]  Patient Name: Nely Grider  :  1945    As part of the Medicare Wellness portion of your visit today, we are providing you with this personalized preventive plan of services (PPPS). This plan is based upon recommendations of the [place] Preventive Services Task Force (USPSTF) and the Advisory Committee on Immunization Practices (ACIP).    This lists the preventive care services that should be considered, and provides dates of when you are due. Items listed as completed are up-to-date and do not require any further intervention.    Health Maintenance   Topic Date Due   • URINE MICROALBUMIN  1945   • PNEUMOCOCCAL VACCINES (65+ LOW/MEDIUM RISK) (2 of 2 - PCV13) 2010   • ZOSTER VACCINE (2 of 3) 10/02/2013   • LIPID PANEL  2018   • MEDICARE ANNUAL WELLNESS  2019   • COLONOSCOPY  2019   • TDAP/TD VACCINES (2 - Td) 2019   • DIABETIC FOOT EXAM  2019   • HEMOGLOBIN A1C  2019   • DIABETIC EYE EXAM  2019   • MAMMOGRAM  2020   • INFLUENZA VACCINE  Completed       No orders of the defined types were placed in this encounter.      Return in about 1 year (around 2020) for Medicare Wellness subseq.      MyPlate from [employer]  The general, healthful diet is based on the 2010 Dietary Guidelines for Americans. The amount of food you need to eat from each food group depends on your age, sex, and level of physical activity and can be individualized by a dietitian. Go to ChooseMyPlate.gov for more information.  What do I need to know about the MyPlate plan?  · Enjoy your food, but eat less.  · Avoid oversized portions.  ? ½ of your plate should include fruits and vegetables.  ? ¼ of your plate should be grains.  ? ¼ of your plate should be  protein.  Grains  · Make at least half of your grains whole grains.  · For a 2,000 calorie daily food plan, eat 6 oz every day.  · 1 oz is about 1 slice bread, 1 cup cereal, or ½ cup cooked rice, cereal, or pasta.  Vegetables  · Make half your plate fruits and vegetables.  · For a 2,000 calorie daily food plan, eat 2½ cups every day.  · 1 cup is about 1 cup raw or cooked vegetables or vegetable juice or 2 cups raw leafy greens.  Fruits  · Make half your plate fruits and vegetables.  · For a 2,000 calorie daily food plan, eat 2 cups every day.  · 1 cup is about 1 cup fruit or 100% fruit juice or ½ cup dried fruit.  Protein  · For a 2,000 calorie daily food plan, eat 5½ oz every day.  · 1 oz is about 1 oz meat, poultry, or fish, ¼ cup cooked beans, 1 egg, 1 Tbsp peanut butter, or ½ oz nuts or seeds.  Dairy  · Switch to fat-free or low-fat (1%) milk.  · For a 2,000 calorie daily food plan, eat 3 cups every day.  · 1 cup is about 1 cup milk or yogurt or soy milk (soy beverage), 1½ oz natural cheese, or 2 oz processed cheese.  Fats, Oils, and Empty Calories  · Only small amounts of oils are recommended.  · Empty calories are calories from solid fats or added sugars.  · Compare sodium in foods like soup, bread, and frozen meals. Choose the foods with lower numbers.  · Drink water instead of sugary drinks.  What foods can I eat?  Grains  Whole grains such as whole wheat, quinoa, millet, and bulgur. Bread, rolls, and pasta made from whole grains. Brown or wild rice. Hot or cold cereals made from whole grains and without added sugar.  Vegetables  All fresh vegetables, especially fresh red, dark green, or orange vegetables. Peas and beans. Low-sodium frozen or canned vegetables prepared without added salt. Low-sodium vegetable juices.  Fruits  All fresh, frozen, and dried fruits. Canned fruit packed in water or fruit juice without added sugar. Fruit juices without added sugar.  Meats and Other Protein Sources  Boiled, baked,  or grilled lean meat trimmed of fat. Skinless poultry. Fresh seafood and shellfish. Canned seafood packed in water. Unsalted nuts and unsalted nut butters. Tofu. Dried beans and pea. Eggs.  Dairy  Low-fat or fat-free milk, yogurt, and cheeses.  Sweets and Desserts  Frozen desserts made from low-fat milk.  Fats and Oils  Olive, peanut, and canola oils and margarine. Salad dressing and mayonnaise made from these oils.  Other  Soups and casseroles made from allowed ingredients and without added fat or salt.  The items listed above may not be a complete list of recommended foods or beverages. Contact your dietitian for more options.  What foods are not recommended?  Grains  Sweetened, low-fiber cereals. Packaged baked goods. Snack crackers and chips. Cheese crackers, butter crackers, and biscuits. Frozen waffles, sweet breads, doughnuts, pastries, packaged baking mixes, pancakes, cakes, and cookies.  Vegetables  Regular canned or frozen vegetables or vegetables prepared with salt. Canned tomatoes. Canned tomato sauce. Fried vegetables. Vegetables in cream sauce or cheese sauce.  Fruits  Fruits packed in syrup or made with added sugar.  Meats and Other Protein Sources  Marbled or fatty meats such as ribs. Poultry with skin. Fried meats, poultry, eggs, or fish. Sausages, hot dogs, and deli meats such as pastrami, bologna, or salami.  Dairy  Whole milk, cream, cheeses made from whole milk, sour cream. Ice cream or yogurt made from whole milk or with added sugar.  Beverages  For adults, no more than one alcoholic drink per day. Regular soft drinks or other sugary beverages. Juice drinks.  Sweets and Desserts  Sugary or fatty desserts, candy, and other sweets.  Fats and Oils  Solid shortening or partially hydrogenated oils. Solid margarine. Margarine that contains trans fats. Butter.  The items listed above may not be a complete list of foods and beverages to avoid. Contact your dietitian for more information.  This  information is not intended to replace advice given to you by your health care provider. Make sure you discuss any questions you have with your health care provider.  Document Released: 01/06/2009 Document Revised: 05/25/2017 Document Reviewed: 11/26/2014  [employer] Interactive Patient Education © 2018 [employer]

## 2019-01-22 NOTE — PROGRESS NOTES
QUICK REFERENCE INFORMATION:  The ABCs of the Annual Wellness Visit    Subsequent Medicare Wellness Visit    HEALTH RISK ASSESSMENT    1945    Recent Hospitalizations:  Recently treated at the following:  [facility] Rt hip replacement 4/18/2018.        Current Medical Providers:  Patient Care Team:  Raimundo Ibarra MD as PCP - General  Luis Gustafson OD (Optometry)        Smoking Status:  Social History     Tobacco Use   Smoking Status Never Smoker   Smokeless Tobacco Never Used       Alcohol Consumption:  Social History     Substance and Sexual Activity   Alcohol Use No       Depression Screen:   PHQ-2/PHQ-9 Depression Screening 1/22/2019   Little interest or pleasure in doing things 1   Feeling down, depressed, or hopeless 0   Total Score 1       Health Habits and Functional and Cognitive Screening:  Functional & Cognitive Status 1/22/2019   Do you have difficulty preparing food and eating? No   Do you have difficulty bathing yourself, getting dressed or grooming yourself? No   Do you have difficulty using the toilet? No   Do you have difficulty moving around from place to place? Yes   Do you have trouble with steps or getting out of a bed or a chair? Yes   In the past year have you fallen or experienced a near fall? No   Current Diet Well Balanced Diet   Dental Exam Not up to date   Eye Exam Up to date   Exercise (times per week) 1 times per week   Current Exercise Activities Include Walking   Do you need help using the phone?  No   Are you deaf or do you have serious difficulty hearing?  -   Do you need help with transportation? Yes   Do you need help shopping? No   Do you need help preparing meals?  No   Do you need help with housework?  Yes   Do you need help with laundry? No   Do you need help taking your medications? No   Do you need help managing money? No   Do you ever drive or ride in a car without wearing a seat belt? No   Have you felt unusual stress, anger or loneliness in the  last month? No   Who do you live with? Spouse   If you need help, do you have trouble finding someone available to you? No   Have you been bothered in the last four weeks by sexual problems? No   Do you have difficulty concentrating, remembering or making decisions? No           Does the patient have evidence of cognitive impairment? No    Aspirin use counseling: pt wants to speak with pcp about asp qd.       Recent Lab Results:  CMP:  Lab Results   Component Value Date     (H) 07/05/2018    BUN 18 07/05/2018    CREATININE 1.13 07/05/2018    EGFRIFNONA 47 (L) 07/05/2018    EGFRIFAFRI 57 (L) 07/05/2018    BCR 15.9 07/05/2018     07/05/2018    K 4.1 07/05/2018    CO2 27.0 07/05/2018    CALCIUM 9.4 07/05/2018    PROTENTOTREF 6.8 07/05/2018    ALBUMIN 3.91 07/05/2018    LABGLOBREF 2.9 07/05/2018    LABIL2 1.4 (L) 07/05/2018    BILITOT 0.2 (L) 07/05/2018    ALKPHOS 84 07/05/2018    AST 19 07/05/2018    ALT 9 07/05/2018     Lipid Panel:  Lab Results   Component Value Date    TRIG 247 (H) 08/31/2017    HDL 38 (L) 08/31/2017    VLDL 49.4 08/31/2017     HbA1c:  Lab Results   Component Value Date    HGBA1C 6.5 12/17/2018       Visual Acuity:  No exam data present    Age-appropriate Screening Schedule:  Refer to the list below for future screening recommendations based on patient's age, sex and/or medical conditions. Orders for these recommended tests are listed in the plan section. The patient has been provided with a written plan.    Health Maintenance   Topic Date Due   • URINE MICROALBUMIN  1945   • PNEUMOCOCCAL VACCINES (65+ LOW/MEDIUM RISK) (2 of 2 - PCV13) 01/18/2010   • ZOSTER VACCINE (2 of 3) 10/02/2013   • LIPID PANEL  08/31/2018   • COLONOSCOPY  01/18/2019   • TDAP/TD VACCINES (2 - Td) 01/18/2019   • DIABETIC FOOT EXAM  04/11/2019   • HEMOGLOBIN A1C  06/17/2019   • DIABETIC EYE EXAM  11/26/2019   • MAMMOGRAM  12/04/2020   • INFLUENZA VACCINE  Completed        Subjective   History of Present  Justa Grider is a 73 y.o. female who presents for an Subsequent Wellness Visit.    The following portions of the patient's history were reviewed and updated as appropriate: allergies, current medications, past family history, past medical history, past social history, past surgical history and problem list.    Outpatient Medications Prior to Visit   Medication Sig Dispense Refill   • ACCU-CHEK CARLTON PLUS test strip TEST TWO TIMES DAILY 200 each 5   • ACCU-CHEK SOFTCLIX LANCETS lancets 200 each by Other route Daily. Use as instructed 200 each 3   • Blood Glucose Monitoring Suppl (ACCU-CHEK CARLTON) device Use as directed; For: Diabetes mellitus     • gabapentin (NEURONTIN) 400 MG capsule Take 1 capsule by mouth 3 (Three) Times a Day. 270 capsule 1   • glimepiride (AMARYL) 4 MG tablet TAKE 1 TABLET EVERY MORNING BEFORE BREAKFAST 90 tablet 3   • metFORMIN (GLUCOPHAGE) 500 MG tablet Take 1 tablet by mouth 2 (Two) Times a Day With Meals. 180 tablet 3   • omeprazole (priLOSEC) 20 MG capsule TAKE 1 CAPSULE TWICE DAILY 180 capsule 3   • simvastatin (ZOCOR) 10 MG tablet TAKE 1 TABLET AT BEDTIME 90 tablet 1   • Chlorhexidine Gluconate 4 % solution Shower with solution as directed for 5 days prior to surgery 237 mL 0   • losartan-hydrochlorothiazide (HYZAAR) 100-25 MG per tablet TAKE 1 TABLET EVERY DAY 90 tablet 3   • mupirocin (BACTROBAN) 2 % ointment Apply to the inside of each nostril as directed by provider 2 (Two) Times a Day x5 days before surgery. 22 g 0     No facility-administered medications prior to visit.        Patient Active Problem List   Diagnosis   • Neck sprain   • Cough   • Gastroesophageal reflux disease without esophagitis   • Mixed hyperlipidemia   • Essential hypertension   • Cervical sprain   • Controlled type 2 diabetes mellitus without complication, without long-term current use of insulin (CMS/[facility])   • Mass of right hip region   • Anemia   • Constipation   • Diabetic polyneuropathy  "associated with type 2 diabetes mellitus (CMS/[facility])   • Primary osteoarthritis of both hips   • Status post total replacement of right hip   • Renal insufficiency       Advance Care Planning:  has an advance directive - a copy has been provided and is in file    Identification of Risk Factors:  Risk factors include: weight , inactivity, increased fall risk and polypharmacy.    Review of Systems    Compared to one year ago, the patient feels her physical health is better.  Compared to one year ago, the patient feels her mental health is better.    Objective     Physical Exam    Vitals:    01/22/19 0759   BP: 140/64   BP Location: Right arm   Pulse: 88   Temp: 97.1 °F (36.2 °C)   TempSrc: Temporal   Weight: 86.4 kg (190 lb 8 oz)   Height: 165.7 cm (65.25\")   PainSc: 0-No pain       Patient's Body mass index is 31.46 kg/m². BMI is above normal parameters. Recommendations include: educational material.    Finger Rub Hearing{Test (right ear):passed  Finger Rub Hearing{Test (left ear):passed      Assessment/Plan   Patient Self-Management and Personalized Health Advice  The patient has been provided with information about: diet, exercise, fall prevention and supplements and preventive services including:   · Bone densitometry screening, Colorectal cancer screening, colonoscopy referral placed, Exercise counseling provided, Fall Risk assessment done, Fall Risk plan of care done, Nutrition counseling provided.    Visit Diagnoses:    ICD-10-CM ICD-9-CM   1. Medicare annual wellness visit, subsequent Z00.00 V70.0   2. Hyperlipidemia, unspecified hyperlipidemia type E78.5 272.4   3. Controlled type 2 diabetes mellitus without complication, without long-term current use of insulin (CMS/[facility]) E11.9 250.00   4. Screen for colon cancer Z12.11 V76.51   5. Post-menopausal Z78.0 V49.81       No orders of the defined types were placed in this encounter.      Outpatient Encounter Medications as of 1/22/2019   Medication Sig Dispense " Refill   • ACCU-CHEK CARLTON PLUS test strip TEST TWO TIMES DAILY 200 each 5   • ACCU-CHEK SOFTCLIX LANCETS lancets 200 each by Other route Daily. Use as instructed 200 each 3   • Blood Glucose Monitoring Suppl (ACCU-CHEK CARLTON) device Use as directed; For: Diabetes mellitus     • gabapentin (NEURONTIN) 400 MG capsule Take 1 capsule by mouth 3 (Three) Times a Day. 270 capsule 1   • glimepiride (AMARYL) 4 MG tablet TAKE 1 TABLET EVERY MORNING BEFORE BREAKFAST 90 tablet 3   • metFORMIN (GLUCOPHAGE) 500 MG tablet Take 1 tablet by mouth 2 (Two) Times a Day With Meals. 180 tablet 3   • omeprazole (priLOSEC) 20 MG capsule TAKE 1 CAPSULE TWICE DAILY 180 capsule 3   • simvastatin (ZOCOR) 10 MG tablet TAKE 1 TABLET AT BEDTIME 90 tablet 1   • Chlorhexidine Gluconate 4 % solution Shower with solution as directed for 5 days prior to surgery 237 mL 0   • [DISCONTINUED] losartan-hydrochlorothiazide (HYZAAR) 100-25 MG per tablet TAKE 1 TABLET EVERY DAY 90 tablet 3   • [DISCONTINUED] mupirocin (BACTROBAN) 2 % ointment Apply to the inside of each nostril as directed by provider 2 (Two) Times a Day x5 days before surgery. 22 g 0     No facility-administered encounter medications on file as of 1/22/2019.        Reviewed use of high risk medication in the elderly: yes  Reviewed for potential of harmful drug interactions in the elderly: yes    Follow Up:  Return in about 1 year (around 1/22/2020) for Medicare Wellness subseq.     An After Visit Summary and PPPS with all of these plans were given to the patient.

## 2019-01-23 DIAGNOSIS — Z12.11 SCREENING FOR COLON CANCER: Primary | ICD-10-CM

## 2019-01-28 ENCOUNTER — APPOINTMENT (OUTPATIENT)
Dept: PREADMISSION TESTING | Facility: HOSPITAL | Age: 74
End: 2019-01-28

## 2019-01-28 VITALS — BODY MASS INDEX: 31.11 KG/M2 | WEIGHT: 186.73 LBS | HEIGHT: 65 IN

## 2019-01-28 DIAGNOSIS — M16.12 PRIMARY OSTEOARTHRITIS OF LEFT HIP: ICD-10-CM

## 2019-01-28 LAB
ANION GAP SERPL CALCULATED.3IONS-SCNC: 7 MMOL/L (ref 3–11)
APTT PPP: 33.1 SECONDS (ref 24–37)
BACTERIA UR QL AUTO: ABNORMAL /HPF
BACTERIA UR QL AUTO: ABNORMAL /HPF
BASOPHILS # BLD AUTO: 0.08 10*3/MM3 (ref 0–0.2)
BASOPHILS NFR BLD AUTO: 0.9 % (ref 0–1)
BILIRUB UR QL STRIP: NEGATIVE
BILIRUB UR QL STRIP: NEGATIVE
BUN BLD-MCNC: 18 MG/DL (ref 9–23)
BUN/CREAT SERPL: 14.6 (ref 7–25)
CALCIUM SPEC-SCNC: 9.9 MG/DL (ref 8.7–10.4)
CHLORIDE SERPL-SCNC: 105 MMOL/L (ref 99–109)
CLARITY UR: ABNORMAL
CLARITY UR: ABNORMAL
CO2 SERPL-SCNC: 28 MMOL/L (ref 20–31)
COLOR UR: YELLOW
COLOR UR: YELLOW
CREAT BLD-MCNC: 1.23 MG/DL (ref 0.6–1.3)
CRP SERPL-MCNC: 0.44 MG/DL (ref 0–1)
DEPRECATED RDW RBC AUTO: 49.5 FL (ref 37–54)
EOSINOPHIL # BLD AUTO: 0.26 10*3/MM3 (ref 0–0.3)
EOSINOPHIL NFR BLD AUTO: 3 % (ref 0–3)
ERYTHROCYTE [DISTWIDTH] IN BLOOD BY AUTOMATED COUNT: 14.3 % (ref 11.3–14.5)
ERYTHROCYTE [SEDIMENTATION RATE] IN BLOOD: 21 MM/HR (ref 0–30)
GFR SERPL CREATININE-BSD FRML MDRD: 43 ML/MIN/1.73
GLUCOSE BLD-MCNC: 110 MG/DL (ref 70–100)
GLUCOSE UR STRIP-MCNC: NEGATIVE MG/DL
GLUCOSE UR STRIP-MCNC: NEGATIVE MG/DL
HBA1C MFR BLD: 6.4 % (ref 4.8–5.6)
HCT VFR BLD AUTO: 43.9 % (ref 34.5–44)
HGB BLD-MCNC: 13.8 G/DL (ref 11.5–15.5)
HGB UR QL STRIP.AUTO: ABNORMAL
HGB UR QL STRIP.AUTO: NEGATIVE
HYALINE CASTS UR QL AUTO: ABNORMAL /LPF
HYALINE CASTS UR QL AUTO: ABNORMAL /LPF
IMM GRANULOCYTES # BLD AUTO: 0.02 10*3/MM3 (ref 0–0.03)
IMM GRANULOCYTES NFR BLD AUTO: 0.2 % (ref 0–0.6)
INR PPP: 1.02 (ref 0.85–1.16)
KETONES UR QL STRIP: NEGATIVE
KETONES UR QL STRIP: NEGATIVE
LEUKOCYTE ESTERASE UR QL STRIP.AUTO: ABNORMAL
LEUKOCYTE ESTERASE UR QL STRIP.AUTO: NEGATIVE
LYMPHOCYTES # BLD AUTO: 3.16 10*3/MM3 (ref 0.6–4.8)
LYMPHOCYTES NFR BLD AUTO: 36.1 % (ref 24–44)
MCH RBC QN AUTO: 29.7 PG (ref 27–31)
MCHC RBC AUTO-ENTMCNC: 31.4 G/DL (ref 32–36)
MCV RBC AUTO: 94.6 FL (ref 80–99)
MONOCYTES # BLD AUTO: 0.89 10*3/MM3 (ref 0–1)
MONOCYTES NFR BLD AUTO: 10.2 % (ref 0–12)
MUCOUS THREADS URNS QL MICRO: ABNORMAL /HPF
NEUTROPHILS # BLD AUTO: 4.37 10*3/MM3 (ref 1.5–8.3)
NEUTROPHILS NFR BLD AUTO: 49.8 % (ref 41–71)
NITRITE UR QL STRIP: NEGATIVE
NITRITE UR QL STRIP: NEGATIVE
PH UR STRIP.AUTO: 5.5 [PH] (ref 5–8)
PH UR STRIP.AUTO: 5.5 [PH] (ref 5–8)
PLATELET # BLD AUTO: 393 10*3/MM3 (ref 150–450)
PMV BLD AUTO: 9.5 FL (ref 6–12)
POTASSIUM BLD-SCNC: 4.2 MMOL/L (ref 3.5–5.5)
PROT UR QL STRIP: ABNORMAL
PROT UR QL STRIP: NEGATIVE
PROTHROMBIN TIME: 12.9 SECONDS (ref 11.2–14.3)
RBC # BLD AUTO: 4.64 10*6/MM3 (ref 3.89–5.14)
RBC # UR: ABNORMAL /HPF
RBC # UR: ABNORMAL /HPF
REF LAB TEST METHOD: ABNORMAL
REF LAB TEST METHOD: ABNORMAL
SODIUM BLD-SCNC: 140 MMOL/L (ref 132–146)
SP GR UR STRIP: 1.02 (ref 1–1.03)
SP GR UR STRIP: >=1.03 (ref 1–1.03)
SQUAMOUS #/AREA URNS HPF: ABNORMAL /HPF
SQUAMOUS #/AREA URNS HPF: ABNORMAL /HPF
UROBILINOGEN UR QL STRIP: ABNORMAL
UROBILINOGEN UR QL STRIP: ABNORMAL
WBC NRBC COR # BLD: 8.76 10*3/MM3 (ref 3.5–10.8)
WBC UR QL AUTO: ABNORMAL /HPF
WBC UR QL AUTO: ABNORMAL /HPF

## 2019-01-28 PROCEDURE — 80048 BASIC METABOLIC PNL TOTAL CA: CPT | Performed by: ORTHOPAEDIC SURGERY

## 2019-01-28 PROCEDURE — 87086 URINE CULTURE/COLONY COUNT: CPT | Performed by: ORTHOPAEDIC SURGERY

## 2019-01-28 PROCEDURE — 85610 PROTHROMBIN TIME: CPT | Performed by: ORTHOPAEDIC SURGERY

## 2019-01-28 PROCEDURE — 85025 COMPLETE CBC W/AUTO DIFF WBC: CPT | Performed by: ORTHOPAEDIC SURGERY

## 2019-01-28 PROCEDURE — 81001 URINALYSIS AUTO W/SCOPE: CPT | Performed by: ORTHOPAEDIC SURGERY

## 2019-01-28 PROCEDURE — 83036 HEMOGLOBIN GLYCOSYLATED A1C: CPT | Performed by: ORTHOPAEDIC SURGERY

## 2019-01-28 PROCEDURE — 36415 COLL VENOUS BLD VENIPUNCTURE: CPT

## 2019-01-28 PROCEDURE — 85652 RBC SED RATE AUTOMATED: CPT | Performed by: ORTHOPAEDIC SURGERY

## 2019-01-28 PROCEDURE — 85730 THROMBOPLASTIN TIME PARTIAL: CPT | Performed by: ORTHOPAEDIC SURGERY

## 2019-01-28 PROCEDURE — 93005 ELECTROCARDIOGRAM TRACING: CPT

## 2019-01-28 PROCEDURE — 86140 C-REACTIVE PROTEIN: CPT | Performed by: ORTHOPAEDIC SURGERY

## 2019-01-28 PROCEDURE — 93010 ELECTROCARDIOGRAM REPORT: CPT | Performed by: INTERNAL MEDICINE

## 2019-01-28 RX ORDER — SIMVASTATIN 10 MG
TABLET ORAL
Qty: 90 TABLET | Refills: 1 | Status: SHIPPED | OUTPATIENT
Start: 2019-01-28 | End: 2019-06-17 | Stop reason: SDUPTHER

## 2019-01-28 ASSESSMENT — HOOS JR
HOOS JR SCORE: 29.009
HOOS JR SCORE: 19

## 2019-01-28 NOTE — PAT
Patient attended joint class in April 2018.    The following information and instructions were given:    Nothing to eat or drink after midnight except sips of water with routine prescribed medication (except blood thinners, certain blood pressure medications, diabetes medications, or weight reducing medications) unless otherwise instructed by your physician.  Do not eat, drink, smoke or chew gum after midnight the night before surgery. This also includes no mints.    DO NOT shave for two days before your procedure.  Do not wear makeup.      DO NOT wear fingernail polish (gel/regular) and/or acrylic/artificial nails on the day of surgery.   If a patient had recent manicure and would rather not remove polish or artificial nails, then the minimum requirement is that the polish/artificial nails must be removed from the middle finger on each hand.      If patient is having surgery/procedure on an upper extremity, then the patient was instructed that fingernail polish/artificial fingernails must be removed for surgery.  NO EXCEPTIONS.      If patient is having surgery/procedure on a lower extremity, then the patient was instructed that toenail polish on both extremities must be removed for surgery.  NO EXCEPTIONS.    Remove all jewelry (advised to go to jeweler if unable to remove).  Jewelry, especially rings, can no longer be taped for surgery.    Leave anything you consider valuable at home.    Leave your suitcase in the car until after your surgery.    Bring the following with you (if applicable)       -picture ID and insurance cards       -Co-pay/deductible required by insurance       -Medications in the original bottles (not a list) including all over-the-counter  medications if not brought to PAT       -Copy of advance directive, living will or power of  documents if not  brought to PAT       -CPAP or BIPAP mask and tubing (do not bring machine)       -Skin prep instructions sheet       -PAT  Pass    Education booklet, brochure, handout or binder given to patient.      When applicable, an ERAS booklet was given to patient.    Pain Control After Surgery handout given to patient.    Respirex use (handout given to patient) and pneumonia prevention.    Signs and Symptoms of infection discussed.    DVT Prevention education given.  Stressing the importance of ambulation.    Patient to apply Chlorhexadine wipes to surgical area (as instructed) the night before procedure and the AM of procedure.    When applicable patients with ERAS orders were instructed to drink 20 ounces of Gatorade or G2 for diabetics (or until full) the morning of surgery.  The Gatorade or G2 must be consumed at least 1 hour before arrival time on the day of surgery .  No RED Gatorade or G2.  Appropriate ERAS handout and/or booklet given to patient during PAT visit.

## 2019-01-28 NOTE — DISCHARGE INSTRUCTIONS
The following information and instructions were given:    Nothing to eat or drink after midnight except sips of water with routine prescribed medication (except blood thinners, certain blood pressure medications, diabetes medications, or weight reducing medications) unless otherwise instructed by your physician.  Do not eat, drink, smoke or chew gum after midnight the night before surgery. This also includes no mints.    DO NOT shave for two days before your procedure.  Do not wear makeup.      DO NOT wear fingernail polish (gel/regular) and/or acrylic/artificial nails on the day of surgery.   If a patient had recent manicure and would rather not remove polish or artificial nails, then the minimum requirement is that the polish/artificial nails must be removed from the middle finger on each hand.      If patient is having surgery/procedure on an upper extremity, then the patient was instructed that fingernail polish/artificial fingernails must be removed for surgery.  NO EXCEPTIONS.      If patient is having surgery/procedure on a lower extremity, then the patient was instructed that toenail polish on both extremities must be removed for surgery.  NO EXCEPTIONS.    Remove all jewelry (advised to go to jeweler if unable to remove).  Jewelry, especially rings, can no longer be taped for surgery.    Leave anything you consider valuable at home.    Leave your suitcase in the car until after your surgery.    Bring the following with you (if applicable)       -picture ID and insurance cards       -Co-pay/deductible required by insurance       -Medications in the original bottles (not a list) including all over-the-counter  medications if not brought to PAT       -Copy of advance directive, living will or power of  documents if not  brought to PAT       -CPAP or BIPAP mask and tubing (do not bring machine)       -Skin prep instructions sheet       -PAT Pass    Education booklet, brochure, handout or binder given to  patient.      When applicable, an ERAS booklet was given to patient.    Pain Control After Surgery handout given to patient.    Respirex use (handout given to patient) and pneumonia prevention.    Signs and Symptoms of infection discussed.    DVT Prevention education given.  Stressing the importance of ambulation.    Patient to apply Chlorhexadine wipes to surgical area (as instructed) the night before procedure and the AM of procedure.    When applicable patients with ERAS orders were instructed to drink 20 ounces of Gatorade or G2 for diabetics (or until full) the morning of surgery.  The Gatorade or G2 must be consumed at least 1 hour before arrival time on the day of surgery .  No RED Gatorade or G2.  Appropriate ERAS handout and/or booklet given to patient during PAT visit.        Joint Information Review patient already attended in April 2018

## 2019-01-29 ENCOUNTER — HOSPITAL ENCOUNTER (OUTPATIENT)
Dept: BONE DENSITY | Facility: HOSPITAL | Age: 74
Discharge: HOME | End: 2019-01-29
Admitting: NURSE PRACTITIONER

## 2019-01-29 DIAGNOSIS — Z78.0 POST-MENOPAUSAL: ICD-10-CM

## 2019-01-29 PROCEDURE — 77080 DXA BONE DENSITY AXIAL: CPT

## 2019-01-30 ENCOUNTER — LAB REQUISITION (OUTPATIENT)
Dept: LAB | Facility: HOSPITAL | Age: 74
End: 2019-01-30

## 2019-01-30 ENCOUNTER — OUTSIDE FACILITY SERVICE (OUTPATIENT)
Dept: GASTROENTEROLOGY | Facility: CLINIC | Age: 74
End: 2019-01-30

## 2019-01-30 DIAGNOSIS — Z12.11 ENCOUNTER FOR SCREENING FOR MALIGNANT NEOPLASM OF COLON: ICD-10-CM

## 2019-01-30 DIAGNOSIS — Z12.11 SCREEN FOR COLON CANCER: ICD-10-CM

## 2019-01-30 LAB — BACTERIA SPEC AEROBE CULT: NORMAL

## 2019-01-30 PROCEDURE — 88305 TISSUE EXAM BY PATHOLOGIST: CPT | Performed by: INTERNAL MEDICINE

## 2019-01-30 PROCEDURE — 45380 COLONOSCOPY AND BIOPSY: CPT | Performed by: INTERNAL MEDICINE

## 2019-01-31 PROBLEM — K63.5 COLON POLYP: Status: ACTIVE | Noted: 2019-01-31

## 2019-01-31 PROBLEM — K57.90 DIVERTICULOSIS: Status: ACTIVE | Noted: 2019-01-31

## 2019-01-31 LAB
CYTO UR: NORMAL
LAB AP CASE REPORT: NORMAL
LAB AP CLINICAL INFORMATION: NORMAL
PATH REPORT.FINAL DX SPEC: NORMAL
PATH REPORT.GROSS SPEC: NORMAL

## 2019-02-04 RX ORDER — OMEPRAZOLE 20 MG/1
CAPSULE, DELAYED RELEASE ORAL
Qty: 180 CAPSULE | Refills: 3 | Status: SHIPPED | OUTPATIENT
Start: 2019-02-04 | End: 2019-11-18 | Stop reason: SDUPTHER

## 2019-02-11 ENCOUNTER — ANESTHESIA EVENT (OUTPATIENT)
Dept: PERIOP | Facility: HOSPITAL | Age: 74
End: 2019-02-11

## 2019-02-11 RX ORDER — FAMOTIDINE 10 MG/ML
20 INJECTION, SOLUTION INTRAVENOUS ONCE
Status: CANCELLED | OUTPATIENT
Start: 2019-02-11 | End: 2019-02-11

## 2019-02-12 ENCOUNTER — APPOINTMENT (OUTPATIENT)
Dept: GENERAL RADIOLOGY | Facility: HOSPITAL | Age: 74
End: 2019-02-12

## 2019-02-12 ENCOUNTER — ANESTHESIA (OUTPATIENT)
Dept: PERIOP | Facility: HOSPITAL | Age: 74
End: 2019-02-12

## 2019-02-12 ENCOUNTER — HOSPITAL ENCOUNTER (INPATIENT)
Facility: HOSPITAL | Age: 74
LOS: 1 days | Discharge: HOME | End: 2019-02-13
Attending: ORTHOPAEDIC SURGERY | Admitting: ORTHOPAEDIC SURGERY

## 2019-02-12 DIAGNOSIS — Z74.09 IMPAIRED MOBILITY AND ADLS: ICD-10-CM

## 2019-02-12 DIAGNOSIS — M16.12 PRIMARY OSTEOARTHRITIS OF LEFT HIP: ICD-10-CM

## 2019-02-12 DIAGNOSIS — Z96.642 STATUS POST TOTAL REPLACEMENT OF LEFT HIP: ICD-10-CM

## 2019-02-12 DIAGNOSIS — Z74.09 IMPAIRED FUNCTIONAL MOBILITY, BALANCE, GAIT, AND ENDURANCE: Primary | ICD-10-CM

## 2019-02-12 DIAGNOSIS — Z78.9 IMPAIRED MOBILITY AND ADLS: ICD-10-CM

## 2019-02-12 LAB
GLUCOSE BLDC GLUCOMTR-MCNC: 276 MG/DL (ref 70–130)
GLUCOSE BLDC GLUCOMTR-MCNC: 300 MG/DL (ref 70–130)
GLUCOSE BLDC GLUCOMTR-MCNC: 70 MG/DL (ref 70–130)
GLUCOSE BLDC GLUCOMTR-MCNC: 71 MG/DL (ref 70–130)
GLUCOSE BLDC GLUCOMTR-MCNC: 78 MG/DL (ref 70–130)
GLUCOSE BLDC GLUCOMTR-MCNC: 84 MG/DL (ref 70–130)
POTASSIUM BLDA-SCNC: 4.18 MMOL/L (ref 3.5–5.3)

## 2019-02-12 PROCEDURE — 25010000003 CEFAZOLIN IN DEXTROSE 2-4 GM/100ML-% SOLUTION: Performed by: ORTHOPAEDIC SURGERY

## 2019-02-12 PROCEDURE — C1776 JOINT DEVICE (IMPLANTABLE): HCPCS | Performed by: ORTHOPAEDIC SURGERY

## 2019-02-12 PROCEDURE — A9270 NON-COVERED ITEM OR SERVICE: HCPCS | Performed by: ANESTHESIOLOGY

## 2019-02-12 PROCEDURE — 97116 GAIT TRAINING THERAPY: CPT

## 2019-02-12 PROCEDURE — A9270 NON-COVERED ITEM OR SERVICE: HCPCS | Performed by: ORTHOPAEDIC SURGERY

## 2019-02-12 PROCEDURE — 63710000001 INSULIN LISPRO (HUMAN) PER 5 UNITS: Performed by: NURSE PRACTITIONER

## 2019-02-12 PROCEDURE — 82962 GLUCOSE BLOOD TEST: CPT

## 2019-02-12 PROCEDURE — 84132 ASSAY OF SERUM POTASSIUM: CPT | Performed by: ANESTHESIOLOGY

## 2019-02-12 PROCEDURE — 73502 X-RAY EXAM HIP UNI 2-3 VIEWS: CPT

## 2019-02-12 PROCEDURE — 63710000001 MELOXICAM 7.5 MG TABLET: Performed by: ORTHOPAEDIC SURGERY

## 2019-02-12 PROCEDURE — 25010000002 ROPIVACAINE PER 1 MG: Performed by: ORTHOPAEDIC SURGERY

## 2019-02-12 PROCEDURE — 63710000001 ACETAMINOPHEN 500 MG TABLET: Performed by: ORTHOPAEDIC SURGERY

## 2019-02-12 PROCEDURE — 97161 PT EVAL LOW COMPLEX 20 MIN: CPT

## 2019-02-12 PROCEDURE — 63710000001 OXYCODONE-ACETAMINOPHEN 5-325 MG TABLET: Performed by: ORTHOPAEDIC SURGERY

## 2019-02-12 PROCEDURE — 25010000002 PHENYLEPHRINE PER 1 ML: Performed by: NURSE ANESTHETIST, CERTIFIED REGISTERED

## 2019-02-12 PROCEDURE — 25010000002 FENTANYL CITRATE (PF) 100 MCG/2ML SOLUTION: Performed by: NURSE ANESTHETIST, CERTIFIED REGISTERED

## 2019-02-12 PROCEDURE — 25010000002 HYDROMORPHONE PER 4 MG: Performed by: NURSE PRACTITIONER

## 2019-02-12 PROCEDURE — 63710000001 FAMOTIDINE 20 MG TABLET: Performed by: ANESTHESIOLOGY

## 2019-02-12 PROCEDURE — 25010000002 PROPOFOL 10 MG/ML EMULSION: Performed by: NURSE ANESTHETIST, CERTIFIED REGISTERED

## 2019-02-12 PROCEDURE — 25010000002 DEXAMETHASONE PER 1 MG: Performed by: NURSE ANESTHETIST, CERTIFIED REGISTERED

## 2019-02-12 PROCEDURE — 63710000001 MUPIROCIN 2 % OINTMENT: Performed by: ORTHOPAEDIC SURGERY

## 2019-02-12 PROCEDURE — 25010000002 ONDANSETRON PER 1 MG: Performed by: NURSE ANESTHETIST, CERTIFIED REGISTERED

## 2019-02-12 PROCEDURE — 63710000001 INSULIN DETEMIR PER 5 UNITS: Performed by: INTERNAL MEDICINE

## 2019-02-12 PROCEDURE — 0SRB04A REPLACEMENT OF LEFT HIP JOINT WITH CERAMIC ON POLYETHYLENE SYNTHETIC SUBSTITUTE, UNCEMENTED, OPEN APPROACH: ICD-10-PCS | Performed by: ORTHOPAEDIC SURGERY

## 2019-02-12 PROCEDURE — 63710000001 PREGABALIN 150 MG CAPSULE: Performed by: ORTHOPAEDIC SURGERY

## 2019-02-12 PROCEDURE — 27130 TOTAL HIP ARTHROPLASTY: CPT | Performed by: ORTHOPAEDIC SURGERY

## 2019-02-12 DEVICE — BIOLOX DELTA CERAMIC FEMORAL HEAD 32MM DIA +1 12/14 TAPER
Type: IMPLANTABLE DEVICE | Site: HIP | Status: FUNCTIONAL
Brand: BIOLOX DELTA

## 2019-02-12 DEVICE — TOTL HIP GRIPTION CUP DEPUY UPCHRG: Type: IMPLANTABLE DEVICE | Site: HIP | Status: FUNCTIONAL

## 2019-02-12 DEVICE — TOTL HIP COP DEPUY 9641334: Type: IMPLANTABLE DEVICE | Site: HIP | Status: FUNCTIONAL

## 2019-02-12 DEVICE — PINNACLE GRIPTION ACETABULAR SHELL SECTOR 50MM OD
Type: IMPLANTABLE DEVICE | Site: HIP | Status: FUNCTIONAL
Brand: PINNACLE GRIPTION

## 2019-02-12 DEVICE — PINNACLE HIP SOLUTIONS ALTRX POLYETHYLENE ACETABULAR LINER +4 NEUTRAL 32MM ID 50MM OD
Type: IMPLANTABLE DEVICE | Site: HIP | Status: FUNCTIONAL
Brand: PINNACLE ALTRX

## 2019-02-12 DEVICE — SUMMIT FEMORAL STEM 12/14 TAPER TAPER ED W/POROCOAT SIZE 6 HI 150MM
Type: IMPLANTABLE DEVICE | Site: HIP | Status: FUNCTIONAL
Brand: SUMMIT POROCOAT

## 2019-02-12 RX ORDER — LIDOCAINE HYDROCHLORIDE 10 MG/ML
0.5 INJECTION, SOLUTION EPIDURAL; INFILTRATION; INTRACAUDAL; PERINEURAL ONCE AS NEEDED
Status: COMPLETED | OUTPATIENT
Start: 2019-02-12 | End: 2019-02-12

## 2019-02-12 RX ORDER — ROPIVACAINE HYDROCHLORIDE 5 MG/ML
INJECTION, SOLUTION EPIDURAL; INFILTRATION; PERINEURAL AS NEEDED
Status: DISCONTINUED | OUTPATIENT
Start: 2019-02-12 | End: 2019-02-12 | Stop reason: HOSPADM

## 2019-02-12 RX ORDER — BUPIVACAINE HYDROCHLORIDE 5 MG/ML
INJECTION, SOLUTION PERINEURAL
Status: COMPLETED | OUTPATIENT
Start: 2019-02-12 | End: 2019-02-12

## 2019-02-12 RX ORDER — CEFAZOLIN SODIUM 2 G/100ML
2 INJECTION, SOLUTION INTRAVENOUS EVERY 8 HOURS
Status: COMPLETED | OUTPATIENT
Start: 2019-02-12 | End: 2019-02-12

## 2019-02-12 RX ORDER — MAGNESIUM HYDROXIDE 1200 MG/15ML
LIQUID ORAL AS NEEDED
Status: DISCONTINUED | OUTPATIENT
Start: 2019-02-12 | End: 2019-02-12 | Stop reason: HOSPADM

## 2019-02-12 RX ORDER — PROPOFOL 10 MG/ML
VIAL (ML) INTRAVENOUS AS NEEDED
Status: DISCONTINUED | OUTPATIENT
Start: 2019-02-12 | End: 2019-02-12 | Stop reason: SURG

## 2019-02-12 RX ORDER — DOCUSATE SODIUM 100 MG/1
100 CAPSULE, LIQUID FILLED ORAL 2 TIMES DAILY PRN
Status: DISCONTINUED | OUTPATIENT
Start: 2019-02-12 | End: 2019-02-13 | Stop reason: HOSPADM

## 2019-02-12 RX ORDER — PROPOFOL 10 MG/ML
VIAL (ML) INTRAVENOUS CONTINUOUS PRN
Status: DISCONTINUED | OUTPATIENT
Start: 2019-02-12 | End: 2019-02-12 | Stop reason: SURG

## 2019-02-12 RX ORDER — SODIUM CHLORIDE 9 MG/ML
120 INJECTION, SOLUTION INTRAVENOUS CONTINUOUS
Status: DISCONTINUED | OUTPATIENT
Start: 2019-02-12 | End: 2019-02-13 | Stop reason: HOSPADM

## 2019-02-12 RX ORDER — ONDANSETRON 2 MG/ML
4 INJECTION INTRAMUSCULAR; INTRAVENOUS ONCE AS NEEDED
Status: DISCONTINUED | OUTPATIENT
Start: 2019-02-12 | End: 2019-02-12 | Stop reason: HOSPADM

## 2019-02-12 RX ORDER — FAMOTIDINE 20 MG/1
20 TABLET, FILM COATED ORAL ONCE
Status: COMPLETED | OUTPATIENT
Start: 2019-02-12 | End: 2019-02-12

## 2019-02-12 RX ORDER — SODIUM CHLORIDE 0.9 % (FLUSH) 0.9 %
3 SYRINGE (ML) INJECTION EVERY 12 HOURS SCHEDULED
Status: DISCONTINUED | OUTPATIENT
Start: 2019-02-12 | End: 2019-02-13 | Stop reason: HOSPADM

## 2019-02-12 RX ORDER — GABAPENTIN 400 MG/1
400 CAPSULE ORAL 3 TIMES DAILY
Status: DISCONTINUED | OUTPATIENT
Start: 2019-02-12 | End: 2019-02-13 | Stop reason: HOSPADM

## 2019-02-12 RX ORDER — ONDANSETRON 2 MG/ML
4 INJECTION INTRAMUSCULAR; INTRAVENOUS EVERY 6 HOURS PRN
Status: DISCONTINUED | OUTPATIENT
Start: 2019-02-12 | End: 2019-02-13 | Stop reason: HOSPADM

## 2019-02-12 RX ORDER — MELOXICAM 7.5 MG/1
15 TABLET ORAL ONCE
Status: COMPLETED | OUTPATIENT
Start: 2019-02-12 | End: 2019-02-12

## 2019-02-12 RX ORDER — NICOTINE POLACRILEX 4 MG
15 LOZENGE BUCCAL
Status: DISCONTINUED | OUTPATIENT
Start: 2019-02-12 | End: 2019-02-13 | Stop reason: HOSPADM

## 2019-02-12 RX ORDER — ONDANSETRON 2 MG/ML
INJECTION INTRAMUSCULAR; INTRAVENOUS AS NEEDED
Status: DISCONTINUED | OUTPATIENT
Start: 2019-02-12 | End: 2019-02-12 | Stop reason: SURG

## 2019-02-12 RX ORDER — ATORVASTATIN CALCIUM 10 MG/1
10 TABLET, FILM COATED ORAL NIGHTLY
Status: DISCONTINUED | OUTPATIENT
Start: 2019-02-12 | End: 2019-02-13 | Stop reason: HOSPADM

## 2019-02-12 RX ORDER — ACETAMINOPHEN 160 MG/5ML
650 SOLUTION ORAL EVERY 4 HOURS PRN
Status: DISCONTINUED | OUTPATIENT
Start: 2019-02-12 | End: 2019-02-13 | Stop reason: HOSPADM

## 2019-02-12 RX ORDER — CEFAZOLIN SODIUM 2 G/100ML
2 INJECTION, SOLUTION INTRAVENOUS ONCE
Status: COMPLETED | OUTPATIENT
Start: 2019-02-12 | End: 2019-02-12

## 2019-02-12 RX ORDER — LABETALOL HYDROCHLORIDE 5 MG/ML
5 INJECTION, SOLUTION INTRAVENOUS
Status: DISCONTINUED | OUTPATIENT
Start: 2019-02-12 | End: 2019-02-12 | Stop reason: HOSPADM

## 2019-02-12 RX ORDER — SODIUM CHLORIDE 0.9 % (FLUSH) 0.9 %
3-10 SYRINGE (ML) INJECTION AS NEEDED
Status: DISCONTINUED | OUTPATIENT
Start: 2019-02-12 | End: 2019-02-12 | Stop reason: HOSPADM

## 2019-02-12 RX ORDER — ASPIRIN 325 MG
325 TABLET, DELAYED RELEASE (ENTERIC COATED) ORAL DAILY
Status: DISCONTINUED | OUTPATIENT
Start: 2019-02-13 | End: 2019-02-13 | Stop reason: HOSPADM

## 2019-02-12 RX ORDER — DEXAMETHASONE SODIUM PHOSPHATE 10 MG/ML
INJECTION INTRAMUSCULAR; INTRAVENOUS AS NEEDED
Status: DISCONTINUED | OUTPATIENT
Start: 2019-02-12 | End: 2019-02-12 | Stop reason: SURG

## 2019-02-12 RX ORDER — DEXTROSE MONOHYDRATE 25 G/50ML
25 INJECTION, SOLUTION INTRAVENOUS
Status: DISCONTINUED | OUTPATIENT
Start: 2019-02-12 | End: 2019-02-13 | Stop reason: HOSPADM

## 2019-02-12 RX ORDER — ACETAMINOPHEN 500 MG
1000 TABLET ORAL ONCE
Status: COMPLETED | OUTPATIENT
Start: 2019-02-12 | End: 2019-02-12

## 2019-02-12 RX ORDER — OXYCODONE HYDROCHLORIDE AND ACETAMINOPHEN 5; 325 MG/1; MG/1
1 TABLET ORAL EVERY 4 HOURS PRN
Status: DISCONTINUED | OUTPATIENT
Start: 2019-02-12 | End: 2019-02-13 | Stop reason: HOSPADM

## 2019-02-12 RX ORDER — SODIUM CHLORIDE 0.9 % (FLUSH) 0.9 %
3 SYRINGE (ML) INJECTION EVERY 12 HOURS SCHEDULED
Status: DISCONTINUED | OUTPATIENT
Start: 2019-02-12 | End: 2019-02-12 | Stop reason: HOSPADM

## 2019-02-12 RX ORDER — BISACODYL 10 MG
10 SUPPOSITORY, RECTAL RECTAL DAILY PRN
Status: DISCONTINUED | OUTPATIENT
Start: 2019-02-12 | End: 2019-02-13 | Stop reason: HOSPADM

## 2019-02-12 RX ORDER — PREGABALIN 150 MG/1
150 CAPSULE ORAL ONCE
Status: COMPLETED | OUTPATIENT
Start: 2019-02-12 | End: 2019-02-12

## 2019-02-12 RX ORDER — FENTANYL CITRATE 50 UG/ML
25 INJECTION, SOLUTION INTRAMUSCULAR; INTRAVENOUS
Status: DISCONTINUED | OUTPATIENT
Start: 2019-02-12 | End: 2019-02-12 | Stop reason: HOSPADM

## 2019-02-12 RX ORDER — PANTOPRAZOLE SODIUM 40 MG/1
40 TABLET, DELAYED RELEASE ORAL EVERY MORNING
Status: DISCONTINUED | OUTPATIENT
Start: 2019-02-12 | End: 2019-02-13 | Stop reason: HOSPADM

## 2019-02-12 RX ORDER — SODIUM CHLORIDE, SODIUM LACTATE, POTASSIUM CHLORIDE, CALCIUM CHLORIDE 600; 310; 30; 20 MG/100ML; MG/100ML; MG/100ML; MG/100ML
9 INJECTION, SOLUTION INTRAVENOUS CONTINUOUS
Status: DISCONTINUED | OUTPATIENT
Start: 2019-02-12 | End: 2019-02-12

## 2019-02-12 RX ORDER — HYDROMORPHONE HYDROCHLORIDE 1 MG/ML
0.5 INJECTION, SOLUTION INTRAMUSCULAR; INTRAVENOUS; SUBCUTANEOUS
Status: DISCONTINUED | OUTPATIENT
Start: 2019-02-12 | End: 2019-02-13 | Stop reason: HOSPADM

## 2019-02-12 RX ORDER — SODIUM CHLORIDE 0.9 % (FLUSH) 0.9 %
1-10 SYRINGE (ML) INJECTION AS NEEDED
Status: DISCONTINUED | OUTPATIENT
Start: 2019-02-12 | End: 2019-02-13 | Stop reason: HOSPADM

## 2019-02-12 RX ORDER — MELOXICAM 7.5 MG/1
15 TABLET ORAL DAILY
Status: DISCONTINUED | OUTPATIENT
Start: 2019-02-12 | End: 2019-02-13 | Stop reason: HOSPADM

## 2019-02-12 RX ORDER — LABETALOL HYDROCHLORIDE 5 MG/ML
10 INJECTION, SOLUTION INTRAVENOUS EVERY 4 HOURS PRN
Status: DISCONTINUED | OUTPATIENT
Start: 2019-02-12 | End: 2019-02-13 | Stop reason: HOSPADM

## 2019-02-12 RX ORDER — ONDANSETRON 4 MG/1
4 TABLET, FILM COATED ORAL EVERY 6 HOURS PRN
Status: DISCONTINUED | OUTPATIENT
Start: 2019-02-12 | End: 2019-02-13 | Stop reason: HOSPADM

## 2019-02-12 RX ORDER — BISACODYL 5 MG/1
10 TABLET, DELAYED RELEASE ORAL DAILY PRN
Status: DISCONTINUED | OUTPATIENT
Start: 2019-02-12 | End: 2019-02-13 | Stop reason: HOSPADM

## 2019-02-12 RX ORDER — ACETAMINOPHEN 325 MG/1
650 TABLET ORAL EVERY 4 HOURS PRN
Status: DISCONTINUED | OUTPATIENT
Start: 2019-02-12 | End: 2019-02-13 | Stop reason: HOSPADM

## 2019-02-12 RX ADMIN — MELOXICAM 15 MG: 7.5 TABLET ORAL at 17:10

## 2019-02-12 RX ADMIN — PREGABALIN 150 MG: 150 CAPSULE ORAL at 07:02

## 2019-02-12 RX ADMIN — CEFAZOLIN SODIUM 2 G: 2 INJECTION, SOLUTION INTRAVENOUS at 23:02

## 2019-02-12 RX ADMIN — FENTANYL CITRATE 25 MCG: 50 INJECTION INTRAMUSCULAR; INTRAVENOUS at 09:45

## 2019-02-12 RX ADMIN — FAMOTIDINE 20 MG: 20 TABLET, FILM COATED ORAL at 07:02

## 2019-02-12 RX ADMIN — BUPIVACAINE HYDROCHLORIDE 2.2 ML: 5 INJECTION, SOLUTION PERINEURAL at 07:39

## 2019-02-12 RX ADMIN — FENTANYL CITRATE 25 MCG: 50 INJECTION INTRAMUSCULAR; INTRAVENOUS at 10:30

## 2019-02-12 RX ADMIN — INSULIN LISPRO 4 UNITS: 100 INJECTION, SOLUTION INTRAVENOUS; SUBCUTANEOUS at 20:20

## 2019-02-12 RX ADMIN — MELOXICAM 15 MG: 7.5 TABLET ORAL at 07:02

## 2019-02-12 RX ADMIN — HYDROMORPHONE HYDROCHLORIDE 0.5 MG: 1 INJECTION, SOLUTION INTRAMUSCULAR; INTRAVENOUS; SUBCUTANEOUS at 15:49

## 2019-02-12 RX ADMIN — OXYCODONE AND ACETAMINOPHEN 1 TABLET: 5; 325 TABLET ORAL at 20:19

## 2019-02-12 RX ADMIN — GABAPENTIN 400 MG: 400 CAPSULE ORAL at 17:10

## 2019-02-12 RX ADMIN — LIDOCAINE HYDROCHLORIDE 0.3 ML: 10 INJECTION, SOLUTION EPIDURAL; INFILTRATION; INTRACAUDAL; PERINEURAL at 07:01

## 2019-02-12 RX ADMIN — OXYCODONE AND ACETAMINOPHEN 1 TABLET: 5; 325 TABLET ORAL at 13:14

## 2019-02-12 RX ADMIN — CEFAZOLIN SODIUM 2 G: 2 INJECTION, SOLUTION INTRAVENOUS at 07:25

## 2019-02-12 RX ADMIN — DEXAMETHASONE SODIUM PHOSPHATE 8 MG: 10 INJECTION INTRAMUSCULAR; INTRAVENOUS at 07:45

## 2019-02-12 RX ADMIN — ACETAMINOPHEN 1000 MG: 500 TABLET ORAL at 07:02

## 2019-02-12 RX ADMIN — PHENYLEPHRINE HYDROCHLORIDE 80 MCG: 10 INJECTION INTRAVENOUS at 08:24

## 2019-02-12 RX ADMIN — INSULIN DETEMIR 10 UNITS: 100 INJECTION, SOLUTION SUBCUTANEOUS at 20:20

## 2019-02-12 RX ADMIN — FENTANYL CITRATE 25 MCG: 50 INJECTION INTRAMUSCULAR; INTRAVENOUS at 09:50

## 2019-02-12 RX ADMIN — TRANEXAMIC ACID 1000 MG: 100 INJECTION, SOLUTION INTRAVENOUS at 07:42

## 2019-02-12 RX ADMIN — SODIUM CHLORIDE 120 ML/HR: 9 INJECTION, SOLUTION INTRAVENOUS at 09:50

## 2019-02-12 RX ADMIN — PHENYLEPHRINE HYDROCHLORIDE 80 MCG: 10 INJECTION INTRAVENOUS at 08:11

## 2019-02-12 RX ADMIN — MUPIROCIN 1 APPLICATION: 20 OINTMENT TOPICAL at 07:02

## 2019-02-12 RX ADMIN — SODIUM CHLORIDE, POTASSIUM CHLORIDE, SODIUM LACTATE AND CALCIUM CHLORIDE 9 ML/HR: 600; 310; 30; 20 INJECTION, SOLUTION INTRAVENOUS at 07:01

## 2019-02-12 RX ADMIN — INSULIN LISPRO 5 UNITS: 100 INJECTION, SOLUTION INTRAVENOUS; SUBCUTANEOUS at 17:10

## 2019-02-12 RX ADMIN — GABAPENTIN 400 MG: 400 CAPSULE ORAL at 20:24

## 2019-02-12 RX ADMIN — TRANEXAMIC ACID 1000 MG: 100 INJECTION, SOLUTION INTRAVENOUS at 08:34

## 2019-02-12 RX ADMIN — CEFAZOLIN SODIUM 2 G: 2 INJECTION, SOLUTION INTRAVENOUS at 17:09

## 2019-02-12 RX ADMIN — PROPOFOL 30 MG: 10 INJECTION, EMULSION INTRAVENOUS at 07:35

## 2019-02-12 RX ADMIN — PROPOFOL 75 MCG/KG/MIN: 10 INJECTION, EMULSION INTRAVENOUS at 07:41

## 2019-02-12 RX ADMIN — FENTANYL CITRATE 25 MCG: 50 INJECTION INTRAMUSCULAR; INTRAVENOUS at 10:10

## 2019-02-12 RX ADMIN — ATORVASTATIN CALCIUM 10 MG: 10 TABLET, FILM COATED ORAL at 20:19

## 2019-02-12 RX ADMIN — ONDANSETRON 4 MG: 2 INJECTION INTRAMUSCULAR; INTRAVENOUS at 09:02

## 2019-02-12 RX ADMIN — PROPOFOL 10 MG: 10 INJECTION, EMULSION INTRAVENOUS at 07:37

## 2019-02-12 NOTE — OP NOTE
DATE OF PROCEDURE:  02/12/19    PREOPERATIVE DIAGNOSIS: left hip arthritis    POSTOPERATIVE DIAGNOSIS: left hip arthritis    PROCEDURE PERFORMED: left total hip arthroplasty with DePuy components    IMPLANTS: # 50 press-fit [place] cup with +4 neutral polyethylene liner with # 6 high offset press-fit Bledsoe stem with 32 x +1 Bio-lox ceramic head    SURGEON: Carlos Pollack MD    ASSISTANT: Jadyn Coyle PA-C     SPECIMENS: None    IMPLANTS:   Implants     Implant    Cup Acet Pinn Sector W Griptn 50mm - [id number] - Implanted   (Left) Hip    Inventory item: Cup Acet Pinn Sector W Griptn 50mm Model/Cat number: [id number]    : DEPUY Lot number: [id number]    As of 2/12/2019     Status: Implanted                  Stem Fem Bledsoe Pc Tpr Offst/Hi Sz6 - [id number] - Implanted   (Left) Hip    Inventory item: Stem Fem Bledsoe Pc Tpr Offst/Hi Sz6 Model/Cat number: [id number]    : DEPUY Lot number: [id number]    As of 2/12/2019     Status: Implanted                  Liner Acet Altrx Ntrl 30m28md Pls4 - [id number] - Implanted   (Left) Hip    Inventory item: Liner Acet Altrx Ntrl 95v01qp Pls4 Model/Cat number: [id number]    : DEPUY Lot number: [id number]    As of 2/12/2019     Status: Implanted                  Hd Fem Bioloxdelta/Art Ceram 32mm Pls1 - [id number] - Implanted   (Left) Hip    Inventory item: Hd Fem Bioloxdelta/Art Ceram 32mm Pls1 Model/Cat number: [id number]    : DEPUY Lot number: [id number]    As of 2/12/2019     Status: Implanted                         ANESTHESIA:  Spinal    STAFF:  Circulator: Tona Sandhu RN  Scrub Person: Ángel Cruz  Vendor Representative: Anthony Israel  Nursing Assistant: Nikunj Rose  Assistant: Jadyn Coyle PA-C    ESTIMATED BLOOD LOSS: 100ml     COMPLICATIONS: None    PREOPERATIVE ANTIBIOTICS: Ancef 2 g    INDICATIONS: The patient is a 73 y.o. female with a history of debilitating left hip pain secondary to advanced osteoarthritis, that  failed to improve in spite of conservative treatment. The patient opted for a left total hip arthroplasty at this time and consented for the procedure. Please see my office notes for details with regard to preoperative counseling and operative rationale.     DESCRIPTION OF PROCEDURE: The patient was positively identified in the preoperative holding area, brought to the operative suite, and placed in a supine position. After adequate spinal anesthetic had been achieved, the patient was placed in lateral decubitus position with the left side up. The patient was well padded on the pegboard table. After sterile prep and drape of the left hip and lower extremity, a timeout procedure was performed to confirm the operative site, as well as the other parameters. A skin incision was made on the lateral aspect of the hip for a modified direct lateral approach. Following a sharp skin incision, dissection was carried down to the level of the fascia which was incised in line with its fibers. The underlying interval between the anterior one third and posterior two thirds of the gluteus medius was then entered after the bursa had been reflected posteriorly. This was elevated as a full thickness flap and preserved for later repair. The hip capsule was then incised in a T-shaped fashion and the head dislocated from the acetabulum. Description of femoral head: Complete wear, with osteophytes at the head neck junction. The head was then resected with the aid of an oscillating saw blade. Description of acetabulum: Thickening of the labrum, particularly along the superior posterior portion, with wear. The acetabulum was sequentially reamed up to 49 to accommodate a 50 press-fit [place] cup, which had excellent press-fit characteristics and did not require any screw fixation. A trial +4 neutral polyethylene liner was placed. Attention was then redirected towards the femoral aspect. Sequential reaming and broaching was performed on the  femur to accommodate a # 6 broach with a + 1 x 32 head.  Trial reduction was performed, with no dislocation throughout the full arc of motion, with appropriate limb lengths.  The hip was again dislocated, and the final +4 neutral polyethylene liner was placed and the final # 6  high offset press-fit [place] stem was placed, followed by +1 x 32 ceramic head, and the same reduction characteristics were noted as with the trial with no dislocation throughout the full arc of motion, and appropriate limb lengths.  Therefore, the hip was copiously irrigated and attention was directed towards closure. The hip capsule was closed with #1 Vicryl in an interrupted figure-of-eight fashion, followed by closure of the gluteus medius and vastus lateralis sleeve as a full thickness sleeve repair with #1 Vicryl in an interrupted figure-of-eight fashion, followed by closure of the fascia nestor with #1 Vicryl in an interrupted figure-of-eight fashion in 3 strategic locations both proximally, distally and in the central portion, followed by oversewing this from distal to proximal with a #1 StrataFix symmetric, which nicely sealed that layer, followed by closure of the deep fascial layer with #1 Vicryl in a buried interrupted fashion, followed by closure of the subcutaneous layer with 2-0 Vicryl and the skin with 3-0 StrataFix in a running subcuticular fashion. Prineo wound closure dressing was applied followed by a sterile dressing with 4 x 4s secured with micropore tape.  The patient tolerated the procedure well and was brought to the recovery room in good condition.     POSTOPERATIVE PLAN:  1. The patient will begin early range of motion and weight-bearing per the post total hip arthroplasty protocol.   2. I anticipate brief hospitalization for initial rehabilitation and pain control followed by continued rehabilitation in a home health setting.   3. Postoperative medical management with Dr. Melton.  4. Postoperative DVT  prophylaxis with aspirin unless there is a contraindication.  5. Postoperative IV antibiotics with Ancef for 24 hours.      Carlos Pollack MD  02/12/19  9:09 AM

## 2019-02-12 NOTE — ANESTHESIA PREPROCEDURE EVALUATION
Anesthesia Evaluation     Patient summary reviewed and Nursing notes reviewed   history of anesthetic complications (difficulty remembering hospital stay after last surgery):  NPO Solid Status: > 8 hours  NPO Liquid Status: > 4 hours           Airway   Mallampati: I  TM distance: >3 FB  Neck ROM: full  No difficulty expected  Dental    (+) edentulous    Pulmonary - negative pulmonary ROS and normal exam   (-) pulmonary embolism  Cardiovascular - normal exam  Exercise tolerance: good (4-7 METS)    ECG reviewed    (+) hypertension, hyperlipidemia,   (-) valvular problems/murmurs, past MI, dysrhythmias, angina      Neuro/Psych  (+) numbness (diabetic neuropathy),     GI/Hepatic/Renal/Endo    (+) obesity,  GERD,  diabetes mellitus,     Musculoskeletal     Abdominal   (+) obese,    Substance History      OB/GYN          Other   (+) arthritis   history of cancer                    Anesthesia Plan    ASA 3     spinal and MAC   (Pt took metformin and held amaryl this am, will closely monitor glucose  Possible GA if spinal unsuccessfull)  intravenous induction   Anesthetic plan, all risks, benefits, and alternatives have been provided, discussed and informed consent has been obtained with: patient.    Plan discussed with CRNA.

## 2019-02-12 NOTE — H&P
Pre-Op H&P  Nely Grider  [id number]  1945    Chief complaint: Left hip pain    HPI:  11/12/19 office visit:  Nely Grider is a 72 y.o. female.  She follows up today for her right hip.  Her right total hip arthroplasty is doing well, and she is pleased with results.  Fully ambulatory without external aids for the right hip.  100% relief compared to her preoperative symptoms.     However, her left hip is bothering her significantly, with severe pain, using a cane for ambulation, pain with activities daily living, and has progressed to the point where she would like to proceed with left total hip arthroplasty surgery.  The pain is shooting and stabbing, worse with walking, standing and climbing stairs.    2/12/19:  Here today for left total hip arthroplasty    Review of Systems:  General ROS: negative for chills, fever or skin lesions;  No changes since last office visit  Cardiovascular ROS: no chest pain or dyspnea on exertion  Respiratory ROS: no cough, shortness of breath, or wheezing    Allergies:   Allergies   Allergen Reactions   • Codeine Hives       Home Meds:    No current facility-administered medications on file prior to encounter.      Current Outpatient Medications on File Prior to Encounter   Medication Sig Dispense Refill   • metFORMIN (GLUCOPHAGE) 500 MG tablet Take 1 tablet by mouth 2 (Two) Times a Day With Meals. 180 tablet 3   • chlorhexidine (HIBICLENS) 4 % external liquid Shower with solution as directed for 5 days prior to surgery 236 mL 0   • mupirocin (BACTROBAN) 2 % ointment Apply to the inside of each nostril as directed by provider 2 (Two) Times a Day x5 days before surgery. 22 g 0       PMH:   Past Medical History:   Diagnosis Date   • Anesthesia complication     per patient with hip surgery last april did not remember being in hospital or going home can only rmember from [facility]   • Anesthesia complication     with hip surgery in april 2018 had issues with swallowing after  surgery called in speech   • Arthritis    • Cancer (CMS/HCC)     cervical   • Cataract    • Diabetes mellitus (CMS/HCC)     type 2 dm, check blood sugar every morning, diagnosed 3 or 4 years   • Full dentures    • Full dentures    • GERD (gastroesophageal reflux disease)    • High cholesterol    • History of IBS    • Hypertension     been off bp meds since last april 2018   • Neuropathy    • Wears glasses      PSH:    Past Surgical History:   Procedure Laterality Date   • CARDIAC CATHETERIZATION     • CHOLECYSTECTOMY     • COLONOSCOPY     • EYE SURGERY      bilateral cataract   • HYSTERECTOMY      partial   • JOINT REPLACEMENT      hip surgery 2018 in april   • OOPHORECTOMY      one removed   • TONSILLECTOMY     • TOTAL HIP ARTHROPLASTY Right 4/19/2018    Procedure: RIGHT TOTAL HIP ARTHROPLASTY;  Surgeon: Carlos Pollack MD;  Location: [facility];  Service: Orthopedics   • WRIST SURGERY      metal plate     Social History:   Tobacco:   Social History     Tobacco Use   Smoking Status Never Smoker   Smokeless Tobacco Never Used      Alcohol:     Social History     Substance and Sexual Activity   Alcohol Use No       Vitals:           T97.6 O2 97% /74 HR 70  Physical Exam:  General Appearance:    Alert, cooperative, no distress, appears stated age   Head:    Normocephalic, without obvious abnormality, atraumatic   Lungs:     Clear to auscultation bilaterally, respirations unlabored    Heart:   Regular rate and rhythm, S1 and S2 normal, no murmur, rub    or gallop    Abdomen:    Soft, non-tender.  +bowel sounds   Breast Exam:    deferred   Genitalia:    deferred   Extremities:   Extremities normal, atraumatic, no cyanosis or edema   Skin:   Skin color, texture, turgor normal, no rashes or lesions   Neurologic:   Grossly intact   Results Review  I reviewed the patient's new clinical results.    Cancer Staging (if applicable)  Cancer Patient: __ yes _x_no __unknown; If yes, clinical stage T:__ N:__M:__, stage group  or __N/A    Impression: Primary osteoarthritis of left hip s/p right DOROTHY 4/2018    Plan: Her right total hip arthroplasty is functioning well, she is pleased with results.     However, her left hip is bothering her, and she would like to proceed with total hip arthroplasty surgery.  She has exhausted conservative treatment options.  Please see my counseling note for details.     Surgical Counseling      I have informed the patient of the diagnosis and the prognosis.  Exhaustive conservative treatment modalities have not resulted in long term pain relief.  The symptoms have progressed to the point of daily pain and inability to perform activities of daily living without significant pain.  The patient has reached the point of desiring to proceed with total hip arthroplasty after discussing the risks, benefits and alternatives to the procedure.  The surgical procedure itself was discussed in detail.  Risks of the procedure were discussed, which included but are not limited to, bleeding, infection, damage to blood vessels and nerves, incomplete pain relief, loosening of the prosthesis, deep infection, need for further surgery, leg length discrepancy, hip dislocation, loss of limb, deep venous thrombosis, pulmonary embolus, death, heart attack, stroke, kidney failure, liver failure, and anesthetic complications.  In addition, the potential for deep infection developing in the future was discussed, which could require further surgery.  The hip would have to be re-opened, debrided, and potentially remove the prosthesis, which may or may not be replaced in the future.  Also, the possibility for loosening of the prosthesis has been mentioned.  If the prosthesis loosened, a revision arthroplasty could be performed, with results that are not as predictable compared to the original procedure.  The typical rehabilitative course has also been discussed, and full recovery may take up to a year to see the maximum benefit.  The  importance of patient cooperation in the rehabilitative efforts has also been discussed.  No guarantees whatsoever were given.  The patient understands the potential risks versus the benefits and desires to proceed with total hip arthroplasty at a mutually convenient time.    Carlos Pollack MD   2/12/2019   7:18 AM     Agree with above - plan for left DOROTHY

## 2019-02-12 NOTE — ANESTHESIA PROCEDURE NOTES
Spinal Block    Pre-sedation assessment completed: 2/12/2019 7:30 AM    Patient reassessed immediately prior to procedure    Patient location during procedure: OR  Start Time: 2/12/2019 7:39 AM  Indication:at surgeon's request  Performed By  CRNA: Haylee Gerard CRNA  Preanesthetic Checklist  Completed: patient identified, site marked, surgical consent, pre-op evaluation, timeout performed, IV checked, risks and benefits discussed and monitors and equipment checked  Spinal Block Prep:  Patient Position:sitting  Sterile Tech:cap, gloves, sterile barriers and mask  Prep:Chloraprep  Patient Monitoring:blood pressure monitoring, continuous pulse oximetry and EKG  Spinal Block Procedure  Approach:midline  Guidance:landmark technique and palpation technique  Location:L4-L5  Needle Type:Belkys  Needle Gauge:25 G  Placement of Spinal needle event:cerebrospinal fluid aspirated  Paresthesia: no  Fluid Appearance:clear  Medications: bupivacaine (MARCAINE) injection 0.5%, 2.2 mL  Med Administered at 2/12/2019 7:39 AM   Post Assessment  Patient Tolerance:patient tolerated the procedure well with no apparent complications  Complications no  Additional Notes  Procedure:  Pt assisted to sitting position, with legs in position of comfort over side of bed.  Pt. instructed in optimal spine presentation, the spine was prepped/ Draped and the skin at insertion site was anesthetized with 1% Lidocaine 2 ml.  The spinal needle was then advanced until CSF flow was obtained and LA was injected:

## 2019-02-12 NOTE — ANESTHESIA POSTPROCEDURE EVALUATION
"Patient: Nely Grider    Procedure Summary     Date:  02/12/19 Room / [facility]:   [facility]    Anesthesia Start:  0725 Anesthesia Stop:  0928    Procedure:  LEFT TOTAL HIP ARTHROPLASTY (Left Hip) Diagnosis:       Primary osteoarthritis of left hip      (Primary osteoarthritis of left hip [M16.12])    Surgeon:  Carlos Pollack MD Provider:  Rosario Ly MD    Anesthesia Type:  spinal, MAC ASA Status:  3          Anesthesia Type: spinal, MAC  Last vitals  BP   141/82 (02/12/19 0928)   Temp   97.7 °F (36.5 °C) (02/12/19 0928)   Pulse   65 (02/12/19 0928)   Resp   14 (02/12/19 0928)     SpO2   95 % (02/12/19 0928)     Post Anesthesia Care and Evaluation    Patient location during evaluation: PACU  Patient participation: complete - patient participated  Level of consciousness: awake and alert  Pain score: 0  Pain management: adequate  Airway patency: patent  Anesthetic complications: No anesthetic complications  PONV Status: none  Cardiovascular status: hemodynamically stable and acceptable  Respiratory status: nonlabored ventilation, acceptable and nasal cannula  Hydration status: acceptable    Comments: Pt transported to PACU with O2 via nasal cannula at 4 L/MIN. Vital signs stable.  Pt shows no signs of distress.  Report given to PACU RN. /82 (BP Location: Left arm)   Pulse 65   Temp 97.7 °F (36.5 °C) (Temporal)   Resp 14   Ht 165.1 cm (65\")   Wt 84.4 kg (186 lb)   SpO2 95%   BMI 30.95 kg/m²         "

## 2019-02-12 NOTE — H&P
Patient Name: Nely Grider  MRN: [id number]  : 1945  DOS: 2019    Attending: Carlos Pollack MD    Primary Care Provider: Raimundo Ibarra MD      Chief complaint:  Left hip pain    Subjective   Patient is a 73 y.o. female presented for left total hip arthroplasty by Dr. Pollack.    She underwent surgery under spinal anesthesia. She tolerated surgery well and is admitted for further medical management.     She is known to us from previous right hip replacement in 2018; which she recovered well.  She reports history of blood clot 30-40 years ago, but does not recall treatment. A sister of hers had LE DVTs and had IVC filters and other complications as well.    When seen in PACU she is doing well. She complains of moderate soreness. She denies nausea, shortness of breath or chest pain.     ( Above is noted, agree.  Doing well when I saw her in her room afterwards.  Still with some postoperative pain the left hip/thigh.  Was seen by physical therapy earlier and ambulated 100 feet with a rolling walker limited by fatigue and left knee buckling related to residual numbness from spinal anesthesia.  No nausea vomiting.  No shortness breath or chest pain.)wy    Allergies:  Allergies   Allergen Reactions   • Codeine Hives       Meds:  Medications Prior to Admission   Medication Sig Dispense Refill Last Dose   • gabapentin (NEURONTIN) 400 MG capsule Take 1 capsule by mouth 3 (Three) Times a Day. 270 capsule 1 2019 at 0400   • glimepiride (AMARYL) 4 MG tablet TAKE 1 TABLET EVERY MORNING BEFORE BREAKFAST 90 tablet 3 2019 at 0800   • metFORMIN (GLUCOPHAGE) 500 MG tablet Take 1 tablet by mouth 2 (Two) Times a Day With Meals. 180 tablet 3 2019 at 0400   • omeprazole (priLOSEC) 20 MG capsule TAKE 1 CAPSULE TWICE DAILY 180 capsule 3 2019 at 0400   • simvastatin (ZOCOR) 10 MG tablet TAKE 1 TABLET AT BEDTIME 90 tablet 1 2019 at 2200       History:   Past Medical History:   Diagnosis  Date   • Anesthesia complication     per patient with hip surgery last april did not remember being in hospital or going home can only rmember from [facility]   • Anesthesia complication     with hip surgery in april 2018 had issues with swallowing after surgery called in speech   • Arthritis    • Cancer (CMS/HCC)     cervical   • Cataract    • Diabetes mellitus (CMS/HCC)     type 2 dm, check blood sugar every morning, diagnosed 3 or 4 years   • Full dentures    • Full dentures    • GERD (gastroesophageal reflux disease)    • High cholesterol    • History of IBS    • Hypertension     been off bp meds since last april 2018   • Neuropathy    • Wears glasses      Past Surgical History:   Procedure Laterality Date   • CARDIAC CATHETERIZATION     • CHOLECYSTECTOMY     • COLONOSCOPY     • EYE SURGERY      bilateral cataract   • HYSTERECTOMY      partial   • JOINT REPLACEMENT      hip surgery 2018 in april   • OOPHORECTOMY      one removed   • TONSILLECTOMY     • TOTAL HIP ARTHROPLASTY Right 4/19/2018    Procedure: RIGHT TOTAL HIP ARTHROPLASTY;  Surgeon: Carlos Pollack MD;  Location[facility];  Service: Orthopedics   • WRIST SURGERY      metal plate     Family History   Problem Relation Age of Onset   • Arthritis Mother    • Heart attack Mother    • Hypertension Mother    • Hyperlipidemia Mother    • Osteoporosis Mother    • Heart disease Sister    • Diabetes Sister    • Arthritis Sister    • Heart attack Sister    • Hypertension Sister    • Hyperlipidemia Sister    • Bleeding Disorder Sister    • Heart disease Brother    • Cancer Brother    • Diabetes Brother    • Arthritis Brother    • Heart attack Brother    • Hypertension Brother    • Hyperlipidemia Brother    • Ovarian cancer Daughter    • Cancer Daughter    • Ovarian cancer Other         grandaughter   • Ovarian cancer Other         granddaughter   • Heart attack Father    • Hypertension Father    • Stroke Father    • Kidney disease Paternal Uncle    • Liver disease  "Paternal Uncle    • Cancer Other    • Stroke Other    • Diabetes Other    • Breast cancer Neg Hx      Social History     Tobacco Use   • Smoking status: Never Smoker   • Smokeless tobacco: Never Used   Substance Use Topics   • Alcohol use: No   • Drug use: No   She is  with 4 children. She is retired from [employer] . She lives with  in a single level house; she has one step to go in.    Review of Systems  Pertinent items are noted in HPI, all other systems reviewed and negative    Vital Signs  Visit Vitals  /89 (BP Location: Left arm, Patient Position: Lying)   Pulse 69   Temp 97.6 °F (36.4 °C) (Temporal)   Resp 16   Ht 165.1 cm (65\")   Wt 84.4 kg (186 lb)   SpO2 97%   BMI 30.95 kg/m²       Physical Exam:    General Appearance:    Alert, cooperative, in no acute distress   Head:    Normocephalic, without obvious abnormality, atraumatic   Eyes:            Lids and lashes normal, conjunctivae and sclerae normal, no   icterus, no pallor, corneas clear   Ears:    Ears appear intact with no abnormalities noted   Neck:   No adenopathy, supple, trachea midline, no thyromegaly   Lungs:     Clear to auscultation,respirations regular, even and                   unlabored    Heart:    Regular rhythm and normal rate, normal S1 and S2, no            murmur, no gallop   Abdomen:     Normal bowel sounds, no masses, no organomegaly, soft        non-tender, non-distended, no guarding, no rebound                 tenderness   Genitalia:    Deferred   Extremities:   Left hip with bulky surgical dressing CDI   Pulses:   Pulses palpable and equal bilaterally   Skin:   No bleeding, bruising or rash   Neurologic:   Cranial nerves 2 - 12 grossly intact, limited sensation and movement BLE due to lingering effects of spinal block      I reviewed the patient's new clinical results.     Results for BRIGITTE CHOW (MRN [id number]) as of 2/12/2019 12:52   Ref. Range 1/28/2019 08:14   Glucose Latest Ref Range: 70 - 100 " mg/dL 110 (H)   Sodium Latest Ref Range: 132 - 146 mmol/L 140   Potassium Latest Ref Range: 3.5 - 5.5 mmol/L 4.2   CO2 Latest Ref Range: 20.0 - 31.0 mmol/L 28.0   Chloride Latest Ref Range: 99 - 109 mmol/L 105   Anion Gap Latest Ref Range: 3.0 - 11.0 mmol/L 7.0   Creatinine Latest Ref Range: 0.60 - 1.30 mg/dL 1.23   BUN Latest Ref Range: 9 - 23 mg/dL 18   BUN/Creatinine Ratio Latest Ref Range: 7.0 - 25.0  14.6   Calcium Latest Ref Range: 8.7 - 10.4 mg/dL 9.9   eGFR Non African Am Latest Ref Range: >60 mL/min/1.73 43 (L)   Hemoglobin A1C Latest Ref Range: 4.80 - 5.60 % 6.40 (H)   C-Reactive Protein Latest Ref Range: 0.00 - 1.00 mg/dL 0.44   Protime Latest Ref Range: 11.2 - 14.3 Seconds 12.9   INR Latest Ref Range: 0.85 - 1.16  1.02   PTT Latest Ref Range: 24.0 - 37.0 seconds 33.1   WBC Latest Ref Range: 3.50 - 10.80 10*3/mm3 8.76   RBC Latest Ref Range: 3.89 - 5.14 10*6/mm3 4.64   Hemoglobin Latest Ref Range: 11.5 - 15.5 g/dL 13.8   Hematocrit Latest Ref Range: 34.5 - 44.0 % 43.9   RDW Latest Ref Range: 11.3 - 14.5 % 14.3   MCV Latest Ref Range: 80.0 - 99.0 fL 94.6   MCH Latest Ref Range: 27.0 - 31.0 pg 29.7   MCHC Latest Ref Range: 32.0 - 36.0 g/dL 31.4 (L)   MPV Latest Ref Range: 6.0 - 12.0 fL 9.5   Platelets Latest Ref Range: 150 - 450 10*3/mm3 393     Results for BRIGITTE CHOW (MRN [id number]) as of 2/12/2019 12:52   Ref. Range 2/12/2019 07:22 2/12/2019 08:53 2/12/2019 09:53   Glucose Latest Ref Range: 70 - 130 mg/dL 71 78 84     Assessment and Plan:     Status post total replacement of left hip    Primary osteoarthritis of left hip    Mixed hyperlipidemia    Essential hypertension    Controlled type 2 diabetes mellitus without complication, without long-term current use of insulin (CMS/[facility])      Plan  1. PT/OT- early ambulation post op  2. Pain control-prns   3. IS-encourage  4. DVT proph- mechs/ASA  5. Bowel regimen  6. Resume home medications as appropriate  7. Monitor post-op labs  8. DC planning for  home    HTN, HLD  - Continue home statin  - Monitor BP q4 hrs  - PRN hydralazine for SBP >170     DM  - hgb A1c on 1/28/19 6.4  - Hold OHA while inpatient  - Accuchecks ACHS with moderate dose SSI  ( f/u  this evening. Will add a dose of Levemir, and monitor with SSI)SKYLAR Ibrahim  02/12/19  1:39 PM     I have personally performed the evaluation on this patient. My history is consistent  with HPI obtained. My exam findings are listed above. I have personally reviewed and discussed the above formulated treatment plan with pt and AH. Srinivas.

## 2019-02-13 VITALS
SYSTOLIC BLOOD PRESSURE: 148 MMHG | OXYGEN SATURATION: 98 % | DIASTOLIC BLOOD PRESSURE: 67 MMHG | TEMPERATURE: 98.1 F | WEIGHT: 186 LBS | RESPIRATION RATE: 16 BRPM | HEART RATE: 75 BPM | HEIGHT: 65 IN | BODY MASS INDEX: 30.99 KG/M2

## 2019-02-13 PROBLEM — D62 ACUTE BLOOD LOSS ANEMIA: Status: ACTIVE | Noted: 2019-02-13

## 2019-02-13 PROBLEM — G89.18 ACUTE POSTOPERATIVE PAIN: Status: ACTIVE | Noted: 2019-02-13

## 2019-02-13 LAB
ANION GAP SERPL CALCULATED.3IONS-SCNC: 5 MMOL/L (ref 3–11)
BUN BLD-MCNC: 19 MG/DL (ref 9–23)
BUN/CREAT SERPL: 13 (ref 7–25)
CALCIUM SPEC-SCNC: 9.1 MG/DL (ref 8.7–10.4)
CHLORIDE SERPL-SCNC: 103 MMOL/L (ref 99–109)
CO2 SERPL-SCNC: 24 MMOL/L (ref 20–31)
CREAT BLD-MCNC: 1.46 MG/DL (ref 0.6–1.3)
DEPRECATED RDW RBC AUTO: 48 FL (ref 37–54)
ERYTHROCYTE [DISTWIDTH] IN BLOOD BY AUTOMATED COUNT: 13.9 % (ref 11.3–14.5)
GFR SERPL CREATININE-BSD FRML MDRD: 35 ML/MIN/1.73
GLUCOSE BLD-MCNC: 201 MG/DL (ref 70–100)
GLUCOSE BLDC GLUCOMTR-MCNC: 168 MG/DL (ref 70–130)
GLUCOSE BLDC GLUCOMTR-MCNC: 207 MG/DL (ref 70–130)
HCT VFR BLD AUTO: 33.4 % (ref 34.5–44)
HGB BLD-MCNC: 10.5 G/DL (ref 11.5–15.5)
MCH RBC QN AUTO: 29.7 PG (ref 27–31)
MCHC RBC AUTO-ENTMCNC: 31.4 G/DL (ref 32–36)
MCV RBC AUTO: 94.4 FL (ref 80–99)
PLATELET # BLD AUTO: 308 10*3/MM3 (ref 150–450)
PMV BLD AUTO: 9.5 FL (ref 6–12)
POTASSIUM BLD-SCNC: 4 MMOL/L (ref 3.5–5.5)
RBC # BLD AUTO: 3.54 10*6/MM3 (ref 3.89–5.14)
SODIUM BLD-SCNC: 132 MMOL/L (ref 132–146)
WBC NRBC COR # BLD: 17.36 10*3/MM3 (ref 3.5–10.8)

## 2019-02-13 PROCEDURE — 80048 BASIC METABOLIC PNL TOTAL CA: CPT | Performed by: NURSE PRACTITIONER

## 2019-02-13 PROCEDURE — 82962 GLUCOSE BLOOD TEST: CPT

## 2019-02-13 PROCEDURE — 97535 SELF CARE MNGMENT TRAINING: CPT

## 2019-02-13 PROCEDURE — 97116 GAIT TRAINING THERAPY: CPT

## 2019-02-13 PROCEDURE — 97166 OT EVAL MOD COMPLEX 45 MIN: CPT

## 2019-02-13 PROCEDURE — 97110 THERAPEUTIC EXERCISES: CPT

## 2019-02-13 PROCEDURE — 85027 COMPLETE CBC AUTOMATED: CPT | Performed by: ORTHOPAEDIC SURGERY

## 2019-02-13 PROCEDURE — 99024 POSTOP FOLLOW-UP VISIT: CPT | Performed by: ORTHOPAEDIC SURGERY

## 2019-02-13 RX ORDER — OXYCODONE HYDROCHLORIDE AND ACETAMINOPHEN 5; 325 MG/1; MG/1
1 TABLET ORAL EVERY 4 HOURS PRN
Qty: 40 TABLET | Refills: 0 | Status: SHIPPED | OUTPATIENT
Start: 2019-02-13 | End: 2019-03-15

## 2019-02-13 RX ORDER — PSEUDOEPHEDRINE HCL 30 MG
100 TABLET ORAL 2 TIMES DAILY PRN
Qty: 60 EACH | Refills: 0 | Status: SHIPPED | OUTPATIENT
Start: 2019-02-13 | End: 2019-04-23

## 2019-02-13 RX ADMIN — PANTOPRAZOLE SODIUM 40 MG: 40 TABLET, DELAYED RELEASE ORAL at 05:27

## 2019-02-13 RX ADMIN — INSULIN LISPRO 2 UNITS: 100 INJECTION, SOLUTION INTRAVENOUS; SUBCUTANEOUS at 08:39

## 2019-02-13 RX ADMIN — ACETAMINOPHEN 650 MG: 325 TABLET ORAL at 05:26

## 2019-02-13 RX ADMIN — MELOXICAM 15 MG: 7.5 TABLET ORAL at 08:39

## 2019-02-13 RX ADMIN — ASPIRIN 325 MG: 325 TABLET, DELAYED RELEASE ORAL at 08:38

## 2019-02-13 RX ADMIN — GABAPENTIN 400 MG: 400 CAPSULE ORAL at 08:39

## 2019-02-13 NOTE — PLAN OF CARE
Problem: Patient Care Overview  Goal: Plan of Care Review  Outcome: Ongoing (interventions implemented as appropriate)   02/13/19 2735   Coping/Psychosocial   Plan of Care Reviewed With patient   Plan of Care Review   Progress improving   OTHER   Outcome Summary OT eval complete. Pt completes sit to stand with SBA and ambulates with SBA and RW. Pt educated on use of AE for completion of all LBD and LBB. Pt has ADL kit in room. Pt dof/don socks and pants with supervision, verbal cues, and use of AE. Pt with good verbal recall of hip precautions. Recommend d/c home with HH when medically ready.

## 2019-02-13 NOTE — THERAPY EVALUATION
Acute Care - Occupational Therapy Initial Evaluation  [facility]     Patient Name: Nely Grider  : 1945  MRN: [id number]  Today's Date: 2019  Onset of Illness/Injury or Date of Surgery: 19  Date of Referral to OT: 19  Referring Physician: MD Ranjeet     Admit Date: 2019       ICD-10-CM ICD-9-CM   1. Impaired functional mobility, balance, gait, and endurance Z74.09 V49.89   2. Primary osteoarthritis of left hip M16.12 715.15   3. Impaired mobility and ADLs Z74.09 799.89     Patient Active Problem List   Diagnosis   • Neck sprain   • Cough   • Gastroesophageal reflux disease without esophagitis   • Mixed hyperlipidemia   • Essential hypertension   • Cervical sprain   • Controlled type 2 diabetes mellitus without complication, without long-term current use of insulin (CMS/HCC)   • Mass of right hip region   • Anemia   • Constipation   • Diabetic polyneuropathy associated with type 2 diabetes mellitus (CMS/HCC)   • Primary osteoarthritis of both hips   • Status post total replacement of left hip   • Renal insufficiency   • Colon polyp   • Diverticulosis   • Primary osteoarthritis of left hip     Past Medical History:   Diagnosis Date   • Anesthesia complication     per patient with hip surgery last april did not remember being in hospital or going home can only rmember from rehad on   • Anesthesia complication     with hip surgery in 2018 had issues with swallowing after surgery called in speech   • Arthritis    • Cancer (CMS/HCC)     cervical   • Cataract    • Diabetes mellitus (CMS/HCC)     type 2 dm, check blood sugar every morning, diagnosed 3 or 4 years   • Full dentures    • Full dentures    • GERD (gastroesophageal reflux disease)    • High cholesterol    • History of IBS    • Hypertension     been off bp meds since last 2018   • Neuropathy    • Wears glasses      Past Surgical History:   Procedure Laterality Date   • CARDIAC CATHETERIZATION     • CHOLECYSTECTOMY      • COLONOSCOPY     • EYE SURGERY      bilateral cataract   • HYSTERECTOMY      partial   • JOINT REPLACEMENT      hip surgery 2018 in april   • OOPHORECTOMY      one removed   • TONSILLECTOMY     • TOTAL HIP ARTHROPLASTY Right 4/19/2018    Procedure: RIGHT TOTAL HIP ARTHROPLASTY;  Surgeon: Carlos Pollack MD;  Location: [facility];  Service: Orthopedics   • WRIST SURGERY      metal plate          OT ASSESSMENT FLOWSHEET (last 72 hours)      Occupational Therapy Evaluation     Row Name 02/13/19 0800                   OT Evaluation Time/Intention    Subjective Information  no complaints  -HK        Document Type  evaluation  -HK        Mode of Treatment  individual therapy;occupational therapy  -HK        Patient Effort  excellent  -HK        Symptoms Noted During/After Treatment  none  -HK           General Information    Patient Profile Reviewed?  yes  -HK        Onset of Illness/Injury or Date of Surgery  02/12/19  -HK        Referring Physician  MD Ranjeet   -HK        Patient Observations  alert;cooperative;agree to therapy  -HK        Patient/Family Observations   at bedside.   -HK        General Observations of Patient  Pt received supine in bed with IV heplocked.   -HK        Prior Level of Function  min assist:;all household mobility;gait;transfer;bed mobility;ADL's;home management;cooking;cleaning;using stairs;independent:;driving;dependent:;community mobility;shopping all mobility limited by pain  -HK        Equipment Currently Used at Home  grab bar;cane, straight;cane, quad;commode, bedside;walker, rolling;other (see comments) not currently using AE   -HK        Pertinent History of Current Functional Problem  Pt is a 73 YOF who presents [facility] for surgical management of intractable hip pain and dysfunction that failed to improve with conservative management. Pt is POD #1 s/p L DOROTHY.   -HK        Existing Precautions/Restrictions  fall;left;hip;other (see comments) Lateral hip precautions  -HK         Equipment Issued to Patient  leg ;elastic shoe laces  -HK        Risks Reviewed  patient:;spouse/S.O.:;LOB;nausea/vomiting;dizziness;increased discomfort;change in vital signs;increased drainage;lines disloged  -HK        Benefits Reviewed  patient:;spouse/S.O.:;improve function;increase independence;increase strength;increase balance;decrease pain;decrease risk of DVT;increase knowledge;improve skin integrity  -HK        Barriers to Rehab  previous functional deficit  -HK           Relationship/Environment    Primary Source of Support/Comfort  spouse  -HK        Lives With  spouse  -HK           Resource/Environmental Concerns    Current Living Arrangements  home/apartment/condo  -HK           Home Main Entrance    Number of Stairs, Main Entrance  one  -HK        Stair Railings, Main Entrance  none  -HK           Cognitive Assessment/Interventions    Additional Documentation  Cognitive Assessment/Intervention (Group)  -HK           Cognitive Assessment/Intervention- PT/OT    Affect/Mental Status (Cognitive)  WFL  -HK        Orientation Status (Cognition)  oriented x 4  -HK        Follows Commands (Cognition)  follows one step commands;over 90% accuracy  -HK        Safety Deficit (Cognitive)  mild deficit;safety precautions awareness;safety precautions follow-through/compliance  -HK        Personal Safety Interventions  fall prevention program maintained;gait belt;nonskid shoes/slippers when out of bed  -HK           Safety Issues, Functional Mobility    Impairments Affecting Function (Mobility)  pain;strength  -HK           Mobility Assessment/Treatment    Extremity Weight-bearing Status  left lower extremity  -HK        Left Lower Extremity (Weight-bearing Status)  weight-bearing as tolerated (WBAT)  -HK           Bed Mobility Assessment/Treatment    Bed Mobility Assessment/Treatment  supine-sit;scooting/bridging  -HK        Scooting/Bridging [place] (Bed Mobility)  supervision;verbal cues  -HK         Supine-Sit [place] (Bed Mobility)  supervision;verbal cues  -HK        Bed Mobility, Safety Issues  decreased use of arms for pushing/pulling;decreased use of legs for bridging/pushing  -HK        Assistive Device (Bed Mobility)  leg ;head of bed elevated  -HK        Comment (Bed Mobility)  OT issed leg  and educated pt on use. Pt able to recall hip precautions prior to getting out of bed. Pt advanced to EOB with supervision and use of leg .   -HK           Functional Mobility    Functional Mobility- Ind. Level  standby assist  -HK        Functional Mobility- Device  rolling walker  -HK        Functional Mobility-Distance (Feet)  25  -HK        Functional Mobility- Safety Issues  weight-shifting ability decreased;step length decreased  -HK        Functional Mobility- Comment  Pt ambulated from bed to bathroom and back to chair.   -HK           Transfer Assessment/Treatment    Transfer Assessment/Treatment  sit-stand transfer;stand-sit transfer;toilet transfer  -HK        Comment (Transfers)  Verbal cues for safe hand placement and to slide L LE out prior to t/f.   -HK           Sit-Stand Transfer    Sit-Stand Tripp (Transfers)  stand by assist;verbal cues  -HK        Assistive Device (Sit-Stand Transfers)  walker, front-wheeled  -HK           Stand-Sit Transfer    Stand-Sit Tripp (Transfers)  stand by assist;verbal cues  -HK        Assistive Device (Stand-Sit Transfers)  walker, front-wheeled  -HK           Toilet Transfer    Type (Toilet Transfer)  sit-stand;stand-sit  -HK        [place] Level (Toilet Transfer)  supervision  -HK        Assistive Device (Toilet Transfer)  walker, front-wheeled;grab bars/safety frame;raised toilet seat  -HK           ADL Assessment/Intervention    BADL Assessment/Intervention  lower body dressing;upper body dressing;toileting;bathing  -HK           Bathing Assessment/Intervention    Comment (Bathing)  Pt educated on use of LH sponge for  completion of LBB.   -HK           Upper Body Dressing Assessment/Training    Upper Body Dressing [place] Level  don;pull-over garment;independent  -HK        Upper Body Dressing Position  unsupported sitting  -HK           Lower Body Dressing Assessment/Training    Lower Body Dressing [place] Level  don;socks;doff;pants/bottoms;supervision;verbal cues  -HK        Assistive Devices (Lower Body Dressing)  reacher;sock-aid  -HK        Lower Body Dressing Position  unsupported sitting  -HK        Comment (Lower Body Dressing)  OT educated pt on use of AE for completion of all LBD. Pt dof/don socks with supervision and use of reacher and sock aid. Pt donned pants with use of reacher and supervision. Pt has ADL kit at home. OT issed elastic shoe laces.    -HK           Toileting Assessment/Training    [place] Level (Toileting)  toileting skills;supervision  -HK        Assistive Devices (Toileting)  grab bar/safety frame;raised toilet seat  -HK        Toileting Position  unsupported sitting  -HK        Comment (Toileting)  Pt completed pedro care and clothing managemetn with supervision.   -HK           BADL Safety/Performance    Impairments, BADL Safety/Performance  balance;pain;strength  -HK        Skilled BADL Treatment/Intervention  adaptive equipment training;BADL process/adaptation training  -HK           General ROM    RT Upper Ext  Rt Shoulder Flexion  -HK        LT Upper Ext  Lt Shoulder Flexion  -HK           Right Upper Ext    Rt Shoulder Flexion AROM  WFL for eval  -HK           Left Upper Ext    Lt Shoulder Flexion AROM  WFL for eval   -HK           MMT (Manual Muscle Testing)    Rt Upper Ext  Rt Shoulder WFL  -HK        Lt Upper Ext  Lt Shoulder WFL  -HK        General MMT Comments  WFL for eval   -HK           Motor Assessment/Interventions    Additional Documentation  Balance (Group)  -HK           Balance    Balance  static sitting balance;static standing balance;dynamic sitting  balance;dynamic standing balance  -HK           Static Sitting Balance    Level of [place] (Unsupported Sitting, Static Balance)  independent  -HK        Sitting Position (Unsupported Sitting, Static Balance)  sitting on edge of bed  -HK        Time Able to Maintain Position (Unsupported Sitting, Static Balance)  2 to 3 minutes  -HK           Dynamic Sitting Balance    Level of [place], Reaches Outside Midline (Sitting, Dynamic Balance)  independent  -HK        Sitting Position, Reaches Outside Midline (Sitting, Dynamic Balance)  sitting in chair  -HK        Comment, Reaches Outside Midline (Sitting, Dynamic Balance)  completing LBD with use of AE   -HK           Static Standing Balance    Level of [place] (Supported Standing, Static Balance)  supervision  -HK        Time Able to Maintain Position (Supported Standing, Static Balance)  45 to 60 seconds  -HK        Assistive Device Utilized (Supported Standing, Static Balance)  walker, rolling  -HK           Dynamic Standing Balance    Level of [place], Reaches Outside Midline (Standing, Dynamic Balance)  supervision  -HK        Time Able to Maintain Position, Reaches Outside Midline (Standing, Dynamic Balance)  30 to 45 seconds  -HK        Assistive Device Utilized (Supported Standing, Dynamic Balance)  walker, rolling  -HK        Comment, Reaches Outside Midline (Standing, Dynamic Balance)  completing clothing managemetn after pedro care.   -HK           Sensory Assessment/Intervention    Sensory General Assessment  no sensation deficits identified  -HK           Vision Assessment/Intervention    Visual Impairment/Limitations  WFL  -HK           Positioning and Restraints    Pre-Treatment Position  in bed  -HK        Post Treatment Position  chair  -HK        In Chair  notified nsg;reclined;call light within reach;encouraged to call for assist;exit alarm on;with family/caregiver  -HK           Pain Assessment    Additional Documentation  Pain  Scale: Numbers Pre/Post-Treatment (Group)  -HK           Pain Scale: Numbers Pre/Post-Treatment    Pain Scale: Numbers, Pretreatment  0/10 - no pain  -HK        Pain Scale: Numbers, Post-Treatment  0/10 - no pain  -HK           Wound 02/12/19 0835 Left hip incision    Wound - Properties Group Date first assessed: 02/12/19  -LD Time first assessed: 0835  -LD Side: Left  -LD Location: hip  -LD Type: incision  -LD       Coping    Observed Emotional State  accepting;calm  -HK           Plan of Care Review    Plan of Care Reviewed With  patient;spouse  -HK           Clinical Impression (OT)    Date of Referral to OT  02/12/19  -HK        OT Diagnosis  Decreased independence with ADLS and Mobility.   -HK        Patient/Family Goals Statement (OT Eval)  Pt would like to improve and return home.   -HK        Criteria for Skilled Therapeutic Interventions Met (OT Eval)  yes;treatment indicated  -HK        Rehab Potential (OT Eval)  good, to achieve stated therapy goals  -HK        Therapy Frequency (OT Eval)  daily  -HK        Care Plan Review (OT)  evaluation/treatment results reviewed;care plan/treatment goals reviewed;risks/benefits reviewed;patient/other agree to care plan  -HK        Care Plan Review, Other Participant (OT Eval)  spouse  -HK        Anticipated Discharge Disposition (OT)  home with assist;home with home health  -HK           Vital Signs    Pre Systolic BP Rehab  -- RN cleared for tx  -HK        Pre Patient Position  Supine  -HK        Intra Patient Position  Standing  -HK        Post Patient Position  Sitting  -HK           OT Goals    Bed Mobility Goal Selection (OT)  bed mobility, OT goal 1  -HK        Transfer Goal Selection (OT)  transfer, OT goal 1  -HK        Dressing Goal Selection (OT)  dressing, OT goal 1  -HK        Toileting Goal Selection (OT)  toileting, OT goal 1  -HK           Bed Mobility Goal 1 (OT)    Activity/Assistive Device (Bed Mobility Goal 1, OT)  sit to supine/supine to  sit;scooting;leg   -HK        [place] Level/Cues Needed (Bed Mobility Goal 1, OT)  conditional independence  -HK        Time Frame (Bed Mobility Goal 1, OT)  5 days  -HK        Progress/Outcomes (Bed Mobility Goal 1, OT)  goal ongoing  -HK           Transfer Goal 1 (OT)    Activity/Assistive Device (Transfer Goal 1, OT)  sit-to-stand/stand-to-sit;toilet  -HK        [place] Level/Cues Needed (Transfer Goal 1, OT)  conditional independence  -HK        Time Frame (Transfer Goal 1, OT)  5 days  -HK        Progress/Outcome (Transfer Goal 1, OT)  goal ongoing  -HK           Dressing Goal 1 (OT)    Activity/Assistive Device (Dressing Goal 1, OT)  lower body dressing  -HK        [place]/Cues Needed (Dressing Goal 1, OT)  conditional independence  -HK        Time Frame (Dressing Goal 1, OT)  5 days  -HK        Progress/Outcome (Dressing Goal 1, OT)  goal ongoing  -HK           Toileting Goal 1 (OT)    Activity/Device (Toileting Goal 1, OT)  toileting skills, all  -HK        [place] Level/Cues Needed (Toileting Goal 1, OT)  independent  -HK        Time Frame (Toileting Goal 1, OT)  5 days  -HK        Progress/Outcome (Toileting Goal 1, OT)  goal ongoing  -HK           Living Environment    Home Accessibility  not wheelchair accessible;stairs to enter home;other (see comments) walk in shower   -HK          User Key  (r) = Recorded By, (t) = Taken By, (c) = Cosigned By    Initials Name Effective Dates     Tona Sandhu RN 06/16/16 -      Connie Goetz OT 03/07/18 -          Occupational Therapy Education     Title: PT OT SLP Therapies (In Progress)     Topic: Occupational Therapy (In Progress)     Point: ADL training (Done)     Description: Instruct learner(s) on proper safety adaptation and remediation techniques during self care or transfers.   Instruct in proper use of assistive devices.    Learning Progress Summary           Patient Acceptance, E,TB,D, VU,DU by  at 2/13/2019  9:38 AM     Comment:  Pt and  educated on ADL retraining with use of AE for completion of all LBD and LBB, safety precautions, and appropriate body mechanics to maintain hip precautions.   Significant Other Acceptance, E,TB,D, VU,DU by  at 2/13/2019  9:38 AM    Comment:  Pt and  educated on ADL retraining with use of AE for completion of all LBD and LBB, safety precautions, and appropriate body mechanics to maintain hip precautions.                   Point: Precautions (Done)     Description: Instruct learner(s) on prescribed precautions during self-care and functional transfers.    Learning Progress Summary           Patient Acceptance, E,TB,D, VU,DU by  at 2/13/2019  9:38 AM    Comment:  Pt and  educated on ADL retraining with use of AE for completion of all LBD and LBB, safety precautions, and appropriate body mechanics to maintain hip precautions.   Significant Other Acceptance, E,TB,D, VU,DU by  at 2/13/2019  9:38 AM    Comment:  Pt and  educated on ADL retraining with use of AE for completion of all LBD and LBB, safety precautions, and appropriate body mechanics to maintain hip precautions.                   Point: Body mechanics (Done)     Description: Instruct learner(s) on proper positioning and spine alignment during self-care, functional mobility activities and/or exercises.    Learning Progress Summary           Patient Acceptance, E,TB,D, VU,DU by  at 2/13/2019  9:38 AM    Comment:  Pt and  educated on ADL retraining with use of AE for completion of all LBD and LBB, safety precautions, and appropriate body mechanics to maintain hip precautions.   Significant Other Acceptance, E,TB,D, VU,DU by  at 2/13/2019  9:38 AM    Comment:  Pt and  educated on ADL retraining with use of AE for completion of all LBD and LBB, safety precautions, and appropriate body mechanics to maintain hip precautions.                               User Key     Initials Effective Dates Name  Provider Type Discipline     03/07/18 -  Connie Goetz, OT Occupational Therapist OT                  OT Recommendation and Plan  Outcome Summary/Treatment Plan (OT)  Anticipated Discharge Disposition (OT): home with assist, home with home health  Therapy Frequency (OT Eval): daily  Plan of Care Review  Plan of Care Reviewed With: patient  Plan of Care Reviewed With: patient  Outcome Summary: OT eval complete. Pt completes sit to stand with SBA and ambulates with SBA and RW. Pt educated on use of AE for completion of all LBD and LBB. Pt has ADL kit in room. Pt dof/don socks and pants with supervision, verbal cues, and use of AE. Pt with good verbal recall of hip precautions. Recommend d/c home with HH when medically ready.     Outcome Measures     Row Name 02/13/19 0901 02/13/19 0800 02/12/19 1426       How much help from another person do you currently need...    Turning from your back to your side while in flat bed without using bedrails?  3  -MJ  --  3  -LR    Moving from lying on back to sitting on the side of a flat bed without bedrails?  3  -MJ  --  3  -LR    Moving to and from a bed to a chair (including a wheelchair)?  3  -MJ  --  3  -LR    Standing up from a chair using your arms (e.g., wheelchair, bedside chair)?  3  -MJ  --  3  -LR    Climbing 3-5 steps with a railing?  3  -MJ  --  2  -LR    To walk in hospital room?  3  -MJ  --  3  -LR    AM-PAC 6 Clicks Score  18  -MJ  --  17  -LR       How much help from another is currently needed...    Putting on and taking off regular lower body clothing?  --  3  -HK  --    Bathing (including washing, rinsing, and drying)  --  3  -HK  --    Toileting (which includes using toilet bed pan or urinal)  --  4  -HK  --    Putting on and taking off regular upper body clothing  --  4  -HK  --    Taking care of personal grooming (such as brushing teeth)  --  3  -HK  --    Eating meals  --  4  -HK  --    Score  --  21  -HK  --       Functional Assessment    Outcome Measure  Options  AM-PAC 6 Clicks Basic Mobility (PT)  -  AM-PAC 6 Clicks Daily Activity (OT)  -HK  AM-PAC 6 Clicks Basic Mobility (PT)  -LR      User Key  (r) = Recorded By, (t) = Taken By, (c) = Cosigned By    Initials Name Provider Type    LR Selin Cochran, PT Physical Therapist    Jay Jay Macias, PT Physical Therapist    HK Connie Goetz, OT Occupational Therapist          Time Calculation:   Time Calculation- OT     Row Name 02/13/19 0901 02/13/19 0800          Time Calculation- OT    OT Start Time  --  0800  -HK     OT Received On  --  02/13/19  -     OT Goal Re-Cert Due Date  --  02/23/19  -        Timed Charges    05586 - Gait Training Minutes   13  -  --     40023 - OT Self Care/Mgmt Minutes  --  25  -HK       User Key  (r) = Recorded By, (t) = Taken By, (c) = Cosigned By    Initials Name Provider Type    Jay Jay Macias, PT Physical Therapist    HK Connie Goetz, OT Occupational Therapist        Therapy Suggested Charges     Code   Minutes Charges    82143 (CPT®) Hc Ot Neuromusc Re Education Ea 15 Min      84033 (CPT®) Hc Ot Ther Proc Ea 15 Min      48688 (CPT®) Hc Ot Therapeutic Act Ea 15 Min      94892 (CPT®) Hc Ot Manual Therapy Ea 15 Min      42561 (CPT®) Hc Ot Iontophoresis Ea 15 Min      17934 (CPT®) Hc Ot Elec Stim Ea-Per 15 Min      96775 (CPT®) Hc Ot Ultrasound Ea 15 Min      52030 (CPT®) Hc Ot Self Care/Mgmt/Train Ea 15 Min 25 2    Total  25 2        Therapy Charges for Today     Code Description Service Date Service Provider Modifiers Qty    73296825831 HC OT SELF CARE/MGMT/TRAIN EA 15 MIN 2/13/2019 Connie Goetz, OT GO 2    96731454907 HC OT EVAL MOD COMPLEXITY 2 2/13/2019 Connie Goetz, OT GO 1               Connie Goetz OT  2/13/2019

## 2019-02-13 NOTE — PLAN OF CARE
Problem: Patient Care Overview  Goal: Plan of Care Review  Outcome: Ongoing (interventions implemented as appropriate)   02/13/19 0901   Coping/Psychosocial   Plan of Care Reviewed With patient;spouse   Plan of Care Review   Progress improving   OTHER   Outcome Summary Pt increased gait distance to 350 feet with CGA and RW. Pt progressed to stair training x1 step with RW to mimic home environment. Pt is discharging home today with HHPT, made good progress toward goals during inpatient stay       Problem: Hip Arthroplasty (Total, Partial) (Adult)  Goal: Signs and Symptoms of Listed Potential Problems Will be Absent, Minimized or Managed (Hip Arthroplasty)  Outcome: Ongoing (interventions implemented as appropriate)   02/13/19 0901   Goal/Outcome Evaluation   Problems Assessed (Hip Arthroplasty) functional deficit;pain   Problems Present (Hip Arthroplasty) functional deficit;pain

## 2019-02-13 NOTE — DISCHARGE SUMMARY
Patient Name: Nely Grider  MRN: [id number]  : 1945  DOS: 2019    Attending: No att. providers found    Primary Care Provider: Raimundo Ibarra MD    Date of Admission:.2019  5:32 AM    Date of Discharge:  2019    Discharge Diagnosis:     Status post total replacement of left hip    Primary osteoarthritis of left hip    Mixed hyperlipidemia    Essential hypertension    Controlled type 2 diabetes mellitus without complication, without long-term current use of insulin (CMS/LTAC, located within [facility])    Leukocytosis, likely reactive    Renal insufficiency    Acute blood loss anemia, mild, asymptomatic    Acute postoperative pain      Hospital Course  Patient is a 73 y.o. female presented for left total hip arthroplasty by Dr. Pollack.     She underwent surgery under spinal anesthesia. She tolerated surgery well and was admitted for further medical management.     She is known to us from previous right hip replacement in 2018; which she recovered well.  She reports history of blood clot 30-40 years ago, but does not recall treatment. A sister of hers had LE DVTs and had IVC filters and other complications as well.    The patient has done well postop. The patient has been able to ambulate 350 feet with PT.    The patient has had good pain control with PO pain medications.  Adjustments were made to pain medications to optimize postop pain management. Risks and benefits of opiate medications discussed with patient.    The patient was placed on DVT prophylaxis including aspirin. The patient was encouraged to use IS for atelectasis prophylaxis.   The patient was placed on a bowel regimen to prevent constipation while on pain medication.   The patient's H/H was monitored with a slight decrease that remained asymptomatic.    It is felt by all involved that the patient can discharge home at this time, and the patient has no further questions        Procedures Performed  DATE OF PROCEDURE:  19     PREOPERATIVE  "DIAGNOSIS: left hip arthritis     POSTOPERATIVE DIAGNOSIS: left hip arthritis     PROCEDURE PERFORMED: left total hip arthroplasty with DePuy components     IMPLANTS: # 50 press-fit [place] cup with +4 neutral polyethylene liner with # 6 high offset press-fit [place] stem with 32 x +1 [employer] ceramic head     SURGEON: Carlos Pollack MD    Pertinent Test Results:    I reviewed the patient's new clinical results.   Results from last 7 days   Lab Units 02/13/19  0642   WBC 10*3/mm3 17.36*   HEMOGLOBIN g/dL 10.5*   HEMATOCRIT % 33.4*   PLATELETS 10*3/mm3 308     Results for BRIGITTE CHOW (MRN [id number]) as of 2/13/2019 09:54   Ref. Range 1/28/2019 08:14   Hemoglobin Latest Ref Range: 11.5 - 15.5 g/dL 13.8   Hematocrit Latest Ref Range: 34.5 - 44.0 % 43.9     Results from last 7 days   Lab Units 02/13/19  0642   SODIUM mmol/L 132   POTASSIUM mmol/L 4.0   CHLORIDE mmol/L 103   CO2 mmol/L 24.0   BUN mg/dL 19   CREATININE mg/dL 1.46*   CALCIUM mg/dL 9.1   GLUCOSE mg/dL 201*   Results for BRIGITTE CHOW (MRN [id number]) as of 2/13/2019 09:54   Ref. Range 1/28/2019 08:14   Creatinine Latest Ref Range: 0.60 - 1.30 mg/dL 1.23     Results for BRIGITTE CHOW (MRN [id number]) as of 2/13/2019 09:54   Ref. Range 2/12/2019 20:19 2/13/2019 02:08 2/13/2019 06:42 2/13/2019 07:27   Glucose Latest Ref Range: 70 - 130 mg/dL 276 (H) 207 (H) 201 (H) 168 (H)     I reviewed the patient's new imaging including images and reports.      Physical therapy: Pt increased gait distance to 350 feet with CGA and RW. Pt progressed to stair training x1 step with RW to mimic home environment. Pt is discharging home today with HHPT, made good progress toward goals during inpatient stay     Discharge Assessment:    Vital Signs  Visit Vitals  /67 (BP Location: Left arm, Patient Position: Lying)   Pulse 75   Temp 98.1 °F (36.7 °C) (Oral)   Resp 16   Ht 165.1 cm (65\")   Wt 84.4 kg (186 lb)   SpO2 98%   BMI 30.95 kg/m²     Temp (24hrs), " Av.8 °F (36.6 °C), Min:97.6 °F (36.4 °C), Max:98.1 °F (36.7 °C)      General Appearance:    Alert, cooperative, in no acute distress   Lungs:     Clear to auscultation,respirations regular, even and                   unlabored    Heart:    Regular rhythm and normal rate, normal S1 and S2   Abdomen:     Normal bowel sounds, no masses, no organomegaly, soft        non-tender, non-distended, no guarding, no rebound                 tenderness   Extremities:   Moves all extremities well, no edema, no cyanosis, no              Redness. Left hip Prineo CDI   Pulses:   Pulses palpable and equal bilaterally   Skin:   No bleeding, bruising or rash   Neurologic:   Cranial nerves 2 - 12 grossly intact, sensation intact. Flexion and dorsiflexion intact bilateral feet.       Discharge Disposition: Home    Discharge Medications     Discharge Medications      New Medications      Instructions Start Date   aspirin 325 MG EC tablet   325 mg, Oral, Daily, For 1 month      docusate sodium 100 MG capsule   100 mg, Oral, 2 Times Daily PRN      oxyCODONE-acetaminophen 5-325 MG per tablet  Commonly known as:  PERCOCET   1 tablet, Oral, Every 4 Hours PRN         Continue These Medications      Instructions Start Date   gabapentin 400 MG capsule  Commonly known as:  NEURONTIN   400 mg, Oral, 3 Times Daily      glimepiride 4 MG tablet  Commonly known as:  AMARYL   TAKE 1 TABLET EVERY MORNING BEFORE BREAKFAST      metFORMIN 500 MG tablet  Commonly known as:  GLUCOPHAGE   500 mg, Oral, 2 Times Daily With Meals      omeprazole 20 MG capsule  Commonly known as:  priLOSEC   TAKE 1 CAPSULE TWICE DAILY      simvastatin 10 MG tablet  Commonly known as:  ZOCOR   TAKE 1 TABLET AT BEDTIME             Discharge Diet: Consistent carb diet    Activity at Discharge: WBAT LLE    Follow-up Appointments  Dr. Pollack per his orders    Discharge took over 30 min.    I have personally performed the evaluation on this patient. My history is consistant  with above  documentation . My exam finding are listed above. I have personally reviewed and discussed the above formulated discharge plan with patient and [email or web address].    Prisca Melton MD  02/14/19  11:59 AM

## 2019-02-13 NOTE — PLAN OF CARE
Problem: Patient Care Overview  Goal: Plan of Care Review  Outcome: Ongoing (interventions implemented as appropriate)   02/13/19 7082   Coping/Psychosocial   Plan of Care Reviewed With patient;spouse   Plan of Care Review   Progress improving   OTHER   Outcome Summary Pt had uneventful night, slept between [facility] care. Ambulated in [place] with standby assist. Voids without problems. Pt doing well. VSS       Problem: Hip Arthroplasty (Total, Partial) (Adult)  Goal: Signs and Symptoms of Listed Potential Problems Will be Absent, Minimized or Managed (Hip Arthroplasty)  Outcome: Ongoing (interventions implemented as appropriate)      Problem: Fall Risk (Adult)  Goal: Identify Related Risk Factors and Signs and Symptoms  Outcome: Ongoing (interventions implemented as appropriate)

## 2019-02-13 NOTE — THERAPY DISCHARGE NOTE
Acute Care - Physical Therapy Treatment Note/Discharge   Alin     Patient Name: Nely Grider  : 1945  MRN: [id number]  Today's Date: 2019  Onset of Illness/Injury or Date of Surgery: (P) 19  Date of Referral to PT: 19  Referring Physician: MD Pollack (P)     Admit Date: 2019    Visit Dx:    ICD-10-CM ICD-9-CM   1. Impaired functional mobility, balance, gait, and endurance Z74.09 V49.89   2. Primary osteoarthritis of left hip M16.12 715.15   3. Impaired mobility and ADLs Z74.09 799.89     Patient Active Problem List   Diagnosis   • Neck sprain   • Cough   • Gastroesophageal reflux disease without esophagitis   • Mixed hyperlipidemia   • Essential hypertension   • Cervical sprain   • Controlled type 2 diabetes mellitus without complication, without long-term current use of insulin (CMS/HCC)   • Mass of right hip region   • Anemia   • Constipation   • Diabetic polyneuropathy associated with type 2 diabetes mellitus (CMS/HCC)   • Primary osteoarthritis of both hips   • Status post total replacement of left hip   • Renal insufficiency   • Colon polyp   • Diverticulosis   • Primary osteoarthritis of left hip       Physical Therapy Education     Title: PT OT SLP Therapies (In Progress)     Topic: Physical Therapy (Done)     Point: Mobility training (Done)     Learning Progress Summary           Patient Acceptance, E,JULIANE,H, VU by MARIAH at 2019  9:01 AM    Comment:  Reviewed lateral hip precautions, HEP, gait mechanics, benefits of mobility, car transfer, stair sequencing. Issued HEP handout    Acceptance, E,JULIANE, VU,NR by LR at 2019  2:26 PM    Comment:  Educated on hip precautions, weight bearing status, correct supine to sit t/f technique, correct sit<->stand t/f technique, correct gait mechanics, and progression of POC.   Family Acceptance, E,D, VU,NR by LR at 2019  2:26 PM    Comment:  Educated on hip precautions, weight bearing status, correct supine to sit t/f technique,  correct sit<->stand t/f technique, correct gait mechanics, and progression of POC.   Significant Other Acceptance, E,D,H, VU by MARIAH at 2/13/2019  9:01 AM    Comment:  Reviewed lateral hip precautions, HEP, gait mechanics, benefits of mobility, car transfer, stair sequencing. Issued HEP handout    Acceptance, E,D, VU,NR by LR at 2/12/2019  2:26 PM    Comment:  Educated on hip precautions, weight bearing status, correct supine to sit t/f technique, correct sit<->stand t/f technique, correct gait mechanics, and progression of POC.                   Point: Home exercise program (Done)     Learning Progress Summary           Patient Acceptance, E,D,H, VU by MARIAH at 2/13/2019  9:01 AM    Comment:  Reviewed lateral hip precautions, HEP, gait mechanics, benefits of mobility, car transfer, stair sequencing. Issued HEP handout    Acceptance, E,D, VU,NR by LR at 2/12/2019  2:26 PM    Comment:  Educated on hip precautions, weight bearing status, correct supine to sit t/f technique, correct sit<->stand t/f technique, correct gait mechanics, and progression of POC.   Family Acceptance, E,D, VU,NR by LR at 2/12/2019  2:26 PM    Comment:  Educated on hip precautions, weight bearing status, correct supine to sit t/f technique, correct sit<->stand t/f technique, correct gait mechanics, and progression of POC.   Significant Other Acceptance, E,D,H, VU by MARIAH at 2/13/2019  9:01 AM    Comment:  Reviewed lateral hip precautions, HEP, gait mechanics, benefits of mobility, car transfer, stair sequencing. Issued HEP handout    Acceptance, E,D, VU,NR by LR at 2/12/2019  2:26 PM    Comment:  Educated on hip precautions, weight bearing status, correct supine to sit t/f technique, correct sit<->stand t/f technique, correct gait mechanics, and progression of POC.                   Point: Body mechanics (Done)     Learning Progress Summary           Patient Acceptance, E,D,H, VU by MARIAH at 2/13/2019  9:01 AM    Comment:  Reviewed lateral hip  precautions, HEP, gait mechanics, benefits of mobility, car transfer, stair sequencing. Issued HEP handout    Acceptance, E,D, VU,NR by LR at 2/12/2019  2:26 PM    Comment:  Educated on hip precautions, weight bearing status, correct supine to sit t/f technique, correct sit<->stand t/f technique, correct gait mechanics, and progression of POC.   Family Acceptance, E,D, VU,NR by LR at 2/12/2019  2:26 PM    Comment:  Educated on hip precautions, weight bearing status, correct supine to sit t/f technique, correct sit<->stand t/f technique, correct gait mechanics, and progression of POC.   Significant Other Acceptance, E,D,H, VU by MARIAH at 2/13/2019  9:01 AM    Comment:  Reviewed lateral hip precautions, HEP, gait mechanics, benefits of mobility, car transfer, stair sequencing. Issued HEP handout    Acceptance, E,D, VU,NR by LR at 2/12/2019  2:26 PM    Comment:  Educated on hip precautions, weight bearing status, correct supine to sit t/f technique, correct sit<->stand t/f technique, correct gait mechanics, and progression of POC.                   Point: Precautions (Done)     Learning Progress Summary           Patient Acceptance, E,D,H, VU by MARIAH at 2/13/2019  9:01 AM    Comment:  Reviewed lateral hip precautions, HEP, gait mechanics, benefits of mobility, car transfer, stair sequencing. Issued HEP handout    Acceptance, E,D, VU,NR by LR at 2/12/2019  2:26 PM    Comment:  Educated on hip precautions, weight bearing status, correct supine to sit t/f technique, correct sit<->stand t/f technique, correct gait mechanics, and progression of POC.   Family Acceptance, E,D, VU,NR by LR at 2/12/2019  2:26 PM    Comment:  Educated on hip precautions, weight bearing status, correct supine to sit t/f technique, correct sit<->stand t/f technique, correct gait mechanics, and progression of POC.   Significant Other Acceptance, E,D,H, VU by MARIAH at 2/13/2019  9:01 AM    Comment:  Reviewed lateral hip precautions, HEP, gait mechanics,  benefits of mobility, car transfer, stair sequencing. Issued HEP handout    Acceptance, E,D, VU,NR by LR at 2/12/2019  2:26 PM    Comment:  Educated on hip precautions, weight bearing status, correct supine to sit t/f technique, correct sit<->stand t/f technique, correct gait mechanics, and progression of POC.                               User Key     Initials Effective Dates Name Provider Type Discipline     06/19/15 -  Selin Cochran, PT Physical Therapist PT     04/03/18 -  Jay Jay Carr, PT Physical Therapist PT              Rehab Goal Summary     Row Name 02/13/19 0901             Physical Therapy Goals    Bed Mobility Goal Selection (PT)  bed mobility, PT goal 1  -MJ      Transfer Goal Selection (PT)  transfer, PT goal 1  -MJ      Gait Training Goal Selection (PT)  gait training, PT goal 1  -MJ      Stairs Goal Selection (PT)  stairs, PT goal 1  -MJ         Bed Mobility Goal 1 (PT)    Activity/Assistive Device (Bed Mobility Goal 1, PT)  sit to supine/supine to sit  -MJ      [place] Level/Cues Needed (Bed Mobility Goal 1, PT)  conditional independence  -MJ      Time Frame (Bed Mobility Goal 1, PT)  long term goal (LTG);3 days  -MJ      Progress/Outcomes (Bed Mobility Goal 1, PT)  goal not met;discharged from facility  -MJ         Transfer Goal 1 (PT)    Activity/Assistive Device (Transfer Goal 1, PT)  sit-to-stand/stand-to-sit;walker, rolling  -MJ      [place] Level/Cues Needed (Transfer Goal 1, PT)  conditional independence  -MJ      Time Frame (Transfer Goal 1, PT)  long term goal (LTG);3 days  -MJ      Progress/Outcome (Transfer Goal 1, PT)  goal not met;discharged from facility  -MJ         Gait Training Goal 1 (PT)    Activity/Assistive Device (Gait Training Goal 1, PT)  gait (walking locomotion);walker, rolling  -MJ      [place] Level (Gait Training Goal 1, PT)  conditional independence  -MJ      Distance (Gait Goal 1, PT)  500 feet  -MJ      Time Frame (Gait Training Goal 1,  PT)  long term goal (LTG);3 days  -MJ      Progress/Outcome (Gait Training Goal 1, PT)  goal not met;discharged from facility  -MJ         Stairs Goal 1 (PT)    Activity/Assistive Device (Stairs Goal 1, PT)  ascending stairs;descending stairs;step-to-step;walker, rolling;other (see comments) backwards  -MJ      [place] Level/Cues Needed (Stairs Goal 1, PT)  contact guard assist  -MJ      Number of Stairs (Stairs Goal 1, PT)  1  -MJ      Time Frame (Stairs Goal 1, PT)  long term goal (LTG);3 days  -MJ      Progress/Outcome (Stairs Goal 1, PT)  goal met  -MJ        User Key  (r) = Recorded By, (t) = Taken By, (c) = Cosigned By    Initials Name Provider Type Discipline    Jay Jay Macias, PT Physical Therapist PT        Therapy Treatment  Rehabilitation Treatment Summary     Row Name 02/13/19 0901             Treatment Time/Intention    Discipline  physical therapist  -      Document Type  therapy note (daily note);discharge treatment  -MJ      Subjective Information  no complaints  -MJ      Mode of Treatment  physical therapy  -MJ      Patient/Family Observations  Pt sitting UIC  -MJ      Care Plan Review  patient/other agree to care plan  -MJ      Care Plan Review, Other Participant(s)  spouse  -MJ      Patient Effort  excellent  -MJ      Existing Precautions/Restrictions  fall;left;hip;other (see comments) lateral hip approach  -MJ      Recorded by [MJ] Jay Jay Carr, PT 02/13/19 0928      Row Name 02/13/19 0901             Cognitive Assessment/Intervention- PT/OT    Orientation Status (Cognition)  oriented x 4  -MJ      Follows Commands (Cognition)  WNL  -MJ      Recorded by [MJ] Jay Jay Carr, PT 02/13/19 0928      Row Name 02/13/19 0901             Safety Issues, Functional Mobility    Impairments Affecting Function (Mobility)  pain;strength  -MJ      Recorded by [MJ] Jay Jay Carr, PT 02/13/19 0928      Row Name 02/13/19 0901             Mobility Assessment/Intervention    Extremity Weight-bearing Status   left lower extremity  -MJ      Left Lower Extremity (Weight-bearing Status)  weight-bearing as tolerated (WBAT)  -MJ      Recorded by [MJ] Jay Jay Carr, PT 02/13/19 0928      Row Name 02/13/19 0901             Bed Mobility Assessment/Treatment    Comment (Bed Mobility)  NT- pt UIC  -MJ      Recorded by [MJ] Jay Jay Carr, PT 02/13/19 0928      Row Name 02/13/19 0901             Transfer Assessment/Treatment    Transfer Assessment/Treatment  sit-stand transfer;stand-sit transfer  -MJ      Comment (Transfers)  Verbal cues for correct hand placement and to step L LE out prior to t/f.   -MJ      Recorded by [MJ] Jay Jay Carr, PT 02/13/19 0928      Row Name 02/13/19 0901             Sit-Stand Transfer    Sit-Stand [place] (Transfers)  supervision;verbal cues  -MJ      Assistive Device (Sit-Stand Transfers)  walker, front-wheeled  -MJ      Recorded by [MJ] Jay Jay Carr, PT 02/13/19 0928      Row Name 02/13/19 0901             Stand-Sit Transfer    Stand-Sit [place] (Transfers)  supervision;verbal cues  -MJ      Assistive Device (Stand-Sit Transfers)  walker, front-wheeled  -MJ      Recorded by [MJ] Jay Jay Carr, PT 02/13/19 0928      Row Name 02/13/19 0901             Gait/Stairs Assessment/Training    66637 - Gait Training Minutes   13  -MJ      [place] Level (Gait)  contact guard;verbal cues  -MJ      Assistive Device (Gait)  walker, front-wheeled  -MJ      Distance in Feet (Gait)  350  -MJ      Pattern (Gait)  step-through  -MJ      Deviations/Abnormal Patterns (Gait)  left sided deviations;antalgic;bilateral deviations;marika decreased;stride length decreased  -MJ      Bilateral Gait Deviations  heel strike decreased;weight shift ability decreased;forward flexed posture  -MJ      Negotiation (Stairs)  stairs independence;stairs assistive device;number of steps;ascending technique;descending technique  -MJ      [place] Level (Stairs)  contact guard;verbal cues  -MJ      Assistive Device (Stairs)   walker, front-wheeled  -MJ      Handrail Location (Stairs)  none  -MJ      Number of Steps (Stairs)  1  -MJ      Ascending Technique (Stairs)  step-to-step  -MJ      Descending Technique (Stairs)  step-to-step  -MJ      Comment (Gait/Stairs)  Pt demo step through gait pattern at slow pace. Verbal cues to stay in middle of RW, to allow heel to strike at initial contact and to increase LE weight bearing, improvement noted. Gait limited by fatigue. Pt performed 1 step backward with RW. Cues to ascend with R LE and to descend forward with L LE  -MJ      Recorded by [MJ] Jay Jay Carr, PT 02/13/19 0928      Row Name 02/13/19 0901             Therapeutic Exercise    43484 - PT Therapeutic Exercise Minutes  10  -MJ      Recorded by [MJ] Jay Jay Carr, PT 02/13/19 0928      Row Name 02/13/19 0901             Therapeutic Exercise    Lower Extremity (Therapeutic Exercise)  gluteal sets;heel slides, left;LAQ (long arc quad), left;quad sets, left;SLR (straight leg raise), left  -MJ      Lower Extremity Range of Motion (Therapeutic Exercise)  hip abduction/adduction, left;ankle dorsiflexion/plantar flexion, left  -MJ      Exercise Type (Therapeutic Exercise)  AAROM (active assistive range of motion)  -MJ      Position (Therapeutic Exercise)  seated  -MJ      Sets/Reps (Therapeutic Exercise)  15x each  -MJ      Comment (Therapeutic Exercise)  Cues for technique. Patricia w/ SLR  -MJ      Recorded by [MJ] Jay Jay Carr, PT 02/13/19 0928      Row Name 02/13/19 0901             Positioning and Restraints    Pre-Treatment Position  sitting in chair/recliner  -MJ      Post Treatment Position  chair  -MJ      In Chair  notified nsg;reclined;call light within reach;encouraged to call for assist;exit alarm on;with family/caregiver  -MJ      Recorded by [MJ] Jay Jay Carr, PT 02/13/19 0928      Row Name 02/13/19 0901             Pain Scale: Numbers Pre/Post-Treatment    Pain Scale: Numbers, Pretreatment  0/10 - no pain  -MJ      Pain Scale:  Numbers, Post-Treatment  0/10 - no pain  -MJ      Recorded by [MJ] Jay Jay Carr, PT 02/13/19 0928      Row Name                Wound 02/12/19 0835 Left hip incision    Wound - Properties Group Date first assessed: 02/12/19 [LD] Time first assessed: 0835 [LD] Side: Left [LD] Location: hip [LD] Type: incision [LD] Recorded by:  [LD] Tona Sandhu RN 02/12/19 0835    Row Name 02/13/19 0901             Plan of Care Review    Plan of Care Reviewed With  patient;spouse  -MJ      Recorded by [MJ] Jay Jay Carr, PT 02/13/19 0928      Row Name 02/13/19 0901             Outcome Summary/Treatment Plan (PT)    Daily Summary of Progress (PT)  progress toward functional goals is good  -MJ      Recorded by [MJ] Jay Jay Carr, PT 02/13/19 0928        User Key  (r) = Recorded By, (t) = Taken By, (c) = Cosigned By    Initials Name Effective Dates Discipline    LD Tona Sandhu RN 06/16/16 -  Nurse    Jay Jay Macias, PT 04/03/18 -  PT        Wound 02/12/19 0835 Left hip incision (Active)   Dressing Appearance no drainage;intact;dry 2/12/2019  7:23 PM   Closure JOEL 2/12/2019  7:23 PM   Base dressing in place, unable to visualize 2/12/2019  7:23 PM   Dressing Care, Wound gauze;other (see comments) 2/12/2019  7:23 PM       PT Recommendation and Plan     Outcome Summary/Treatment Plan (PT)  Daily Summary of Progress (PT): progress toward functional goals is good  Plan of Care Reviewed With: patient, spouse  Progress: improving  Outcome Summary: Pt increased gait distance to 350 feet with CGA and RW. Pt progressed to stair training x1 step with RW to mimic home environment. Pt is discharging home today with HHPT, made good progress toward goals during inpatient stay    Outcome Measures     Row Name 02/13/19 0901 02/12/19 1426          How much help from another person do you currently need...    Turning from your back to your side while in flat bed without using bedrails?  3  -MJ  3  -LR     Moving from lying on back to sitting on  the side of a flat bed without bedrails?  3  -MJ  3  -LR     Moving to and from a bed to a chair (including a wheelchair)?  3  -MJ  3  -LR     Standing up from a chair using your arms (e.g., wheelchair, bedside chair)?  3  -MJ  3  -LR     Climbing 3-5 steps with a railing?  3  -MJ  2  -LR     To walk in hospital room?  3  -MJ  3  -LR     AM-PAC 6 Clicks Score  18  -MJ  17  -LR        Functional Assessment    Outcome Measure Options  AM-PAC 6 Clicks Basic Mobility (PT)  -MJ  AM-PAC 6 Clicks Basic Mobility (PT)  -LR       User Key  (r) = Recorded By, (t) = Taken By, (c) = Cosigned By    Initials Name Provider Type    Selin Rawls, PT Physical Therapist    Jay Jay Macias, PT Physical Therapist           Time Calculation:   PT Charges     Row Name 02/13/19 0901             Time Calculation    Start Time  0901  -      PT Received On  02/13/19  -      PT Goal Re-Cert Due Date  02/22/19  -         Time Calculation- PT    Total Timed Code Minutes- PT  23 minute(s)  -         Timed Charges    86386 - PT Therapeutic Exercise Minutes  10  -MJ      48599 - Gait Training Minutes   13  -MJ        User Key  (r) = Recorded By, (t) = Taken By, (c) = Cosigned By    Initials Name Provider Type    Jay Jay Macias, PT Physical Therapist        Therapy Suggested Charges     Code   Minutes Charges    20089 (CPT®) Hc Pt Neuromusc Re Education Ea 15 Min      54801 (CPT®) Hc Pt Ther Proc Ea 15 Min 10 1    59270 (CPT®) Hc Gait Training Ea 15 Min 13 1    16904 (CPT®) Hc Pt Therapeutic Act Ea 15 Min      91783 (CPT®) Hc Pt Manual Therapy Ea 15 Min      64215 (CPT®) Hc Pt Iontophoresis Ea 15 Min      73862 (CPT®) Hc Pt Elec Stim Ea-Per 15 Min      86832 (CPT®) Hc Pt Ultrasound Ea 15 Min      77338 (CPT®) Hc Pt Self Care/Mgmt/Train Ea 15 Min      74807 (CPT®) Hc Pt Prosthetic (S) Train Initial Encounter, Each 15 Min      02208 (CPT®) Hc Pt Orthotic(S)/Prosthetic(S) Encounter, Each 15 Min      98601 (CPT®) Hc Orthotic(S)  Mgmt/Train Initial Encounter, Each 15min      Total  23 2          Therapy Charges for Today     Code Description Service Date Service Provider Modifiers Qty    39135056293 HC PT THER PROC EA 15 MIN 2/13/2019 Jay Jay Carr, PT GP 1    26381131781 HC GAIT TRAINING EA 15 MIN 2/13/2019 Jay Jay Carr, PT GP 1    54226686806 HC PT THER SUPP EA 15 MIN 2/13/2019 Jay Jay Carr, PT GP 2          PT G-Codes  Outcome Measure Options: AM-PAC 6 Clicks Basic Mobility (PT)  AM-PAC 6 Clicks Score: 18    PT Discharge Summary  Reason for Discharge: Discharge from facility  Outcomes Achieved: Patient able to partially acheive established goals  Discharge Destination: Home with assist, Home with home health    Jay Jay Carr, PT  2/13/2019

## 2019-02-13 NOTE — PROGRESS NOTES
Discharge Planning Assessment  [facility]     Patient Name: Nely Grider  MRN: [id number]  Today's Date: 2/13/2019    Admit Date: 2/12/2019    Discharge Needs Assessment     Row Name 02/13/19 1218       Living Environment    Lives With  spouse    Name(s) of Who Lives With Patient  Mir Grider (spouse) [phone]    Current Living Arrangements  home/apartment/condo    Primary Care Provided by  self    Provides Primary Care For  no one    Family Caregiver if Needed  spouse    Family Caregiver Names  Mir( spouse)    Quality of Family Relationships  helpful;supportive       Resource/Environmental Concerns    Resource/Environmental Concerns  none    Transportation Concerns  car, none       Transition Planning    Patient/Family Anticipates Transition to  home with family    Patient/Family Anticipated Services at Transition  outpatient care    Transportation Anticipated  family or friend will provide       Discharge Needs Assessment    Readmission Within the Last 30 Days  no previous admission in last 30 days    Concerns to be Addressed  basic needs;discharge planning    Equipment Currently Used at Home  grab bar;shower chair;walker, rolling;cane, straight    Anticipated Changes Related to Illness  inability to care for self    Equipment Needed After Discharge  none    Outpatient/Agency/Support Group Needs  outpatient therapy    Discharge Facility/Level of Care Needs  outpatient therapy    Current Discharge Risk  dependent with mobility/activities of daily living        Discharge Plan     Row Name 02/13/19 1220       Plan    Plan  Home with outpt PT    Patient/Family in Agreement with Plan  yes    Plan Comments  I met with Mrs Grider to discuss d/c plan. Her plan is to return home. She lives with  who can assist.Sshe aready has equipment needed form previous surgery. Prior to admit she was independent with ADL's. We discussed options for PT. She prefers to do outpatient PT at [facility] outpatient center. I spoke with Martin who accepted referral. Appt set up for Monday 2/18 at 9:30 am. She is in agreement with plan.. No other d/c needs identifed.    Final Discharge Disposition Code  01 - home or self-care        Destination      No service coordination in this encounter.      Durable Medical Equipment      No service coordination in this encounter.      Dialysis/Infusion      No service coordination in this encounter.      Home Medical Care      No service coordination in this encounter.      Community Resources      No service coordination in this encounter.        Expected Discharge Date and Time     Expected Discharge Date Expected Discharge Time    Feb 13, 2019         Demographic Summary     Row Name 02/13/19 1213       General Information    Admission Type  inpatient    Arrived From  PACU/recovery room    Referral Source  physician    Reason for Consult  discharge planning    Preferred Language  English    General Information Comments  PCP- Raimundo Ibarra       Contact Information    Permission Granted to Share Info With  ;family/designee        Functional Status     Row Name 02/13/19 1217       Functional Status    Usual Activity Tolerance  good       Functional Status, IADL    Medications  independent    Meal Preparation  independent    Housekeeping  independent    Laundry  independent    Shopping  independent       Mental Status    General Appearance WDL  WDL       Mental Status Summary    Recent Changes in Mental Status/Cognitive Functioning  no changes       Employment/    Employment Status  retired    Employment/ Comments  insurance- [employer]        Psychosocial    No documentation.       Abuse/Neglect    No documentation.       Legal    No documentation.       Substance Abuse    No documentation.       Patient Forms    No documentation.           Sonja C Kellerman, RN

## 2019-02-13 NOTE — PROGRESS NOTES
"      [facility] Orthopaedic Surgery Progress Note    Subjective      LOS: 1 day   Patient Care Team:  Raimundo Ibarra MD as PCP - General  Luis Gustafson OD (Optometry)    Chief complaint: Left hip pain       Interval History:   Patient resting comfortably in bed.  Pain is well-controlled.  She would like to go home today.    Objective      Vital Signs  Temp (24hrs), Av.8 °F (36.6 °C), Min:97.6 °F (36.4 °C), Max:98.1 °F (36.7 °C)      /67 (BP Location: Left arm, Patient Position: Lying)   Pulse 75   Temp 98.1 °F (36.7 °C) (Oral)   Resp 16   Ht 165.1 cm (65\")   Wt 84.4 kg (186 lb)   SpO2 98%   BMI 30.95 kg/m²     Examination:   Examination of the left hip: The wound is clean, dry, and intact.  Ankle dorsiflexion, ankle plantar flexion, and EHL are intact.  Sensation intact in the foot to light touch.   Thigh is soft and nontender.    Labs:  Results from last 7 days   Lab Units 19  0642   WBC 10*3/mm3 17.36*   HEMOGLOBIN g/dL 10.5*   HEMATOCRIT % 33.4*   MCV fL 94.4   PLATELETS 10*3/mm3 308       Radiology:  Imaging Results (last 24 hours)     Procedure Component Value Units Date/Time    XR Hip With or Without Pelvis 2 - 3 View Left [[id number]] Collected:  19 1345     Updated:  19 1614    Narrative:       EXAMINATION: XR HIP W OR WO PELVIS 2-3 VIEW LEFT- 2019      INDICATION: post-op left DOROTHY; M16.12-Unilateral primary osteoarthritis,  left hip      COMPARISON: NONE     FINDINGS: Imaging of the pelvis and 2 views of the left hip reveal  patient to be status post total left hip arthroplasty.  Postsurgical  changes seen in the soft tissues. No hardware complication or  malalignment identified of the prosthesis.           Impression:       Status post total left hip arthroplasty with no hardware  complication or malalignment identified of the prosthesis.     D:  2019  E:  2019     This report was finalized on 2019 4:12 PM by Dr. Mallika Trevino MD.       "       PT:  Patient ambulated 100 feet with RW, limited by fatigue and L knee buckling d/t residual numbness from spinal. Plan is d/c home with family and HHPT. Encouraged patient to ambulate again with nursing tonight once numbness has fully resolved. Will continue to progress mobility training, strengthening, and ROM as able. PADD score of 8.      Results Review:     I reviewed the patient's new clinical results.    Assessment and Plan     Assessment:   Status post left total hip arthroplasty-doing well      Status post total replacement of left hip    Mixed hyperlipidemia    Essential hypertension    Controlled type 2 diabetes mellitus without complication, without long-term current use of insulin (CMS/[facility])    Primary osteoarthritis of left hip      Plan for disposition: Plan for discharge to home today.  Follow-up with me in 3 weeks as planned.    3/6/2019 8:50 AM Carlos Pollack MD [facility] ORTHOPEDIC SPORTS MEDICINE    Appt Notes: 3 WEEKS POSTOP           Carlos Pollack MD  02/13/19  8:25 AM

## 2019-02-14 ENCOUNTER — TRANSITIONAL CARE MANAGEMENT TELEPHONE ENCOUNTER (OUTPATIENT)
Dept: INTERNAL MEDICINE | Facility: CLINIC | Age: 74
End: 2019-02-14

## 2019-02-14 ENCOUNTER — READMISSION MANAGEMENT (OUTPATIENT)
Dept: CALL CENTER | Facility: HOSPITAL | Age: 74
End: 2019-02-14

## 2019-02-15 NOTE — OUTREACH NOTE
Prep Survey      Responses   Facility patient discharged from?  [facility]   Is patient eligible?  Yes   Discharge diagnosis  left total hip   Does the patient have one of the following disease processes/diagnoses(primary or secondary)?  Total Joint Replacement   Does the patient have Home health ordered?  No   Is there a DME ordered?  No   What DME was ordered?  has home equipment   Comments regarding appointments  see AVS   Prep survey completed?  Yes          Sravani Rosen RN

## 2019-02-15 NOTE — OUTREACH NOTE
"PORSCHE call completed. Please see flow sheet for additional details.  Pt says she's doing \"fine\".  Pain well controlled w/ pain RX, denies N/V/D/C/fever/chills. Also taking stool softener & ASA as ordered. Dressing D/I.  Using walker to ambulate.  Denies questions/needs.  Confirms PORSCHE 2/25/19, PT 2/18/19, Ortho f/u 3/6/19.  "

## 2019-02-18 ENCOUNTER — READMISSION MANAGEMENT (OUTPATIENT)
Dept: CALL CENTER | Facility: HOSPITAL | Age: 74
End: 2019-02-18

## 2019-02-18 NOTE — OUTREACH NOTE
Total Joint Week 1 Survey      Responses   Facility patient discharged from?  [facility]   Does the patient have one of the following disease processes/diagnoses(primary or secondary)?  Total Joint Replacement   Is there a successful TCM telephone encounter documented?  Yes          Lupe Rollins RN

## 2019-02-19 ENCOUNTER — TELEPHONE (OUTPATIENT)
Dept: ORTHOPEDIC SURGERY | Facility: CLINIC | Age: 74
End: 2019-02-19

## 2019-02-19 NOTE — TELEPHONE ENCOUNTER
CALLED PT TOLD HER SHE NEED TO PUT ANOTHER BANDAGE ON AND KEEP DRY. ALSO TOLD HER SHE SHOULD CONTACT PCP CONCERNING BOWEL MOVEMENT PER DR REEDER

## 2019-02-19 NOTE — TELEPHONE ENCOUNTER
----- Message from Carlos Pollack MD sent at 2/19/2019 10:05 AM EST -----  Leave bandage on till dry.    If she has not been able to have a bowel movement we need to have her PCP help.    ----- Message -----  From: Cris Monte  Sent: 2/19/2019   9:57 AM  To: Carlos Pollack MD    PATIENT CALLED/PT TOOK HER BANDAGE OFF YESTERDAY AND NOW SHE IS HAVING A SMALL AMT OF BLEEDING AROUND THE INCISION. SHOULD SHE PUT ANOTHER BANDAGE ON OR JUST LEAVE IT?    ALSO HAS NOT HAD A BOWEL MOVEMENT IN A WEEK. SHE HAS BEEN TAKING MEDICATION YOU RECOMMENDED WITH NO RELIEF.

## 2019-02-21 ENCOUNTER — READMISSION MANAGEMENT (OUTPATIENT)
Dept: CALL CENTER | Facility: HOSPITAL | Age: 74
End: 2019-02-21

## 2019-02-21 NOTE — OUTREACH NOTE
Total Joint Week 2 Survey      Responses   Facility patient discharged from?  [facility]   Does the patient have one of the following disease processes/diagnoses(primary or secondary)?  Total Joint Replacement   Joint surgery performed?  Hip   Week 2 attempt successful?  Yes   Call start time  1124   Call end time  1129   Has the patient been back in either the hospital or Emergency Department since discharge?  No   Discharge diagnosis  left total hip   Is patient permission given to speak with other caregiver?  Yes   List who call center can speak with     Does the patient have all medications related to this admission filled (includes all antibiotics, pain medications, etc.)  Yes   Is the patient taking all medications as directed (includes completed medication regime)?  Yes   Is the patient able to teach back alternate methods of pain control?  Ice, Knee-elevation/no pillow under knee, Reposition, Correct alignment   Medication comments  Not having to use pain meds per pt.    Does the patient have a follow up appointment with their surgeon?  Yes   Has the patient kept scheduled appointments due by today?  N/A   DME comments  Using walker for ambulation   Psychosocial issues?  No   When is the first therapy visit scheduled (PO Day) including how many days per week   Pt doing outpt PT at [facility], began 2-18-19   Has the patient began therapy sessions (either in the home or as an out patient)?  Yes   Does the patient have a wound vac in place?  No   Has the patient fallen since discharge?  No   Comments  Incision on left hip is covered with dressing, denies s/sx of inf. She is bruised around hip. Dtr is RN and is helping her. She was having constipation but took laxative and had BM yesterday.    What is the patient's perception of their functional status since discharge?  Improving   Is the patient able to teach back signs and symptoms of infection?  Temp >100.4 for 24h or longer, Increased swelling or  redness around incision (not associated with surgical edema), Incisional drainage, Changes in mobility, Shortness of breath or chest pain   Is the patient able to teach back how to prevent infection?  Shower only as directed by surgeon, Keep incision covered if pets in house, Check incision daily, Wash hands before and after touching incision, Keep incision covered if drainage, No tub baths, hot tub or swimming   Is the patient able to teach back signs and symptoms of DVT?  Redness in calf, Severe pain in calf, Area hot to touch   Is the patient able to teach back home safety measures?  Ability to shower, Modifications with ADLs such as dressing, cooking, toileting   Did the patient implement home safety suggestions from pre-surgery classes if attended?  Yes   Is the patient/caregiver able to teach back the hierarchy of who to call/visit for symptoms/problems? PCP, Specialist, Home health nurse, Urgent Care, ED, 911  Yes   Week 2 call completed?  Yes          Katherin Nguyễn RN

## 2019-02-25 ENCOUNTER — OFFICE VISIT (OUTPATIENT)
Dept: INTERNAL MEDICINE | Facility: CLINIC | Age: 74
End: 2019-02-25

## 2019-02-25 VITALS — WEIGHT: 191 LBS | BODY MASS INDEX: 31.78 KG/M2 | DIASTOLIC BLOOD PRESSURE: 82 MMHG | SYSTOLIC BLOOD PRESSURE: 110 MMHG

## 2019-02-25 DIAGNOSIS — M16.0 PRIMARY OSTEOARTHRITIS OF BOTH HIPS: ICD-10-CM

## 2019-02-25 DIAGNOSIS — E11.9 CONTROLLED TYPE 2 DIABETES MELLITUS WITHOUT COMPLICATION, WITHOUT LONG-TERM CURRENT USE OF INSULIN (HCC): ICD-10-CM

## 2019-02-25 DIAGNOSIS — E11.42 DIABETIC POLYNEUROPATHY ASSOCIATED WITH TYPE 2 DIABETES MELLITUS (HCC): ICD-10-CM

## 2019-02-25 DIAGNOSIS — N28.9 RENAL INSUFFICIENCY: ICD-10-CM

## 2019-02-25 DIAGNOSIS — E78.2 MIXED HYPERLIPIDEMIA: ICD-10-CM

## 2019-02-25 DIAGNOSIS — I10 ESSENTIAL HYPERTENSION: Primary | ICD-10-CM

## 2019-02-25 PROBLEM — M16.12 PRIMARY OSTEOARTHRITIS OF LEFT HIP: Status: RESOLVED | Noted: 2019-02-12 | Resolved: 2019-02-25

## 2019-02-25 PROCEDURE — 99495 TRANSJ CARE MGMT MOD F2F 14D: CPT | Performed by: INTERNAL MEDICINE

## 2019-02-25 NOTE — PROGRESS NOTES
Transitional Care Follow Up Visit  Subjective     Nely Grider is a 73 y.o. female who presents for a transitional care management visit.    Within 48 business hours after discharge our office contacted her via telephone to coordinate her care and needs.      I reviewed and discussed the details of that call along with the discharge summary, hospital problems, inpatient lab results, inpatient diagnostic studies, and consultation reports with Nely.     Current outpatient and discharge medications have been reconciled for the patient.    Date of TCM Phone Call 9/23/2016 5/10/2018 2/15/2019   [facility]   Date of Admission 9/16/2016 5/25/2018 2/12/2019   Date of Discharge 9/22/2016 5/9/2018 2/13/2019   Discharge Disposition Home or Self Care Home or Self Care Home or Self Care     Risk for Readmission (LACE) Score: 7 (2/13/2019  6:00 AM)      History of Present Illness   Course During Hospital Stay:  Recent hospitalization for left hip replacement.  Doing well is walking around on walker. No swelling.  NIDDM was about 200 in hospital.  HTN on meds,no headaches, no chest pain, no sob.  GERD is stable on meds.         The following portions of the patient's history were reviewed and updated as appropriate: allergies, current medications, past medical history and problem list.    Review of Systems   Constitutional: Negative.  Negative for fatigue and fever.   Eyes: Negative.    Respiratory: Negative.  Negative for cough, chest tightness, shortness of breath and wheezing.    Cardiovascular: Negative.  Negative for chest pain, palpitations and leg swelling.   Gastrointestinal: Negative.  Negative for abdominal distention and abdominal pain.   Genitourinary: Negative.    Musculoskeletal:        Hip is painful but improving.       Objective   Physical Exam   Constitutional: She appears well-developed and well-nourished.   In chair.   Neck: Normal  range of motion. Neck supple.   Cardiovascular: Normal rate, regular rhythm and normal heart sounds. Exam reveals no gallop and no friction rub.   No murmur heard.  Pulmonary/Chest: Effort normal and breath sounds normal. No stridor. No respiratory distress. She has no wheezes. She has no rales.   Abdominal: Soft. Bowel sounds are normal. She exhibits no distension and no mass. There is no tenderness. There is no guarding.   Musculoskeletal: She exhibits no edema.   Neurological: She is alert.   Nursing note and vitals reviewed.      Assessment/Plan   Nely was seen today for transitional care management.    Diagnoses and all orders for this visit:    Essential hypertension  Stable on meds.    Mixed hyperlipidemia  Stable no changes.    Controlled type 2 diabetes mellitus without complication, without long-term current use of insulin (CMS/[facility])  Stable sugars a little up in hospital.    Diabetic polyneuropathy associated with type 2 diabetes mellitus (CMS/[facility])  NO change.    Primary osteoarthritis of both hips  As noted doing well post op.    Renal insufficiency  Stable.    All problems are stable.  Same meds.  Recheck in April.        Transitional Care Management Certification  I certify that the following are true:  1. Communication was made within 2 business days of discharge.  2. Complexity of Medical Decision Making is moderate.  3. Face to face visit occurred within 14 days.    *Note: 34957 is for high complexity patients with a face to face visit within 7 days of discharge.  41708 is for high complexity patients with a face to face on days 8-14 post discharge or medium complexity with face to face visit within 14 days post discharge.

## 2019-03-06 ENCOUNTER — OFFICE VISIT (OUTPATIENT)
Dept: ORTHOPEDIC SURGERY | Facility: CLINIC | Age: 74
End: 2019-03-06

## 2019-03-06 DIAGNOSIS — Z96.642 HISTORY OF TOTAL LEFT HIP REPLACEMENT: Primary | ICD-10-CM

## 2019-03-06 DIAGNOSIS — Z47.89 ORTHOPEDIC AFTERCARE: ICD-10-CM

## 2019-03-06 PROCEDURE — 99024 POSTOP FOLLOW-UP VISIT: CPT | Performed by: ORTHOPAEDIC SURGERY

## 2019-03-06 NOTE — PROGRESS NOTES
[facility] Orthopaedic Surgery Clinic Note    Subjective     Chief Complaint   Patient presents with   • Post-op     3 weeks status post Left Total Hip Arthroplasty 02/12/2019        HPI    Nely Grider is a 73 y.o. female.  She follows up today for her left total hip arthroplasty.  75% improvement compared to preoperative symptoms.  Ambulating with the aid of a cane.  No new complaints today.      Patient Active Problem List   Diagnosis   • Neck sprain   • Cough   • Gastroesophageal reflux disease without esophagitis   • Mixed hyperlipidemia   • Essential hypertension   • Cervical sprain   • Controlled type 2 diabetes mellitus without complication, without long-term current use of insulin (CMS/HCC)   • Leukocytosis, likely reactive   • Mass of right hip region   • Anemia   • Constipation   • Diabetic polyneuropathy associated with type 2 diabetes mellitus (CMS/HCC)   • Primary osteoarthritis of both hips   • Status post total replacement of left hip   • Renal insufficiency   • Colon polyp   • Diverticulosis   • Acute blood loss anemia, mild, asymptomatic   • Acute postoperative pain     Past Medical History:   Diagnosis Date   • Anesthesia complication     per patient with hip surgery last april did not remember being in hospital or going home can only rmember from rehad on   • Anesthesia complication     with hip surgery in april 2018 had issues with swallowing after surgery called in speech   • Arthritis    • Cancer (CMS/HCC)     cervical   • Cataract    • Diabetes mellitus (CMS/HCC)     type 2 dm, check blood sugar every morning, diagnosed 3 or 4 years   • Full dentures    • Full dentures    • GERD (gastroesophageal reflux disease)    • High cholesterol    • History of IBS    • Hypertension     been off bp meds since last april 2018   • Neuropathy    • Wears glasses       Past Surgical History:   Procedure Laterality Date   • CARDIAC CATHETERIZATION     • CHOLECYSTECTOMY     • COLONOSCOPY     • EYE SURGERY       bilateral cataract   • HYSTERECTOMY      partial   • JOINT REPLACEMENT      hip surgery 2018 in april   • OOPHORECTOMY      one removed   • TONSILLECTOMY     • TOTAL HIP ARTHROPLASTY Right 4/19/2018    Procedure: RIGHT TOTAL HIP ARTHROPLASTY;  Surgeon: Carlos Pollack MD;  Location:  [facility];  Service: Orthopedics   • TOTAL HIP ARTHROPLASTY Left 2/12/2019    Procedure: TOTAL HIP ARTHROPLASTY LEFT;  Surgeon: Carlos Pollack MD;  Location:  [facility];  Service: Orthopedics   • WRIST SURGERY      metal plate      Family History   Problem Relation Age of Onset   • Arthritis Mother    • Heart attack Mother    • Hypertension Mother    • Hyperlipidemia Mother    • Osteoporosis Mother    • Heart disease Sister    • Diabetes Sister    • Arthritis Sister    • Heart attack Sister    • Hypertension Sister    • Hyperlipidemia Sister    • Bleeding Disorder Sister    • Heart disease Brother    • Cancer Brother    • Diabetes Brother    • Arthritis Brother    • Heart attack Brother    • Hypertension Brother    • Hyperlipidemia Brother    • Ovarian cancer Daughter    • Cancer Daughter    • Ovarian cancer Other         grandaughter   • Ovarian cancer Other         granddaughter   • Heart attack Father    • Hypertension Father    • Stroke Father    • Kidney disease Paternal Uncle    • Liver disease Paternal Uncle    • Cancer Other    • Stroke Other    • Diabetes Other    • Breast cancer Neg Hx      Social History     Socioeconomic History   • Marital status:      Spouse name: Not on file   • Number of children: Not on file   • Years of education: Not on file   • Highest education level: Not on file   Social Needs   • Financial resource strain: Not on file   • Food insecurity - worry: Not on file   • Food insecurity - inability: Not on file   • Transportation needs - medical: Not on file   • Transportation needs - non-medical: Not on file   Occupational History   • Not on file   Tobacco Use   • Smoking status: Never Smoker    • Smokeless tobacco: Never Used   Substance and Sexual Activity   • Alcohol use: No   • Drug use: No   • Sexual activity: Defer     Comment:    Other Topics Concern   • Not on file   Social History Narrative   • Not on file      Current Outpatient Medications on File Prior to Visit   Medication Sig Dispense Refill   • aspirin  MG EC tablet Take 1 tablet by mouth Daily. For 1 month 30 tablet 0   • docusate sodium 100 MG capsule Take 100 mg by mouth 2 (Two) Times a Day As Needed for Constipation. 60 each 0   • gabapentin (NEURONTIN) 400 MG capsule Take 1 capsule by mouth 3 (Three) Times a Day. 270 capsule 1   • glimepiride (AMARYL) 4 MG tablet TAKE 1 TABLET EVERY MORNING BEFORE BREAKFAST 90 tablet 3   • metFORMIN (GLUCOPHAGE) 500 MG tablet Take 1 tablet by mouth 2 (Two) Times a Day With Meals. 180 tablet 3   • omeprazole (priLOSEC) 20 MG capsule TAKE 1 CAPSULE TWICE DAILY 180 capsule 3   • oxyCODONE-acetaminophen (PERCOCET) 5-325 MG per tablet Take 1 tablet by mouth Every 4 (Four) Hours As Needed for Moderate Pain  for up to 30 days. 40 tablet 0   • simvastatin (ZOCOR) 10 MG tablet TAKE 1 TABLET AT BEDTIME 90 tablet 1     No current facility-administered medications on file prior to visit.       Allergies   Allergen Reactions   • Codeine Hives        Review of Systems   Constitutional: Negative for activity change, appetite change, chills, diaphoresis, fatigue, fever and unexpected weight change.   HENT: Negative for congestion, dental problem, drooling, ear discharge, ear pain, facial swelling, hearing loss, mouth sores, nosebleeds, postnasal drip, rhinorrhea, sinus pressure, sneezing, sore throat, tinnitus, trouble swallowing and voice change.    Eyes: Negative for photophobia, pain, discharge, redness, itching and visual disturbance.   Respiratory: Negative for apnea, cough, choking, chest tightness, shortness of breath, wheezing and stridor.    Cardiovascular: Negative for chest pain, palpitations  and leg swelling.   Gastrointestinal: Negative for abdominal distention, abdominal pain, anal bleeding, blood in stool, constipation, diarrhea, nausea, rectal pain and vomiting.   Endocrine: Negative for cold intolerance, heat intolerance, polydipsia, polyphagia and polyuria.   Genitourinary: Negative for decreased urine volume, difficulty urinating, dysuria, enuresis, flank pain, frequency, genital sores, hematuria and urgency.   Musculoskeletal: Negative for arthralgias, back pain, gait problem, joint swelling, myalgias, neck pain and neck stiffness.        L DOROTHY f/u   Skin: Negative for color change, pallor, rash and wound.   Allergic/Immunologic: Negative for environmental allergies, food allergies and immunocompromised state.   Neurological: Negative for dizziness, tremors, seizures, syncope, facial asymmetry, speech difficulty, weakness, light-headedness, numbness and headaches.   Hematological: Negative for adenopathy. Does not bruise/bleed easily.   Psychiatric/Behavioral: Negative for agitation, behavioral problems, confusion, decreased concentration, dysphoric mood, hallucinations, self-injury, sleep disturbance and suicidal ideas. The patient is not nervous/anxious and is not hyperactive.         Objective      Physical Exam  There were no vitals taken for this visit.    There is no height or weight on file to calculate BMI.    General:   Mental Status:  Alert   Appearance: Cooperative, in no acute distress   Build and Nutrition: Well-nourished and well developed female   Orientation: Alert and oriented to person, place and time   Posture: Normal   Gait: Walking with a cane    Integument:   Left hip: Wound is well-healed with no signs of infection    Lower Extremity:   Left Hip:    Tenderness:  None    Swelling:  None    Crepitus:  None    Range of motion:  External Rotation: 30°       Internal Rotation: 30°       Flexion:  100°       Extension:  0°    Deformities:  None      Imaging/Studies  Imaging  Results (last 24 hours)     Procedure Component Value Units Date/Time    XR Hip With or Without Pelvis 1 View Left [[id number]] Resulted:  03/06/19 0846     Updated:  03/06/19 0847    Narrative:       Left Hip Radiographs  Indication: status-post left total hip arthroplasty  Views: low AP pelvis and lateral of the left hip    Comparison: 12/12/2019    Findings:   The components are well aligned, with only slight subsidence of the   femoral stem compared to the immediate postoperative films, with no   unusual bony features.            Assessment and Plan     Nely was seen today for post-op.    Diagnoses and all orders for this visit:    History of total left hip replacement  -     XR Hip With or Without Pelvis 1 View Left    Orthopedic aftercare        I reviewed my findings with the patient today.  Her left total hip arthroplasty appears to be functioning well.  She will continue with her rehabilitation, and I will see her back in 6 weeks.  I would like a repeat x-ray, secondary to the slight subsidence I am seeing compared to her immediate postoperative films.    Return in about 6 weeks (around 4/17/2019) for Recheck with X-Rays.      Medical Decision Making  Management Options : physical/occupational therapy  Data/Risk: radiology tests and independent visualization of imaging, lab tests, or EMG/NCV      Carlos Pollack MD  03/06/19  8:54 AM

## 2019-03-07 ENCOUNTER — READMISSION MANAGEMENT (OUTPATIENT)
Dept: CALL CENTER | Facility: HOSPITAL | Age: 74
End: 2019-03-07

## 2019-03-07 NOTE — OUTREACH NOTE
Total Joint Month 1 Survey      Responses   Facility patient discharged from?  [facility]   Does the patient have one of the following disease processes/diagnoses(primary or secondary)?  Total Joint Replacement   Joint surgery performed?  Hip   Month 1 attempt successful?  Yes   Call start time  1443   Call end time  1444   Has the patient been back in either the hospital or Emergency Department since discharge?  No   Discharge diagnosis  left total hip   Is patient permission given to speak with other caregiver?  Yes   List who call center can speak with     Is the patient taking all medications as directed (includes completed medication regime)?  Yes   Is the patient able to teach back alternate methods of pain control?  Ice, Reposition, Correct alignment   Has the patient kept scheduled appointments due by today?  Yes   Is the patient still receiving Home Health Services?  N/A   Is the patient still attending therapy sessions(either in the home or as an outpatient)?  Yes   Has the patient fallen since discharge?  No   What is the patient's perception of their functional status since discharge?  Improving   Month 1 call completed?  Yes          Koki Rollins RN

## 2019-04-08 ENCOUNTER — READMISSION MANAGEMENT (OUTPATIENT)
Dept: CALL CENTER | Facility: HOSPITAL | Age: 74
End: 2019-04-08

## 2019-04-08 NOTE — OUTREACH NOTE
Total Joint Month 2 Survey      Responses   Facility patient discharged from?  [facility]   Does the patient have one of the following disease processes/diagnoses(primary or secondary)?  Total Joint Replacement   Joint surgery performed?  Hip   Month 2 attempt successful?  Yes   Call start time  1441   Call end time  1443   Is the patient taking all medications as directed (includes completed medication regime)?  Yes   Has the patient kept scheduled appointments due by today?  Yes   Is the patient still receiving Home Health Services?  N/A   Is the patient still attending therapy sessions(either in the home or as an outpatient)?  Yes   What is the patient's perception of their functional status since discharge?  Improving   Is the patient/caregiver able to teach back the hierarchy of who to call/visit for symptoms/problems? PCP, Specialist, Home health nurse, Urgent Care, ED, 911  Yes   Month 2 Call Completed?  Yes   Revoked  No further contact(revokes)-requires comment   Graduated/Revoked comments  meeting all goals, no need for more calls          Chelita Smith RN

## 2019-04-15 ENCOUNTER — TELEPHONE (OUTPATIENT)
Dept: INTERNAL MEDICINE | Facility: CLINIC | Age: 74
End: 2019-04-15

## 2019-04-22 ENCOUNTER — OFFICE VISIT (OUTPATIENT)
Dept: ORTHOPEDIC SURGERY | Facility: CLINIC | Age: 74
End: 2019-04-22

## 2019-04-22 VITALS — BODY MASS INDEX: 31.44 KG/M2 | OXYGEN SATURATION: 98 % | HEIGHT: 65 IN | WEIGHT: 188.71 LBS | HEART RATE: 64 BPM

## 2019-04-22 DIAGNOSIS — Z96.643 STATUS POST TOTAL REPLACEMENT OF BOTH HIPS: Primary | ICD-10-CM

## 2019-04-22 PROCEDURE — 99024 POSTOP FOLLOW-UP VISIT: CPT | Performed by: ORTHOPAEDIC SURGERY

## 2019-04-22 PROCEDURE — 99213 OFFICE O/P EST LOW 20 MIN: CPT | Performed by: ORTHOPAEDIC SURGERY

## 2019-04-22 ASSESSMENT — HOOS JR
HOOS JR SCORE: 100
HOOS JR SCORE: 0

## 2019-04-22 NOTE — PROGRESS NOTES
[facility] – [place] Orthopaedic Surgery Clinic Note    Subjective     Chief Complaint   Patient presents with   • Right Hip - Follow-up     1 yr status post Right Total Hip Arthroplasty 04/19/2018   • Post-op     6 week f/u; 10 weeks status post Left Total Hip Arthroplasty 02/12/2019        HPI    Nely Grider is a 73 y.o. female.  Follows up today for both of her hips.    She is now a year out on her right total hip arthroplasty, and doing well.  She is pleased with results.  100% improvement compared to preoperative symptoms.    She is now just over 2 months out on her left total hip arthroplasty, and is doing well, with 80% improvement compared to preoperative symptoms.  She does note some weakness and pain laterally.  It usually improves when she does physical therapy exercises.      Patient Active Problem List   Diagnosis   • Neck sprain   • Cough   • Gastroesophageal reflux disease without esophagitis   • Mixed hyperlipidemia   • Essential hypertension   • Cervical sprain   • Controlled type 2 diabetes mellitus without complication, without long-term current use of insulin (CMS/HCC)   • Leukocytosis, likely reactive   • Mass of right hip region   • Anemia   • Constipation   • Diabetic polyneuropathy associated with type 2 diabetes mellitus (CMS/HCC)   • Primary osteoarthritis of both hips   • Status post total replacement of left hip   • Renal insufficiency   • Colon polyp   • Diverticulosis   • Acute blood loss anemia, mild, asymptomatic   • Acute postoperative pain     Past Medical History:   Diagnosis Date   • Anesthesia complication     per patient with hip surgery last april did not remember being in hospital or going home can only rmember from [facility]   • Anesthesia complication     with hip surgery in april 2018 had issues with swallowing after surgery called in speech   • Arthritis    • Cancer (CMS/HCC)     cervical   • Cataract    • Diabetes mellitus (CMS/HCC)     type 2 dm, check blood sugar every morning,  diagnosed 3 or 4 years   • Full dentures    • Full dentures    • GERD (gastroesophageal reflux disease)    • High cholesterol    • History of IBS    • Hypertension     been off bp meds since last april 2018   • Neuropathy    • Wears glasses       Past Surgical History:   Procedure Laterality Date   • CARDIAC CATHETERIZATION     • CHOLECYSTECTOMY     • COLONOSCOPY     • EYE SURGERY      bilateral cataract   • HYSTERECTOMY      partial   • JOINT REPLACEMENT      hip surgery 2018 in april   • OOPHORECTOMY      one removed   • TONSILLECTOMY     • TOTAL HIP ARTHROPLASTY Right 4/19/2018    Procedure: RIGHT TOTAL HIP ARTHROPLASTY;  Surgeon: Carlos Pollack MD;  Location:  [facility];  Service: Orthopedics   • TOTAL HIP ARTHROPLASTY Left 2/12/2019    Procedure: TOTAL HIP ARTHROPLASTY LEFT;  Surgeon: Carlos Pollack MD;  Location:  [facility];  Service: Orthopedics   • WRIST SURGERY      metal plate      Family History   Problem Relation Age of Onset   • Arthritis Mother    • Heart attack Mother    • Hypertension Mother    • Hyperlipidemia Mother    • Osteoporosis Mother    • Heart disease Sister    • Diabetes Sister    • Arthritis Sister    • Heart attack Sister    • Hypertension Sister    • Hyperlipidemia Sister    • Bleeding Disorder Sister    • Heart disease Brother    • Cancer Brother    • Diabetes Brother    • Arthritis Brother    • Heart attack Brother    • Hypertension Brother    • Hyperlipidemia Brother    • Ovarian cancer Daughter    • Cancer Daughter    • Ovarian cancer Other         grandaughter   • Ovarian cancer Other         granddaughter   • Heart attack Father    • Hypertension Father    • Stroke Father    • Kidney disease Paternal Uncle    • Liver disease Paternal Uncle    • Cancer Other    • Stroke Other    • Diabetes Other    • Breast cancer Neg Hx      Social History     Socioeconomic History   • Marital status:      Spouse name: Not on file   • Number of children: Not on file   • Years of education:  Not on file   • Highest education level: Not on file   Tobacco Use   • Smoking status: Never Smoker   • Smokeless tobacco: Never Used   Substance and Sexual Activity   • Alcohol use: No   • Drug use: No   • Sexual activity: Defer     Comment:       Current Outpatient Medications on File Prior to Visit   Medication Sig Dispense Refill   • aspirin  MG EC tablet Take 1 tablet by mouth Daily. For 1 month 30 tablet 0   • docusate sodium 100 MG capsule Take 100 mg by mouth 2 (Two) Times a Day As Needed for Constipation. 60 each 0   • gabapentin (NEURONTIN) 400 MG capsule Take 1 capsule by mouth 3 (Three) Times a Day. 270 capsule 1   • glimepiride (AMARYL) 4 MG tablet TAKE 1 TABLET EVERY MORNING BEFORE BREAKFAST 90 tablet 3   • metFORMIN (GLUCOPHAGE) 500 MG tablet Take 1 tablet by mouth 2 (Two) Times a Day With Meals. 180 tablet 3   • omeprazole (priLOSEC) 20 MG capsule TAKE 1 CAPSULE TWICE DAILY 180 capsule 3   • simvastatin (ZOCOR) 10 MG tablet TAKE 1 TABLET AT BEDTIME 90 tablet 1     No current facility-administered medications on file prior to visit.       Allergies   Allergen Reactions   • Codeine Hives        Review of Systems   Constitutional: Positive for fatigue. Negative for activity change, appetite change, chills, diaphoresis, fever and unexpected weight change.        Night Sweats    HENT: Negative for congestion, dental problem, drooling, ear discharge, ear pain, facial swelling, hearing loss, mouth sores, nosebleeds, postnasal drip, rhinorrhea, sinus pressure, sneezing, sore throat, tinnitus, trouble swallowing and voice change.    Eyes: Negative for photophobia, pain, discharge, redness, itching and visual disturbance.   Respiratory: Negative for apnea, cough, choking, chest tightness, shortness of breath, wheezing and stridor.    Cardiovascular: Negative for chest pain, palpitations and leg swelling.   Gastrointestinal: Negative for abdominal distention, abdominal pain, anal bleeding, blood in  "stool, constipation, diarrhea, nausea, rectal pain and vomiting.   Endocrine: Negative for cold intolerance, heat intolerance, polydipsia, polyphagia and polyuria.   Genitourinary: Negative for decreased urine volume, difficulty urinating, dysuria, enuresis, flank pain, frequency, genital sores, hematuria and urgency.   Musculoskeletal: Positive for back pain. Negative for arthralgias, gait problem, joint swelling, myalgias, neck pain and neck stiffness.   Skin: Negative for color change, pallor, rash and wound.   Allergic/Immunologic: Negative for environmental allergies, food allergies and immunocompromised state.   Neurological: Negative for dizziness, tremors, seizures, syncope, facial asymmetry, speech difficulty, weakness, light-headedness, numbness and headaches.   Hematological: Negative for adenopathy. Does not bruise/bleed easily.   Psychiatric/Behavioral: Positive for sleep disturbance. Negative for agitation, behavioral problems, confusion, decreased concentration, dysphoric mood, hallucinations, self-injury and suicidal ideas. The patient is not nervous/anxious and is not hyperactive.         Objective      Physical Exam  Pulse 64   Ht 165.1 cm (65\")   Wt 85.6 kg (188 lb 11.4 oz)   SpO2 98%   Breastfeeding? No   BMI 31.40 kg/m²     Body mass index is 31.4 kg/m².    General:   Mental Status:  Alert   Appearance: Cooperative, in no acute distress   Build and Nutrition: Well-nourished well-developed female   Orientation: Alert and oriented to person, place and time   Posture: Normal   Gait: Mild limp on the left    Integument:   Right hip: Wound is well-healed with no signs of infection    Lower Extremity:   Right Hip:    Tenderness:  None    Swelling:  None    Crepitus:  None    Range of motion:  External Rotation: 30°       Internal Rotation: 30°       Flexion:  100°       Extension:  0°    Deformities:  None  Functional testing: Negative [facility]    No leg length discrepancy  Integument:   Left " hip: Wound is well-healed with no signs of infection    Lower Extremity:   Left Hip:    Tenderness:  None    Swelling:  None    Crepitus:  None    Range of motion:  External Rotation: 30°       Internal Rotation: 30°       Flexion:  100°       Extension:  0°    Deformities:  None  Functional testing: Negative [facility]    No leg length discrepancy      Imaging/Studies  Radiographs of the hips show good alignment of the components, with no signs of loosening or failure.  Please see my x-ray report for details.      Assessment and Plan     Nely was seen today for post-op and follow-up.    Diagnoses and all orders for this visit:    Status post total replacement of both hips  -     XR Hips Bilateral With or Without Pelvis 2 View; Future        1. Status post total replacement of both hips        Both of her hips are doing well.  She will continue with rehabilitation on the left hip, and may do the exercises on her own.  I will see her back in 4 months with an x-ray, but sooner for any problems.    Return in about 4 months (around 8/22/2019) for Recheck with X-Rays.      Medical Decision Making  Management Options : physical/occupational therapy  Data/Risk: radiology tests and independent visualization of imaging, lab tests, or EMG/NCV      Carlos Pollack MD  04/22/19  9:14 AM

## 2019-04-23 ENCOUNTER — OFFICE VISIT (OUTPATIENT)
Dept: INTERNAL MEDICINE | Facility: CLINIC | Age: 74
End: 2019-04-23

## 2019-04-23 VITALS
OXYGEN SATURATION: 97 % | BODY MASS INDEX: 31.59 KG/M2 | TEMPERATURE: 97.9 F | RESPIRATION RATE: 18 BRPM | DIASTOLIC BLOOD PRESSURE: 82 MMHG | HEART RATE: 57 BPM | HEIGHT: 65 IN | WEIGHT: 189.6 LBS | SYSTOLIC BLOOD PRESSURE: 120 MMHG

## 2019-04-23 DIAGNOSIS — E13.9 DIABETES MELLITUS OF OTHER TYPE WITHOUT COMPLICATION, UNSPECIFIED WHETHER LONG TERM INSULIN USE (HCC): Primary | ICD-10-CM

## 2019-04-23 DIAGNOSIS — E11.9 CONTROLLED TYPE 2 DIABETES MELLITUS WITHOUT COMPLICATION, WITHOUT LONG-TERM CURRENT USE OF INSULIN (HCC): ICD-10-CM

## 2019-04-23 DIAGNOSIS — M16.0 PRIMARY OSTEOARTHRITIS OF BOTH HIPS: ICD-10-CM

## 2019-04-23 DIAGNOSIS — E78.2 MIXED HYPERLIPIDEMIA: ICD-10-CM

## 2019-04-23 DIAGNOSIS — I10 ESSENTIAL HYPERTENSION: ICD-10-CM

## 2019-04-23 DIAGNOSIS — K21.9 GASTROESOPHAGEAL REFLUX DISEASE WITHOUT ESOPHAGITIS: ICD-10-CM

## 2019-04-23 DIAGNOSIS — E11.42 DIABETIC POLYNEUROPATHY ASSOCIATED WITH TYPE 2 DIABETES MELLITUS (HCC): ICD-10-CM

## 2019-04-23 LAB
EXPIRATION DATE: NORMAL
HBA1C MFR BLD: 6.8 %
Lab: NORMAL

## 2019-04-23 PROCEDURE — 83036 HEMOGLOBIN GLYCOSYLATED A1C: CPT | Performed by: INTERNAL MEDICINE

## 2019-04-23 PROCEDURE — 99214 OFFICE O/P EST MOD 30 MIN: CPT | Performed by: INTERNAL MEDICINE

## 2019-04-23 NOTE — PROGRESS NOTES
Subjective   Nely Grider is a 73 y.o. female.     History of Present Illness   For follow up of  HTN on meds, no chest pain, sob or headaches.  Hyperlipidemia on meds, no muscle aches.  NIDDM on meds, no new sx.  GERD is stable.  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    The following portions of the patient's history were reviewed and updated as appropriate: allergies, current medications, past medical history and problem list.    Review of Systems   Constitutional: Negative.  Negative for fatigue and fever.   Respiratory: Negative.  Negative for cough, chest tightness, shortness of breath and wheezing.    Cardiovascular: Negative.  Negative for chest pain, palpitations and leg swelling.   Gastrointestinal: Negative.  Negative for abdominal pain.   Musculoskeletal: Positive for arthralgias.       Objective   Physical Exam   Constitutional: She appears well-developed and well-nourished.   Cardiovascular: Normal rate, regular rhythm and normal heart sounds. Exam reveals no gallop and no friction rub.   No murmur heard.  Pulmonary/Chest: Effort normal and breath sounds normal. No respiratory distress. She has no wheezes. She has no rales. She exhibits no tenderness.   Abdominal: Soft. Bowel sounds are normal. She exhibits no distension. There is no tenderness.   Musculoskeletal: She exhibits no edema.   Nursing note and vitals reviewed.      Assessment/Plan   Nely was seen today for diabetes.    Diagnoses and all orders for this visit:    Diabetes mellitus of other type without complication, unspecified whether long term insulin use (CMS/[facility])  -     POC Glycosylated Hemoglobin (Hb A1C)  A1C = 6.8, no change.    Essential hypertension  Stable no change.    Mixed hyperlipidemia  Stable no change.    Diabetic polyneuropathy associated with type 2 diabetes mellitus (CMS/[facility])  Stable no change.    Primary osteoarthritis of both hips  Stable no change.    Gastroesophageal reflux disease without esophagitis  Stable, no  change.    All problems are stable.  Same meds.  Recheck 4 months.

## 2019-06-10 ENCOUNTER — OFFICE VISIT (OUTPATIENT)
Dept: INTERNAL MEDICINE | Facility: CLINIC | Age: 74
End: 2019-06-10

## 2019-06-10 VITALS
SYSTOLIC BLOOD PRESSURE: 128 MMHG | DIASTOLIC BLOOD PRESSURE: 84 MMHG | HEART RATE: 66 BPM | WEIGHT: 187 LBS | TEMPERATURE: 98.7 F | RESPIRATION RATE: 20 BRPM | BODY MASS INDEX: 31.12 KG/M2

## 2019-06-10 DIAGNOSIS — R05.9 COUGH: Primary | ICD-10-CM

## 2019-06-10 PROCEDURE — 99213 OFFICE O/P EST LOW 20 MIN: CPT | Performed by: INTERNAL MEDICINE

## 2019-06-10 NOTE — PROGRESS NOTES
Subjective   Nely Grider is a 73 y.o. female.     History of Present Illness   One day history of increased cough.  Her  was sick last week and she thinks he gave this too her.  No sore throat, fever or purulent sputum.  No wheezing.    The following portions of the patient's history were reviewed and updated as appropriate: allergies, current medications, past medical history, past social history and problem list.    Review of Systems   Constitutional: Negative.  Negative for fatigue and fever.   HENT: Negative for congestion, ear pain and sore throat.    Respiratory: Positive for cough. Negative for chest tightness, shortness of breath and wheezing.    Cardiovascular: Negative.  Negative for chest pain, palpitations and leg swelling.   Gastrointestinal: Negative.  Negative for abdominal distention, abdominal pain and nausea.   Genitourinary: Negative.        Objective   Physical Exam   Constitutional: She appears well-developed and well-nourished.   Neck: Normal range of motion. Neck supple.   Cardiovascular: Normal rate, regular rhythm and normal heart sounds.   Pulmonary/Chest: Effort normal and breath sounds normal. No respiratory distress. She has no wheezes. She has no rales. She exhibits no tenderness.   Lymphadenopathy:     She has no cervical adenopathy.   Nursing note and vitals reviewed.        Assessment/Plan   Nely was seen today for cough.    Diagnoses and all orders for this visit:    Cough  Most likely this is a viral uri.  She cannot take codeine so recommend otc mucinex DM.  If not better or worse she will call.

## 2019-06-17 RX ORDER — SIMVASTATIN 10 MG
TABLET ORAL
Qty: 90 TABLET | Refills: 1 | Status: SHIPPED | OUTPATIENT
Start: 2019-06-17 | End: 2019-10-05 | Stop reason: HOSPADM

## 2019-07-01 RX ORDER — LOSARTAN POTASSIUM AND HYDROCHLOROTHIAZIDE 25; 100 MG/1; MG/1
TABLET ORAL
Qty: 90 TABLET | Refills: 3 | Status: SHIPPED | OUTPATIENT
Start: 2019-07-01 | End: 2019-09-12

## 2019-07-17 ENCOUNTER — APPOINTMENT (OUTPATIENT)
Dept: GENERAL RADIOLOGY | Facility: HOSPITAL | Age: 74
End: 2019-07-17

## 2019-07-17 ENCOUNTER — HOSPITAL ENCOUNTER (EMERGENCY)
Facility: HOSPITAL | Age: 74
Discharge: HOME | End: 2019-07-17
Attending: EMERGENCY MEDICINE | Admitting: EMERGENCY MEDICINE

## 2019-07-17 VITALS
OXYGEN SATURATION: 99 % | DIASTOLIC BLOOD PRESSURE: 71 MMHG | SYSTOLIC BLOOD PRESSURE: 156 MMHG | TEMPERATURE: 98.3 F | WEIGHT: 190 LBS | HEIGHT: 66 IN | RESPIRATION RATE: 16 BRPM | HEART RATE: 69 BPM | BODY MASS INDEX: 30.53 KG/M2

## 2019-07-17 DIAGNOSIS — M54.6 THORACIC SPINE PAIN: Primary | ICD-10-CM

## 2019-07-17 DIAGNOSIS — S39.012A LUMBAR SPINE STRAIN, INITIAL ENCOUNTER: ICD-10-CM
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
## 2019-07-17 PROCEDURE — 72100 X-RAY EXAM L-S SPINE 2/3 VWS: CPT

## 2019-07-17 PROCEDURE — 99283 EMERGENCY DEPT VISIT LOW MDM: CPT

## 2019-07-17 PROCEDURE — 72072 X-RAY EXAM THORAC SPINE 3VWS: CPT

## 2019-07-17 RX ORDER — HYDROCODONE BITARTRATE AND ACETAMINOPHEN 5; 325 MG/1; MG/1
1 TABLET ORAL ONCE
Status: COMPLETED | OUTPATIENT
Start: 2019-07-17 | End: 2019-07-17

## 2019-07-17 RX ORDER — CYCLOBENZAPRINE HCL 10 MG
10 TABLET ORAL ONCE
Status: COMPLETED | OUTPATIENT
Start: 2019-07-17 | End: 2019-07-17

## 2019-07-17 RX ORDER — IBUPROFEN 600 MG/1
600 TABLET ORAL ONCE
Status: COMPLETED | OUTPATIENT
Start: 2019-07-17 | End: 2019-07-17

## 2019-07-17 RX ORDER — CYCLOBENZAPRINE HCL 10 MG
10 TABLET ORAL 3 TIMES DAILY
Qty: 20 TABLET | Refills: 0 | Status: SHIPPED | OUTPATIENT
Start: 2019-07-17 | End: 2019-08-21

## 2019-07-17 RX ORDER — NAPROXEN 375 MG/1
375 TABLET ORAL 2 TIMES DAILY PRN
Qty: 14 TABLET | Refills: 0 | Status: SHIPPED | OUTPATIENT
Start: 2019-07-17 | End: 2019-08-21

## 2019-07-17 RX ADMIN — IBUPROFEN 600 MG: 600 TABLET, FILM COATED ORAL at 15:10

## 2019-07-17 RX ADMIN — CYCLOBENZAPRINE HYDROCHLORIDE 10 MG: 10 TABLET, FILM COATED ORAL at 15:10

## 2019-07-17 RX ADMIN — HYDROCODONE BITARTRATE AND ACETAMINOPHEN 1 TABLET: 5; 325 TABLET ORAL at 15:10

## 2019-07-22 RX ORDER — GABAPENTIN 400 MG/1
CAPSULE ORAL
Qty: 270 CAPSULE | Refills: 1 | Status: SHIPPED | OUTPATIENT
Start: 2019-07-22 | End: 2019-10-31 | Stop reason: SDUPTHER

## 2019-07-26 ENCOUNTER — TELEPHONE (OUTPATIENT)
Dept: INTERNAL MEDICINE | Facility: CLINIC | Age: 74
End: 2019-07-26

## 2019-07-26 NOTE — TELEPHONE ENCOUNTER
----- Message from Ximena Bradley sent at 7/25/2019  4:14 PM EDT -----  Patient requested a handicap parking sticker. Patient can be reached at [phone].

## 2019-08-21 ENCOUNTER — OFFICE VISIT (OUTPATIENT)
Dept: ORTHOPEDIC SURGERY | Facility: CLINIC | Age: 74
End: 2019-08-21

## 2019-08-21 VITALS — HEIGHT: 66 IN | WEIGHT: 184.97 LBS | HEART RATE: 86 BPM | OXYGEN SATURATION: 98 % | BODY MASS INDEX: 29.73 KG/M2

## 2019-08-21 DIAGNOSIS — Z96.642 STATUS POST TOTAL HIP REPLACEMENT, LEFT: Primary | ICD-10-CM

## 2019-08-21 DIAGNOSIS — Z09 POSTOPERATIVE EXAMINATION: ICD-10-CM

## 2019-08-21 PROCEDURE — 99213 OFFICE O/P EST LOW 20 MIN: CPT | Performed by: ORTHOPAEDIC SURGERY

## 2019-08-21 NOTE — PROGRESS NOTES
[facility] – [place] Orthopaedic Surgery Clinic Note    Subjective     Chief Complaint   Patient presents with   • Follow-up     4 month follow up - 6 months status post Left Total Hip Arthroplasty 02/12/2019        HPI    Nely Grider is a 73 y.o. female.  She follows up today for left total hip arthroplasty.  She is doing well today, with 100% relief compared to her preoperative symptoms.  Does not feel quite as good as her other hip did following that replacement, but overall she is pleased.  She is ambulatory without external aids.      Patient Active Problem List   Diagnosis   • Neck sprain   • Cough   • Gastroesophageal reflux disease without esophagitis   • Mixed hyperlipidemia   • Essential hypertension   • Cervical sprain   • Controlled type 2 diabetes mellitus without complication, without long-term current use of insulin (CMS/HCC)   • Leukocytosis, likely reactive   • Mass of right hip region   • Anemia   • Constipation   • Diabetic polyneuropathy associated with type 2 diabetes mellitus (CMS/HCC)   • Primary osteoarthritis of both hips   • Status post total replacement of left hip   • Renal insufficiency   • Colon polyp   • Diverticulosis   • Acute blood loss anemia, mild, asymptomatic   • Acute postoperative pain     Past Medical History:   Diagnosis Date   • Anesthesia complication     per patient with hip surgery last april did not remember being in hospital or going home can only rmember from [facility]   • Anesthesia complication     with hip surgery in april 2018 had issues with swallowing after surgery called in speech   • Arthritis    • Cancer (CMS/HCC)     cervical   • Cataract    • Diabetes mellitus (CMS/HCC)     type 2 dm, check blood sugar every morning, diagnosed 3 or 4 years   • Full dentures    • Full dentures    • GERD (gastroesophageal reflux disease)    • High cholesterol    • History of IBS    • Hypertension     been off bp meds since last april 2018   • Neuropathy    • Wears glasses       Past  Surgical History:   Procedure Laterality Date   • CARDIAC CATHETERIZATION     • CHOLECYSTECTOMY     • COLONOSCOPY     • EYE SURGERY      bilateral cataract   • HYSTERECTOMY      partial   • JOINT REPLACEMENT      hip surgery 2018 in april   • OOPHORECTOMY      one removed   • TONSILLECTOMY     • TOTAL HIP ARTHROPLASTY Right 4/19/2018    Procedure: RIGHT TOTAL HIP ARTHROPLASTY;  Surgeon: Carlos Pollack MD;  Location: [facility] OR;  Service: Orthopedics   • TOTAL HIP ARTHROPLASTY Left 2/12/2019    Procedure: TOTAL HIP ARTHROPLASTY LEFT;  Surgeon: Carlos Pollack MD;  Location:  [facility];  Service: Orthopedics   • WRIST SURGERY      metal plate      Family History   Problem Relation Age of Onset   • Arthritis Mother    • Heart attack Mother    • Hypertension Mother    • Hyperlipidemia Mother    • Osteoporosis Mother    • Heart disease Sister    • Diabetes Sister    • Arthritis Sister    • Heart attack Sister    • Hypertension Sister    • Hyperlipidemia Sister    • Bleeding Disorder Sister    • Heart disease Brother    • Cancer Brother    • Diabetes Brother    • Arthritis Brother    • Heart attack Brother    • Hypertension Brother    • Hyperlipidemia Brother    • Ovarian cancer Daughter    • Cancer Daughter    • Ovarian cancer Other         grandaughter   • Ovarian cancer Other         granddaughter   • Heart attack Father    • Hypertension Father    • Stroke Father    • Kidney disease Paternal Uncle    • Liver disease Paternal Uncle    • Cancer Other    • Stroke Other    • Diabetes Other    • Breast cancer Neg Hx      Social History     Socioeconomic History   • Marital status:      Spouse name: Not on file   • Number of children: Not on file   • Years of education: Not on file   • Highest education level: Not on file   Tobacco Use   • Smoking status: Never Smoker   • Smokeless tobacco: Never Used   Substance and Sexual Activity   • Alcohol use: No   • Drug use: No   • Sexual activity: Defer     Comment:        Current Outpatient Medications on File Prior to Visit   Medication Sig Dispense Refill   • aspirin  MG EC tablet Take 1 tablet by mouth Daily. For 1 month 30 tablet 0   • gabapentin (NEURONTIN) 400 MG capsule TAKE 1 CAPSULE THREE TIMES DAILY 270 capsule 1   • glimepiride (AMARYL) 4 MG tablet TAKE 1 TABLET EVERY MORNING BEFORE BREAKFAST 90 tablet 3   • losartan-hydrochlorothiazide (HYZAAR) 100-25 MG per tablet TAKE 1 TABLET EVERY DAY 90 tablet 3   • metFORMIN (GLUCOPHAGE) 500 MG tablet Take 1 tablet by mouth 2 (Two) Times a Day With Meals. 180 tablet 3   • omeprazole (priLOSEC) 20 MG capsule TAKE 1 CAPSULE TWICE DAILY 180 capsule 3   • Probiotic Product (PROBIOTIC DAILY) capsule Take as directed on package 60 capsule 0   • simvastatin (ZOCOR) 10 MG tablet TAKE 1 TABLET AT BEDTIME 90 tablet 1   • [DISCONTINUED] cyclobenzaprine (FLEXERIL) 10 MG tablet Take 1 tablet by mouth 3 (Three) Times a Day. 20 tablet 0   • [DISCONTINUED] naproxen (NAPROSYN) 375 MG tablet Take 1 tablet by mouth 2 (Two) Times a Day As Needed for Mild Pain . 14 tablet 0     No current facility-administered medications on file prior to visit.       Allergies   Allergen Reactions   • Codeine Hives        Review of Systems   Constitutional: Negative for activity change, appetite change, chills, diaphoresis, fatigue, fever and unexpected weight change.        Night sweats   HENT: Positive for congestion. Negative for dental problem, drooling, ear discharge, ear pain, facial swelling, hearing loss, mouth sores, nosebleeds, postnasal drip, rhinorrhea, sinus pressure, sneezing, sore throat, tinnitus, trouble swallowing and voice change.    Eyes: Negative for photophobia, pain, discharge, redness, itching and visual disturbance.   Respiratory: Negative for apnea, cough, choking, chest tightness, shortness of breath, wheezing and stridor.    Cardiovascular: Positive for leg swelling. Negative for chest pain and palpitations.   Gastrointestinal:  "Negative for abdominal distention, abdominal pain, anal bleeding, blood in stool, constipation, diarrhea, nausea, rectal pain and vomiting.   Endocrine: Negative for cold intolerance, heat intolerance, polydipsia, polyphagia and polyuria.   Genitourinary: Negative for decreased urine volume, difficulty urinating, dysuria, enuresis, flank pain, frequency, genital sores, hematuria and urgency.   Musculoskeletal: Positive for back pain. Negative for arthralgias, gait problem, joint swelling, myalgias, neck pain and neck stiffness.   Skin: Negative for color change, pallor, rash and wound.   Allergic/Immunologic: Negative for environmental allergies, food allergies and immunocompromised state.   Neurological: Negative for dizziness, tremors, seizures, syncope, facial asymmetry, speech difficulty, weakness, light-headedness, numbness and headaches.   Hematological: Negative for adenopathy. Does not bruise/bleed easily.   Psychiatric/Behavioral: Positive for sleep disturbance. Negative for agitation, behavioral problems, confusion, decreased concentration, dysphoric mood, hallucinations, self-injury and suicidal ideas. The patient is not nervous/anxious and is not hyperactive.         Objective      Physical Exam  Pulse 86   Ht 166.4 cm (65.51\")   Wt 83.9 kg (184 lb 15.5 oz)   SpO2 98%   BMI 30.30 kg/m²     Body mass index is 30.3 kg/m².    General:   Mental Status:  Alert   Appearance: Cooperative, in no acute distress   Build and Nutrition: Well-nourished well-developed female   Orientation: Alert and oriented to person, place and time   Posture: Normal   Gait: Mild limp on the left    Integument:   Left hip: Wound is well-healed with no signs of infection    Lower Extremity:   Left Hip:    Tenderness:  None    Swelling:  None    Crepitus:  None    Range of motion:  External Rotation: 30°       Internal Rotation: 30°       Flexion:  100°       Extension:  0°    Deformities:  None  Functional testing: Negative " [facility]    No leg length discrepancy      Imaging/Studies  Imaging Results (last 24 hours)     Procedure Component Value Units Date/Time    XR Hip With or Without Pelvis 1 View Left [[id number]] Resulted:  08/21/19 0939     Updated:  08/21/19 0939    Narrative:       Left Hip Radiographs  Indication: status-post left total hip arthroplasty  Views: low AP pelvis and lateral of the left hip    Comparison: no change compared to prior study, 4/22/2019    Findings:   The components are well aligned, with no signs of loosening or failure.            Assessment and Plan     Nely was seen today for follow-up.    Diagnoses and all orders for this visit:    Status post total hip replacement, left  -     XR Hip With or Without Pelvis 1 View Left    Postoperative examination        1. Status post total hip replacement, left    2. Postoperative examination        I reviewed my findings with the patient today.  Her left total hip arthroplasty is functioning well, she is pleased with results.  I will see her back in 6 months, which will be a one-year checkup, but sooner for any problems.    Return in about 6 months (around 2/21/2020) for Recheck with X-Rays.      Medical Decision Making  Data/Risk: radiology tests and independent visualization of imaging, lab tests, or EMG/NCV      Carlos Pollack MD  08/21/19  9:56 AM

## 2019-08-27 ENCOUNTER — OFFICE VISIT (OUTPATIENT)
Dept: INTERNAL MEDICINE | Facility: CLINIC | Age: 74
End: 2019-08-27

## 2019-08-27 VITALS
BODY MASS INDEX: 31.29 KG/M2 | RESPIRATION RATE: 21 BRPM | WEIGHT: 191 LBS | HEART RATE: 88 BPM | DIASTOLIC BLOOD PRESSURE: 76 MMHG | SYSTOLIC BLOOD PRESSURE: 148 MMHG

## 2019-08-27 DIAGNOSIS — E78.2 MIXED HYPERLIPIDEMIA: ICD-10-CM

## 2019-08-27 DIAGNOSIS — I10 ESSENTIAL HYPERTENSION: ICD-10-CM

## 2019-08-27 DIAGNOSIS — M16.0 PRIMARY OSTEOARTHRITIS OF BOTH HIPS: ICD-10-CM

## 2019-08-27 DIAGNOSIS — E11.9 CONTROLLED TYPE 2 DIABETES MELLITUS WITHOUT COMPLICATION, WITHOUT LONG-TERM CURRENT USE OF INSULIN (HCC): Primary | ICD-10-CM

## 2019-08-27 DIAGNOSIS — K21.9 GASTROESOPHAGEAL REFLUX DISEASE WITHOUT ESOPHAGITIS: ICD-10-CM

## 2019-08-27 LAB
EXPIRATION DATE: NORMAL
HBA1C MFR BLD: 6.6 %
Lab: NORMAL

## 2019-08-27 PROCEDURE — 83036 HEMOGLOBIN GLYCOSYLATED A1C: CPT | Performed by: INTERNAL MEDICINE

## 2019-08-27 PROCEDURE — 99214 OFFICE O/P EST MOD 30 MIN: CPT | Performed by: INTERNAL MEDICINE

## 2019-08-27 NOTE — PROGRESS NOTES
Subjective   Nely Grider is a 73 y.o. female.     History of Present Illness   For follow up of  HTN on meds, no headaches, sob or chest pain.  NIDDM no new sx.  Hyperlipidemia on meds,no muscle aches.  DJD is doing ok.  GERD is fine on meds.      The following portions of the patient's history were reviewed and updated as appropriate: allergies, current medications, past medical history, past social history and problem list.    Review of Systems   Constitutional: Negative.  Negative for fatigue and fever.   HENT: Negative.  Negative for congestion.    Eyes: Negative.    Respiratory: Negative.  Negative for cough, chest tightness and shortness of breath.    Cardiovascular: Negative.  Negative for chest pain, palpitations and leg swelling.   Gastrointestinal: Negative.  Negative for abdominal distention, abdominal pain and constipation.   Genitourinary: Negative.    Musculoskeletal: Positive for arthralgias.       Objective   Physical Exam   Constitutional: She appears well-developed and well-nourished.   /80 by me big cuff right.   Neck: Normal range of motion. Neck supple.   Cardiovascular: Normal rate, regular rhythm and normal heart sounds. Exam reveals no gallop and no friction rub.   No murmur heard.  Pulmonary/Chest: Effort normal and breath sounds normal. No respiratory distress. She has no wheezes. She has no rales. She exhibits no tenderness.   Abdominal: Soft. Bowel sounds are normal. She exhibits no distension. There is no tenderness. There is no guarding.   Musculoskeletal: She exhibits no edema.   Nursing note and vitals reviewed.        Assessment/Plan   Nely was seen today for essential hypertension.    Diagnoses and all orders for this visit:    Controlled type 2 diabetes mellitus without complication, without long-term current use of insulin (CMS/[facility])  -     POC Glycosylated Hemoglobin (Hb A1C) = 6.6, stable no change.    Essential hypertension  Stable, no change.    Mixed  hyperlipidemia  Stable, no change.    Gastroesophageal reflux disease without esophagitis  Stable, no change.    Primary osteoarthritis of both hips  Stable, no change.    All problems are stable.  Same meds.  Recheck 4 months.

## 2019-09-11 RX ORDER — GLIMEPIRIDE 4 MG/1
TABLET ORAL
Qty: 90 TABLET | Refills: 3 | Status: SHIPPED | OUTPATIENT
Start: 2019-09-11 | End: 2020-04-29

## 2019-09-12 ENCOUNTER — OFFICE VISIT (OUTPATIENT)
Dept: INTERNAL MEDICINE | Facility: CLINIC | Age: 74
End: 2019-09-12

## 2019-09-12 VITALS
WEIGHT: 187 LBS | HEART RATE: 72 BPM | SYSTOLIC BLOOD PRESSURE: 142 MMHG | BODY MASS INDEX: 30.63 KG/M2 | RESPIRATION RATE: 19 BRPM | DIASTOLIC BLOOD PRESSURE: 70 MMHG

## 2019-09-12 DIAGNOSIS — M25.512 ACUTE PAIN OF LEFT SHOULDER: Primary | ICD-10-CM

## 2019-09-12 PROCEDURE — 99213 OFFICE O/P EST LOW 20 MIN: CPT | Performed by: INTERNAL MEDICINE

## 2019-09-12 RX ORDER — METHYLPREDNISOLONE 4 MG/1
TABLET ORAL
Qty: 1 EACH | Refills: 0 | Status: SHIPPED | OUTPATIENT
Start: 2019-09-12 | End: 2019-10-05 | Stop reason: HOSPADM

## 2019-09-12 NOTE — PROGRESS NOTES
Subjective   Nely Grider is a 73 y.o. female.     History of Present Illness   For the past week, she has noticed pain in her left shoulder.  When she tries to move her arm up rapidly it hurts but she does have full rom.  She also says it hurts in her left lower mouth and gum and in her left ear a times.    The following portions of the patient's history were reviewed and updated as appropriate: allergies, current medications, past medical history, past social history and problem list.    Review of Systems   Constitutional: Negative for fatigue and fever.   HENT: Positive for ear pain.    Respiratory: Negative.    Cardiovascular: Negative.    Musculoskeletal:        As noted.       Objective   Physical Exam   Constitutional: She appears well-developed and well-nourished.   HENT:   Mouth/Throat: Oropharynx is clear and moist.   TM is normal.   Neck: Normal range of motion. Neck supple.   Musculoskeletal:   Some pain on moving her left arm up at shoulder but she does have full ROM.   Nursing note and vitals reviewed.        Assessment/Plan   Nely was seen today for shoulder pain.    Diagnoses and all orders for this visit:    Acute pain of left shoulder  -     methylPREDNISolone (MEDROL, JOSE,) 4 MG tablet; Take as directed on package instructions.    Sounds like a pinched nerve or shoulder pull.  Not sure why ear or mouth is involved.  Will try a medrol dosepak.  If not better she will call.

## 2019-10-03 ENCOUNTER — HOSPITAL ENCOUNTER (INPATIENT)
Facility: HOSPITAL | Age: 74
LOS: 1 days | Discharge: HOME | End: 2019-10-05
Attending: EMERGENCY MEDICINE | Admitting: INTERNAL MEDICINE

## 2019-10-03 ENCOUNTER — APPOINTMENT (OUTPATIENT)
Dept: GENERAL RADIOLOGY | Facility: HOSPITAL | Age: 74
End: 2019-10-03

## 2019-10-03 ENCOUNTER — APPOINTMENT (OUTPATIENT)
Dept: CT IMAGING | Facility: HOSPITAL | Age: 74
End: 2019-10-03

## 2019-10-03 DIAGNOSIS — R20.2 FACIAL PARESTHESIA: ICD-10-CM

## 2019-10-03 DIAGNOSIS — Z78.9 IMPAIRED MOBILITY AND ADLS: ICD-10-CM

## 2019-10-03 DIAGNOSIS — R29.898 LEFT ARM WEAKNESS: ICD-10-CM

## 2019-10-03 DIAGNOSIS — I63.9 CEREBROVASCULAR ACCIDENT (CVA), UNSPECIFIED MECHANISM (HCC): Primary | ICD-10-CM

## 2019-10-03 DIAGNOSIS — Z74.09 IMPAIRED MOBILITY AND ADLS: ICD-10-CM

## 2019-10-03 LAB
ALT SERPL W P-5'-P-CCNC: 18 U/L (ref 1–33)
APTT PPP: 33.2 SECONDS (ref 24–37)
AST SERPL-CCNC: 37 U/L (ref 1–32)
BASOPHILS # BLD AUTO: 0.05 10*3/MM3 (ref 0–0.2)
BASOPHILS NFR BLD AUTO: 0.5 % (ref 0–1.5)
BUN BLDA-MCNC: 7 MG/DL (ref 8–26)
CA-I BLDA-SCNC: 1.29 MMOL/L (ref 1.2–1.32)
CHLORIDE BLDA-SCNC: 102 MMOL/L (ref 98–109)
CO2 BLDA-SCNC: 24 MMOL/L (ref 24–29)
CREAT BLDA-MCNC: 1.2 MG/DL (ref 0.6–1.3)
DEPRECATED RDW RBC AUTO: 47.1 FL (ref 37–54)
EOSINOPHIL # BLD AUTO: 0.23 10*3/MM3 (ref 0–0.4)
EOSINOPHIL NFR BLD AUTO: 2.3 % (ref 0.3–6.2)
ERYTHROCYTE [DISTWIDTH] IN BLOOD BY AUTOMATED COUNT: 13.7 % (ref 12.3–15.4)
GLUCOSE BLDC GLUCOMTR-MCNC: 186 MG/DL (ref 70–130)
HCT VFR BLD AUTO: 41.2 % (ref 34–46.6)
HCT VFR BLDA CALC: 40 % (ref 38–51)
HGB BLD-MCNC: 12.9 G/DL (ref 12–15.9)
HGB BLDA-MCNC: 13.6 G/DL (ref 12–17)
HOLD SPECIMEN: NORMAL
HOLD SPECIMEN: NORMAL
IMM GRANULOCYTES # BLD AUTO: 0.01 10*3/MM3 (ref 0–0.05)
IMM GRANULOCYTES NFR BLD AUTO: 0.1 % (ref 0–0.5)
INR PPP: 0.9 (ref 0.8–1.2)
LYMPHOCYTES # BLD AUTO: 3.71 10*3/MM3 (ref 0.7–3.1)
LYMPHOCYTES NFR BLD AUTO: 37.7 % (ref 19.6–45.3)
MCH RBC QN AUTO: 29.3 PG (ref 26.6–33)
MCHC RBC AUTO-ENTMCNC: 31.3 G/DL (ref 31.5–35.7)
MCV RBC AUTO: 93.4 FL (ref 79–97)
MONOCYTES # BLD AUTO: 0.67 10*3/MM3 (ref 0.1–0.9)
MONOCYTES NFR BLD AUTO: 6.8 % (ref 5–12)
NEUTROPHILS # BLD AUTO: 5.18 10*3/MM3 (ref 1.7–7)
NEUTROPHILS NFR BLD AUTO: 52.6 % (ref 42.7–76)
NRBC BLD AUTO-RTO: 0 /100 WBC (ref 0–0.2)
PLATELET # BLD AUTO: 400 10*3/MM3 (ref 140–450)
PMV BLD AUTO: 9.5 FL (ref 6–12)
POTASSIUM BLDA-SCNC: 4.1 MMOL/L (ref 3.5–4.9)
PROTHROMBIN TIME: 11.3 SECONDS (ref 12.8–15.2)
RBC # BLD AUTO: 4.41 10*6/MM3 (ref 3.77–5.28)
SODIUM BLDA-SCNC: 138 MMOL/L (ref 138–146)
TROPONIN T SERPL-MCNC: <0.01 NG/ML (ref 0–0.03)
WBC NRBC COR # BLD: 9.85 10*3/MM3 (ref 3.4–10.8)
WHOLE BLOOD HOLD SPECIMEN: NORMAL
WHOLE BLOOD HOLD SPECIMEN: NORMAL

## 2019-10-03 PROCEDURE — 85014 HEMATOCRIT: CPT

## 2019-10-03 PROCEDURE — 0 IOPAMIDOL PER 1 ML: Performed by: EMERGENCY MEDICINE

## 2019-10-03 PROCEDURE — 70450 CT HEAD/BRAIN W/O DYE: CPT

## 2019-10-03 PROCEDURE — 70496 CT ANGIOGRAPHY HEAD: CPT

## 2019-10-03 PROCEDURE — 80047 BASIC METABLC PNL IONIZED CA: CPT

## 2019-10-03 PROCEDURE — 0042T HC CT CEREBRAL PERFUSION W/WO CONTRAST: CPT

## 2019-10-03 PROCEDURE — 70498 CT ANGIOGRAPHY NECK: CPT

## 2019-10-03 PROCEDURE — 99284 EMERGENCY DEPT VISIT MOD MDM: CPT

## 2019-10-03 PROCEDURE — 84484 ASSAY OF TROPONIN QUANT: CPT | Performed by: EMERGENCY MEDICINE

## 2019-10-03 PROCEDURE — 85730 THROMBOPLASTIN TIME PARTIAL: CPT | Performed by: EMERGENCY MEDICINE

## 2019-10-03 PROCEDURE — 84460 ALANINE AMINO (ALT) (SGPT): CPT | Performed by: EMERGENCY MEDICINE

## 2019-10-03 PROCEDURE — 85025 COMPLETE CBC W/AUTO DIFF WBC: CPT | Performed by: EMERGENCY MEDICINE

## 2019-10-03 PROCEDURE — 93005 ELECTROCARDIOGRAM TRACING: CPT | Performed by: EMERGENCY MEDICINE

## 2019-10-03 PROCEDURE — 85610 PROTHROMBIN TIME: CPT

## 2019-10-03 PROCEDURE — 84450 TRANSFERASE (AST) (SGOT): CPT | Performed by: EMERGENCY MEDICINE

## 2019-10-03 PROCEDURE — 71045 X-RAY EXAM CHEST 1 VIEW: CPT

## 2019-10-03 RX ORDER — SODIUM CHLORIDE 0.9 % (FLUSH) 0.9 %
10 SYRINGE (ML) INJECTION AS NEEDED
Status: DISCONTINUED | OUTPATIENT
Start: 2019-10-03 | End: 2019-10-05 | Stop reason: HOSPADM

## 2019-10-03 RX ORDER — ASPIRIN 81 MG/1
324 TABLET, CHEWABLE ORAL ONCE
Status: DISCONTINUED | OUTPATIENT
Start: 2019-10-03 | End: 2019-10-05 | Stop reason: HOSPADM

## 2019-10-03 RX ADMIN — IOPAMIDOL 115 ML: 755 INJECTION, SOLUTION INTRAVENOUS at 21:58

## 2019-10-04 ENCOUNTER — APPOINTMENT (OUTPATIENT)
Dept: GENERAL RADIOLOGY | Facility: HOSPITAL | Age: 74
End: 2019-10-04

## 2019-10-04 ENCOUNTER — APPOINTMENT (OUTPATIENT)
Dept: CARDIOLOGY | Facility: HOSPITAL | Age: 74
End: 2019-10-04

## 2019-10-04 ENCOUNTER — APPOINTMENT (OUTPATIENT)
Dept: MRI IMAGING | Facility: HOSPITAL | Age: 74
End: 2019-10-04

## 2019-10-04 PROBLEM — G45.9 TIA (TRANSIENT ISCHEMIC ATTACK): Status: ACTIVE | Noted: 2019-10-04

## 2019-10-04 PROBLEM — I63.9 CEREBROVASCULAR ACCIDENT (CVA): Status: ACTIVE | Noted: 2019-10-04

## 2019-10-04 LAB
ALBUMIN SERPL-MCNC: 3.7 G/DL (ref 3.5–5.2)
ALBUMIN/GLOB SERPL: 1.5 G/DL
ALP SERPL-CCNC: 77 U/L (ref 39–117)
ALT SERPL W P-5'-P-CCNC: 17 U/L (ref 1–33)
ANION GAP SERPL CALCULATED.3IONS-SCNC: 11 MMOL/L (ref 5–15)
AST SERPL-CCNC: 23 U/L (ref 1–32)
BASOPHILS # BLD AUTO: 0.05 10*3/MM3 (ref 0–0.2)
BASOPHILS NFR BLD AUTO: 0.4 % (ref 0–1.5)
BH CV ECHO MEAS - AO MAX PG (FULL): 6 MMHG
BH CV ECHO MEAS - AO MAX PG: 8.6 MMHG
BH CV ECHO MEAS - AO ROOT AREA (BSA CORRECTED): 1.4
BH CV ECHO MEAS - AO ROOT AREA: 5.8 CM^2
BH CV ECHO MEAS - AO ROOT DIAM: 2.7 CM
BH CV ECHO MEAS - AO V2 MAX: 146.6 CM/SEC
BH CV ECHO MEAS - AVA(V,A): 1.5 CM^2
BH CV ECHO MEAS - AVA(V,D): 1.5 CM^2
BH CV ECHO MEAS - BSA(HAYCOCK): 2 M^2
BH CV ECHO MEAS - BSA: 1.9 M^2
BH CV ECHO MEAS - BZI_BMI: 31 KILOGRAMS/M^2
BH CV ECHO MEAS - BZI_METRIC_HEIGHT: 165.1 CM
BH CV ECHO MEAS - BZI_METRIC_WEIGHT: 84.4 KG
BH CV ECHO MEAS - EDV(CUBED): 80.1 ML
BH CV ECHO MEAS - EDV(MOD-SP2): 53 ML
BH CV ECHO MEAS - EDV(MOD-SP4): 58 ML
BH CV ECHO MEAS - EDV(TEICH): 83.6 ML
BH CV ECHO MEAS - EF(CUBED): 82.5 %
BH CV ECHO MEAS - EF(MOD-BP): 63 %
BH CV ECHO MEAS - EF(MOD-SP2): 62.3 %
BH CV ECHO MEAS - EF(MOD-SP4): 65.5 %
BH CV ECHO MEAS - EF(TEICH): 75.6 %
BH CV ECHO MEAS - ESV(CUBED): 14 ML
BH CV ECHO MEAS - ESV(MOD-SP2): 20 ML
BH CV ECHO MEAS - ESV(MOD-SP4): 20 ML
BH CV ECHO MEAS - ESV(TEICH): 20.4 ML
BH CV ECHO MEAS - FS: 44.1 %
BH CV ECHO MEAS - IVS/LVPW: 0.82
BH CV ECHO MEAS - IVSD: 0.85 CM
BH CV ECHO MEAS - LA DIMENSION: 3.6 CM
BH CV ECHO MEAS - LA/AO: 1.3
BH CV ECHO MEAS - LAD MAJOR: 6 CM
BH CV ECHO MEAS - LAT PEAK E' VEL: 5.2 CM/SEC
BH CV ECHO MEAS - LATERAL E/E' RATIO: 11.7
BH CV ECHO MEAS - LV DIASTOLIC VOL/BSA (35-75): 30.2 ML/M^2
BH CV ECHO MEAS - LV MASS(C)D: 131.9 GRAMS
BH CV ECHO MEAS - LV MASS(C)DI: 68.8 GRAMS/M^2
BH CV ECHO MEAS - LV MAX PG: 2.6 MMHG
BH CV ECHO MEAS - LV MEAN PG: 1.4 MMHG
BH CV ECHO MEAS - LV SYSTOLIC VOL/BSA (12-30): 10.4 ML/M^2
BH CV ECHO MEAS - LV V1 MAX: 81.2 CM/SEC
BH CV ECHO MEAS - LV V1 MEAN: 54.6 CM/SEC
BH CV ECHO MEAS - LV V1 VTI: 15.8 CM
BH CV ECHO MEAS - LVIDD: 4.3 CM
BH CV ECHO MEAS - LVIDS: 2.4 CM
BH CV ECHO MEAS - LVLD AP2: 7 CM
BH CV ECHO MEAS - LVLD AP4: 6.7 CM
BH CV ECHO MEAS - LVLS AP2: 5.8 CM
BH CV ECHO MEAS - LVLS AP4: 5.5 CM
BH CV ECHO MEAS - LVOT AREA (M): 2.8 CM^2
BH CV ECHO MEAS - LVOT AREA: 2.8 CM^2
BH CV ECHO MEAS - LVOT DIAM: 1.9 CM
BH CV ECHO MEAS - LVPWD: 1 CM
BH CV ECHO MEAS - MED PEAK E' VEL: 5.7 CM/SEC
BH CV ECHO MEAS - MEDIAL E/E' RATIO: 10.6
BH CV ECHO MEAS - MV A MAX VEL: 86.1 CM/SEC
BH CV ECHO MEAS - MV DEC TIME: 0.23 SEC
BH CV ECHO MEAS - MV E MAX VEL: 62 CM/SEC
BH CV ECHO MEAS - MV E/A: 0.72
BH CV ECHO MEAS - PA ACC SLOPE: 1001 CM/SEC^2
BH CV ECHO MEAS - PA ACC TIME: 0.07 SEC
BH CV ECHO MEAS - PA MAX PG: 3.2 MMHG
BH CV ECHO MEAS - PA PR(ACCEL): 45.7 MMHG
BH CV ECHO MEAS - PA V2 MAX: 89.6 CM/SEC
BH CV ECHO MEAS - SI(CUBED): 34.5 ML/M^2
BH CV ECHO MEAS - SI(LVOT): 22.8 ML/M^2
BH CV ECHO MEAS - SI(MOD-SP2): 17.2 ML/M^2
BH CV ECHO MEAS - SI(MOD-SP4): 19.8 ML/M^2
BH CV ECHO MEAS - SI(TEICH): 32.9 ML/M^2
BH CV ECHO MEAS - SV(CUBED): 66.1 ML
BH CV ECHO MEAS - SV(LVOT): 43.8 ML
BH CV ECHO MEAS - SV(MOD-SP2): 33 ML
BH CV ECHO MEAS - SV(MOD-SP4): 38 ML
BH CV ECHO MEAS - SV(TEICH): 63.2 ML
BH CV ECHO MEASUREMENTS AVERAGE E/E' RATIO: 11.38
BH CV VAS BP LEFT ARM: NORMAL MMHG
BH CV XLRA - RV BASE: 2.7 CM
BH CV XLRA - RV LENGTH: 6.7 CM
BH CV XLRA - RV MID: 1.9 CM
BH CV XLRA - TDI S': 10.6 CM/SEC
BILIRUB SERPL-MCNC: 0.3 MG/DL (ref 0.2–1.2)
BUN BLD-MCNC: 8 MG/DL (ref 8–23)
BUN/CREAT SERPL: 7.5 (ref 7–25)
CALCIUM SPEC-SCNC: 9.6 MG/DL (ref 8.6–10.5)
CHLORIDE SERPL-SCNC: 105 MMOL/L (ref 98–107)
CHOLEST SERPL-MCNC: 145 MG/DL (ref 0–200)
CO2 SERPL-SCNC: 25 MMOL/L (ref 22–29)
CREAT BLD-MCNC: 1.06 MG/DL (ref 0.57–1)
DEPRECATED RDW RBC AUTO: 48.7 FL (ref 37–54)
EOSINOPHIL # BLD AUTO: 0.23 10*3/MM3 (ref 0–0.4)
EOSINOPHIL NFR BLD AUTO: 2 % (ref 0.3–6.2)
ERYTHROCYTE [DISTWIDTH] IN BLOOD BY AUTOMATED COUNT: 14.1 % (ref 12.3–15.4)
GFR SERPL CREATININE-BSD FRML MDRD: 51 ML/MIN/1.73
GLOBULIN UR ELPH-MCNC: 2.5 GM/DL
GLUCOSE BLD-MCNC: 88 MG/DL (ref 65–99)
GLUCOSE BLDC GLUCOMTR-MCNC: 174 MG/DL (ref 70–130)
GLUCOSE BLDC GLUCOMTR-MCNC: 200 MG/DL (ref 70–130)
GLUCOSE BLDC GLUCOMTR-MCNC: 71 MG/DL (ref 70–130)
GLUCOSE BLDC GLUCOMTR-MCNC: 88 MG/DL (ref 70–130)
HBA1C MFR BLD: 6.8 % (ref 4.8–5.6)
HCT VFR BLD AUTO: 38.9 % (ref 34–46.6)
HDLC SERPL-MCNC: 31 MG/DL (ref 40–60)
HGB BLD-MCNC: 12.2 G/DL (ref 12–15.9)
IMM GRANULOCYTES # BLD AUTO: 0.03 10*3/MM3 (ref 0–0.05)
IMM GRANULOCYTES NFR BLD AUTO: 0.3 % (ref 0–0.5)
LDLC SERPL CALC-MCNC: 74 MG/DL (ref 0–100)
LDLC/HDLC SERPL: 2.38 {RATIO}
LV EF 2D ECHO EST: 65 %
LYMPHOCYTES # BLD AUTO: 2.48 10*3/MM3 (ref 0.7–3.1)
LYMPHOCYTES NFR BLD AUTO: 21 % (ref 19.6–45.3)
MAXIMAL PREDICTED HEART RATE: 145 BPM
MCH RBC QN AUTO: 29.6 PG (ref 26.6–33)
MCHC RBC AUTO-ENTMCNC: 31.4 G/DL (ref 31.5–35.7)
MCV RBC AUTO: 94.4 FL (ref 79–97)
MONOCYTES # BLD AUTO: 0.64 10*3/MM3 (ref 0.1–0.9)
MONOCYTES NFR BLD AUTO: 5.4 % (ref 5–12)
NEUTROPHILS # BLD AUTO: 8.36 10*3/MM3 (ref 1.7–7)
NEUTROPHILS NFR BLD AUTO: 70.9 % (ref 42.7–76)
NRBC BLD AUTO-RTO: 0 /100 WBC (ref 0–0.2)
PLATELET # BLD AUTO: 372 10*3/MM3 (ref 140–450)
PMV BLD AUTO: 10 FL (ref 6–12)
POTASSIUM BLD-SCNC: 4.2 MMOL/L (ref 3.5–5.2)
PROT SERPL-MCNC: 6.2 G/DL (ref 6–8.5)
RBC # BLD AUTO: 4.12 10*6/MM3 (ref 3.77–5.28)
SODIUM BLD-SCNC: 141 MMOL/L (ref 136–145)
STRESS TARGET HR: 123 BPM
TRIGL SERPL-MCNC: 201 MG/DL (ref 0–150)
TSH SERPL DL<=0.05 MIU/L-ACNC: 2.91 UIU/ML (ref 0.27–4.2)
VLDLC SERPL-MCNC: 40.2 MG/DL
WBC NRBC COR # BLD: 11.79 10*3/MM3 (ref 3.4–10.8)

## 2019-10-04 PROCEDURE — 80061 LIPID PANEL: CPT | Performed by: INTERNAL MEDICINE

## 2019-10-04 PROCEDURE — 92523 SPEECH SOUND LANG COMPREHEN: CPT

## 2019-10-04 PROCEDURE — 85025 COMPLETE CBC W/AUTO DIFF WBC: CPT | Performed by: INTERNAL MEDICINE

## 2019-10-04 PROCEDURE — 63710000001 INSULIN LISPRO (HUMAN) PER 5 UNITS: Performed by: INTERNAL MEDICINE

## 2019-10-04 PROCEDURE — 92611 MOTION FLUOROSCOPY/SWALLOW: CPT

## 2019-10-04 PROCEDURE — 99223 1ST HOSP IP/OBS HIGH 75: CPT | Performed by: INTERNAL MEDICINE

## 2019-10-04 PROCEDURE — 74230 X-RAY XM SWLNG FUNCJ C+: CPT

## 2019-10-04 PROCEDURE — 93306 TTE W/DOPPLER COMPLETE: CPT | Performed by: INTERNAL MEDICINE

## 2019-10-04 PROCEDURE — G0108 DIAB MANAGE TRN  PER INDIV: HCPCS

## 2019-10-04 PROCEDURE — 84443 ASSAY THYROID STIM HORMONE: CPT | Performed by: INTERNAL MEDICINE

## 2019-10-04 PROCEDURE — 93306 TTE W/DOPPLER COMPLETE: CPT

## 2019-10-04 PROCEDURE — 82962 GLUCOSE BLOOD TEST: CPT

## 2019-10-04 PROCEDURE — 99223 1ST HOSP IP/OBS HIGH 75: CPT | Performed by: PSYCHIATRY & NEUROLOGY

## 2019-10-04 PROCEDURE — 74240 X-RAY XM UPR GI TRC 1CNTRST: CPT

## 2019-10-04 PROCEDURE — 97165 OT EVAL LOW COMPLEX 30 MIN: CPT

## 2019-10-04 PROCEDURE — 70551 MRI BRAIN STEM W/O DYE: CPT

## 2019-10-04 PROCEDURE — 83036 HEMOGLOBIN GLYCOSYLATED A1C: CPT | Performed by: INTERNAL MEDICINE

## 2019-10-04 PROCEDURE — 97162 PT EVAL MOD COMPLEX 30 MIN: CPT

## 2019-10-04 PROCEDURE — 80053 COMPREHEN METABOLIC PANEL: CPT | Performed by: INTERNAL MEDICINE

## 2019-10-04 PROCEDURE — 92610 EVALUATE SWALLOWING FUNCTION: CPT

## 2019-10-04 RX ORDER — GABAPENTIN 400 MG/1
400 CAPSULE ORAL EVERY 12 HOURS SCHEDULED
Status: DISCONTINUED | OUTPATIENT
Start: 2019-10-04 | End: 2019-10-05 | Stop reason: HOSPADM

## 2019-10-04 RX ORDER — ASPIRIN 81 MG/1
81 TABLET, CHEWABLE ORAL DAILY
Status: DISCONTINUED | OUTPATIENT
Start: 2019-10-04 | End: 2019-10-05 | Stop reason: HOSPADM

## 2019-10-04 RX ORDER — NICOTINE POLACRILEX 4 MG
15 LOZENGE BUCCAL
Status: DISCONTINUED | OUTPATIENT
Start: 2019-10-04 | End: 2019-10-05 | Stop reason: HOSPADM

## 2019-10-04 RX ORDER — DEXTROSE MONOHYDRATE 25 G/50ML
25 INJECTION, SOLUTION INTRAVENOUS
Status: DISCONTINUED | OUTPATIENT
Start: 2019-10-04 | End: 2019-10-05 | Stop reason: HOSPADM

## 2019-10-04 RX ORDER — SODIUM CHLORIDE 0.9 % (FLUSH) 0.9 %
10 SYRINGE (ML) INJECTION AS NEEDED
Status: DISCONTINUED | OUTPATIENT
Start: 2019-10-04 | End: 2019-10-05 | Stop reason: HOSPADM

## 2019-10-04 RX ORDER — SODIUM CHLORIDE 0.9 % (FLUSH) 0.9 %
10 SYRINGE (ML) INJECTION EVERY 12 HOURS SCHEDULED
Status: DISCONTINUED | OUTPATIENT
Start: 2019-10-04 | End: 2019-10-05 | Stop reason: HOSPADM

## 2019-10-04 RX ORDER — CLOPIDOGREL BISULFATE 75 MG/1
300 TABLET ORAL ONCE
Status: COMPLETED | OUTPATIENT
Start: 2019-10-04 | End: 2019-10-04

## 2019-10-04 RX ORDER — PANTOPRAZOLE SODIUM 40 MG/1
40 TABLET, DELAYED RELEASE ORAL EVERY MORNING
Status: DISCONTINUED | OUTPATIENT
Start: 2019-10-04 | End: 2019-10-05 | Stop reason: HOSPADM

## 2019-10-04 RX ORDER — AMLODIPINE BESYLATE 5 MG/1
5 TABLET ORAL
Status: DISCONTINUED | OUTPATIENT
Start: 2019-10-04 | End: 2019-10-05 | Stop reason: HOSPADM

## 2019-10-04 RX ORDER — ATORVASTATIN CALCIUM 40 MG/1
80 TABLET, FILM COATED ORAL NIGHTLY
Status: DISCONTINUED | OUTPATIENT
Start: 2019-10-04 | End: 2019-10-05 | Stop reason: HOSPADM

## 2019-10-04 RX ORDER — CLOPIDOGREL BISULFATE 75 MG/1
75 TABLET ORAL DAILY
Status: DISCONTINUED | OUTPATIENT
Start: 2019-10-05 | End: 2019-10-05 | Stop reason: HOSPADM

## 2019-10-04 RX ADMIN — SODIUM CHLORIDE, PRESERVATIVE FREE 10 ML: 5 INJECTION INTRAVENOUS at 09:30

## 2019-10-04 RX ADMIN — INSULIN LISPRO 2 UNITS: 100 INJECTION, SOLUTION INTRAVENOUS; SUBCUTANEOUS at 22:04

## 2019-10-04 RX ADMIN — BARIUM SULFATE 100 ML: 0.81 POWDER, FOR SUSPENSION ORAL at 12:04

## 2019-10-04 RX ADMIN — CLOPIDOGREL BISULFATE 300 MG: 75 TABLET ORAL at 16:32

## 2019-10-04 RX ADMIN — ATORVASTATIN CALCIUM 80 MG: 40 TABLET, FILM COATED ORAL at 22:02

## 2019-10-04 RX ADMIN — SODIUM CHLORIDE, PRESERVATIVE FREE 10 ML: 5 INJECTION INTRAVENOUS at 01:20

## 2019-10-04 RX ADMIN — SODIUM CHLORIDE, PRESERVATIVE FREE 10 ML: 5 INJECTION INTRAVENOUS at 22:04

## 2019-10-04 RX ADMIN — AMLODIPINE BESYLATE 5 MG: 5 TABLET ORAL at 16:32

## 2019-10-04 RX ADMIN — BARIUM SULFATE 20 ML: 400 PASTE ORAL at 11:59

## 2019-10-04 RX ADMIN — GABAPENTIN 400 MG: 400 CAPSULE ORAL at 22:06

## 2019-10-04 RX ADMIN — ASPIRIN 81 MG 81 MG: 81 TABLET ORAL at 09:32

## 2019-10-04 RX ADMIN — INSULIN LISPRO 3 UNITS: 100 INJECTION, SOLUTION INTRAVENOUS; SUBCUTANEOUS at 17:45

## 2019-10-04 NOTE — PLAN OF CARE
Problem: Patient Care Overview  Goal: Plan of Care Review  Outcome: Ongoing (interventions implemented as appropriate)   10/04/19 0692   Coping/Psychosocial   Plan of Care Reviewed With patient   Plan of Care Review   Progress no change   OTHER   Outcome Summary Pt a/o x 4. VSS. Pt has L sided facial droop and mild sensory lost on left side of face. Pt ambulates well with assist x 1. No needs or concerns at this time. Will continue to monitor.

## 2019-10-04 NOTE — ED PROVIDER NOTES
"    [facility] 3E    eMERGENCY dEPARTMENT eNCOUnter      Pt Name: Nely Grider  MRN: [id number]  YOB: 1945  Date of evaluation: 10/3/2019  Provider: Param Hawthorne DO    CHIEF COMPLAINT       Chief Complaint   Patient presents with   • Stroke         HISTORY OF PRESENT ILLNESS  (Location/Symptom, Timing/Onset, Context/Setting, Quality, Duration, Modifying Factors, Severity.)   Nely Grider is a 73 y.o. female who presents to the emergency department with c/o a neurologic problem with onset at 2030 tonight.  Patient reports she had just laid down for bed around 2030 this evening when she suddenly developed a weird sensation in her left arm \"like it wasn't working right.\"  She tells me that on further inspection her left arm had in fact gone completely numb and was very weak.  She brings herself to the ED for evaluation concerned for a possible stroke.  On exam, she continues to endorse left arm numbness and weakness, and to a lesser extent in her left leg.  She complains of an associated headache and that her left face feels \"fat and swollen.\"  She denies any speech difficulties, light-headedness, dizziness, or visual changes.  No history of cerebrovascular accident.  No anticoagulant use.  No recent head injuries or falls.    Nursing notes were reviewed.    REVIEW OF SYSTEMS    (2-9 systems for level 4, 10 or more for level 5)   ROS:  General:  No fevers, no chills, no weakness  Eyes: No visual changes  Cardiovascular:  No chest pain, no palpitations  Respiratory:  No shortness of breath, no cough, no wheezing  Gastrointestinal:  No pain, no nausea, no vomiting, no diarrhea  Musculoskeletal:  No muscle pain, no joint pain  Skin:  No rash, no easy bruising  Neurologic:  No speech problems, + headache, + extremity numbness, no extremity tingling, + extremity weakness, no dizziness, no light-headedness  Psychiatric:  No anxiety  Genitourinary:  No dysuria, no hematuria    Except " as noted above the remainder of the review of systems was reviewed and negative.       PAST MEDICAL HISTORY     Past Medical History:   Diagnosis Date   • Anesthesia complication     per patient with hip surgery last april did not remember being in hospital or going home can only rmember from rehad on   • Anesthesia complication     with hip surgery in april 2018 had issues with swallowing after surgery called in speech   • Arthritis    • Cancer (CMS/HCC)     cervical   • Cataract    • Diabetes mellitus (CMS/[facility])     type 2 dm, check blood sugar every morning, diagnosed 3 or 4 years   • Full dentures    • Full dentures    • GERD (gastroesophageal reflux disease)    • High cholesterol    • History of IBS    • Hypertension     been off bp meds since last april 2018   • Neuropathy    • Wears glasses          SURGICAL HISTORY       Past Surgical History:   Procedure Laterality Date   • CARDIAC CATHETERIZATION     • CHOLECYSTECTOMY     • COLONOSCOPY     • EYE SURGERY      bilateral cataract   • HYSTERECTOMY      partial   • JOINT REPLACEMENT      hip surgery 2018 in april   • OOPHORECTOMY      one removed   • TONSILLECTOMY     • TOTAL HIP ARTHROPLASTY Right 4/19/2018    Procedure: RIGHT TOTAL HIP ARTHROPLASTY;  Surgeon: Carlos Pollack MD;  Location:  [facility];  Service: Orthopedics   • TOTAL HIP ARTHROPLASTY Left 2/12/2019    Procedure: TOTAL HIP ARTHROPLASTY LEFT;  Surgeon: Carlos Pollack MD;  Location:  [facility];  Service: Orthopedics   • WRIST SURGERY      metal plate         CURRENT MEDICATIONS       Current Facility-Administered Medications:   •  aspirin chewable tablet 324 mg, 324 mg, Oral, Once, Param Hawthorne DO  •  sodium chloride 0.9 % flush 10 mL, 10 mL, Intravenous, PRN, Param Hawthorne DO    ALLERGIES     Codeine    FAMILY HISTORY       Family History   Problem Relation Age of Onset   • Arthritis Mother    • Heart attack Mother    • Hypertension Mother    • Hyperlipidemia Mother    •  Osteoporosis Mother    • Heart disease Sister    • Diabetes Sister    • Arthritis Sister    • Heart attack Sister    • Hypertension Sister    • Hyperlipidemia Sister    • Bleeding Disorder Sister    • Heart disease Brother    • Cancer Brother    • Diabetes Brother    • Arthritis Brother    • Heart attack Brother    • Hypertension Brother    • Hyperlipidemia Brother    • Ovarian cancer Daughter    • Cancer Daughter    • Ovarian cancer Other         grandaughter   • Ovarian cancer Other         granddaughter   • Heart attack Father    • Hypertension Father    • Stroke Father    • Kidney disease Paternal Uncle    • Liver disease Paternal Uncle    • Cancer Other    • Stroke Other    • Diabetes Other    • Breast cancer Neg Hx           SOCIAL HISTORY       Social History     Socioeconomic History   • Marital status:      Spouse name: Not on file   • Number of children: Not on file   • Years of education: Not on file   • Highest education level: Not on file   Tobacco Use   • Smoking status: Never Smoker   • Smokeless tobacco: Never Used   Substance and Sexual Activity   • Alcohol use: No   • Drug use: No   • Sexual activity: Defer     Comment:          PHYSICAL EXAM    (up to 7 for level 4, 8 or more for level 5)     Vitals:    10/03/19 2315 10/03/19 2330 10/04/19 0000 10/04/19 0030   BP:  157/84 160/84 172/85   BP Location:       Patient Position:       Pulse: 71 70 73 77   Resp:       Temp:       TempSrc:       SpO2: 95%  95% 97%   Weight:       Height:           Physical Exam  General :Patient is awake, alert, oriented, in no acute distress, nontoxic appearing  HEENT: Pupils are equally round and reactive to light, EOMI, conjunctivae clear, sclerae white, there is no injection no icterus.  Oral mucosa is moist, no exudate. Uvula is midline  Neck: Neck is supple, full range of motion, trachea midline  Cardiac: Heart regular rate, rhythm, no murmurs, rubs, or gallops  Lungs: Lungs are clear to auscultation,  there is no wheezing, rhonchi, or rales. There is no use of accessory muscles.  Chest wall: There is no tenderness to palpation over the chest wall or over ribs  Abdomen: Abdomen is soft, nontender, nondistended. There is no firm or pulsatile masses, no rebound rigidity or guarding.   Musculoskeletal: 5 out of 5 strength in all 4 extremities.  No focal muscle deficits are appreciated  Neuro: There is some mild ataxia of the left upper extremity in finger-to-nose with a slight overshoot.  There is a sensory deficit in the left upper extremity as well as decreased  strength as compared to the right upper extremity.  Slightly decreased strength in the left lower extremity compared to the right.  Mild sensory deficit in the V2, V3 trigeminal distribution.  Level of consciousness is normal, cerebellar function is normal, reflexes are grossly normal. No evidence of incontinence or loss of bowel or bladder function, no saddle anesthesia noted. GCS 15.  NIHSS 3.  Dermatology: Skin is warm and dry  Psych: Mentation is grossly normal, cognition is grossly normal. Affect is appropriate.  Speech intact.      DIAGNOSTIC RESULTS     EKG: All EKG's are interpreted by the Emergency Department Physician who either signs or Co-signs this chart in the absence of a cardiologist.    ECG 12 Lead   Final Result   Test Reason : Acute Stroke Protocol (onset < 12 hrs)   Blood Pressure : **/** mmHG   Vent. Rate : 074 BPM     Atrial Rate : 074 BPM      P-R Int : 140 ms          QRS Dur : 084 ms       QT Int : 410 ms       P-R-T Axes : 011 -15 -09 degrees      QTc Int : 455 ms      Normal sinus rhythm   Minimal voltage criteria for LVH, may be normal variant   Borderline ECG   When compared with ECG of 28-JAN-2019 08:26,   No significant change was found   Confirmed by VANESSA HERNANDEZ MD ([id number]) on 10/3/2019 10:40:53 PM      Referred By:  CONNER           Confirmed By:VANESSA HERNANDEZ MD          RADIOLOGY:   Non-plain film images such as CT,  Ultrasound and MRI are read by the radiologist. Plain radiographic images are visualized and preliminarily interpreted by the emergency physician with the below findings:      [] Radiologist's Report Reviewed:  CT Angiogram Head With & Without Contrast   Final Result   Negative CT angiogram of the head and neck. No intracranial or extracranial arterial flow limiting stenosis or aneurysm.       Signer Name: Guilherme Garrido MD    Signed: 10/3/2019 10:31 PM    Workstation Name: [facility]      CT Angiogram Neck With & Without Contrast   Final Result   Negative CT angiogram of the head and neck. No intracranial or extracranial arterial flow limiting stenosis or aneurysm.       Signer Name: Guilherme Garrido MD    Signed: 10/3/2019 10:31 PM    Workstation Name: [facility]      XR Chest 1 View   Final Result   No acute cardiopulmonary findings.      Signer Name: Johnathan Siddiqui MD    Signed: 10/3/2019 10:26 PM    Workstation Name: [facility]      CT Cerebral Perfusion With & Without Contrast   Final Result   Normal brain perfusion CT.               Signer Name: Guilherme Garrido MD    Signed: 10/3/2019 10:20 PM    Workstation Name: [facility]      CT Head Without Contrast Stroke Protocol   Final Result   Normal, negative unenhanced head CT.      The examination was performed at 9:29 PM and results were called by telephone at 10/3/2019 9:43 PM to Param Hawthorne MD who verbally acknowledged these results.         Signer Name: Trey Faria MD    Signed: 10/3/2019 9:45 PM    Workstation Name: [facility]            ED BEDSIDE ULTRASOUND:   Performed by ED Physician - none    LABS:    I have reviewed and interpreted all of the currently available lab results from this visit (if applicable):  Results for orders placed or performed  during the hospital encounter of 10/03/19   Troponin   Result Value Ref Range    Troponin T <0.010 0.000 - 0.030 ng/mL   aPTT   Result Value Ref Range    PTT 33.2 24.0 - 37.0 seconds   AST   Result Value Ref Range    AST (SGOT) 37 (H) 1 - 32 U/L   ALT   Result Value Ref Range    ALT (SGPT) 18 1 - 33 U/L   CBC Auto Differential   Result Value Ref Range    WBC 9.85 3.40 - 10.80 10*3/mm3    RBC 4.41 3.77 - 5.28 10*6/mm3    Hemoglobin 12.9 12.0 - 15.9 g/dL    Hematocrit 41.2 34.0 - 46.6 %    MCV 93.4 79.0 - 97.0 fL    MCH 29.3 26.6 - 33.0 pg    MCHC 31.3 (L) 31.5 - 35.7 g/dL    RDW 13.7 12.3 - 15.4 %    RDW-SD 47.1 37.0 - 54.0 fl    MPV 9.5 6.0 - 12.0 fL    Platelets 400 140 - 450 10*3/mm3    Neutrophil % 52.6 42.7 - 76.0 %    Lymphocyte % 37.7 19.6 - 45.3 %    Monocyte % 6.8 5.0 - 12.0 %    Eosinophil % 2.3 0.3 - 6.2 %    Basophil % 0.5 0.0 - 1.5 %    Immature Grans % 0.1 0.0 - 0.5 %    Neutrophils, Absolute 5.18 1.70 - 7.00 10*3/mm3    Lymphocytes, Absolute 3.71 (H) 0.70 - 3.10 10*3/mm3    Monocytes, Absolute 0.67 0.10 - 0.90 10*3/mm3    Eosinophils, Absolute 0.23 0.00 - 0.40 10*3/mm3    Basophils, Absolute 0.05 0.00 - 0.20 10*3/mm3    Immature Grans, Absolute 0.01 0.00 - 0.05 10*3/mm3    nRBC 0.0 0.0 - 0.2 /100 WBC   POC Protime / INR   Result Value Ref Range    Protime 11.3 (L) 12.8 - 15.2 seconds    INR 0.9 0.8 - 1.2   POC CHEM 8   Result Value Ref Range    Glucose 186 (H) 70 - 130 mg/dL    BUN 7 (L) 8 - 26 mg/dL    Creatinine 1.20 0.60 - 1.30 mg/dL    Sodium 138 138 - 146 mmol/L    Potassium 4.1 3.5 - 4.9 mmol/L    Chloride 102 98 - 109 mmol/L    Total CO2 24 24 - 29 mmol/L    Hemoglobin 13.6 12.0 - 17.0 g/dL    Hematocrit 40 38 - 51 %    Ionized Calcium 1.29 1.20 - 1.32 mmol/L   Light Blue Top   Result Value Ref Range    Extra Tube hold for add-on    Green Top (Gel)   Result Value Ref Range    Extra Tube Hold for add-ons.    Lavender Top   Result Value Ref Range    Extra Tube hold for add-on    Gold Top - SST    Result Value Ref Range    Extra Tube Hold for add-ons.         All other labs were within normal range or not returned as of this dictation.      EMERGENCY DEPARTMENT COURSE and DIFFERENTIAL DIAGNOSIS/MDM:   Vitals:    Vitals:    10/03/19 2315 10/03/19 2330 10/04/19 0000 10/04/19 0030   BP:  157/84 160/84 172/85   BP Location:       Patient Position:       Pulse: 71 70 73 77   Resp:       Temp:       TempSrc:       SpO2: 95%  95% 97%   Weight:       Height:           ED Course as of Oct 04 0056   Thu Oct 03, 2019   2142 NIHSS 3  [MU]   2143 CT head without contrast is negative per radiologist. -LAURA  [MU]      ED Course User Index  [MU] Marc Lennon   !    Patient with left upper extremity weakness, left facial numbness and tingling onset a 30 prior to arrival at the emergency department.  She is awake and alert, protecting her airway, stat CT, CT angios perfusion studies were obtained upon arrival.  No prior history of stroke.  Initial CT head without contrast with no acute findings.  On reevaluation patient is awake alert, her symptoms are improving, does have some mild paresthesia to the left cheek and left jaw, good strength in the bilateral upper extremities is noted as initially she was slightly weaker in her left , with slight overshoot with a left upper extremity.  She was given a full dose aspirin.  CT perfusion study, CT Kalli of the head and neck also without any acute findings.  We will discuss with neurology, plan for admission for further stroke work-up, imaging and consultation.  tPpa not indicated at this time after discussion with neurology, as symptoms are improving, repeat neurological examination reveals equal strength in bilateral upper extremities, sensations intact the bilateral upper extremities, some very mild paresthesia in the left cheek and left jawline.  No facial droop.  No difficulty with speech.  NIH of 1.        MEDICATIONS ADMINISTERED IN ED:  Medications   sodium chloride  0.9 % flush 10 mL (not administered)   aspirin chewable tablet 324 mg (324 mg Oral Not Given 10/3/19 2321)   iopamidol (ISOVUE-370) 76 % injection 150 mL (115 mL Intravenous Given 10/3/19 2158)       PROCEDURES:  Procedures    CRITICAL CARE TIME    Total Critical Care time was 45 minutes, excluding separately reportable procedures. Acute stroke, CVA requiring multiple rechecks, re-evaluations and intervention. There was a high probability of clinically significant/life threatening deterioration in the patient's condition which required my urgent intervention.      FINAL IMPRESSION    No diagnosis found.      DISPOSITION/PLAN     ED Disposition     ED Disposition Condition Comment    Decision to Admit  Level of Care: Telemetry [5]   Admitting Physician: LI FRAGA [[id number]]            PATIENT REFERRED TO:  No follow-up provider specified.    DISCHARGE MEDICATIONS:     Medication List      ASK your doctor about these medications    aspirin 325 MG EC tablet  Take 1 tablet by mouth Daily. For 1 month     gabapentin 400 MG capsule  Commonly known as:  NEURONTIN  TAKE 1 CAPSULE THREE TIMES DAILY     glimepiride 4 MG tablet  Commonly known as:  AMARYL  TAKE 1 TABLET EVERY MORNING BEFORE BREAKFAST     metFORMIN 500 MG tablet  Commonly known as:  GLUCOPHAGE  Take 1 tablet by mouth 2 (Two) Times a Day With Meals.     methylPREDNISolone 4 MG tablet  Commonly known as:  MEDROL (JOSE)  Take as directed on package instructions.     omeprazole 20 MG capsule  Commonly known as:  priLOSEC  TAKE 1 CAPSULE TWICE DAILY     PROBIOTIC DAILY capsule  Take as directed on package     simvastatin 10 MG tablet  Commonly known as:  ZOCOR  TAKE 1 TABLET AT BEDTIME            Documentation assistance provided by Marc Lennon acting as scribe for Dr. Param Hawthorne.     The scribe's documentation has been prepared under my direction and personally reviewed by me in its entirety.  I confirm that the note above accurately reflects all work,  treatment, procedures, and medical decision making performed by me.      Comment: Please note this report has been produced using speech recognition software.      Param Hawthorne DO  Attending Emergency Physician                 Marc Lennon  10/03/19 2158       Marc Lennon  10/03/19 2244       Param Hawthorne DO  10/04/19 0056

## 2019-10-04 NOTE — PLAN OF CARE
Problem: Patient Care Overview  Goal: Plan of Care Review  Outcome: Ongoing (interventions implemented as appropriate)   10/04/19 1718   Coping/Psychosocial   Plan of Care Reviewed With patient;family   Plan of Care Review   Progress improving   OTHER   Outcome Summary Pt has had family at bedside all day. VSS, no complaints noted at this time, will continue to monitor.     Goal: Discharge Needs Assessment  Outcome: Ongoing (interventions implemented as appropriate)   10/04/19 1401   Discharge Needs Assessment   Readmission Within the Last 30 Days no previous admission in last 30 days   Concerns to be Addressed discharge planning   Patient/Family Anticipates Transition to home with family   Patient/Family Anticipated Services at Transition    Transportation Anticipated family or friend will provide   Anticipated Changes Related to Illness none   Equipment Needed After Discharge none   Offered/Gave Vendor List no   Disability   Equipment Currently Used at Home bath bench;commode;walker, rolling;wheelchair

## 2019-10-04 NOTE — PLAN OF CARE
Problem: Patient Care Overview  Goal: Plan of Care Review  Outcome: Ongoing (interventions implemented as appropriate)   10/04/19 1042   Coping/Psychosocial   Plan of Care Reviewed With patient   OTHER   Outcome Summary Pt with current evolving symptoms that have decreased I with ADLs. Pt with decreased coordination in L UE CGA for mobilty. Pt would benefit from some assist from family and outpt therapy at discharge.       Problem: Stroke (Ischemic) (Adult)  Goal: Signs and Symptoms of Listed Potential Problems Will be Absent, Minimized or Managed (Stroke)  Outcome: Ongoing (interventions implemented as appropriate)   10/04/19 1042   Goal/Outcome Evaluation   Problems Assessed (Stroke (Ischemic)) cognitive impairment;motor/sensory impairment;muscle tone abnormal;communication impairment   Problems Assessed (Stroke (Ischemic)) motor/sensory impairment

## 2019-10-04 NOTE — THERAPY EVALUATION
Acute Care - Occupational Therapy Initial Evaluation  [facility]     Patient Name: Nely Grider  : 1945  MRN: [id number]  Today's Date: 10/4/2019  Onset of Illness/Injury or Date of Surgery: 10/03/19  Date of Referral to OT: 10/03/19       Admit Date: 10/3/2019       ICD-10-CM ICD-9-CM   1. Cerebrovascular accident (CVA), unspecified mechanism (CMS/HCC) I63.9 434.91   2. Left arm weakness R29.898 729.89   3. Facial paresthesia R20.9 782.0   4. Impaired mobility and ADLs Z74.09 799.89     Patient Active Problem List   Diagnosis   • Neck sprain   • Cough   • Gastroesophageal reflux disease without esophagitis   • Mixed hyperlipidemia   • Essential hypertension   • Cervical sprain   • Controlled type 2 diabetes mellitus without complication, without long-term current use of insulin (CMS/HCC)   • Leukocytosis, likely reactive   • Mass of right hip region   • Anemia   • Constipation   • Diabetic polyneuropathy associated with type 2 diabetes mellitus (CMS/HCC)   • Primary osteoarthritis of both hips   • Status post total replacement of left hip   • Renal insufficiency   • Colon polyp   • Diverticulosis   • Acute blood loss anemia, mild, asymptomatic   • Acute postoperative pain   • TIA (transient ischemic attack)   • Cerebrovascular accident (CVA) (CMS/HCC)     Past Medical History:   Diagnosis Date   • Anesthesia complication     per patient with hip surgery last april did not remember being in hospital or going home can only rmember from rehad on   • Anesthesia complication     with hip surgery in 2018 had issues with swallowing after surgery called in speech   • Arthritis    • Cancer (CMS/HCC)     cervical   • Cataract    • Diabetes mellitus (CMS/HCC)     type 2 dm, check blood sugar every morning, diagnosed 3 or 4 years   • Full dentures    • Full dentures    • GERD (gastroesophageal reflux disease)    • High cholesterol    • History of IBS    • Hypertension     been off bp meds since last april  2018   • Neuropathy    • Wears glasses      Past Surgical History:   Procedure Laterality Date   • CARDIAC CATHETERIZATION     • CHOLECYSTECTOMY     • COLONOSCOPY     • EYE SURGERY      bilateral cataract   • HYSTERECTOMY      partial   • JOINT REPLACEMENT      hip surgery 2018 in april   • OOPHORECTOMY      one removed   • TONSILLECTOMY     • TOTAL HIP ARTHROPLASTY Right 4/19/2018    Procedure: RIGHT TOTAL HIP ARTHROPLASTY;  Surgeon: Carlos Pollack MD;  Location:  [facility];  Service: Orthopedics   • TOTAL HIP ARTHROPLASTY Left 2/12/2019    Procedure: TOTAL HIP ARTHROPLASTY LEFT;  Surgeon: Carlos Pollack MD;  Location:  [facility];  Service: Orthopedics   • WRIST SURGERY      metal plate          OT ASSESSMENT FLOWSHEET (last 12 hours)      Occupational Therapy Evaluation     Row Name 10/04/19 1042                   OT Evaluation Time/Intention    Subjective Information  no complaints  -AN        Document Type  evaluation  -AN        Mode of Treatment  occupational therapy  -AN        Patient Effort  excellent  -AN        Symptoms Noted During/After Treatment  none  -AN           General Information    Patient Profile Reviewed?  yes  -AN        Onset of Illness/Injury or Date of Surgery  10/03/19  -AN        Patient Observations  alert;agree to therapy;cooperative  -AN        General Observations of Patient  Pt in bed [place], daughter present  -AN        Prior Level of Function  independent:;all household mobility;ADL's;bed mobility;transfer;home management;driving  -AN        Equipment Currently Used at Home  shower chair;grab bar  -AN        Pertinent History of Current Functional Problem  Pt to ED with numbness , decreased strength and coordination of L UE  -AN        Existing Precautions/Restrictions  fall  -AN        Risks Reviewed  patient:;LOB;dizziness;increased discomfort;change in vital signs  -AN        Benefits Reviewed  patient:;improve function;increase independence;increase strength;increase balance   -AN        Barriers to Rehab  none identified  -AN           Relationship/Environment    Primary Source of Support/Comfort  spouse  -AN        Lives With  spouse  -AN        Family Caregiver if Needed  spouse;child(marcus), adult  -AN        Primary Roles/Responsibilities  retired  -AN           Resource/Environmental Concerns    Current Living Arrangements  home/apartment/condo  -AN           Home Main Entrance    Number of Stairs, Main Entrance  one  -AN           Cognitive Assessment/Interventions    Additional Documentation  Cognitive Assessment/Intervention (Group)  -AN           Cognitive Assessment/Intervention- PT/OT    Affect/Mental Status (Cognitive)  WFL  -AN        Orientation Status (Cognition)  oriented x 4  -AN        Follows Commands (Cognition)  WFL  -AN        Personal Safety Interventions  fall prevention program maintained  -AN           Bed Mobility Assessment/Treatment    Supine-Sit-Supine [place] (Bed Mobility)  conditional independence  -AN        Assistive Device (Bed Mobility)  head of bed elevated  -AN           Functional Mobility    Functional Mobility- Ind. Level  contact guard assist  -AN        Functional Mobility-Distance (Feet)  10  -AN           Transfer Assessment/Treatment    Transfer Assessment/Treatment  sit-stand transfer;stand-sit transfer  -AN           Sit-Stand Transfer    Sit-Stand [place] (Transfers)  supervision  -AN           Stand-Sit Transfer    Stand-Sit [place] (Transfers)  supervision  -AN           ADL Assessment/Intervention    BADL Assessment/Intervention  lower body dressing;grooming  -AN           Lower Body Dressing Assessment/Training    Lower Body Dressing [place] Level  don;socks;minimum assist (75% patient effort)  -AN           Grooming Assessment/Training    [place] Level (Grooming)  wash face, hands;set up  -AN           General ROM    GENERAL ROM COMMENTS  [place]wayne UE WFL   -AN           MMT (Manual Muscle Testing)    General MMT  Comments  R UE grossly 4/5, L 4-/5  -AN           Motor Assessment/Interventions    Additional Documentation  Balance (Group);Gross Motor Coordination (Group);Therapeutic Exercise (Group)  -AN           Gross Motor Coordination    Gross Motor Impairments  strength;sensation L UE  -AN           Balance    Balance  static standing balance;dynamic sitting balance;dynamic standing balance  -AN           Static Sitting Balance    Level of [place] (Unsupported Sitting, Static Balance)  standby assist  -AN        Sitting Position (Unsupported Sitting, Static Balance)  sitting on edge of bed  -AN           Static Standing Balance    Level of [place] (Supported Standing, Static Balance)  contact guard assist  -AN           Dynamic Standing Balance    Level of [place], Reaches Outside Midline (Standing, Dynamic Balance)  contact guard assist  -AN           Sensory Assessment/Intervention    Sensory General Assessment  other (see comments) reports dull  -AN           Vision Assessment/Intervention    Visual Impairment/Limitations  corrective lenses full time;WFL  -AN           Positioning and Restraints    Pre-Treatment Position  in bed  -AN        Post Treatment Position  chair  -AN        In Chair  reclined;call light within reach;encouraged to call for assist;with family/caregiver  -AN           Pain Scale: Numbers Pre/Post-Treatment    Pain Scale: Numbers, Pretreatment  0/10 - no pain  -AN        Pain Scale: Numbers, Post-Treatment  0/10 - no pain  -AN           Plan of Care Review    Plan of Care Reviewed With  patient  -AN           Clinical Impression (OT)    Date of Referral to OT  10/03/19  -AN        OT Diagnosis  Impaired ADLs  -AN        Patient/Family Goals Statement (OT Eval)  agreeable to OT  -AN        Criteria for Skilled Therapeutic Interventions Met (OT Eval)  yes;treatment indicated  -AN        Rehab Potential (OT Eval)  good, to achieve stated therapy goals  -AN        Therapy Frequency (OT  Eli)  daily  -AN        Care Plan Review (OT)  evaluation/treatment results reviewed  -AN        Anticipated Discharge Disposition (OT)  home with OP services;home with assist  -AN           Vital Signs    Pre Systolic BP Rehab  142  -AN        Pre Treatment Diastolic BP  62  -AN        Post Systolic BP Rehab  135  -AN        Post Treatment Diastolic BP  79  -AN        Pretreatment Heart Rate (beats/min)  78  -AN        Intratreatment Heart Rate (beats/min)  109  -AN        Posttreatment Heart Rate (beats/min)  74  -AN        Pre SpO2 (%)  96  -AN        O2 Delivery Pre Treatment  supplemental O2  -AN        Post SpO2 (%)  94  -AN        O2 Delivery Post Treatment  room air  -AN           OT Goals    Transfer Goal Selection (OT)  transfer, OT goal 1  -AN        Bathing Goal Selection (OT)  bathing, OT goal 1  -AN        Dressing Goal Selection (OT)  dressing, OT goal 1  -AN        Coordination Goal Selection (OT)  coordination, OT goal 1  -AN        Additional Documentation  Coordination Goal Selection (OT) (Row)  -AN           Transfer Goal 1 (OT)    Activity/Assistive Device (Transfer Goal 1, OT)  transfers, all with AD/DME as needed  -AN        [place] Level/Cues Needed (Transfer Goal 1, OT)  set-up required;supervision required  -AN        Time Frame (Transfer Goal 1, OT)  10 days  -AN        Progress/Outcome (Transfer Goal 1, OT)  goal ongoing  -AN           Bathing Goal 1 (OT)    Activity/Assistive Device (Bathing Goal 1, OT)  bathing skills, all;shower chair  -AN        [place] Level/Cues Needed (Bathing Goal 1, OT)  supervision required  -AN        Time Frame (Bathing Goal 1, OT)  10 days  -AN        Progress/Outcomes (Bathing Goal 1, OT)  goal ongoing  -AN           Dressing Goal 1 (OT)    Activity/Assistive Device (Dressing Goal 1, OT)  dressing skills, all  -AN        [place]/Cues Needed (Dressing Goal 1, OT)  supervision required;set-up required  -AN        Time Frame (Dressing Goal 1,  OT)  10 days  -AN        Progress/Outcome (Dressing Goal 1, OT)  goal ongoing  -AN           Coordination Goal 1 (OT)    Activity/Assistive Device (Coordination Goal 1, OT)  FM task;GM task with [place]wayne UE's  -AN        [place] Level/Cues Needed (Coordination Goal 1, OT)  set-up required  -AN        Time Frame (Coordination Goal 1, OT)  10 days  -AN        Progress/Outcomes (Coordination Goal 1, OT)  goal ongoing  -AN           Living Environment    Home Accessibility  stairs to enter home  -AN          User Key  (r) = Recorded By, (t) = Taken By, (c) = Cosigned By    Initials Name Effective Dates    AN Bailee Cat OT 06/22/15 -          Occupational Therapy Education     Title: PT OT SLP Therapies (In Progress)     Topic: Occupational Therapy (In Progress)     Point: ADL training (Done)     Description: Instruct learner(s) on proper safety adaptation and remediation techniques during self care or transfers.   Instruct in proper use of assistive devices.    Learning Progress Summary           Patient Acceptance, E,D, VU,NR by AN at 10/4/2019 10:42 AM    Comment:  Educated pt on current deficits, OT role and POC.                   Point: Home exercise program (Done)     Description: Instruct learner(s) on appropriate technique for monitoring, assisting and/or progressing therapeutic exercises/activities.    Learning Progress Summary           Patient Acceptance, E,D, VU,NR by AN at 10/4/2019 10:42 AM    Comment:  Educated pt on current deficits, OT role and POC.                               User Key     Initials Effective Dates Name Provider Type Discipline    AN 06/22/15 -  Bailee Cat OT Occupational Therapist OT                  OT Recommendation and Plan  Outcome Summary/Treatment Plan (OT)  Anticipated Discharge Disposition (OT): home with OP services, home with assist  Therapy Frequency (OT Eval): daily  Plan of Care Review  Plan of Care Reviewed With: patient  Plan of Care Reviewed With:  patient  Outcome Summary: Pt with current evolving symptoms that have decreased I with ADLs. Pt with decreased coordination in L UE CGA for mobilty. Pt would benefit from some assist from family and outpt therapy at discharge.    Outcome Measures     Row Name 10/04/19 1042             How much help from another is currently needed...    Putting on and taking off regular lower body clothing?  3  -AN      Bathing (including washing, rinsing, and drying)  3  -AN      Toileting (which includes using toilet bed pan or urinal)  3  -AN      Putting on and taking off regular upper body clothing  3  -AN      Taking care of personal grooming (such as brushing teeth)  4  -AN      Eating meals  4  -AN      AM-PAC 6 Clicks Score (OT)  20  -AN         Modified [place] Scale    Modified [place] Scale  3 - Moderate disability.  Requiring some help, but able to walk without assistance.  -AN         Functional Assessment    Outcome Measure Options  AM-PAC 6 Clicks Daily Activity (OT);Modified [place]  -AN        User Key  (r) = Recorded By, (t) = Taken By, (c) = Cosigned By    Initials Name Provider Type    Bailee Su OT Occupational Therapist          Time Calculation:   Time Calculation- OT     Row Name 10/04/19 1336             Time Calculation- OT    OT Start Time  1042  -AN      Total Timed Code Minutes- OT  0 minute(s)  -AN      OT Received On  10/04/19  -AN      OT Goal Re-Cert Due Date  10/14/19  -AN        User Key  (r) = Recorded By, (t) = Taken By, (c) = Cosigned By    Initials Name Provider Type    Bailee Su OT Occupational Therapist        Therapy Charges for Today     Code Description Service Date Service Provider Modifiers Qty    [id number]  OT EVAL LOW COMPLEXITY 4 10/4/2019 Bailee Cat OT GO 1               Bailee Cat OT  10/4/2019

## 2019-10-04 NOTE — THERAPY EVALUATION
Acute Care - Speech Language Pathology Initial Evaluation  [facility]     Patient Name: Nely Grider  : 1945  MRN: [id number]  Today's Date: 10/4/2019               Admit Date: 10/3/2019     Visit Dx:    ICD-10-CM ICD-9-CM   1. Cerebrovascular accident (CVA), unspecified mechanism (CMS/HCC) I63.9 434.91   2. Left arm weakness R29.898 729.89   3. Facial paresthesia R20.9 782.0     Patient Active Problem List   Diagnosis   • Neck sprain   • Cough   • Gastroesophageal reflux disease without esophagitis   • Mixed hyperlipidemia   • Essential hypertension   • Cervical sprain   • Controlled type 2 diabetes mellitus without complication, without long-term current use of insulin (CMS/HCC)   • Leukocytosis, likely reactive   • Mass of right hip region   • Anemia   • Constipation   • Diabetic polyneuropathy associated with type 2 diabetes mellitus (CMS/HCC)   • Primary osteoarthritis of both hips   • Status post total replacement of left hip   • Renal insufficiency   • Colon polyp   • Diverticulosis   • Acute blood loss anemia, mild, asymptomatic   • Acute postoperative pain   • TIA (transient ischemic attack)     Past Medical History:   Diagnosis Date   • Anesthesia complication     per patient with hip surgery last april did not remember being in hospital or going home can only rmember from [facility] on   • Anesthesia complication     with hip surgery in 2018 had issues with swallowing after surgery called in speech   • Arthritis    • Cancer (CMS/HCC)     cervical   • Cataract    • Diabetes mellitus (CMS/HCC)     type 2 dm, check blood sugar every morning, diagnosed 3 or 4 years   • Full dentures    • Full dentures    • GERD (gastroesophageal reflux disease)    • High cholesterol    • History of IBS    • Hypertension     been off bp meds since last 2018   • Neuropathy    • Wears glasses      Past Surgical History:   Procedure Laterality Date   • CARDIAC CATHETERIZATION     • CHOLECYSTECTOMY     •  COLONOSCOPY     • EYE SURGERY      bilateral cataract   • HYSTERECTOMY      partial   • JOINT REPLACEMENT      hip surgery 2018 in april   • OOPHORECTOMY      one removed   • TONSILLECTOMY     • TOTAL HIP ARTHROPLASTY Right 4/19/2018    Procedure: RIGHT TOTAL HIP ARTHROPLASTY;  Surgeon: Carlos Pollack MD;  Location:  [facility];  Service: Orthopedics   • TOTAL HIP ARTHROPLASTY Left 2/12/2019    Procedure: TOTAL HIP ARTHROPLASTY LEFT;  Surgeon: Carlos Pollack MD;  Location:  [facility];  Service: Orthopedics   • WRIST SURGERY      metal plate        SLP EVALUATION (last 72 hours)      SLP SLC Evaluation     Row Name 10/04/19 1000                   Communication Assessment/Intervention    Document Type  evaluation  -AW        Subjective Information  no complaints  -AW        Patient Observations  alert;cooperative;agree to therapy  -AW        Patient/Family Observations  dtrs present  -AW        Patient Effort  excellent  -AW        Symptoms Noted During/After Treatment  none  -AW           General Information    Patient Profile Reviewed  yes  -AW        Pertinent History Of Current Problem  TIA  -AW        Precautions/Limitations, Vision  WFL with corrective lenses  -AW        Precautions/Limitations, Hearing  WFL  -AW        Prior Level of Function-Communication  WFL  -AW        Plans/Goals Discussed with  patient and family;agreed upon  -AW        Barriers to Rehab  none identified  -AW        Patient's Goals for Discharge  patient did not state  -AW        Family Goals for Discharge  family did not state  -AW           Pain Assessment    Additional Documentation  Pain Scale: Numbers Pre/Post-Treatment (Group)  -AW           Pain Scale: Numbers Pre/Post-Treatment    Pain Scale: Numbers, Pretreatment  0/10 - no pain  -AW        Pain Scale: Numbers, Post-Treatment  0/10 - no pain  -AW           Comprehension Assessment/Intervention    Comprehension Assessment/Intervention  Auditory Comprehension  -AW            Auditory Comprehension Assessment/Intervention    Auditory Comprehension (Communication)  WFL  -AW        Able to Identify Objects/Pictures (Communication)  [facility]  -AW        Answers Questions (Communication)  yes/no;wh questions;personal;simple;complex;L  -AW        Able to Follow Commands (Communication)  [facility]  -AW        Narrative Discourse  conversational level;WFL  -AW           Expression Assessment/Intervention    Expression Assessment/Intervention  verbal expression  -AW           Verbal Expression Assessment/Intervention    Verbal Expression  [facility]  -AW        Spontaneous/Functional Words  [facility]  -AW        Sentence Formulation  [facility]  -AW        Conversational Discourse/Fluency  WFL  -AW           Oral Motor Structure and Function    Oral Motor Structure and Function  WFL;other (see comments) very slight droop, not impacting speech/swallow  -AW        Dentition Assessment  edentulous, dentures not available;other (see comments) dentures are being brought to hospital  -           Oral Musculature and Cranial Nerve Assessment    Oral Motor General Assessment  WFL  -AW           Motor Speech Assessment/Intervention    Motor Speech Function  L  -AW           Cognitive Assessment Intervention- SLP    Cognitive Function (Cognition)  [facility]  -AW        Orientation Status (Cognition)  awareness of basic personal information;person;place;time;situation;[facility]  -AW        Memory (Cognitive)  simple;L  -AW        Attention (Cognitive)  WFL;sustained  -           SLP Clinical Impressions    SLP Diagnosis  [facility]  -AW        SLC Criteria for Skilled Therapy Interventions Met  no problems identified which require skilled intervention;baseline status  -AW        Functional Impact  no impact on function  -AW           Recommendations    Therapy Frequency (SLP SLC)  evaluation only  -          User Key  (r) = Recorded By, (t) = Taken By, (c) = Cosigned By    Initials Name Effective Dates    Alissa Mayer MS CCC-SLP  06/22/15 -              EDUCATION  The patient has been educated in the following areas:     Communication Impairment.    SLP Recommendation and Plan  SLP Diagnosis: WFL     Swallow Criteria for Skilled Therapeutic Interventions Met: demonstrates skilled criteria  SLC Criteria for Skilled Therapy Interventions Met: no problems identified which require skilled intervention, baseline status     Therapy Frequency (Swallow): evaluation only       Plan of Care Reviewed With: patient, family  Progress: improving                  Time Calculation:     Time Calculation- SLP     Row Name 10/04/19 1030             Time Calculation- SLP    SLP Start Time  0945  -AW      SLP Received On  10/04/19  -        User Key  (r) = Recorded By, (t) = Taken By, (c) = Cosigned By    Initials Name Provider Type     Alissa Canas, MS CCC-SLP Speech and Language Pathologist          Therapy Charges for Today     Code Description Service Date Service Provider Modifiers Qty    25081969137 HC ST EVAL SPEECH AND PROD W LANG  2 10/4/2019 Alissa Canas MS CCC-SLP GN 1    42389467570 HC ST EVAL ORAL PHARYNG SWALLOW 2 10/4/2019 Alissa Canas MS CCC-SLP GN 1                     Alissa Canas MS CCC-SLP  10/4/2019 and Acute Care - Speech Language Pathology   Swallow Initial Evaluation [facility]     Patient Name: Nely Grider  : 1945  MRN: [id number]  Today's Date: 10/4/2019               Admit Date: 10/3/2019    Visit Dx:     ICD-10-CM ICD-9-CM   1. Cerebrovascular accident (CVA), unspecified mechanism (CMS/[facility]) I63.9 434.91   2. Left arm weakness R29.898 729.89   3. Facial paresthesia R20.9 782.0     Patient Active Problem List   Diagnosis   • Neck sprain   • Cough   • Gastroesophageal reflux disease without esophagitis   • Mixed hyperlipidemia   • Essential hypertension   • Cervical sprain   • Controlled type 2 diabetes mellitus without complication, without long-term current use of insulin (CMS/[facility])   •  Leukocytosis, likely reactive   • Mass of right hip region   • Anemia   • Constipation   • Diabetic polyneuropathy associated with type 2 diabetes mellitus (CMS/HCC)   • Primary osteoarthritis of both hips   • Status post total replacement of left hip   • Renal insufficiency   • Colon polyp   • Diverticulosis   • Acute blood loss anemia, mild, asymptomatic   • Acute postoperative pain   • TIA (transient ischemic attack)     Past Medical History:   Diagnosis Date   • Anesthesia complication     per patient with hip surgery last april did not remember being in hospital or going home can only rmember from [facility]   • Anesthesia complication     with hip surgery in april 2018 had issues with swallowing after surgery called in speech   • Arthritis    • Cancer (CMS/HCC)     cervical   • Cataract    • Diabetes mellitus (CMS/HCC)     type 2 dm, check blood sugar every morning, diagnosed 3 or 4 years   • Full dentures    • Full dentures    • GERD (gastroesophageal reflux disease)    • High cholesterol    • History of IBS    • Hypertension     been off bp meds since last april 2018   • Neuropathy    • Wears glasses      Past Surgical History:   Procedure Laterality Date   • CARDIAC CATHETERIZATION     • CHOLECYSTECTOMY     • COLONOSCOPY     • EYE SURGERY      bilateral cataract   • HYSTERECTOMY      partial   • JOINT REPLACEMENT      hip surgery 2018 in april   • OOPHORECTOMY      one removed   • TONSILLECTOMY     • TOTAL HIP ARTHROPLASTY Right 4/19/2018    Procedure: RIGHT TOTAL HIP ARTHROPLASTY;  Surgeon: Carlos Pollack MD;  Location:  [facility];  Service: Orthopedics   • TOTAL HIP ARTHROPLASTY Left 2/12/2019    Procedure: TOTAL HIP ARTHROPLASTY LEFT;  Surgeon: Carlos Pollack MD;  Location:  [facility];  Service: Orthopedics   • WRIST SURGERY      metal plate        SWALLOW EVALUATION (last 72 hours)      [facility] Adult Swallow Evaluation     Row Name 10/04/19 1000                   Oral Motor and Function    Secretion  Management  WNL/WFL  -AW        Mucosal Quality  moist, healthy  -AW        Volitional Swallow  WFL  -AW        Volitional Cough  WFL  -AW           General Eating/Swallowing Observations    Respiratory Support Currently in Use  room air  -AW        Eating/Swallowing Skills  self-fed  -AW        Positioning During Eating  upright in bed  -AW        Utensils Used  spoon;cup;straw  -AW        Consistencies Trialed  regular textures;thin liquids;pureed  -AW           Clinical Swallow Eval    Oral Prep Phase  WFL  -AW        Oral Transit  WFL  -AW        Oral Residue  WFL  -AW        Pharyngeal Phase  suspected pharyngeal impairment  -AW        Esophageal Phase  suspected esophageal impairment  -AW        Clinical Swallow Evaluation Summary  Pt with frequent throat clearing and c/o material sticking in her throat. Reports she has reflux and takes omeprazole. Numerous people in her family have had to have esophageal dilations. Pt has had a recent respiratory infection. Will order MBS with valadez UGI to be done today. Pt may have soft diet and thins now.   -AW           Pharyngeal Phase Concerns    Pharyngeal Phase Concerns  throat clear  -AW        Throat Clear  thin  -AW           Esophageal Phase Concerns    Esophageal Phase Concerns  sensation of material sticking  -AW        Sensation of Material Sticking  thin  -AW           Clinical Impression    SLP Swallowing Diagnosis  functional oral phase;suspected pharyngeal dysfunction  -AW        Functional Impact  risk of aspiration/pneumonia  -AW        Rehab Potential/Prognosis, Swallowing  good, to achieve stated therapy goals  -AW        Swallow Criteria for Skilled Therapeutic Interventions Met  demonstrates skilled criteria  -AW           Recommendations    Therapy Frequency (Swallow)  evaluation only  -AW        SLP Diet Recommendation  soft textures;whole;thin liquids  -AW        Recommended Diagnostics  VFSS (MBS)  -AW        SLP Rec. for Method of Medication  Administration  meds whole;with thin liquids  -          User Key  (r) = Recorded By, (t) = Taken By, (c) = Cosigned By    Initials Name Effective Dates    Alissa Mayer MS CCC-SLP 06/22/15 -           EDUCATION  The patient has been educated in the following areas:   Dysphagia (Swallowing Impairment).    SLP Recommendation and Plan  SLP Swallowing Diagnosis: functional oral phase, suspected pharyngeal dysfunction  SLP Diet Recommendation: soft textures, whole, thin liquids     SLP Rec. for Method of Medication Administration: meds whole, with thin liquids        Recommended Diagnostics: VFSS (MBS)  Swallow Criteria for Skilled Therapeutic Interventions Met: demonstrates skilled criteria     Rehab Potential/Prognosis, Swallowing: good, to achieve stated therapy goals  Therapy Frequency (Swallow): evaluation only          Plan of Care Reviewed With: patient, family  Progress: improving           Time Calculation:   Time Calculation- SLP     Row Name 10/04/19 1030             Time Calculation- SLP    SLP Start Time  0945  -      SLP Received On  10/04/19  -        User Key  (r) = Recorded By, (t) = Taken By, (c) = Cosigned By    Initials Name Provider Type     Alissa Canas MS CCC-SLP Speech and Language Pathologist          Therapy Charges for Today     Code Description Service Date Service Provider Modifiers Qty    56924163260 HC ST EVAL SPEECH AND PROD W LANG  2 10/4/2019 Alissa Canas MS CCC-SLP GN 1    18288021559 HC ST EVAL ORAL PHARYNG SWALLOW 2 10/4/2019 Alissa Canas MS CCC-SLP GN 1               Alissa Canas MS CCC-DOUG  10/4/2019

## 2019-10-04 NOTE — PLAN OF CARE
Problem: Patient Care Overview  Goal: Plan of Care Review  Outcome: Ongoing (interventions implemented as appropriate)   10/04/19 0831   Coping/Psychosocial   Plan of Care Reviewed With family;patient   OTHER   Outcome Summary Pt tolerated PT eval well. She transferred supine>sit and scooted EOB with SBA. She stood with CGA and had mild dizziness upon standing. She ambulated 200ft with CGA, no AD with mild unsteadiness and decreased EDUIN, with tingling into the L gastroc. Her strength is essentially equal in B LEs. Skilled PT warranted to improve functionl mobility and safety. Recommend home with assistance from  and OP therapy at discharge.       Problem: Stroke (Ischemic) (Adult)  Goal: Signs and Symptoms of Listed Potential Problems Will be Absent, Minimized or Managed (Stroke)  Outcome: Ongoing (interventions implemented as appropriate)   10/04/19 0831   Goal/Outcome Evaluation   Problems Assessed (Stroke (Ischemic)) cognitive impairment;communication impairment;motor/sensory impairment;muscle tone abnormal   Problems Assessed (Stroke (Ischemic)) motor/sensory impairment

## 2019-10-04 NOTE — PROGRESS NOTES
Discharge Planning Assessment  [facility]     Patient Name: Nely Grider  MRN: [id number]  Today's Date: 10/4/2019    Admit Date: 10/3/2019    Discharge Needs Assessment     Row Name 10/04/19 1401       Living Environment    Lives With  spouse    Name(s) of Who Lives With Patient  Mir Grider (spouse) [phone]    Current Living Arrangements  home/apartment/condo    Primary Care Provided by  self    Provides Primary Care For  no one    Family Caregiver if Needed  child(marcus), adult;spouse    Family Caregiver Names  Mir Grider (spouse) [phone]    Quality of Family Relationships  helpful;involved;supportive    Able to Return to Prior Arrangements  yes       Resource/Environmental Concerns    Resource/Environmental Concerns  home accessibility    Home Accessibility Concerns  stairs to enter home Patient has one step to enter home.  States that she has no difficulty navigating up stairs.       Transition Planning    Patient/Family Anticipates Transition to  home with family    Patient/Family Anticipated Services at Transition      Transportation Anticipated  family or friend will provide       Discharge Needs Assessment    Readmission Within the Last 30 Days  no previous admission in last 30 days    Concerns to be Addressed  discharge planning    Equipment Currently Used at Home  bath bench;commode;walker, rolling;wheelchair    Anticipated Changes Related to Illness  none    Equipment Needed After Discharge  none    Offered/Gave Vendor List  no        Discharge Plan     Row Name 10/04/19 1405       Plan    Plan  Home with family    Patient/Family in Agreement with Plan  yes    Plan Comments  Spoke with patient and family at bedside.  Patient states that she lives in [place] with [place].  She states that she is independent with ADLs.  She states that she has a rolling walker, grab bars, bedside commode, shower chair, and wheelchair at home, however she does not need or use this  DME.  She is not current with home health or services, but has had [facility] and [facility] home health in the past.  She is okay with either agency if she needs services at discharge.  Patient states that she sees Raimundo Ibarra MD as her PCP, and has [employer] as her insurance provider.  She states that she is able to afford her medications.  Plans home with family at this time.  States that she has no needs.  Case management will continue to follow for discharge planning.     Final Discharge Disposition Code  01 - home or self-care        Destination      No service coordination in this encounter.      Durable Medical Equipment      No service coordination in this encounter.      Dialysis/Infusion      No service coordination in this encounter.      Home Medical Care      No service coordination in this encounter.      Therapy      No service coordination in this encounter.      Community Resources      No service coordination in this encounter.        Expected Discharge Date and Time     Expected Discharge Date Expected Discharge Time    Oct 5, 2019         Demographic Summary     Row Name 10/04/19 1400       General Information    Admission Type  inpatient    Arrived From  home    Referral Source  admission list    Reason for Consult  discharge planning    Preferred Language  English     Used During This Interaction  no    General Information Comments  PCP:  Raimundo Ibarra MD confirmed with patient        Functional Status     Row Name 10/04/19 1401       Functional Status    Usual Activity Tolerance  good    Current Activity Tolerance  good       Functional Status, IADL    Medications  independent    Meal Preparation  independent    Housekeeping  independent    Laundry  independent    Shopping  independent        Psychosocial    No documentation.       Abuse/Neglect    No documentation.       Legal    No documentation.       Substance Abuse    No documentation.       Patient Forms    No  documentation.           Haylee Lafleur RN

## 2019-10-04 NOTE — THERAPY RE-EVALUATION
Acute Care - Speech Language Pathology   Swallow Re-Evaluation  Alin   Modified Barium Swallow Study (MBS)       Patient Name: Nely Grider  : 1945  MRN: [id number]  Today's Date: 10/4/2019               Admit Date: 10/3/2019    Visit Dx:     ICD-10-CM ICD-9-CM   1. Cerebrovascular accident (CVA), unspecified mechanism (CMS/HCC) I63.9 434.91   2. Left arm weakness R29.898 729.89   3. Facial paresthesia R20.9 782.0     Patient Active Problem List   Diagnosis   • Neck sprain   • Cough   • Gastroesophageal reflux disease without esophagitis   • Mixed hyperlipidemia   • Essential hypertension   • Cervical sprain   • Controlled type 2 diabetes mellitus without complication, without long-term current use of insulin (CMS/HCC)   • Leukocytosis, likely reactive   • Mass of right hip region   • Anemia   • Constipation   • Diabetic polyneuropathy associated with type 2 diabetes mellitus (CMS/HCC)   • Primary osteoarthritis of both hips   • Status post total replacement of left hip   • Renal insufficiency   • Colon polyp   • Diverticulosis   • Acute blood loss anemia, mild, asymptomatic   • Acute postoperative pain   • TIA (transient ischemic attack)     Past Medical History:   Diagnosis Date   • Anesthesia complication     per patient with hip surgery last april did not remember being in hospital or going home can only rmember from rehad on   • Anesthesia complication     with hip surgery in 2018 had issues with swallowing after surgery called in speech   • Arthritis    • Cancer (CMS/HCC)     cervical   • Cataract    • Diabetes mellitus (CMS/HCC)     type 2 dm, check blood sugar every morning, diagnosed 3 or 4 years   • Full dentures    • Full dentures    • GERD (gastroesophageal reflux disease)    • High cholesterol    • History of IBS    • Hypertension     been off bp meds since last 2018   • Neuropathy    • Wears glasses      Past Surgical History:   Procedure Laterality Date   • CARDIAC  CATHETERIZATION     • CHOLECYSTECTOMY     • COLONOSCOPY     • EYE SURGERY      bilateral cataract   • HYSTERECTOMY      partial   • JOINT REPLACEMENT      hip surgery 2018 in april   • OOPHORECTOMY      one removed   • TONSILLECTOMY     • TOTAL HIP ARTHROPLASTY Right 4/19/2018    Procedure: RIGHT TOTAL HIP ARTHROPLASTY;  Surgeon: Carlos Pollack MD;  Location:  [facility];  Service: Orthopedics   • TOTAL HIP ARTHROPLASTY Left 2/12/2019    Procedure: TOTAL HIP ARTHROPLASTY LEFT;  Surgeon: Carlos Pollack MD;  Location:  [facility];  Service: Orthopedics   • WRIST SURGERY      metal plate        SWALLOW EVALUATION (last 72 hours)      SLP Adult Swallow Evaluation     Row Name 10/04/19 1200 10/04/19 1000                Rehab Evaluation    Document Type  re-evaluation  -AW  --       Subjective Information  no complaints  -AW  --       Patient Observations  cooperative;alert  -AW  --          Oral Motor and Function    Secretion Management  --  WNL/WFL  -AW       Mucosal Quality  --  moist, healthy  -AW       Volitional Swallow  --  WFL  -AW       Volitional Cough  --  WFL  -AW          General Eating/Swallowing Observations    Respiratory Support Currently in Use  --  room air  -AW       Eating/Swallowing Skills  --  self-fed  -AW       Positioning During Eating  --  upright in bed  -AW       Utensils Used  --  spoon;cup;straw  -AW       Consistencies Trialed  --  regular textures;thin liquids;pureed  -AW          Clinical Swallow Eval    Oral Prep Phase  --  WFL  -AW       Oral Transit  --  WFL  -AW       Oral Residue  --  WFL  -AW       Pharyngeal Phase  --  suspected pharyngeal impairment  -AW       Esophageal Phase  --  suspected esophageal impairment  -AW       Clinical Swallow Evaluation Summary  --  Pt with frequent throat clearing and c/o material sticking in her throat. Reports she has reflux and takes omeprazole. Numerous people in her family have had to have esophageal dilations. Pt has had a recent respiratory  infection. Will order MBS with [facility] to be done today. Pt may have soft diet and thins now.   -AW          Pharyngeal Phase Concerns    Pharyngeal Phase Concerns  --  throat clear  -AW       Throat Clear  --  thin  -AW          Esophageal Phase Concerns    Esophageal Phase Concerns  --  sensation of material sticking  -AW       Sensation of Material Sticking  --  thin  -AW          MBS/VFSS    Utensils Used  spoon;cup;straw  -AW  --       Consistencies Trialed  regular textures;pureed;thin liquids  -AW  --          MBS/VFSS Interpretation    Oral Prep Phase  WFL  -AW  --       Oral Transit Phase  WFL  -AW  --       Oral Residue  WFL  -AW  --       VFSS Summary  Pt with mild intermittent penetration of thins, which is typical for age. No aspiration of any consistency and no residue. [facility] was normal - see Rad report for further details.   -AW  --          Initiation of Pharyngeal Swallow    Initiation of Pharyngeal Swallow  WFL  -AW  --          Esophageal Phase    Esophageal Phase  no impairments;other (see comments) suspect globus sensation from reflux, none seen on study  -AW  --          SLP Communication to Radiology    Severity Level of Dysphagia  WFL  -AW  --       Consistencies Aspirated/Penetrated  penetrated;thin liquids  -AW  --          Clinical Impression    SLP Swallowing Diagnosis  functional oral phase;functional pharyngeal phase  -AW  functional oral phase;suspected pharyngeal dysfunction  -AW       Functional Impact  no impact on function;other may need further medical management of reflux  -AW  risk of aspiration/pneumonia  -AW       Rehab Potential/Prognosis, Swallowing  --  good, to achieve stated therapy goals  -AW       Swallow Criteria for Skilled Therapeutic Interventions Met  no problems identified which require skilled intervention  -AW  demonstrates skilled criteria  -AW          Recommendations    Therapy Frequency (Swallow)  evaluation only  -AW  evaluation only  -AW       SLP Diet  Recommendation  regular textures;thin liquids  -AW  soft textures;whole;thin liquids  -AW       Recommended Diagnostics  --  VFSS (MBS)  -AW       SLP Rec. for Method of Medication Administration  --  meds whole;with thin liquids  -AW         User Key  (r) = Recorded By, (t) = Taken By, (c) = Cosigned By    Initials Name Effective Dates    Alissa Mayer MS CCC-SLP 06/22/15 -           EDUCATION  The patient has been educated in the following areas:   Dysphagia (Swallowing Impairment).    SLP Recommendation and Plan  SLP Swallowing Diagnosis: functional oral phase, functional pharyngeal phase  SLP Diet Recommendation: regular textures, thin liquids     SLP Rec. for Method of Medication Administration: meds whole, with thin liquids        Recommended Diagnostics: VFSS (MBS)  Swallow Criteria for Skilled Therapeutic Interventions Met: no problems identified which require skilled intervention     Rehab Potential/Prognosis, Swallowing: good, to achieve stated therapy goals  Therapy Frequency (Swallow): evaluation only          Plan of Care Reviewed With: patient  Progress: improving           Time Calculation:   Time Calculation- SLP     Row Name 10/04/19 1209 10/04/19 1030          Time Calculation- SLP    SLP Start Time  1130  -AW  0945  -     SLP Received On  10/04/19  -AW  10/04/19  -       User Key  (r) = Recorded By, (t) = Taken By, (c) = Cosigned By    Initials Name Provider Type    Alissa Mayer MS CCC-SLP Speech and Language Pathologist          Therapy Charges for Today     Code Description Service Date Service Provider Modifiers Qty    50586013949 HC ST EVAL SPEECH AND PROD W LANG  2 10/4/2019 Alissa Canas MS CCC-SLP GN 1    04119573025 HC ST EVAL ORAL PHARYNG SWALLOW 2 10/4/2019 Alissa Canas MS CCC-SLP GN 1    21680668327 HC ST MOTION FLUORO EVAL SWALLOW 5 10/4/2019 Alissa Canas MS CCC-SLP GN 1               Alissa Canas MS CCC-DOUG  10/4/2019

## 2019-10-04 NOTE — THERAPY EVALUATION
Patient Name: Nley Grider  : 1945    MRN: [id number]                              Today's Date: 10/4/2019       Admit Date: 10/3/2019    Visit Dx:     ICD-10-CM ICD-9-CM   1. Cerebrovascular accident (CVA), unspecified mechanism (CMS/HCC) I63.9 434.91   2. Left arm weakness R29.898 729.89   3. Facial paresthesia R20.9 782.0     Patient Active Problem List   Diagnosis   • Neck sprain   • Cough   • Gastroesophageal reflux disease without esophagitis   • Mixed hyperlipidemia   • Essential hypertension   • Cervical sprain   • Controlled type 2 diabetes mellitus without complication, without long-term current use of insulin (CMS/HCC)   • Leukocytosis, likely reactive   • Mass of right hip region   • Anemia   • Constipation   • Diabetic polyneuropathy associated with type 2 diabetes mellitus (CMS/HCC)   • Primary osteoarthritis of both hips   • Status post total replacement of left hip   • Renal insufficiency   • Colon polyp   • Diverticulosis   • Acute blood loss anemia, mild, asymptomatic   • Acute postoperative pain   • TIA (transient ischemic attack)     Past Medical History:   Diagnosis Date   • Anesthesia complication     per patient with hip surgery last april did not remember being in hospital or going home can only rmember from [facility]   • Anesthesia complication     with hip surgery in 2018 had issues with swallowing after surgery called in speech   • Arthritis    • Cancer (CMS/HCC)     cervical   • Cataract    • Diabetes mellitus (CMS/HCC)     type 2 dm, check blood sugar every morning, diagnosed 3 or 4 years   • Full dentures    • Full dentures    • GERD (gastroesophageal reflux disease)    • High cholesterol    • History of IBS    • Hypertension     been off bp meds since last 2018   • Neuropathy    • Wears glasses      Past Surgical History:   Procedure Laterality Date   • CARDIAC CATHETERIZATION     • CHOLECYSTECTOMY     • COLONOSCOPY     • EYE SURGERY      bilateral cataract   •  HYSTERECTOMY      partial   • JOINT REPLACEMENT      hip surgery 2018 in april   • OOPHORECTOMY      one removed   • TONSILLECTOMY     • TOTAL HIP ARTHROPLASTY Right 4/19/2018    Procedure: RIGHT TOTAL HIP ARTHROPLASTY;  Surgeon: Carlos Pollack MD;  Location:  [facility];  Service: Orthopedics   • TOTAL HIP ARTHROPLASTY Left 2/12/2019    Procedure: TOTAL HIP ARTHROPLASTY LEFT;  Surgeon: Carlos Pollack MD;  Location:  [facility];  Service: Orthopedics   • WRIST SURGERY      metal plate     General Information     Row Name 10/04/19 0831          PT Evaluation Time/Intention    Document Type  evaluation  -NS     Mode of Treatment  physical therapy  -NS     Row Name 10/04/19 0831          General Information    Patient Profile Reviewed?  yes  -NS     Prior Level of Function  independent:;all household mobility;community mobility;gait;transfer;ADL's  -NS     Existing Precautions/Restrictions  fall  -NS     Barriers to Rehab  none identified  -NS     Row Name 10/04/19 0831          Relationship/Environment    Lives With  spouse  -NS     Row Name 10/04/19 0831          Resource/Environmental Concerns    Current Living Arrangements  home/apartment/condo  -NS     Row Name 10/04/19 0831          Home Main Entrance    Number of Stairs, Main Entrance  none  -NS     Row Name 10/04/19 0831          Stairs Within Home, Primary    Number of Stairs, Within Home, Primary  none  -NS     Row Name 10/04/19 0831          Cognitive Assessment/Intervention- PT/OT    Orientation Status (Cognition)  oriented x 4  -NS     Row Name 10/04/19 0831          Safety Issues, Functional Mobility    Safety Issues Affecting Function (Mobility)  safety precaution awareness;safety precautions follow-through/compliance  -NS     Impairments Affecting Function (Mobility)  balance;coordination;endurance/activity tolerance;strength  -NS       User Key  (r) = Recorded By, (t) = Taken By, (c) = Cosigned By    Initials Name Provider Type    Tiff Slater, PT  Physical Therapist        Mobility     Row Name 10/04/19 0831          Bed Mobility Assessment/Treatment    Bed Mobility Assessment/Treatment  supine-sit-supine;scooting/bridging  -NS     Scooting/Bridging [place] (Bed Mobility)  supervision  -NS     Supine-Sit-Supine [place] (Bed Mobility)  supervision  -NS     Assistive Device (Bed Mobility)  bed rails;head of bed elevated  -NS     Comment (Bed Mobility)  Mild dizziness once sitting EOB.  -NS     Row Name 10/04/19 0831          Transfer Assessment/Treatment    Comment (Transfers)  Mild dizziness upon standing. VCing for standing and letting mild dizziness subside prior to ambulation.  -NS     Row Name 10/04/19 0831          Sit-Stand Transfer    Sit-Stand [place] (Transfers)  contact guard  -NS     Assistive Device (Sit-Stand Transfers)  other (see comments) HHA x1  -NS     Row Name 10/04/19 0831          Gait/Stairs Assessment/Training    Gait/Stairs Assessment/Training  gait/ambulation independence  -NS     [place] Level (Gait)  contact guard  -NS     Assistive Device (Gait)  other (see comments) HHA x1  -NS     Distance in Feet (Gait)  200ft  -NS     Pattern (Gait)  step-through  -NS     Deviations/Abnormal Patterns (Gait)  base of support, narrow;stride length decreased;marika decreased  -NS     Bilateral Gait Deviations  lateral trunk flexion Increased B lateral trunk lean  -NS     Comment (Gait/Stairs)  Patient ambulated with mild unsteadiness but had no episodes of LOB. She reported increased tingling in her L gastroc but no feelings of numbness or weakness. Tingling decreased with rest.  -NS       User Key  (r) = Recorded By, (t) = Taken By, (c) = Cosigned By    Initials Name Provider Type    Tiff Slater PT Physical Therapist        Obj/Interventions     Row Name 10/04/19 0831          General ROM    GENERAL ROM COMMENTS  B LEs: no obvious deficits  -NS     Row Name 10/04/19 0831          MMT (Manual Muscle Testing)    General  MMT Comments  B hip flex: 3+/5, B knee flex/ext: 4+/5, B PF/DF: 4+/5  -NS     Row Name 10/04/19 0831          Static Sitting Balance    Level of Jbphh (Unsupported Sitting, Static Balance)  standby assist  -NS     Sitting Position (Unsupported Sitting, Static Balance)  sitting on edge of bed  -NS     Time Able to Maintain Position (Unsupported Sitting, Static Balance)  30 to 45 seconds  -NS     Row Name 10/04/19 0831          Dynamic Sitting Balance    Level of Jbphh, Reaches Outside Midline (Sitting, Dynamic Balance)  standby assist  -NS     Sitting Position, Reaches Outside Midline (Sitting, Dynamic Balance)  sitting on edge of bed  -NS     Row Name 10/04/19 0831          Static Standing Balance    Level of Jbphh (Supported Standing, Static Balance)  contact guard assist  -NS     Time Able to Maintain Position (Supported Standing, Static Balance)  15 to 30 seconds  -NS     Row Name 10/04/19 0831          Dynamic Standing Balance    Level of Jbphh, Reaches Outside Midline (Standing, Dynamic Balance)  contact guard assist  -NS     Time Able to Maintain Position, Reaches Outside Midline (Standing, Dynamic Balance)  30 to 45 seconds  -NS     Assistive Device Utilized (Supported Standing, Dynamic Balance)  other (see comments) HHA  -NS     Row Name 10/04/19 0831          Sensory Assessment/Intervention    Sensory General Assessment  -- Patient has sensation deficits L>R on LEs from the knee down into the feet.   -NS       User Key  (r) = Recorded By, (t) = Taken By, (c) = Cosigned By    Initials Name Provider Type    Tiff Slater, PT Physical Therapist        Goals/Plan     Row Name 10/04/19 0831          Bed Mobility Goal 1 (PT)    Activity/Assistive Device (Bed Mobility Goal 1, PT)  sit to supine/supine to sit  -NS     Jbphh Level/Cues Needed (Bed Mobility Goal 1, PT)  independent  -NS     Time Frame (Bed Mobility Goal 1, PT)  long term goal (LTG);1 week  -NS     Row Name  10/04/19 0831          Transfer Goal 1 (PT)    Activity/Assistive Device (Transfer Goal 1, PT)  sit-to-stand/stand-to-sit  -NS     [place] Level/Cues Needed (Transfer Goal 1, PT)  standby assist  -NS     Time Frame (Transfer Goal 1, PT)  long term goal (LTG);1 week  -NS     Row Name 10/04/19 0831          Gait Training Goal 1 (PT)    Activity/Assistive Device (Gait Training Goal 1, PT)  gait (walking locomotion)  -NS     [place] Level (Gait Training Goal 1, PT)  standby assist  -NS     Distance (Gait Goal 1, PT)  500ft  -NS     Time Frame (Gait Training Goal 1, PT)  long term goal (LTG);1 week  -NS       User Key  (r) = Recorded By, (t) = Taken By, (c) = Cosigned By    Initials Name Provider Type    Tiff Slater, PT Physical Therapist        Clinical Impression     Row Name 10/04/19 0831          Pain Assessment    Additional Documentation  Pain Scale: Numbers Pre/Post-Treatment (Group)  -NS     Row Name 10/04/19 0831          Pain Scale: Numbers Pre/Post-Treatment    Pain Scale: Numbers, Pretreatment  0/10 - no pain  -NS     Pain Scale: Numbers, Post-Treatment  0/10 - no pain  -NS     Row Name 10/04/19 0831          Physical Therapy Clinical Impression    Patient/Family Goals Statement (PT Clinical Impression)  To go home  -NS     Criteria for Skilled Interventions Met (PT Clinical Impression)  yes;treatment indicated  -NS     Rehab Potential (PT Clinical Summary)  good, to achieve stated therapy goals  -NS     Predicted Duration of Therapy (PT)  1 week   -NS     Row Name 10/04/19 0831          Vital Signs    Pre Systolic BP Rehab  142  -NS     Pre Treatment Diastolic BP  62  -NS     Pretreatment Heart Rate (beats/min)  75  -NS     Posttreatment Heart Rate (beats/min)  75  -NS     Pre SpO2 (%)  95  -NS     O2 Delivery Pre Treatment  room air  -NS     Post SpO2 (%)  96  -NS     O2 Delivery Post Treatment  room air  -NS     Pre Patient Position  Supine  -NS     Intra Patient Position  Standing  -NS      Post Patient Position  Supine  -NS     Row Name 10/04/19 0831          Positioning and Restraints    Pre-Treatment Position  in bed  -NS     Post Treatment Position  bed  -NS     In Bed  supine;call light within reach;encouraged to call for assist;with family/caregiver;with other staff  -NS       User Key  (r) = Recorded By, (t) = Taken By, (c) = Cosigned By    Initials Name Provider Type    Tiff Slater PT Physical Therapist        Outcome Measures     Row Name 10/04/19 0831          How much help from another person do you currently need...    Turning from your back to your side while in flat bed without using bedrails?  3  -NS     Moving from lying on back to sitting on the side of a flat bed without bedrails?  3  -NS     Moving to and from a bed to a chair (including a wheelchair)?  3  -NS     Standing up from a chair using your arms (e.g., wheelchair, bedside chair)?  3  -NS     Climbing 3-5 steps with a railing?  2  -NS     To walk in hospital room?  3  -NS     AM-PAC 6 Clicks Score (PT)  17  -NS     Row Name 10/04/19 0831          Modified Ethel Scale    Modified Ethel Scale  4 - Moderately severe disability.  Unable to walk without assistance, and unable to attend to own bodily needs without assistance.  -NS     Row Name 10/04/19 0831          Functional Assessment    Outcome Measure Options  AM-PAC 6 Clicks Basic Mobility (PT);Modified Ethel  -NS       User Key  (r) = Recorded By, (t) = Taken By, (c) = Cosigned By    Initials Name Provider Type    Tiff Slater PT Physical Therapist        Physical Therapy Education     Title: PT OT SLP Therapies (In Progress)     Topic: Physical Therapy (Done)     Point: Mobility training (Done)     Learning Progress Summary           Patient Acceptance, E, VU by NS at 10/4/2019  9:10 AM    Comment:  Patient educated about transferring slowly and letting dizziness subside prior to engaging in next activity.                   Point: Home exercise program (Done)      Learning Progress Summary           Patient Acceptance, E, VU by NS at 10/4/2019  9:10 AM    Comment:  Patient educated about transferring slowly and letting dizziness subside prior to engaging in next activity.                   Point: Body mechanics (Done)     Learning Progress Summary           Patient Acceptance, E, VU by NS at 10/4/2019  9:10 AM    Comment:  Patient educated about transferring slowly and letting dizziness subside prior to engaging in next activity.                   Point: Precautions (Done)     Learning Progress Summary           Patient Acceptance, E, VU by NS at 10/4/2019  9:10 AM    Comment:  Patient educated about transferring slowly and letting dizziness subside prior to engaging in next activity.                               User Key     Initials Effective Dates Name Provider Type Discipline    NS 09/10/19 -  Tiff Rosen, PT Physical Therapist PT              PT Recommendation and Plan  Planned Therapy Interventions (PT Eval): balance training, bed mobility training, gait training, home exercise program, neuromuscular re-education, patient/family education, strengthening, stretching, transfer training  Outcome Summary/Treatment Plan (PT)  Anticipated Discharge Disposition (PT): home with OP services, home with assist  Plan of Care Reviewed With: family, patient  Outcome Summary: Pt tolerated PT eval well. She transferred supine>sit and scooted EOB with SBA. She stood with CGA and had mild dizziness upon standing. She ambulated 200ft with CGA with mild unsteadiness and decreased EDUIN, with tingling into the L gastroc. Her strength is essentially equal in B LEs. Skilled PT warranted to improve functionl mobility and safety. Recommend home with assistance from  and OP therapy at discharge.     Time Calculation:   PT Charges     Row Name 10/04/19 0831             Time Calculation    Start Time  0831  -NS      PT Received On  10/04/19  -NS      PT Goal Re-Cert Due Date  10/14/19   -NS        User Key  (r) = Recorded By, (t) = Taken By, (c) = Cosigned By    Initials Name Provider Type    Tiff Slater, PT Physical Therapist        Therapy Charges for Today     Code Description Service Date Service Provider Modifiers Qty    67956082425  PT EVAL MOD COMPLEXITY 4 10/4/2019 Tiff Rosen, PT GP 1          PT G-Codes  Outcome Measure Options: AM-PAC 6 Clicks Basic Mobility (PT), Modified [place]  AM-PAC 6 Clicks Score (PT): 17  Modified [place]anan Scale: 4 - Moderately severe disability.  Unable to walk without assistance, and unable to attend to own bodily needs without assistance.    Tiff Rosen PT  10/4/2019

## 2019-10-04 NOTE — CONSULTS
Discussed and taught patient about type 2 diabetes self-management, risk factors, and importance of blood glucose control to reduce complications. Target blood glucose readings and A1c goals per ADA were reviewed. Reviewed with patient current A1c of 6.8 and discussed its significance.  Signs, symptoms and treatment of hyperglycemia and hypoglycemia were discussed. Ms. Grider denies hypoglycemic events.  She states she drinks 3 Pepsi's per day.  We discussed cutting back to 1 if possible.  Ms. Grider states she will not drink diet soda. We discussed 1/2 diet and 1/2 regular.  Lifestyle changes such as physical activity with MD approval and healthy eating were encouraged. Stressed the importance of strict blood sugar control after surgery to prevent complications.  Instructed to check blood sugar 1-2 times per day and to call PCP if  blood glucose is trending higher than 180.  She was encouraged to keep record of blood glucose readings to take to follow up appointment with PCP.  Pt was offered a free op follow up appointment however declined at this time.

## 2019-10-04 NOTE — PROGRESS NOTES
"      [facility] Medicine Services  ADMISSION FOLLOW-UP NOTE          Patient admitted after midnight, H&P by my partner performed earlier on today's date reviewed.  Interim findings, labs, and charting also reviewed.        The [facility] Hospital Problem List has been managed and updated to include any new diagnoses:  Active Hospital Problems    Diagnosis  POA   • **TIA (transient ischemic attack) [G45.9]  Yes   • Renal insufficiency [N28.9]  Yes   • Diabetic polyneuropathy associated with type 2 diabetes mellitus (CMS/HCC) [E11.42]  Yes   • Mixed hyperlipidemia [E78.2]  Yes   • Gastroesophageal reflux disease without esophagitis [K21.9]  Yes   • Essential hypertension [I10]  Yes      Resolved Hospital Problems   No resolved problems to display.         ADDITIONAL PLAN:  - detailed assessment and plan form admission reviewed  - MRI completed, read mentions possible small area of ischemia. Neurology to see.   - PT/OT eval completed  - awaiting MBS this morning for complaints of \"choking\"    - anticipate home tomorrow morning or later today pending further work-up    Electronically signed by Herlinda Dahl DO, 10/04/19, 12:12 PM.    "

## 2019-10-04 NOTE — PLAN OF CARE
Problem: Patient Care Overview  Goal: Plan of Care Review  Outcome: Ongoing (interventions implemented as appropriate)   10/04/19 1029   Coping/Psychosocial   Plan of Care Reviewed With patient;family   Plan of Care Review   Progress improving   SLP evaluation completed. Will address dysphagia. Please see note for further details and recommendations.

## 2019-10-04 NOTE — PLAN OF CARE
Problem: Patient Care Overview  Goal: Plan of Care Review  Outcome: Ongoing (interventions implemented as appropriate)   10/04/19 1208   Coping/Psychosocial   Plan of Care Reviewed With patient   Plan of Care Review   Progress improving   SLP re-evaluation completed. Will sign-off dysphagia. Please see note for further details and recommendations.

## 2019-10-04 NOTE — H&P
"    [facility] Medicine Services  HISTORY AND PHYSICAL    Patient Name: Nely Grider  : 1945  MRN: [id number]  Primary Care Physician: Raimundo Ibarra MD  Date of admission: 10/3/2019      Subjective   Subjective     Chief Complaint:  Left-sided weakness    HPI:  Nely Grider is a 73 y.o. female with a PMH significant for diabetes mellitus type 2, HTN/HLD, GERD who presents to the ED due to complaints of left-sided weakness.  Patient states that this evening around 830 p.m. she had difficulty moving her left arm.  She states, \"it felt like someone else's arm \".  She states that she was unable to  things.  She also reports left-sided facial numbness.  She denies weakness or numbness in her legs, denies facial droop or difficulty speaking.  She denies prior similar episodes.  Patient reports occasional palpitations, denies chest pain, shortness of breath.  Additionally, patient reports a 3 to 4-hour history of right-sided headache prior to onset of symptoms which she states is atypical for her.    Review of Systems   Eyes: Negative.    Respiratory: Negative.    Cardiovascular: Positive for palpitations.   Gastrointestinal: Negative.    Endocrine: Negative.    Genitourinary: Negative.    Musculoskeletal: Negative.    Skin: Negative.    Allergic/Immunologic: Negative.    Neurological: Positive for weakness and headaches.   Hematological: Negative.    Psychiatric/Behavioral: Negative.           All other systems reviewed and are negative.     Personal History     Past Medical History:   Diagnosis Date   • Anesthesia complication     per patient with hip surgery last april did not remember being in hospital or going home can only rmember from rehad on   • Anesthesia complication     with hip surgery in 2018 had issues with swallowing after surgery called in speech   • Arthritis    • Cancer (CMS/HCC)     cervical   • Cataract    • Diabetes mellitus (CMS/HCC)     type " 2 dm, check blood sugar every morning, diagnosed 3 or 4 years   • Full dentures    • Full dentures    • GERD (gastroesophageal reflux disease)    • High cholesterol    • History of IBS    • Hypertension     been off bp meds since last april 2018   • Neuropathy    • Wears glasses        Past Surgical History:   Procedure Laterality Date   • CARDIAC CATHETERIZATION     • CHOLECYSTECTOMY     • COLONOSCOPY     • EYE SURGERY      bilateral cataract   • HYSTERECTOMY      partial   • JOINT REPLACEMENT      hip surgery 2018 in april   • OOPHORECTOMY      one removed   • TONSILLECTOMY     • TOTAL HIP ARTHROPLASTY Right 4/19/2018    Procedure: RIGHT TOTAL HIP ARTHROPLASTY;  Surgeon: Carlos Pollack MD;  Location:  [facility];  Service: Orthopedics   • TOTAL HIP ARTHROPLASTY Left 2/12/2019    Procedure: TOTAL HIP ARTHROPLASTY LEFT;  Surgeon: Carlos Pollack MD;  Location:  [facility];  Service: Orthopedics   • WRIST SURGERY      metal plate       Family History: family history includes Arthritis in her brother, mother, and sister; Bleeding Disorder in her sister; Cancer in her brother, daughter, and another family member; Diabetes in her brother, sister, and another family member; Heart attack in her brother, father, mother, and sister; Heart disease in her brother and sister; Hyperlipidemia in her brother, mother, and sister; Hypertension in her brother, father, mother, and sister; Kidney disease in her paternal uncle; Liver disease in her paternal uncle; Osteoporosis in her mother; Ovarian cancer in her daughter and other family members; Stroke in her father and another family member. Otherwise pertinent FHx was reviewed and unremarkable.     Social History:  reports that she has never smoked. She has never used smokeless tobacco. She reports that she does not drink alcohol or use drugs.  Social History     Social History Narrative   • Not on file       Medications:    Available home medication information  reviewed.  Medications Prior to Admission   Medication Sig Dispense Refill Last Dose   • gabapentin (NEURONTIN) 400 MG capsule TAKE 1 CAPSULE THREE TIMES DAILY 270 capsule 1 10/4/2019 at Unknown time   • glimepiride (AMARYL) 4 MG tablet TAKE 1 TABLET EVERY MORNING BEFORE BREAKFAST 90 tablet 3 10/4/2019 at Unknown time   • metFORMIN (GLUCOPHAGE) 500 MG tablet Take 1 tablet by mouth 2 (Two) Times a Day With Meals. 180 tablet 3 10/4/2019 at Unknown time   • omeprazole (priLOSEC) 20 MG capsule TAKE 1 CAPSULE TWICE DAILY 180 capsule 3 10/4/2019 at Unknown time   • simvastatin (ZOCOR) 10 MG tablet TAKE 1 TABLET AT BEDTIME 90 tablet 1 10/4/2019 at Unknown time   • aspirin  MG EC tablet Take 1 tablet by mouth Daily. For 1 month 30 tablet 0 Taking   • methylPREDNISolone (MEDROL, JOSE,) 4 MG tablet Take as directed on package instructions. 1 each 0    • Probiotic Product (PROBIOTIC DAILY) capsule Take as directed on package 60 capsule 0 Taking       Allergies   Allergen Reactions   • Codeine Hives       Objective   Objective     Vital Signs:   Temp:  [97.9 °F (36.6 °C)-98.4 °F (36.9 °C)] 97.9 °F (36.6 °C)  Heart Rate:  [70-84] 75  Resp:  [16-18] 18  BP: (149-177)/(69-85) 163/83   Total (NIH Stroke Scale): 1    Physical Exam   Constitutional: Awake, alert  Eyes: PERRLA, sclerae anicteric, no conjunctival injection  HENT: NCAT, mucous membranes moist  Neck: Supple, no thyromegaly, no lymphadenopathy, trachea midline  Respiratory: Clear to auscultation bilaterally, nonlabored respirations   Cardiovascular: RRR, no murmurs, rubs, or gallops, palpable pedal pulses bilaterally  Gastrointestinal: Positive bowel sounds, soft, nontender, nondistended  Musculoskeletal: No bilateral ankle edema, no clubbing or cyanosis to extremities  Psychiatric: Appropriate affect, cooperative  Neurologic: Oriented x 3, left upper extremity with diminished strength compared to right, diminished sensation in the left upper extremity compared to  right.  Bilateral lower extremities equal in strength with intact sensation, Cranial Nerves intact to confrontation, speech clear  Skin: No rashes      Results Reviewed:  I have personally reviewed current lab and radiology data.    Results from last 7 days   Lab Units 10/03/19  2143 10/03/19  2141   WBC 10*3/mm3 9.85  --    HEMOGLOBIN g/dL 12.9  --    HEMOGLOBIN, POC g/dL  --  13.6   HEMATOCRIT % 41.2  --    HEMATOCRIT POC %  --  40   PLATELETS 10*3/mm3 400  --    INR   --  0.9     Results from last 7 days   Lab Units 10/03/19  2143 10/03/19  2141   CREATININE mg/dL  --  1.20   ALT (SGPT) U/L 18  --    AST (SGOT) U/L 37*  --    TROPONIN T ng/mL <0.010  --      Estimated Creatinine Clearance: 43.5 mL/min (by C-G formula based on SCr of 1.2 mg/dL).  Brief Urine Lab Results  (Last result in the past 365 days)      Color   Clarity   Blood   Leuk Est   Nitrite   Protein   CREAT   Urine HCG        01/28/19 0856 Yellow Slightly Cloudy Trace Negative Negative Trace             Imaging Results (last 24 hours)     Procedure Component Value Units Date/Time    CT Angiogram Head With & Without Contrast [[id number]] Collected:  10/03/19 2231     Updated:  10/03/19 2233    Narrative:       CTA Head, CTA Neck    HISTORY:  73-year-old female with left arm weakness and paresthesias beginning today at 2030 hours.    TECHNIQUE:  CT angiogram of the head and neck with IV contrast. 3-D reconstructions were obtained and reviewed. Evaluation for a significant carotid arterial stenosis is based on NASCET criteria. Radiation dose reduction techniques included automated exposure  control. Radiation audit for known CT and nuclear cardiology exams in the last 12 months: 0.    COMPARISON:  Noncontrast head CT and CT cerebral perfusion 10/3/2019.    CTA NECK:  Bovine arch with the left internal carotid artery arising from the brachiocephalic trunk. No proximal great vessel stenosis. The vertebral arteries are widely patent and codominant.  Cervical carotid bifurcations demonstrate no evidence of hemodynamically  significant stenoses by NASCET criteria. Retropharyngeal course of the bilateral internal carotid arteries.    Cervical soft tissues are unremarkable. No acute osseous abnormality.    CTA HEAD:  Intracranially, there is symmetric distal vascular contrast distribution in the anterior, middle and posterior cerebral artery territories. No evidence of intracranial arterial flow limiting stenosis. No intracranial aneurysm or vascular malformation is  identified. The dural venous sinuses appear normal. No intracranial mass or abnormal contrast enhancement.      Impression:       Negative CT angiogram of the head and neck. No intracranial or extracranial arterial flow limiting stenosis or aneurysm.     Signer Name: Guilherme Garrido MD   Signed: 10/3/2019 10:31 PM   Workstation Name: [email or web address]    [facility]    CT Angiogram Neck With & Without Contrast [[id number]] Collected:  10/03/19 2231     Updated:  10/03/19 2233    Narrative:       CTA Head, CTA Neck    HISTORY:  73-year-old female with left arm weakness and paresthesias beginning today at 2030 hours.    TECHNIQUE:  CT angiogram of the head and neck with IV contrast. 3-D reconstructions were obtained and reviewed. Evaluation for a significant carotid arterial stenosis is based on NASCET criteria. Radiation dose reduction techniques included automated exposure  control. Radiation audit for known CT and nuclear cardiology exams in the last 12 months: 0.    COMPARISON:  Noncontrast head CT and CT cerebral perfusion 10/3/2019.    CTA NECK:  Bovine arch with the left internal carotid artery arising from the brachiocephalic trunk. No proximal great vessel stenosis. The vertebral arteries are widely patent and codominant. Cervical carotid bifurcations demonstrate no evidence of hemodynamically  significant stenoses by NASCET criteria. Retropharyngeal course of the bilateral  internal carotid arteries.    Cervical soft tissues are unremarkable. No acute osseous abnormality.    CTA HEAD:  Intracranially, there is symmetric distal vascular contrast distribution in the anterior, middle and posterior cerebral artery territories. No evidence of intracranial arterial flow limiting stenosis. No intracranial aneurysm or vascular malformation is  identified. The dural venous sinuses appear normal. No intracranial mass or abnormal contrast enhancement.      Impression:       Negative CT angiogram of the head and neck. No intracranial or extracranial arterial flow limiting stenosis or aneurysm.     Signer Name: Guilherme Garrido MD   Signed: 10/3/2019 10:31 PM   Workstation Name: [email or web address]    [facility]    XR Chest 1 View [[id number]] Collected:  10/03/19 2226     Updated:  10/03/19 2228    Narrative:       CR Chest 1 Vw    INDICATION:   Acute stroke protocol. Unable to control movement left arm    COMPARISON:    None available.    FINDINGS:  Single portable AP view(s) of the chest.  The heart and mediastinal contours are normal. The lungs are clear. No pneumothorax or pleural effusion.      Impression:       No acute cardiopulmonary findings.    Signer Name: Johnathan Siddiqui MD   Signed: 10/3/2019 10:26 PM   Workstation Name: [email or web address]-    [facility]    CT Cerebral Perfusion With & Without Contrast [[id number]] Collected:  10/03/19 2220     Updated:  10/03/19 2223    Narrative:       CT CEREBRAL PERFUSION W WO CONTRAST    HISTORY:   73-year-old female with left arm weakness and paresthesias beginning today at 2030 hours.    TECHNIQUE:   Axial CT images of the brain without and with intravenous contrast using cerebral perfusion protocol. Post-processing parametric maps were created using RAPID software and reviewed. Radiation dose reduction techniques included automated exposure control  or exposure modulation based on body size. CT and nuclear cardiology  exams in the last 12 months: 0.    COMPARISON:   Noncontrast head CT 10/3/2019    FINDINGS:   Arterial input function is optimal.     Perfusion maps are symmetric without evidence of cerebral ischemia or core infarction.      Impression:       Normal brain perfusion CT.          Signer Name: Guilherme Garrido MD   Signed: 10/3/2019 10:20 PM   [email or web address]    [facility]    CT Head Without Contrast Stroke Protocol [[id number]] Collected:  10/03/19 2145     Updated:  10/03/19 2147    Narrative:       CT Head WO Code Stroke: 10/3/2019  9:29 PM    HISTORY:   Left arm weakness and paresthesias.    TECHNIQUE:   Axial unenhanced head CT. Radiation dose reduction techniques included automated exposure control or exposure modulation based on body size. Radiation audit for number of CT and nuclear cardiology exams performed in the last year: 0.      COMPARISON:   None.    FINDINGS:   No intracranial hemorrhage, mass, or infarct. No hydrocephalus or extra-axial fluid collection. Brain parenchymal density is normal.     The skull base, calvarium, and extracranial soft tissues are normal.      Impression:       Normal, negative unenhanced head CT.    The examination was performed at 9:29 PM and results were called by telephone at 10/3/2019 9:43 PM to Param Hawthorne MD who verbally acknowledged these results.      Signer Name: Trey Faria MD   Signed: 10/3/2019 9:45 PM   Workstation [facility]: [email or web address]    [facility]             Assessment/Plan   Assessment / Plan     Active Hospital Problems    Diagnosis POA   • **TIA (transient ischemic attack) [G45.9] Yes   • Renal insufficiency [N28.9] Yes   • Diabetic polyneuropathy associated with type 2 diabetes mellitus (CMS/HCC) [E11.42] Yes   • Mixed hyperlipidemia [E78.2] Yes   • Gastroesophageal reflux disease without esophagitis [K21.9] Yes   • Essential hypertension [I10] Yes     This is a 73-year-old female patient  with a PMH significant for diabetes mellitus, HTN/HLD who presents to the ED with left-sided deficits.    TIA  - CT perfusion, CTA head neck without acute findings  -MRI pending  -Echocardiogram in a.m.  - Neurology/SLP/PT/OT consults  -Scheduled neuro checks  -Aspirin, Lipitor  -Fasting lipid panel, hemoglobin A1c  -We will allow permissive hypertension until stroke is ruled out      HTN/HLD  -Atorvastatin  -Permissive hypertension    GERD  - Protonix    CKD  -Appears at baseline  -A.m. labs      DVT prophylaxis: SCDs    CODE STATUS:    Code Status and Medical Interventions:   Ordered at: 10/04/19 0159     Level Of Support Discussed With:    Patient     Code Status:    CPR     Medical Interventions (Level of Support Prior to Arrest):    Full       Admission Status:  I believe this patient meets OBSERVATION status, however if further evaluation or treatment plans warrant, status may change.  Based upon current information, I predict patient's care encounter to be less than or equal to 2 midnights.      Electronically signed by Vilma Lal DO, 10/04/19, 2:01 AM.

## 2019-10-04 NOTE — CONSULTS
Neurology    Referring provider:   No referring provider defined for this encounter.    Reason for Consultation: Stroke    Chief complaint: Left-sided numbness and tingling    History of present illness: 73-year-old woman with onset day before yesterday of left-sided weakness.  It affected her left face left tongue with heaviness and was noted to have mild slurred speech and a droop left face by her children.  In addition she had some numbness in her lower arm and leg and some difficulty moving her left arm altogether.    She had some mild headache and this was associated with a blood pressure of 180.    Blood pressure previously had been asymptomatic off medication.    She was taking a baby aspirin after his hip surgery several months ago but did stop taking that.    She is on simvastatin 10 mg daily.    She is known to have diabetes type 2 with a hemoglobin A1c of 6.    Does not smoke.        Review of Systems: All systems are reviewed and are negative.    Home meds:   Medications Prior to Admission   Medication Sig Dispense Refill Last Dose   • gabapentin (NEURONTIN) 400 MG capsule TAKE 1 CAPSULE THREE TIMES DAILY 270 capsule 1 10/4/2019 at Unknown time   • glimepiride (AMARYL) 4 MG tablet TAKE 1 TABLET EVERY MORNING BEFORE BREAKFAST 90 tablet 3 10/4/2019 at Unknown time   • metFORMIN (GLUCOPHAGE) 500 MG tablet Take 1 tablet by mouth 2 (Two) Times a Day With Meals. 180 tablet 3 10/4/2019 at Unknown time   • omeprazole (priLOSEC) 20 MG capsule TAKE 1 CAPSULE TWICE DAILY 180 capsule 3 10/4/2019 at Unknown time   • simvastatin (ZOCOR) 10 MG tablet TAKE 1 TABLET AT BEDTIME 90 tablet 1 10/4/2019 at Unknown time   • aspirin  MG EC tablet Take 1 tablet by mouth Daily. For 1 month 30 tablet 0 Taking   • methylPREDNISolone (MEDROL, JOSE,) 4 MG tablet Take as directed on package instructions. 1 each 0    • Probiotic Product (PROBIOTIC DAILY) capsule Take as directed on package 60 capsule 0 Taking       History  Past  Medical History:   Diagnosis Date   • Anesthesia complication     per patient with hip surgery last april did not remember being in hospital or going home can only rmember from rehad on   • Anesthesia complication     with hip surgery in april 2018 had issues with swallowing after surgery called in speech   • Arthritis    • Cancer (CMS/HCC)     cervical   • Cataract    • Diabetes mellitus (CMS/HCC)     type 2 dm, check blood sugar every morning, diagnosed 3 or 4 years   • Full dentures    • Full dentures    • GERD (gastroesophageal reflux disease)    • High cholesterol    • History of IBS    • Hypertension     been off bp meds since last april 2018   • Neuropathy    • Wears glasses    ,   Past Surgical History:   Procedure Laterality Date   • CARDIAC CATHETERIZATION     • CHOLECYSTECTOMY     • COLONOSCOPY     • EYE SURGERY      bilateral cataract   • HYSTERECTOMY      partial   • JOINT REPLACEMENT      hip surgery 2018 in april   • OOPHORECTOMY      one removed   • TONSILLECTOMY     • TOTAL HIP ARTHROPLASTY Right 4/19/2018    Procedure: RIGHT TOTAL HIP ARTHROPLASTY;  Surgeon: Carlos Pollack MD;  Location:  [facility];  Service: Orthopedics   • TOTAL HIP ARTHROPLASTY Left 2/12/2019    Procedure: TOTAL HIP ARTHROPLASTY LEFT;  Surgeon: Carlos Pollack MD;  Location:  [facility];  Service: Orthopedics   • WRIST SURGERY      metal plate   ,   Family History   Problem Relation Age of Onset   • Arthritis Mother    • Heart attack Mother    • Hypertension Mother    • Hyperlipidemia Mother    • Osteoporosis Mother    • Heart disease Sister    • Diabetes Sister    • Arthritis Sister    • Heart attack Sister    • Hypertension Sister    • Hyperlipidemia Sister    • Bleeding Disorder Sister    • Heart disease Brother    • Cancer Brother    • Diabetes Brother    • Arthritis Brother    • Heart attack Brother    • Hypertension Brother    • Hyperlipidemia Brother    • Ovarian cancer Daughter    • Cancer Daughter    • Ovarian cancer  "Other         grandaughter   • Ovarian cancer Other         granddaughter   • Heart attack Father    • Hypertension Father    • Stroke Father    • Kidney disease Paternal Uncle    • Liver disease Paternal Uncle    • Cancer Other    • Stroke Other    • Diabetes Other    • Breast cancer Neg Hx    ,   Social History     Tobacco Use   • Smoking status: Never Smoker   • Smokeless tobacco: Never Used   Substance Use Topics   • Alcohol use: No   • Drug use: No    and Allergies:  Codeine,    Vital Signs   Blood pressure 135/79, pulse 81, temperature 98 °F (36.7 °C), temperature source Oral, resp. rate 16, height 165.1 cm (65\"), weight 84.4 kg (186 lb), SpO2 95 %, not currently breastfeeding.  Body mass index is 30.95 kg/m².    Physical Exam:   General: Pleasant white female in no distress              Head: No trauma              Neck: No bruit              Resp: Normal breath sounds              Cor: Regular rhythm              Extremities: No edema              Skin: Warm and dry              Neuro: Mentally alert and oriented with normal memory attention and concentration.    Speech is articulate with no word finding problems.    Coordination is normal on finger-to-nose testing bilaterally.    Cranial nerves show benign fundi equal pupils full eye movements and normal visual fields.    She has decreased left facial sensation and mild left central facial weakness.    Palate elevates normally tongue protrudes normally.    Reflexes are plus minus throughout.    She has no drift of her outstretched arms but has mild decrease in .    Sensation is diminished in the left arm and leg.    She has distal sensory loss to vibration light touch.        Results Review: MRI of the brain was personally reviewed and shows a tiny infarct in the area of the left internal capsule.    T angiogram of the head and neck are normal.    Echocardiogram is also normal.    LDL cholesterol is 74.    Hemoglobin A1c is 6.8.        Labs:  Lab " Results (last 72 hours)     Procedure Component Value Units Date/Time    Lipid Panel [[id number]]  (Abnormal) Collected:  10/04/19 0525    Specimen:  Blood Updated:  10/04/19 0708     Total Cholesterol 145 mg/dL      Triglycerides 201 mg/dL      HDL Cholesterol 31 mg/dL      LDL Cholesterol  74 mg/dL      VLDL Cholesterol 40.2 mg/dL      LDL/HDL Ratio 2.38    Narrative:       Cholesterol Reference Ranges  (U.S. Department of Health and Human Services ATP III Classifications)    Desirable          <200 mg/dL  Borderline High    200-239 mg/dL  High Risk          >240 mg/dL      Triglyceride Reference Ranges  (U.S. Department of Health and Human Services ATP III Classifications)    Normal           <150 mg/dL  Borderline High  150-199 mg/dL  High             200-499 mg/dL  Very High        >500 mg/dL    HDL Reference Ranges  (U.S. Department of Health and Human Services ATP III Classifcations)    Low     <40 mg/dl (major risk factor for CHD)  High    >60 mg/dl ('negative' risk factor for CHD)        LDL Reference Ranges  (U.S. Department of Health and Human Services ATP III Classifcations)    Optimal          <100 mg/dL  Near Optimal     100-129 mg/dL  Borderline High  130-159 mg/dL  High             160-189 mg/dL  Very High        >189 mg/dL    Comprehensive Metabolic Panel [[id number]]  (Abnormal) Collected:  10/04/19 0525    Specimen:  Blood Updated:  10/04/19 0708     Glucose 88 mg/dL      BUN 8 mg/dL      Creatinine 1.06 mg/dL      Sodium 141 mmol/L      Potassium 4.2 mmol/L      Chloride 105 mmol/L      CO2 25.0 mmol/L      Calcium 9.6 mg/dL      Total Protein 6.2 g/dL      Albumin 3.70 g/dL      ALT (SGPT) 17 U/L      AST (SGOT) 23 U/L      Alkaline Phosphatase 77 U/L      Total Bilirubin 0.3 mg/dL      eGFR Non African Amer 51 mL/min/1.73      Globulin 2.5 gm/dL      A/G Ratio 1.5 g/dL      BUN/Creatinine Ratio 7.5     Anion Gap 11.0 mmol/L     Narrative:       GFR Normal >60  Chronic Kidney Disease <60  Kidney  Failure <15    TSH [[id number]]  (Normal) Collected:  10/04/19 0525    Specimen:  Blood Updated:  10/04/19 0703     TSH 2.910 uIU/mL     Hemoglobin A1c [[id number]]  (Abnormal) Collected:  10/04/19 0525    Specimen:  Blood Updated:  10/04/19 0659     Hemoglobin A1C 6.80 %     Narrative:       Hemoglobin A1C Ranges:    Increased Risk for Diabetes  5.7% to 6.4%  Diabetes                     >= 6.5%  Diabetic Goal                < 7.0%    CBC Auto Differential [[id number]]  (Abnormal) Collected:  10/04/19 0525    Specimen:  Blood Updated:  10/04/19 0654     WBC 11.79 10*3/mm3      RBC 4.12 10*6/mm3      Hemoglobin 12.2 g/dL      Hematocrit 38.9 %      MCV 94.4 fL      MCH 29.6 pg      MCHC 31.4 g/dL      RDW 14.1 %      RDW-SD 48.7 fl      MPV 10.0 fL      Platelets 372 10*3/mm3      Neutrophil % 70.9 %      Lymphocyte % 21.0 %      Monocyte % 5.4 %      Eosinophil % 2.0 %      Basophil % 0.4 %      Immature Grans % 0.3 %      Neutrophils, Absolute 8.36 10*3/mm3      Lymphocytes, Absolute 2.48 10*3/mm3      Monocytes, Absolute 0.64 10*3/mm3      Eosinophils, Absolute 0.23 10*3/mm3      Basophils, Absolute 0.05 10*3/mm3      Immature Grans, Absolute 0.03 10*3/mm3      nRBC 0.0 /100 WBC     POC Glucose Once [[id number]]  (Normal) Collected:  10/04/19 0621    Specimen:  Blood Updated:  10/04/19 0623     Glucose 88 mg/dL     POC Glucose Once [[id number]]  (Normal) Collected:  10/04/19 0208    Specimen:  Blood Updated:  10/04/19 0209     Glucose 71 mg/dL     [place] Draw [[id number]] Collected:  10/03/19 2143    Specimen:  Blood Updated:  10/03/19 2250    Narrative:       The following orders were created for panel order [place] Draw.  Procedure                               Abnormality         Status                     ---------                               -----------         ------                     Light Blue Top[[id number]]                                   Final result               Green Top (Gel)[[id number]]                                   Final result               Lavender Top[[id number]]                                     Final result               Gold Top - SST[[id number]]                                   Final result               Green Top (No Gel)[[id number]]                                                            Please view results for these tests on the individual orders.    Light Blue Top [[id number]] Collected:  10/03/19 2143    Specimen:  Blood Updated:  10/03/19 2250     Extra Tube hold for add-on     Comment: Auto resulted       Green Top (Gel) [[id number]] Collected:  10/03/19 2143    Specimen:  Blood Updated:  10/03/19 2250     Extra Tube Hold for add-ons.     Comment: Auto resulted.       Lavender Top [[id number]] Collected:  10/03/19 2143    Specimen:  Blood Updated:  10/03/19 2250     Extra Tube hold for add-on     Comment: Auto resulted       Gold Top - SST [[id number]] Collected:  10/03/19 2143    Specimen:  Blood Updated:  10/03/19 2250     Extra Tube Hold for add-ons.     Comment: Auto resulted.       Troponin [[id number]]  (Normal) Collected:  10/03/19 2143    Specimen:  Blood Updated:  10/03/19 2220     Troponin T <0.010 ng/mL      Comment: Specimen hemolyzed.  Results may be affected.       Narrative:       Troponin T Reference Range:  <= 0.03 ng/mL-   Negative for AMI  >0.03 ng/mL-     Abnormal for myocardial necrosis.  Clinicians would have to utilize clinical acumen, EKG, Troponin and serial changes to determine if it is an Acute Myocardial Infarction or myocardial injury due to an underlying chronic condition.     AST [[id number]]  (Abnormal) Collected:  10/03/19 2143    Specimen:  Blood Updated:  10/03/19 2220     AST (SGOT) 37 U/L      Comment: Specimen hemolyzed.  Results may be affected.       ALT [[id number]]  (Normal) Collected:  10/03/19 2143    Specimen:  Blood Updated:  10/03/19 2219     ALT (SGPT) 18 U/L      Comment: Specimen hemolyzed.  Results may be affected.       aPTT [[id number]]   (Normal) Collected:  10/03/19 2143    Specimen:  Blood Updated:  10/03/19 2215     PTT 33.2 seconds     Narrative:       PTT = The equivalent PTT values for the therapeutic range of heparin levels at 0.3 to 0.5 U/ml are 55 to 70 seconds.    CBC & Differential [[id number]] Collected:  10/03/19 2143    Specimen:  Blood Updated:  10/03/19 2152    Narrative:       The following orders were created for panel order CBC & Differential.  Procedure                               Abnormality         Status                     ---------                               -----------         ------                     CBC Auto Differential[[id number]]        Abnormal            Final result                 Please view results for these tests on the individual orders.    CBC Auto Differential [[id number]]  (Abnormal) Collected:  10/03/19 2143    Specimen:  Blood Updated:  10/03/19 2152     WBC 9.85 10*3/mm3      RBC 4.41 10*6/mm3      Hemoglobin 12.9 g/dL      Hematocrit 41.2 %      MCV 93.4 fL      MCH 29.3 pg      MCHC 31.3 g/dL      RDW 13.7 %      RDW-SD 47.1 fl      MPV 9.5 fL      Platelets 400 10*3/mm3      Neutrophil % 52.6 %      Lymphocyte % 37.7 %      Monocyte % 6.8 %      Eosinophil % 2.3 %      Basophil % 0.5 %      Immature Grans % 0.1 %      Neutrophils, Absolute 5.18 10*3/mm3      Lymphocytes, Absolute 3.71 10*3/mm3      Monocytes, Absolute 0.67 10*3/mm3      Eosinophils, Absolute 0.23 10*3/mm3      Basophils, Absolute 0.05 10*3/mm3      Immature Grans, Absolute 0.01 10*3/mm3      nRBC 0.0 /100 WBC     POC CHEM 8 [[id number]]  (Abnormal) Collected:  10/03/19 2141    Specimen:  Blood Updated:  10/03/19 2146     Glucose 186 mg/dL      BUN 7 mg/dL      Creatinine 1.20 mg/dL      Sodium 138 mmol/L      Potassium 4.1 mmol/L      Chloride 102 mmol/L      Total CO2 24 mmol/L      Hemoglobin 13.6 g/dL      Comment: Serial Number: [id number]:  [id number]        Hematocrit 40 %      Ionized Calcium 1.29 mmol/L     POC Protime / INR  [[id number]]  (Abnormal) Collected:  10/03/19 2141    Specimen:  Blood Updated:  10/03/19 2145     Protime 11.3 seconds      INR 0.9     Comment: Serial Number: [id number]:  [id number]             Rads:  Imaging Results (last 72 hours)     Procedure Component Value Units Date/Time    FL Video Swallow With Speech [[id number]] Collected:  10/04/19 1246     Updated:  10/04/19 1421    Narrative:       EXAMINATION: FL VIDEO SWALLOW W SPEECH-, FL LIMITED UGI FOR MBS REFLUX-  10/04/2019     INDICATION: Dysphagia; I63.9-Cerebral infarction, unspecified;  R29.898-Other symptoms and signs involving the musculoskeletal system;  R20.9-Unspecified disturbances of skin sensation     TECHNIQUE: 40 seconds of fluoroscopic time was used for this exam. 2  associated images were saved. The patient was evaluated in the seated  lateral position while taking a variety of consistencies of barium by  mouth under the direction of speech pathology.     COMPARISON: NONE     FINDINGS: There was penetration that cleared without aspiration with  sips of thin barium. A limited view of the esophagus with the patient in  the seated lateral position demonstrated no signs of a focal esophageal  stricture, or gastroesophageal reflux.       Impression:       Fluoroscopy provided for a modified barium swallow, and  limited upper GI series. Please see speech therapy report for full  details and recommendations. Limited upper GI series appeared within  normal limits.      D:  10/04/2019  E:  10/04/2019       FL Limited Ugi For Mbs Reflux [[id number]] Collected:  10/04/19 1246     Updated:  10/04/19 1421    Narrative:       EXAMINATION: FL VIDEO SWALLOW W SPEECH-, FL LIMITED UGI FOR MBS REFLUX-  10/04/2019     INDICATION: Dysphagia; I63.9-Cerebral infarction, unspecified;  R29.898-Other symptoms and signs involving the musculoskeletal system;  R20.9-Unspecified disturbances of skin sensation     TECHNIQUE: 40 seconds of fluoroscopic time was used for this  exam. 2  associated images were saved. The patient was evaluated in the seated  lateral position while taking a variety of consistencies of barium by  mouth under the direction of speech pathology.     COMPARISON: NONE     FINDINGS: There was penetration that cleared without aspiration with  sips of thin barium. A limited view of the esophagus with the patient in  the seated lateral position demonstrated no signs of a focal esophageal  stricture, or gastroesophageal reflux.       Impression:       Fluoroscopy provided for a modified barium swallow, and  limited upper GI series. Please see speech therapy report for full  details and recommendations. Limited upper GI series appeared within  normal limits.      D:  10/04/2019  E:  10/04/2019       MRI Brain Without Contrast [[id number]] Collected:  10/04/19 0333     Updated:  10/04/19 0335    Narrative:       MRI Brain WO    INDICATION:   Left-sided facial numbness left arm weakness. Right-sided headache any 8:30 PM yesterday    TECHNIQUE:   MRI of the brain without IV contrast.    COMPARISON:    None available.    FINDINGS:  The pituitary gland and foramen magnum region are normal in appearance.. There is one 3 mm focus of possible restricted diffusion seen on series 5 image 82. This may show ADC decreased signal. Otherwise there is no abnormal diffusion. The ventricles and  subarachnoid spaces are normal. There are no masses or extra-axial fluid collections. There are a few scattered areas of increased FLAIR signal intensity consistent with small vessel ischemic changes.      Impression:       There is one area of increased signal intensity suggested in the right cerebral hemisphere near the central sulcus. This may represent recent ischemic change. It measures 3 mm in diameter.    Otherwise study is negative    Signer Name: Johnathan Siddiqui MD   Signed: 10/4/2019 3:33 AM   Workstation [facility]: [facility]    [facility] [place]    CT Angiogram Head With &  Without Contrast [[id number]] Collected:  10/03/19 2231     Updated:  10/03/19 2233    Narrative:       CTA Head, CTA Neck    HISTORY:  73-year-old female with left arm weakness and paresthesias beginning today at 2030 hours.    TECHNIQUE:  CT angiogram of the head and neck with IV contrast. 3-D reconstructions were obtained and reviewed. Evaluation for a significant carotid arterial stenosis is based on NASCET criteria. Radiation dose reduction techniques included automated exposure  control. Radiation audit for known CT and nuclear cardiology exams in the last 12 months: 0.    COMPARISON:  Noncontrast head CT and CT cerebral perfusion 10/3/2019.    CTA NECK:  Bovine arch with the left internal carotid artery arising from the brachiocephalic trunk. No proximal great vessel stenosis. The vertebral arteries are widely patent and codominant. Cervical carotid bifurcations demonstrate no evidence of hemodynamically  significant stenoses by NASCET criteria. Retropharyngeal course of the bilateral internal carotid arteries.    Cervical soft tissues are unremarkable. No acute osseous abnormality.    CTA HEAD:  Intracranially, there is symmetric distal vascular contrast distribution in the anterior, middle and posterior cerebral artery territories. No evidence of intracranial arterial flow limiting stenosis. No intracranial aneurysm or vascular malformation is  identified. The dural venous sinuses appear normal. No intracranial mass or abnormal contrast enhancement.      Impression:       Negative CT angiogram of the head and neck. No intracranial or extracranial arterial flow limiting stenosis or aneurysm.     Signer Name: Guilherme Garrido MD   Signed: 10/3/2019 10:31 PM   Workstation [facility]: [facility]    [facility] [place]    CT Angiogram Neck With & Without Contrast [[id number]] Collected:  10/03/19 2231     Updated:  10/03/19 2233    Narrative:       CTA Head, CTA Neck    HISTORY:  73-year-old female with  left arm weakness and paresthesias beginning today at 2030 hours.    TECHNIQUE:  CT angiogram of the head and neck with IV contrast. 3-D reconstructions were obtained and reviewed. Evaluation for a significant carotid arterial stenosis is based on NASCET criteria. Radiation dose reduction techniques included automated exposure  control. Radiation audit for known CT and nuclear cardiology exams in the last 12 months: 0.    COMPARISON:  Noncontrast head CT and CT cerebral perfusion 10/3/2019.    CTA NECK:  Bovine arch with the left internal carotid artery arising from the brachiocephalic trunk. No proximal great vessel stenosis. The vertebral arteries are widely patent and codominant. Cervical carotid bifurcations demonstrate no evidence of hemodynamically  significant stenoses by NASCET criteria. Retropharyngeal course of the bilateral internal carotid arteries.    Cervical soft tissues are unremarkable. No acute osseous abnormality.    CTA HEAD:  Intracranially, there is symmetric distal vascular contrast distribution in the anterior, middle and posterior cerebral artery territories. No evidence of intracranial arterial flow limiting stenosis. No intracranial aneurysm or vascular malformation is  identified. The dural venous sinuses appear normal. No intracranial mass or abnormal contrast enhancement.      Impression:       Negative CT angiogram of the head and neck. No intracranial or extracranial arterial flow limiting stenosis or aneurysm.     Signer Name: Guilherme Garrido MD   Signed: 10/3/2019 10:31 PM   Workstation [facility]    [facility] [place]    XR Chest 1 View [[id number]] Collected:  10/03/19 2226     Updated:  10/03/19 2228    Narrative:       CR Chest 1 Vw    INDICATION:   Acute stroke protocol. Unable to control movement left arm    COMPARISON:    None available.    FINDINGS:  Single portable AP view(s) of the chest.  The heart and mediastinal contours are normal. The lungs are  clear. No pneumothorax or pleural effusion.      Impression:       No acute cardiopulmonary findings.    Signer Name: Johnathan Siddiqui MD   Signed: 10/3/2019 10:26 PM   [facility]: [facility]    [facility]    CT Cerebral Perfusion With & Without Contrast [[id number]] Collected:  10/03/19 2220     Updated:  10/03/19 2223    Narrative:       CT CEREBRAL PERFUSION W WO CONTRAST    HISTORY:   73-year-old female with left arm weakness and paresthesias beginning today at 2030 hours.    TECHNIQUE:   Axial CT images of the brain without and with intravenous contrast using cerebral perfusion protocol. Post-processing parametric maps were created using RAPID software and reviewed. Radiation dose reduction techniques included automated exposure control  or exposure modulation based on body size. CT and nuclear cardiology exams in the last 12 months: 0.    COMPARISON:   Noncontrast head CT 10/3/2019    FINDINGS:   Arterial input function is optimal.     Perfusion maps are symmetric without evidence of cerebral ischemia or core infarction.      Impression:       Normal brain perfusion CT.          Signer Name: Guilherme Garrido MD   Signed: 10/3/2019 10:20 PM   Workstation Name: [facility]    [facility]    CT Head Without Contrast Stroke Protocol [[id number]] Collected:  10/03/19 2145     Updated:  10/03/19 2147    Narrative:       CT Head WO Code Stroke: 10/3/2019  9:29 PM    HISTORY:   Left arm weakness and paresthesias.    TECHNIQUE:   Axial unenhanced head CT. Radiation dose reduction techniques included automated exposure control or exposure modulation based on body size. Radiation audit for number of CT and nuclear cardiology exams performed in the last year: 0.      COMPARISON:   None.    FINDINGS:   No intracranial hemorrhage, mass, or infarct. No hydrocephalus or extra-axial fluid collection. Brain parenchymal density is normal.     The skull base, calvarium, and  extracranial soft tissues are normal.      Impression:       Normal, negative unenhanced head CT.    The examination was performed at 9:29 PM and results were called by telephone at 10/3/2019 9:43 PM to Param Hawthorne MD who verbally acknowledged these results.      Signer Name: Trey Faria MD   Signed: 10/3/2019 9:45 PM   Workstation Name: [facility]    [facility] [place]            Assessment: Lacunar stroke, associated with malignant hypertension       Plan:    Dual antiplatelet therapy    High-dose Lipitor.    Blood pressure management to 120/80 as tolerated    Comment:   Patient likely can go home in the morning     I discussed the patients findings and my recommendations with patient, family and primary care team      Jean Child MD  10/04/19  2:27 PM

## 2019-10-05 VITALS
WEIGHT: 186 LBS | HEIGHT: 65 IN | TEMPERATURE: 97.9 F | BODY MASS INDEX: 30.99 KG/M2 | DIASTOLIC BLOOD PRESSURE: 65 MMHG | SYSTOLIC BLOOD PRESSURE: 111 MMHG | OXYGEN SATURATION: 96 % | RESPIRATION RATE: 16 BRPM | HEART RATE: 73 BPM

## 2019-10-05 LAB
GLUCOSE BLDC GLUCOMTR-MCNC: 185 MG/DL (ref 70–130)
GLUCOSE BLDC GLUCOMTR-MCNC: 236 MG/DL (ref 70–130)

## 2019-10-05 PROCEDURE — 99239 HOSP IP/OBS DSCHRG MGMT >30: CPT | Performed by: INTERNAL MEDICINE

## 2019-10-05 PROCEDURE — 82962 GLUCOSE BLOOD TEST: CPT

## 2019-10-05 RX ORDER — ATORVASTATIN CALCIUM 80 MG/1
80 TABLET, FILM COATED ORAL NIGHTLY
Qty: 30 TABLET | Refills: 0 | Status: SHIPPED | OUTPATIENT
Start: 2019-10-05 | End: 2019-10-28 | Stop reason: SDUPTHER

## 2019-10-05 RX ORDER — AMLODIPINE BESYLATE 5 MG/1
5 TABLET ORAL
Qty: 30 TABLET | Refills: 0 | Status: SHIPPED | OUTPATIENT
Start: 2019-10-06 | End: 2019-10-28 | Stop reason: SDUPTHER

## 2019-10-05 RX ORDER — ASPIRIN 81 MG/1
81 TABLET, CHEWABLE ORAL DAILY
Qty: 30 TABLET | Refills: 0 | Status: SHIPPED | OUTPATIENT
Start: 2019-10-06

## 2019-10-05 RX ORDER — CLOPIDOGREL BISULFATE 75 MG/1
75 TABLET ORAL DAILY
Qty: 30 TABLET | Refills: 0 | Status: SHIPPED | OUTPATIENT
Start: 2019-10-06 | End: 2019-10-28 | Stop reason: SDUPTHER

## 2019-10-05 RX ADMIN — CLOPIDOGREL BISULFATE 75 MG: 75 TABLET ORAL at 08:30

## 2019-10-05 RX ADMIN — SODIUM CHLORIDE, PRESERVATIVE FREE 10 ML: 5 INJECTION INTRAVENOUS at 08:31

## 2019-10-05 RX ADMIN — AMLODIPINE BESYLATE 5 MG: 5 TABLET ORAL at 08:31

## 2019-10-05 RX ADMIN — INSULIN LISPRO 3 UNITS: 100 INJECTION, SOLUTION INTRAVENOUS; SUBCUTANEOUS at 08:40

## 2019-10-05 RX ADMIN — GABAPENTIN 400 MG: 400 CAPSULE ORAL at 08:30

## 2019-10-05 RX ADMIN — PANTOPRAZOLE SODIUM 40 MG: 40 TABLET, DELAYED RELEASE ORAL at 06:38

## 2019-10-05 RX ADMIN — ASPIRIN 81 MG 81 MG: 81 TABLET ORAL at 08:31

## 2019-10-05 NOTE — PLAN OF CARE
Problem: Patient Care Overview  Goal: Plan of Care Review   10/05/19 0517   Coping/Psychosocial   Plan of Care Reviewed With patient   Plan of Care Review   Progress improving   OTHER   Outcome Summary Pt is a/o x 4. VSS. NIH 2. No needs at this time. Will continue to monitor.

## 2019-10-05 NOTE — DISCHARGE SUMMARY
[facility] Medicine Services  DISCHARGE SUMMARY    Patient Name: Nely Grider  : 1945  MRN: [id number]    Date of Admission: 10/3/2019  Date of Discharge:  10/5/2019  Primary Care Physician: Raimundo Ibarra MD    Consults     Date and Time Order Name Status Description    10/4/2019 0159 Inpatient Neurology Consult Stroke Completed     10/3/2019 2127 Inpatient Neurology Consult Stroke            Hospital Course     Presenting Problem:   TIA (transient ischemic attack) [G45.9]  Cerebrovascular accident (CVA), unspecified mechanism (CMS/HCC) [I63.9]    Active Hospital Problems    Diagnosis  POA   • **TIA (transient ischemic attack) [G45.9]  Yes   • Cerebrovascular accident (CVA) (CMS/HCC) [I63.9]  Yes   • Renal insufficiency [N28.9]  Yes   • Diabetic polyneuropathy associated with type 2 diabetes mellitus (CMS/HCC) [E11.42]  Yes   • Mixed hyperlipidemia [E78.2]  Yes   • Gastroesophageal reflux disease without esophagitis [K21.9]  Yes   • Essential hypertension [I10]  Yes      Resolved Hospital Problems   No resolved problems to display.        Hospital Course:  Nely Grider is a 73 y.o. female with a PMH significant for diabetes mellitus type 2, HTN/HLD, GERD who presents to the ED due to complaints of left-sided weakness. CT perfusion and CTA head/neck were negative. MRI showed very small right sided lacunar stroke. Patient did well with speech therapy, PT/OT. Neurology recommended DAPT and high intensity statin. Patient was started on low dose Norvasc for strict blood pressure control. Echo showed normal LVEF with no evidence of PFO. She feels well this morning and is stable for discharge home.    Discharge Follow Up Recommendations for labs/diagnostics:  - follow up with PCP in one week    Day of Discharge     HPI:   Patient feels good today. Left hand weakness improved almost back to baseline. No chest pain or SOA. No other acute issues this morning. Wants to go  home.    Review of Systems  Gen- No fevers, chills  CV- No chest pain, palpitations  Resp- No cough, dyspnea  GI- No N/V/D, abd pain    Otherwise ROS is negative except as mentioned in the HPI.    Vital Signs:   Temp:  [97.8 °F (36.6 °C)-98.8 °F (37.1 °C)] 97.9 °F (36.6 °C)  Heart Rate:  [72-88] 73  Resp:  [16] 16  BP: (111-141)/(65-79) 111/65     Physical Exam:  Constitutional: No acute distress, awake, alert  HENT: NCAT, mucous membranes moist  Respiratory: Clear to auscultation bilaterally, respiratory effort normal   Cardiovascular: RRR, no murmurs, rubs, or gallops, palpable pedal pulses bilaterally  Gastrointestinal: Positive bowel sounds, soft, nontender, nondistended  Musculoskeletal: No bilateral ankle edema  Psychiatric: Appropriate affect, cooperative  Neurologic: Oriented x 3, strength symmetric in all extremities, Cranial Nerves grossly intact to confrontation, speech clear  Skin: No rashes      Pertinent  and/or Most Recent Results     Results from last 7 days   Lab Units 10/04/19  0525 10/03/19  2143 10/03/19  2141   WBC 10*3/mm3 11.79* 9.85  --    HEMOGLOBIN g/dL 12.2 12.9  --    HEMOGLOBIN, POC g/dL  --   --  13.6   HEMATOCRIT % 38.9 41.2  --    HEMATOCRIT POC %  --   --  40   PLATELETS 10*3/mm3 372 400  --    SODIUM mmol/L 141  --   --    POTASSIUM mmol/L 4.2  --   --    CHLORIDE mmol/L 105  --   --    CO2 mmol/L 25.0  --   --    BUN mg/dL 8  --   --    CREATININE mg/dL 1.06*  --  1.20   GLUCOSE mg/dL 88  --   --    CALCIUM mg/dL 9.6  --   --      Results from last 7 days   Lab Units 10/04/19  0525 10/03/19  2143 10/03/19  2141   BILIRUBIN mg/dL 0.3  --   --    ALK PHOS U/L 77  --   --    ALT (SGPT) U/L 17 18  --    AST (SGOT) U/L 23 37*  --    PROTIME seconds  --   --  11.3*   INR   --   --  0.9   APTT seconds  --  33.2  --      Results from last 7 days   Lab Units 10/04/19  0525   CHOLESTEROL mg/dL 145   TRIGLYCERIDES mg/dL 201*   HDL CHOL mg/dL 31*     Results from last 7 days   Lab Units  10/04/19  0525 10/03/19  2143   TSH uIU/mL 2.910  --    HEMOGLOBIN A1C % 6.80*  --    TROPONIN T ng/mL  --  <0.010       Brief Urine Lab Results  (Last result in the past 365 days)      Color   Clarity   Blood   Leuk Est   Nitrite   Protein   CREAT   Urine HCG        01/28/19 0856 Yellow Slightly Cloudy Trace Negative Negative Trace               Microbiology Results Abnormal     None          Imaging Results (all)     Procedure Component Value Units Date/Time    FL Video Swallow With Speech [[id number]] Collected:  10/04/19 1246     Updated:  10/04/19 1421    Narrative:       EXAMINATION: FL VIDEO SWALLOW W SPEECH-, FL LIMITED UGI FOR MBS REFLUX-  10/04/2019     INDICATION: Dysphagia; I63.9-Cerebral infarction, unspecified;  R29.898-Other symptoms and signs involving the musculoskeletal system;  R20.9-Unspecified disturbances of skin sensation     TECHNIQUE: 40 seconds of fluoroscopic time was used for this exam. 2  associated images were saved. The patient was evaluated in the seated  lateral position while taking a variety of consistencies of barium by  mouth under the direction of speech pathology.     COMPARISON: NONE     FINDINGS: There was penetration that cleared without aspiration with  sips of thin barium. A limited view of the esophagus with the patient in  the seated lateral position demonstrated no signs of a focal esophageal  stricture, or gastroesophageal reflux.       Impression:       Fluoroscopy provided for a modified barium swallow, and  limited upper GI series. Please see speech therapy report for full  details and recommendations. Limited upper GI series appeared within  normal limits.      D:  10/04/2019  E:  10/04/2019       FL Limited Ugi For Mbs Reflux [[id number]] Collected:  10/04/19 1246     Updated:  10/04/19 1421    Narrative:       EXAMINATION: FL VIDEO SWALLOW W SPEECH-, FL LIMITED UGI FOR MBS REFLUX-  10/04/2019     INDICATION: Dysphagia; I63.9-Cerebral infarction,  unspecified;  R29.898-Other symptoms and signs involving the musculoskeletal system;  R20.9-Unspecified disturbances of skin sensation     TECHNIQUE: 40 seconds of fluoroscopic time was used for this exam. 2  associated images were saved. The patient was evaluated in the seated  lateral position while taking a variety of consistencies of barium by  mouth under the direction of speech pathology.     COMPARISON: NONE     FINDINGS: There was penetration that cleared without aspiration with  sips of thin barium. A limited view of the esophagus with the patient in  the seated lateral position demonstrated no signs of a focal esophageal  stricture, or gastroesophageal reflux.       Impression:       Fluoroscopy provided for a modified barium swallow, and  limited upper GI series. Please see speech therapy report for full  details and recommendations. Limited upper GI series appeared within  normal limits.      D:  10/04/2019  E:  10/04/2019       MRI Brain Without Contrast [[id number]] Collected:  10/04/19 0333     Updated:  10/04/19 0335    Narrative:       MRI Brain WO    INDICATION:   Left-sided facial numbness left arm weakness. Right-sided headache any 8:30 PM yesterday    TECHNIQUE:   MRI of the brain without IV contrast.    COMPARISON:    None available.    FINDINGS:  The pituitary gland and foramen magnum region are normal in appearance.. There is one 3 mm focus of possible restricted diffusion seen on series 5 image 82. This may show ADC decreased signal. Otherwise there is no abnormal diffusion. The ventricles and  subarachnoid spaces are normal. There are no masses or extra-axial fluid collections. There are a few scattered areas of increased FLAIR signal intensity consistent with small vessel ischemic changes.      Impression:       There is one area of increased signal intensity suggested in the right cerebral hemisphere near the central sulcus. This may represent recent ischemic change. It measures 3 mm in  diameter.    Otherwise study is negative    Signer Name: Johnathan Siddiqui MD   Signed: 10/4/2019 3:33 AM   Workstation Name: [facility]    [facility]    CT Angiogram Head With & Without Contrast [[id number]] Collected:  10/03/19 2231     Updated:  10/03/19 2233    Narrative:       CTA Head, CTA Neck    HISTORY:  73-year-old female with left arm weakness and paresthesias beginning today at 2030 hours.    TECHNIQUE:  CT angiogram of the head and neck with IV contrast. 3-D reconstructions were obtained and reviewed. Evaluation for a significant carotid arterial stenosis is based on NASCET criteria. Radiation dose reduction techniques included automated exposure  control. Radiation audit for known CT and nuclear cardiology exams in the last 12 months: 0.    COMPARISON:  Noncontrast head CT and CT cerebral perfusion 10/3/2019.    CTA NECK:  Bovine arch with the left internal carotid artery arising from the brachiocephalic trunk. No proximal great vessel stenosis. The vertebral arteries are widely patent and codominant. Cervical carotid bifurcations demonstrate no evidence of hemodynamically  significant stenoses by NASCET criteria. Retropharyngeal course of the bilateral internal carotid arteries.    Cervical soft tissues are unremarkable. No acute osseous abnormality.    CTA HEAD:  Intracranially, there is symmetric distal vascular contrast distribution in the anterior, middle and posterior cerebral artery territories. No evidence of intracranial arterial flow limiting stenosis. No intracranial aneurysm or vascular malformation is  identified. The dural venous sinuses appear normal. No intracranial mass or abnormal contrast enhancement.      Impression:       Negative CT angiogram of the head and neck. No intracranial or extracranial arterial flow limiting stenosis or aneurysm.     Signer Name: Guilherme Garrido MD   Signed: 10/3/2019 10:31 PM   Workstation Name: [facility]    [facility]   [place]    CT Angiogram Neck With & Without Contrast [[id number]] Collected:  10/03/19 2231     Updated:  10/03/19 2233    Narrative:       CTA Head, CTA Neck    HISTORY:  73-year-old female with left arm weakness and paresthesias beginning today at 2030 hours.    TECHNIQUE:  CT angiogram of the head and neck with IV contrast. 3-D reconstructions were obtained and reviewed. Evaluation for a significant carotid arterial stenosis is based on NASCET criteria. Radiation dose reduction techniques included automated exposure  control. Radiation audit for known CT and nuclear cardiology exams in the last 12 months: 0.    COMPARISON:  Noncontrast head CT and CT cerebral perfusion 10/3/2019.    CTA NECK:  Bovine arch with the left internal carotid artery arising from the brachiocephalic trunk. No proximal great vessel stenosis. The vertebral arteries are widely patent and codominant. Cervical carotid bifurcations demonstrate no evidence of hemodynamically  significant stenoses by NASCET criteria. Retropharyngeal course of the bilateral internal carotid arteries.    Cervical soft tissues are unremarkable. No acute osseous abnormality.    CTA HEAD:  Intracranially, there is symmetric distal vascular contrast distribution in the anterior, middle and posterior cerebral artery territories. No evidence of intracranial arterial flow limiting stenosis. No intracranial aneurysm or vascular malformation is  identified. The dural venous sinuses appear normal. No intracranial mass or abnormal contrast enhancement.      Impression:       Negative CT angiogram of the head and neck. No intracranial or extracranial arterial flow limiting stenosis or aneurysm.     Signer Name: Guilherme Garrido MD   Signed: 10/3/2019 10:31 PM   Workstation Name: [email or web address]-    [facility] [place]    XR Chest 1 View [[id number]] Collected:  10/03/19 2226     Updated:  10/03/19 2228    Narrative:       CR Chest 1 Vw    INDICATION:   Acute stroke  protocol. Unable to control movement left arm    COMPARISON:    None available.    FINDINGS:  Single portable AP view(s) of the chest.  The heart and mediastinal contours are normal. The lungs are clear. No pneumothorax or pleural effusion.      Impression:       No acute cardiopulmonary findings.    Signer Name: Johnathan Siddiqui MD   Signed: 10/3/2019 10:26 PM   Work[facility]: [facility]    [facility]    CT Cerebral Perfusion With & Without Contrast [[id number]] Collected:  10/03/19 2220     Updated:  10/03/19 2223    Narrative:       CT CEREBRAL PERFUSION W WO CONTRAST    HISTORY:   73-year-old female with left arm weakness and paresthesias beginning today at 2030 hours.    TECHNIQUE:   Axial CT images of the brain without and with intravenous contrast using cerebral perfusion protocol. Post-processing parametric maps were created using RAPID software and reviewed. Radiation dose reduction techniques included automated exposure control  or exposure modulation based on body size. CT and nuclear cardiology exams in the last 12 months: 0.    COMPARISON:   Noncontrast head CT 10/3/2019    FINDINGS:   Arterial input function is optimal.     Perfusion maps are symmetric without evidence of cerebral ischemia or core infarction.      Impression:       Normal brain perfusion CT.          Signer Name: Guilherme Garrido MD   Signed: 10/3/2019 10:20 PM   Workstation Name: [facility]    [facility]    CT Head Without Contrast Stroke Protocol [[id number]] Collected:  10/03/19 2145     Updated:  10/03/19 2147    Narrative:       CT Head WO Code Stroke: 10/3/2019  9:29 PM    HISTORY:   Left arm weakness and paresthesias.    TECHNIQUE:   Axial unenhanced head CT. Radiation dose reduction techniques included automated exposure control or exposure modulation based on body size. Radiation audit for number of CT and nuclear cardiology exams performed in the last year: 0.      COMPARISON:    None.    FINDINGS:   No intracranial hemorrhage, mass, or infarct. No hydrocephalus or extra-axial fluid collection. Brain parenchymal density is normal.     The skull base, calvarium, and extracranial soft tissues are normal.      Impression:       Normal, negative unenhanced head CT.    The examination was performed at 9:29 PM and results were called by telephone at 10/3/2019 9:43 PM to Param Hawthorne MD who verbally acknowledged these results.      Signer Name: Trey Faria MD   Signed: 10/3/2019 9:45 PM   Workstation Name: [facility]    [facility]                    Results for orders placed during the hospital encounter of 10/03/19   Adult Transthoracic Echo Complete W/ Cont if Necessary Per Protocol (With Agitated Saline)    Narrative · Estimated EF = 65%.  · The cardiac valves are anatomically and functionally normal.            Discharge Details        Discharge Medications      New Medications      Instructions Start Date   amLODIPine 5 MG tablet  Commonly known as:  NORVASC   5 mg, Oral, Every 24 Hours Scheduled   Start Date:  10/6/2019     aspirin 81 MG chewable tablet  Replaces:  aspirin 325 MG EC tablet   81 mg, Oral, Daily   Start Date:  10/6/2019     atorvastatin 80 MG tablet  Commonly known as:  LIPITOR   80 mg, Oral, Nightly      clopidogrel 75 MG tablet  Commonly known as:  PLAVIX   75 mg, Oral, Daily   Start Date:  10/6/2019        Continue These Medications      Instructions Start Date   gabapentin 400 MG capsule  Commonly known as:  NEURONTIN   TAKE 1 CAPSULE THREE TIMES DAILY      glimepiride 4 MG tablet  Commonly known as:  AMARYL   TAKE 1 TABLET EVERY MORNING BEFORE BREAKFAST      metFORMIN 500 MG tablet  Commonly known as:  GLUCOPHAGE   500 mg, Oral, 2 Times Daily With Meals      omeprazole 20 MG capsule  Commonly known as:  priLOSEC   TAKE 1 CAPSULE TWICE DAILY      PROBIOTIC DAILY capsule   Take as directed on package         Stop These Medications     aspirin 325 MG EC tablet  Replaced by:  aspirin 81 MG chewable tablet     methylPREDNISolone 4 MG tablet  Commonly known as:  MEDROL (JOSE)     simvastatin 10 MG tablet  Commonly known as:  ZOCOR            Allergies   Allergen Reactions   • Codeine Hives         Discharge Disposition:  Home or Self Care    Diet:  Hospital:  Diet Order   Procedures   • Diet Regular; Thin; Cardiac, Consistent Carbohydrate     Discharge:     Discharge Activity:   - as tolerated      CODE STATUS:    Code Status and Medical Interventions:   Ordered at: 10/04/19 0159     Level Of Support Discussed With:    Patient     Code Status:    CPR     Medical Interventions (Level of Support Prior to Arrest):    Full         Future Appointments   Date Time Provider Department Center   11/19/2019  9:00 AM Raimundo Ibarra MD [facility] None   1/23/2020  8:00 AM Nanda Buckner, SKYLAR [facility] None   2/19/2020  8:40 AM Carlos Pollack MD [facility] None           Time Spent on Discharge:  40 minutes    Electronically signed by Herlinda Dahl DO 10/05/19, 11:30 AM.

## 2019-10-06 ENCOUNTER — READMISSION MANAGEMENT (OUTPATIENT)
Dept: CALL CENTER | Facility: HOSPITAL | Age: 74
End: 2019-10-06

## 2019-10-06 NOTE — OUTREACH NOTE
Prep Survey      Responses   Facility patient discharged from?  [facility]   Is patient eligible?  Yes   Discharge diagnosis  Cerebrovascular accident (CVA)    Does the patient have one of the following disease processes/diagnoses(primary or secondary)?  Stroke (TIA)   Does the patient have Home health ordered?  No   Is there a DME ordered?  No   Prep survey completed?  Yes          Maine Israel RN

## 2019-10-07 ENCOUNTER — TRANSITIONAL CARE MANAGEMENT TELEPHONE ENCOUNTER (OUTPATIENT)
Dept: INTERNAL MEDICINE | Facility: CLINIC | Age: 74
End: 2019-10-07

## 2019-10-07 NOTE — OUTREACH NOTE
"TCM call completed.  See TCM flowsheet for details.  Does have upcoming hospital follow up appt with Dr. Ibarra 10/14/19.  \"I am doing pretty good.\"  Up and about and states requires no assistance.  States still some left sided arm weakness, but it has improved.  Denies any h/a, lightheaded/dizziness, SOB or chest pain.  Eating and drinking and no issues with bowels or bladder.  Has started her new medicines without issues at present.  States was given exercises to do at home and does not feel she needs PT.  Appointment for hospital follow up made with Dr. Ibarra.  She denies any needs at present and appreciated the call.   "

## 2019-10-08 ENCOUNTER — READMISSION MANAGEMENT (OUTPATIENT)
Dept: CALL CENTER | Facility: HOSPITAL | Age: 74
End: 2019-10-08

## 2019-10-08 NOTE — OUTREACH NOTE
Stroke Week 1 Survey      Responses   Facility patient discharged from?  [facility]   Does the patient have one of the following disease processes/diagnoses(primary or secondary)?  Stroke (TIA)   Is there a successful TCM telephone encounter documented?  Yes          Lynne Lee RN

## 2019-10-11 ENCOUNTER — READMISSION MANAGEMENT (OUTPATIENT)
Dept: CALL CENTER | Facility: HOSPITAL | Age: 74
End: 2019-10-11

## 2019-10-11 NOTE — OUTREACH NOTE
Stroke Week 2 Survey      Responses   Facility patient discharged from?  [facility]   Does the patient have one of the following disease processes/diagnoses(primary or secondary)?  Stroke (TIA)   Week 2 attempt successful?  Yes   Call start time  1549   Call end time  1552   Discharge diagnosis  Cerebrovascular accident (CVA)    Meds reviewed with patient/caregiver?  Yes   Is the patient having any side effects they believe may be caused by any medication additions or changes?  No   Does the patient have all medications ordered at discharge?  Yes   Is the patient taking all medications as directed (includes completed medication regime)?  Yes   Does the patient have a primary care provider?   Yes   Comments regarding PCP  Pt will see Dr. Ibarra on monday   Has the patient kept scheduled appointments due by today?  Yes   Has all DME been delivered?  No   Psychosocial issues?  No   Does the patient require any assistance with activities of daily living such as eating, bathing, dressing, walking, etc.?  Yes   Does the patient have any residual symptoms from stroke/TIA?  No   Does the patient understand the diet ordered at discharge?  Yes   Did the patient receive a copy of their discharge instructions?  Yes   Nursing interventions  Reviewed instructions with patient   What is the patient's perception of their health status since discharge?  Improving   Is the patient able to teach back FAST for Stroke?  Yes   Is the patient/caregiver able to teach back the risk factors for a stroke?  High blood pressure-goal below 120/80, Smoking, Diabetes   Is the patient/caregiver able to teach back signs and symptoms related to disease process for when to call PCP?  Yes   Is the patient/caregiver able to teach back signs and symptoms related to disease process for when to call 911?  Yes   Is the patient/caregiver able to teach back the hierarchy of who to call/visit for symptoms/problems? PCP, Specialist, Home health nurse, Urgent  Care, ED, 911  Yes   Week 2 call completed?  Yes   Wrap up additional comments  Pt denies any concerns/questions at this time.           Gina Schroeder RN

## 2019-10-14 ENCOUNTER — OFFICE VISIT (OUTPATIENT)
Dept: INTERNAL MEDICINE | Facility: CLINIC | Age: 74
End: 2019-10-14

## 2019-10-14 VITALS
HEART RATE: 72 BPM | BODY MASS INDEX: 31.28 KG/M2 | RESPIRATION RATE: 19 BRPM | SYSTOLIC BLOOD PRESSURE: 124 MMHG | DIASTOLIC BLOOD PRESSURE: 68 MMHG | WEIGHT: 188 LBS

## 2019-10-14 DIAGNOSIS — Z23 NEED FOR INFLUENZA VACCINATION: Primary | ICD-10-CM

## 2019-10-14 DIAGNOSIS — E11.9 CONTROLLED TYPE 2 DIABETES MELLITUS WITHOUT COMPLICATION, WITHOUT LONG-TERM CURRENT USE OF INSULIN (HCC): ICD-10-CM

## 2019-10-14 DIAGNOSIS — E78.2 MIXED HYPERLIPIDEMIA: ICD-10-CM

## 2019-10-14 DIAGNOSIS — I10 ESSENTIAL HYPERTENSION: ICD-10-CM

## 2019-10-14 DIAGNOSIS — I63.311 CEREBROVASCULAR ACCIDENT (CVA) DUE TO THROMBOSIS OF RIGHT MIDDLE CEREBRAL ARTERY (HCC): ICD-10-CM

## 2019-10-14 PROBLEM — G45.9 TIA (TRANSIENT ISCHEMIC ATTACK): Status: RESOLVED | Noted: 2019-10-04 | Resolved: 2019-10-14

## 2019-10-14 PROCEDURE — 99495 TRANSJ CARE MGMT MOD F2F 14D: CPT | Performed by: INTERNAL MEDICINE

## 2019-10-14 PROCEDURE — 90653 IIV ADJUVANT VACCINE IM: CPT | Performed by: INTERNAL MEDICINE

## 2019-10-14 PROCEDURE — G0008 ADMIN INFLUENZA VIRUS VAC: HCPCS | Performed by: INTERNAL MEDICINE

## 2019-10-14 NOTE — PROGRESS NOTES
Transitional Care Follow Up Visit  Subjective     Nely Grider is a 73 y.o. female who presents for a transitional care management visit.    Within 48 business hours after discharge our office contacted her via telephone to coordinate her care and needs.      I reviewed and discussed the details of that call along with the discharge summary, hospital problems, inpatient lab results, inpatient diagnostic studies, and consultation reports with Nely.     Current outpatient and discharge medications have been reconciled for the patient.  Reviewed by: Diane Carreon MA      Date of TCM Phone Call 9/23/2016 5/10/2018 2/15/2019 10/7/2019   Formerly [facility], [facility]   Date of Admission 9/16/2016 5/25/2018 2/12/2019 10/3/2019   Date of Discharge 9/22/2016 5/9/2018 2/13/2019 10/5/2019   Discharge Disposition Home or Self Care Home or Self Care Home or Self Care Home or Self Care     Risk for Readmission (LACE) Score: 10 (10/5/2019  6:00 AM)      History of Present Illness   Course During Hospital Stay:    Was hospitalized as above for a small right hemispheric CVA which affected her left arm and left face.  This cleared in about one hour according to the patient.  The CVA was seen on the MRI and all other testing was negative.  She is back to near normal now but still may be slightly weak in her left arm and have a slight facial droop.  She is now on plavix and atorvastatin as well as her other routine meds.     The following portions of the patient's history were reviewed and updated as appropriate: allergies, current medications, past medical history, past social history and problem list.    Review of Systems   Constitutional: Negative for fatigue and fever.   HENT: Negative for trouble swallowing.    Eyes: Negative.    Respiratory: Negative.  Negative for cough, chest tightness, shortness of breath and wheezing.     Cardiovascular: Negative.  Negative for chest pain, palpitations and leg swelling.   Gastrointestinal: Negative.  Negative for abdominal distention and abdominal pain.   Genitourinary: Negative.    Neurological: Positive for weakness (as noted.). Negative for numbness.       Objective   Physical Exam   Constitutional: She appears well-developed and well-nourished.   Neck: Normal range of motion. Neck supple.   Cardiovascular: Normal rate, regular rhythm and normal heart sounds. Exam reveals no gallop and no friction rub.   No murmur heard.  Pulmonary/Chest: Effort normal and breath sounds normal. No respiratory distress. She has no wheezes. She has no rales. She exhibits no tenderness.   Abdominal: Soft. Bowel sounds are normal. She exhibits no distension. There is no tenderness.   Neurological: She is alert. She displays normal reflexes. She exhibits normal muscle tone.   Slight left facial droop.  Left arm seems normal to me.   Nursing note and vitals reviewed.      Assessment/Plan   Nely was seen today for transitional care management.    Diagnoses and all orders for this visit:    Need for influenza vaccination  -     Fluad Tri 65yr+    Cerebrovascular accident (CVA) due to thrombosis of right middle cerebral artery (CMS/HCC)  Seems to stable and improving, no change.    Essential hypertension  Stable, no change.    Mixed hyperlipidemia  Stable, now on higher dose of atorvastatin.    Controlled type 2 diabetes mellitus without complication, without long-term current use of insulin (CMS/HCC)  Stable, no change.    She will recheck here in one month.            Transitional Care Management Certification  I certify that the following are true:  1. Communication was made within 2 business days of discharge.  2. Complexity of Medical Decision Making is moderate.  3. Face to face visit occurred within 14 days.    *Note: 96776 is for high complexity patients with a face to face visit within 7 days of discharge.   71480 is for high complexity patients with a face to face on days 8-14 post discharge or medium complexity with face to face visit within 14 days post discharge.

## 2019-10-21 ENCOUNTER — READMISSION MANAGEMENT (OUTPATIENT)
Dept: CALL CENTER | Facility: HOSPITAL | Age: 74
End: 2019-10-21

## 2019-10-21 NOTE — OUTREACH NOTE
Stroke Week 3 Survey      Responses   Facility patient discharged from?  [place]   Does the patient have one of the following disease processes/diagnoses(primary or secondary)?  Stroke (TIA)   Week 3 attempt successful?  Yes   Call start time  1129   Call end time  1133   Meds reviewed with patient/caregiver?  Yes   Is the patient taking all medications as directed (includes completed medication regime)?  Yes   Has the patient kept scheduled appointments due by today?  Yes   Has home health visited the patient within 72 hours of discharge?  N/A   Does the patient require any assistance with activities of daily living such as eating, bathing, dressing, walking, etc.?  No   Does the patient have any residual symptoms from stroke/TIA?  Yes   Residual symptoms comments  weakness in left arm, face is a little drawn per . she says is fine.    Does the patient understand the diet ordered at discharge?  Yes   What is the patient's perception of their health status since discharge?  Improving   Is the patient able to teach back FAST for Stroke?  Yes   Is the patient/caregiver able to teach back the risk factors for a stroke?  High blood pressure-goal below 120/80, Diabetes, High Cholesterol, Physical inactivity and obesity, Smoking, History of TIAs   Is the patient/caregiver able to teach back signs and symptoms related to disease process for when to call PCP?  Yes   Week 3 call completed?  Yes   Graduated/Revoked comments  staes is getting better, does own therapy at home no questions.           Meme Weaver RN

## 2019-10-28 ENCOUNTER — TELEPHONE (OUTPATIENT)
Dept: INTERNAL MEDICINE | Facility: CLINIC | Age: 74
End: 2019-10-28

## 2019-10-28 RX ORDER — CLOPIDOGREL BISULFATE 75 MG/1
75 TABLET ORAL DAILY
Qty: 90 TABLET | Refills: 1 | Status: SHIPPED | OUTPATIENT
Start: 2019-10-28 | End: 2020-03-23

## 2019-10-28 RX ORDER — ATORVASTATIN CALCIUM 80 MG/1
80 TABLET, FILM COATED ORAL NIGHTLY
Qty: 90 TABLET | Refills: 1 | Status: SHIPPED | OUTPATIENT
Start: 2019-10-28 | End: 2019-11-07 | Stop reason: SDUPTHER

## 2019-10-28 RX ORDER — AMLODIPINE BESYLATE 5 MG/1
5 TABLET ORAL
Qty: 90 TABLET | Refills: 1 | Status: SHIPPED | OUTPATIENT
Start: 2019-10-28 | End: 2020-03-23

## 2019-10-30 ENCOUNTER — READMISSION MANAGEMENT (OUTPATIENT)
Dept: CALL CENTER | Facility: HOSPITAL | Age: 74
End: 2019-10-30

## 2019-10-30 NOTE — OUTREACH NOTE
Stroke Week 4 Survey      Responses   Facility patient discharged from?  [place]   Does the patient have one of the following disease processes/diagnoses(primary or secondary)?  Stroke (TIA)   Week 4 attempt successful?  Yes   Call start time  1135   Call end time  1137   Discharge diagnosis  Cerebrovascular accident (CVA)    Meds reviewed with patient/caregiver?  Yes   Is the patient having any side effects they believe may be caused by any medication additions or changes?  No   Is the patient taking all medications as directed (includes completed medication regime)?  Yes   Has the patient kept scheduled appointments due by today?  Yes   Is the patient still receiving Home Health Services?  N/A   Psychosocial issues?  No   Does the patient require any assistance with activities of daily living such as eating, bathing, dressing, walking, etc.?  No   Does the patient have any residual symptoms from stroke/TIA?  Yes   Does the patient understand the diet ordered at discharge?  Yes   What is the patient's perception of their health status since discharge?  Improving   Nursing interventions  Nurse provided patient education   Is the patient able to teach back FAST for Stroke?  Yes   Is the patient/caregiver able to teach back the risk factors for a stroke?  High blood pressure-goal below 120/80, Diabetes, High Cholesterol, History of TIAs   Is the patient/caregiver able to teach back signs and symptoms related to disease process for when to call PCP?  Yes   Is the patient/caregiver able to teach back signs and symptoms related to disease process for when to call 911?  Yes   Is the patient/caregiver able to teach back the hierarchy of who to call/visit for symptoms/problems? PCP, Specialist, Home health nurse, Urgent Care, ED, 911  Yes   Week 4 Call Completed?  Yes   Would the patient like one additional call?  No   Graduated  Yes   Did the patient feel the follow up calls were helpful during their recovery period?   Yes   Was the number of calls appropriate?  Yes          Koki Rollins RN

## 2019-10-31 RX ORDER — GABAPENTIN 400 MG/1
400 CAPSULE ORAL 3 TIMES DAILY
Qty: 270 CAPSULE | Refills: 1 | Status: CANCELLED | OUTPATIENT
Start: 2019-10-31

## 2019-10-31 RX ORDER — GABAPENTIN 400 MG/1
400 CAPSULE ORAL 3 TIMES DAILY
Qty: 270 CAPSULE | Refills: 1 | Status: SHIPPED | OUTPATIENT
Start: 2019-10-31 | End: 2019-11-19 | Stop reason: SDUPTHER

## 2019-11-07 ENCOUNTER — TELEPHONE (OUTPATIENT)
Dept: INTERNAL MEDICINE | Facility: CLINIC | Age: 74
End: 2019-11-07

## 2019-11-07 RX ORDER — ATORVASTATIN CALCIUM 80 MG/1
80 TABLET, FILM COATED ORAL NIGHTLY
Qty: 90 TABLET | Refills: 1 | Status: SHIPPED | OUTPATIENT
Start: 2019-11-07 | End: 2020-03-30

## 2019-11-12 ENCOUNTER — OFFICE VISIT (OUTPATIENT)
Dept: INTERNAL MEDICINE | Facility: CLINIC | Age: 74
End: 2019-11-12

## 2019-11-12 VITALS
HEART RATE: 74 BPM | SYSTOLIC BLOOD PRESSURE: 116 MMHG | DIASTOLIC BLOOD PRESSURE: 64 MMHG | WEIGHT: 186 LBS | HEIGHT: 65 IN | OXYGEN SATURATION: 100 % | TEMPERATURE: 97.8 F | RESPIRATION RATE: 16 BRPM | BODY MASS INDEX: 30.99 KG/M2

## 2019-11-12 DIAGNOSIS — J40 BRONCHITIS: Primary | ICD-10-CM

## 2019-11-12 PROCEDURE — 99213 OFFICE O/P EST LOW 20 MIN: CPT | Performed by: NURSE PRACTITIONER

## 2019-11-12 RX ORDER — AZITHROMYCIN 250 MG/1
TABLET, FILM COATED ORAL
Qty: 6 TABLET | Refills: 0 | Status: SHIPPED | OUTPATIENT
Start: 2019-11-12 | End: 2019-11-19

## 2019-11-12 RX ORDER — BENZONATATE 200 MG/1
200 CAPSULE ORAL 3 TIMES DAILY PRN
Qty: 30 CAPSULE | Refills: 0 | Status: SHIPPED | OUTPATIENT
Start: 2019-11-12 | End: 2020-06-17

## 2019-11-12 NOTE — PROGRESS NOTES
"Chief Complaint   Patient presents with   • Nasal Congestion     cough, \"crud coming up\", throat sore from coughing        Subjective     History of Present Illness   Patient is here today with concern for upper respiratory infection.  She reports she is had a sore throat and cough over the last 3 to 4 days.  She developed productive cough that is green and now has some discomfort in her right chest when she coughs for a while.  When she is coughing she does feel short of breath.  She is not using any medication over-the-counter at this time.  No sick contact.      The following portions of the patient's history were reviewed and updated as appropriate: allergies, current medications, past family history, past medical history, past social history, past surgical history and problem list.    Review of Systems   Constitutional: Positive for activity change and appetite change. Negative for chills, fatigue and fever.   HENT: Positive for sore throat. Negative for congestion and postnasal drip.    Eyes: Negative for visual disturbance.   Respiratory: Positive for cough and shortness of breath.    Cardiovascular: Positive for chest pain. Negative for palpitations and leg swelling.   Gastrointestinal: Negative for abdominal pain, constipation, diarrhea, nausea and GERD.   Musculoskeletal: Negative for arthralgias and myalgias.   Skin: Negative for rash.   Allergic/Immunologic: Negative for environmental allergies.   Neurological: Negative for dizziness.   Psychiatric/Behavioral: Negative for sleep disturbance.   All other systems reviewed and are negative.      Objective   Physical Exam   Constitutional: She is oriented to person, place, and time. She appears well-developed and well-nourished.   HENT:   Head: Normocephalic and atraumatic.   Bilateral TMs clear nasal mucosa mild edema no rhinorrhea.  Posterior pharynx without erythema minimal clear postnasal drainage.       Eyes: Conjunctivae are normal. Right eye exhibits " no discharge. Left eye exhibits no discharge.   Cardiovascular: Normal rate and regular rhythm.   Pulmonary/Chest: Effort normal and breath sounds normal.   Bilateral breath sounds clear she does have frequent congested cough.  No wheezing rales or rhonchi.   Lymphadenopathy:     She has no cervical adenopathy.   Neurological: She is alert and oriented to person, place, and time.   Skin: Skin is warm and dry. Capillary refill takes 2 to 3 seconds.   Psychiatric: She has a normal mood and affect. Her behavior is normal.   Nursing note and vitals reviewed.              Assessment/Plan   Nely was seen today for nasal congestion.    Diagnoses and all orders for this visit:    Bronchitis  -     azithromycin (ZITHROMAX Z-JOSE) 250 MG tablet; Take 2 tablets the first day, then 1 tablet daily for 4 days.  -     benzonatate (TESSALON) 200 MG capsule; Take 1 capsule by mouth 3 (Three) Times a Day As Needed for Cough.          Return if symptoms worsen or fail to improve.  RTC/call  If symptoms worsen  Meds MOA and SE's reviewed and pt v/u

## 2019-11-14 ENCOUNTER — TRANSCRIBE ORDERS (OUTPATIENT)
Dept: ADMINISTRATIVE | Facility: HOSPITAL | Age: 74
End: 2019-11-14

## 2019-11-14 DIAGNOSIS — Z12.31 VISIT FOR SCREENING MAMMOGRAM: Primary | ICD-10-CM

## 2019-11-18 RX ORDER — OMEPRAZOLE 20 MG/1
CAPSULE, DELAYED RELEASE ORAL
Qty: 180 CAPSULE | Refills: 0 | Status: SHIPPED | OUTPATIENT
Start: 2019-11-18 | End: 2020-01-22

## 2019-11-19 ENCOUNTER — TELEPHONE (OUTPATIENT)
Dept: INTERNAL MEDICINE | Facility: CLINIC | Age: 74
End: 2019-11-19

## 2019-11-19 ENCOUNTER — OFFICE VISIT (OUTPATIENT)
Dept: INTERNAL MEDICINE | Facility: CLINIC | Age: 74
End: 2019-11-19

## 2019-11-19 VITALS
BODY MASS INDEX: 30.66 KG/M2 | DIASTOLIC BLOOD PRESSURE: 76 MMHG | HEIGHT: 65 IN | SYSTOLIC BLOOD PRESSURE: 136 MMHG | WEIGHT: 184 LBS | HEART RATE: 68 BPM | OXYGEN SATURATION: 97 % | RESPIRATION RATE: 16 BRPM

## 2019-11-19 DIAGNOSIS — E78.2 MIXED HYPERLIPIDEMIA: ICD-10-CM

## 2019-11-19 DIAGNOSIS — I63.311 CEREBROVASCULAR ACCIDENT (CVA) DUE TO THROMBOSIS OF RIGHT MIDDLE CEREBRAL ARTERY (HCC): Primary | ICD-10-CM

## 2019-11-19 DIAGNOSIS — E11.9 CONTROLLED TYPE 2 DIABETES MELLITUS WITHOUT COMPLICATION, WITHOUT LONG-TERM CURRENT USE OF INSULIN (HCC): ICD-10-CM

## 2019-11-19 DIAGNOSIS — E11.42 DIABETIC POLYNEUROPATHY ASSOCIATED WITH TYPE 2 DIABETES MELLITUS (HCC): ICD-10-CM

## 2019-11-19 DIAGNOSIS — I10 ESSENTIAL HYPERTENSION: ICD-10-CM

## 2019-11-19 PROBLEM — D62 ACUTE BLOOD LOSS ANEMIA: Status: RESOLVED | Noted: 2019-02-13 | Resolved: 2019-11-19

## 2019-11-19 PROBLEM — G89.18 ACUTE POSTOPERATIVE PAIN: Status: RESOLVED | Noted: 2019-02-13 | Resolved: 2019-11-19

## 2019-11-19 PROCEDURE — 99397 PER PM REEVAL EST PAT 65+ YR: CPT | Performed by: INTERNAL MEDICINE

## 2019-11-19 RX ORDER — GABAPENTIN 400 MG/1
400 CAPSULE ORAL 3 TIMES DAILY
Qty: 270 CAPSULE | Refills: 1 | Status: SHIPPED | OUTPATIENT
Start: 2019-11-19

## 2019-11-19 NOTE — PROGRESS NOTES
Subjective   Nely Grider is a 73 y.o. female.     History of Present Illness   For annual physical and follow up of  Her recent CVA.  No new sx and the other symptoms have resolved.  HTN on meds, no headaches, sob or chest pain.  NIDDM, her A1C last month was 6.8.  Her peripheral neuropathy was about the same.  DJD is about the same.  She is recovering from a recent URI and still has a cough.      The following portions of the patient's history were reviewed and updated as appropriate: allergies, current medications, past medical history, past social history and problem list.    Review of Systems   Constitutional: Positive for appetite change (some decreased appetite with this uri.). Negative for fatigue and fever.   HENT: Negative.  Negative for congestion, ear pain, hearing loss, rhinorrhea, sinus pressure, sore throat, tinnitus and trouble swallowing.    Eyes: Negative.  Negative for redness and visual disturbance.   Respiratory: Positive for cough. Negative for chest tightness, shortness of breath and wheezing.    Cardiovascular: Negative.  Negative for chest pain, palpitations and leg swelling.   Gastrointestinal: Negative.  Negative for abdominal distention, abdominal pain, blood in stool, constipation, diarrhea, nausea and vomiting.   Endocrine: Negative.  Negative for polydipsia, polyphagia and polyuria.   Genitourinary: Negative.  Negative for difficulty urinating, dysuria, flank pain, frequency, menstrual problem, pelvic pain, vaginal bleeding and vaginal discharge.   Musculoskeletal: Positive for arthralgias. Negative for back pain, gait problem, joint swelling and neck pain.   Skin: Negative for rash.   Allergic/Immunologic: Negative.  Negative for environmental allergies.   Neurological: Positive for numbness. Negative for dizziness, tremors, seizures, weakness and confusion.   Hematological: Negative.  Negative for adenopathy.   Psychiatric/Behavioral: Negative.  Negative for agitation, dysphoric  mood, sleep disturbance and depressed mood. The patient is not nervous/anxious.        Objective   Physical Exam   Constitutional: She is oriented to person, place, and time. She appears well-developed and well-nourished.   HENT:   Head: Normocephalic and atraumatic.   Right Ear: External ear normal.   Left Ear: External ear normal.   Nose: Nose normal.   Mouth/Throat: Oropharynx is clear and moist.   Eyes: Conjunctivae and EOM are normal. Pupils are equal, round, and reactive to light.   Neck: Normal range of motion. Neck supple. No thyromegaly present.   Cardiovascular: Normal rate, regular rhythm, normal heart sounds and intact distal pulses. Exam reveals no gallop and no friction rub.   No murmur heard.  Carotids normal.   Pulmonary/Chest: Effort normal and breath sounds normal. No respiratory distress. She has no wheezes. She has no rales. She exhibits no tenderness. Right breast exhibits no mass, no skin change and no tenderness. Left breast exhibits no mass, no skin change and no tenderness.   Abdominal: Soft. Bowel sounds are normal. She exhibits no distension and no mass. There is no tenderness.   Musculoskeletal: Normal range of motion. She exhibits no edema.   Lymphadenopathy:     She has no cervical adenopathy.   Neurological: She is alert and oriented to person, place, and time. She has normal reflexes. A sensory deficit is present. No cranial nerve deficit.   Skin: Skin is warm and dry.   Psychiatric: She has a normal mood and affect. Her behavior is normal. Judgment and thought content normal.   Nursing note and vitals reviewed.        Assessment/Plan   Nely was seen today for annual exam.    Diagnoses and all orders for this visit:    Cerebrovascular accident (CVA) due to thrombosis of right middle cerebral artery (CMS/HCC)  Stable, no change.    Essential hypertension  Stable, no change.    Controlled type 2 diabetes mellitus without complication, without long-term current use of insulin  (CMS/HCC)  Stable, no chagne.    Mixed hyperlipidemia  Stable, no change.    Diabetic polyneuropathy associated with type 2 diabetes mellitus (CMS/HCC)  Stable, no change.    Other orders  -     gabapentin (NEURONTIN) 400 MG capsule; Take 1 capsule by mouth 3 (Three) Times a Day.    She is feeling better from both the CVA and the URI.  No need for new labs.  Counseled on diet and immunizations.  Health maintenance is up to date.  Same meds.  Recheck 4 months.

## 2019-11-19 NOTE — TELEPHONE ENCOUNTER
Pharmacy was calling back to check on prescription request they faxed about a week ago regarding her Metformin 500 mg. Pharmacy hasn't actually filled it with them since February 2019 they were curious if she's filling it elsewhere please call the mail order pharmacy back

## 2019-11-20 NOTE — TELEPHONE ENCOUNTER
It is still on her list, I am not sure.  Please call the patient and see if she is still taking it.

## 2019-11-20 NOTE — TELEPHONE ENCOUNTER
[facility] [phone]  Spoke to [facility] mail [facility] they wanted to confirm if refill appropriate for Metformin since pt hasn't had the medication since 04/2018. Please confirm if pt is suppose to continue on Metformin?

## 2019-11-21 NOTE — TELEPHONE ENCOUNTER
Spoke with Nely, she said that she is still taking the Metformin 500MG but she just ran out and is needing that refilled. She would like that sent to [facility].

## 2020-01-22 RX ORDER — OMEPRAZOLE 20 MG/1
CAPSULE, DELAYED RELEASE ORAL
Qty: 180 CAPSULE | Refills: 0 | Status: SHIPPED | OUTPATIENT
Start: 2020-01-22 | End: 2020-03-23

## 2020-01-31 ENCOUNTER — HOSPITAL ENCOUNTER (OUTPATIENT)
Dept: MAMMOGRAPHY | Facility: HOSPITAL | Age: 75
Discharge: HOME | End: 2020-01-31
Admitting: INTERNAL MEDICINE

## 2020-01-31 DIAGNOSIS — Z12.31 VISIT FOR SCREENING MAMMOGRAM: ICD-10-CM

## 2020-01-31 PROCEDURE — 77067 SCR MAMMO BI INCL CAD: CPT

## 2020-01-31 PROCEDURE — 77067 SCR MAMMO BI INCL CAD: CPT | Performed by: RADIOLOGY

## 2020-01-31 PROCEDURE — 77063 BREAST TOMOSYNTHESIS BI: CPT | Performed by: RADIOLOGY

## 2020-01-31 PROCEDURE — 77063 BREAST TOMOSYNTHESIS BI: CPT

## 2020-02-27 ENCOUNTER — OFFICE VISIT (OUTPATIENT)
Dept: ORTHOPEDIC SURGERY | Facility: CLINIC | Age: 75
End: 2020-02-27

## 2020-02-27 VITALS — BODY MASS INDEX: 29.66 KG/M2 | HEIGHT: 65 IN | OXYGEN SATURATION: 95 % | WEIGHT: 178 LBS | HEART RATE: 98 BPM

## 2020-02-27 DIAGNOSIS — Z09 POSTOPERATIVE EXAMINATION: ICD-10-CM

## 2020-02-27 DIAGNOSIS — Z96.642 STATUS POST TOTAL HIP REPLACEMENT, LEFT: Primary | ICD-10-CM

## 2020-02-27 DIAGNOSIS — M70.62 TROCHANTERIC BURSITIS OF LEFT HIP: ICD-10-CM

## 2020-02-27 PROCEDURE — 99213 OFFICE O/P EST LOW 20 MIN: CPT | Performed by: ORTHOPAEDIC SURGERY

## 2020-02-27 RX ORDER — TERBINAFINE HYDROCHLORIDE 250 MG/1
TABLET ORAL
COMMUNITY
Start: 2020-02-08 | End: 2020-06-17

## 2020-02-27 RX ORDER — MELOXICAM 7.5 MG/1
TABLET ORAL
Qty: 90 TABLET | Refills: 1 | Status: SHIPPED | OUTPATIENT
Start: 2020-02-27 | End: 2021-07-22

## 2020-02-27 ASSESSMENT — HOOS JR
HOOS JR SCORE: 7
HOOS JR SCORE: 67.516

## 2020-02-27 NOTE — PROGRESS NOTES
[facility] Note    Subjective     Chief Complaint   Patient presents with   • Follow-up     6 month follow up - 1 year status post Left Total Hip Arthroplasty 02/12/2019        HPI    It has been 6  month(s) since Ms. Grider's last visit. She returns to clinic today for follow-up of left hip arthroplasty. She rates her pain a 8/10 on the pain scale. Previous/current treatments: physical therapy. Current symptoms: pain. The pain is worse with walking, sitting, climbing stairs and sleeping; resting and heat improve the pain. Overall, she is doing better.  The pain she is experiencing is on the lateral aspect of the hip, and she has no groin pain.  Groin pain improved after the hip replacement, but her lateral pain is bothersome, somewhat improved over the past 3 months.  No systemic symptoms.    I have reviewed the following portions of the patient's history:History of Present Illness      Patient Active Problem List   Diagnosis   • Cough   • Gastroesophageal reflux disease without esophagitis   • Mixed hyperlipidemia   • Essential hypertension   • Controlled type 2 diabetes mellitus without complication, without long-term current use of insulin (CMS/HCC)   • Anemia   • Constipation   • Diabetic polyneuropathy associated with type 2 diabetes mellitus (CMS/HCC)   • Primary osteoarthritis of both hips   • Status post total replacement of left hip   • Renal insufficiency   • Colon polyp   • Diverticulosis   • Cerebrovascular accident (CVA) due to thrombosis of right middle cerebral artery (CMS/HCC)     Past Medical History:   Diagnosis Date   • Anesthesia complication     per patient with hip surgery last april did not remember being in hospital or going home can only rmember from rehad on   • Anesthesia complication     with hip surgery in april 2018 had issues with swallowing after surgery called in speech   • Arthritis    • Cancer (CMS/HCC)     cervical   • Cataract    • Diabetes mellitus  (CMS/[facility])     type 2 dm, check blood sugar every morning, diagnosed 3 or 4 years   • Full dentures    • Full dentures    • GERD (gastroesophageal reflux disease)    • High cholesterol    • History of IBS    • Hypertension     been off bp meds since last april 2018   • Neuropathy    • Wears glasses       Past Surgical History:   Procedure Laterality Date   • CARDIAC CATHETERIZATION     • CHOLECYSTECTOMY     • COLONOSCOPY     • EYE SURGERY      bilateral cataract   • HYSTERECTOMY      partial   • JOINT REPLACEMENT      hip surgery 2018 in april   • OOPHORECTOMY      one removed   • TONSILLECTOMY     • TOTAL HIP ARTHROPLASTY Right 4/19/2018    Procedure: RIGHT TOTAL HIP ARTHROPLASTY;  Surgeon: Carlos Pollack MD;  Location:  [facility];  Service: Orthopedics   • TOTAL HIP ARTHROPLASTY Left 2/12/2019    Procedure: TOTAL HIP ARTHROPLASTY LEFT;  Surgeon: Carlos Pollack MD;  Location:  [facility];  Service: Orthopedics   • WRIST SURGERY      metal plate      Family History   Problem Relation Age of Onset   • Arthritis Mother    • Heart attack Mother    • Hypertension Mother    • Hyperlipidemia Mother    • Osteoporosis Mother    • Heart disease Sister    • Diabetes Sister    • Arthritis Sister    • Heart attack Sister    • Hypertension Sister    • Hyperlipidemia Sister    • Bleeding Disorder Sister    • Heart disease Brother    • Cancer Brother    • Diabetes Brother    • Arthritis Brother    • Heart attack Brother    • Hypertension Brother    • Hyperlipidemia Brother    • Ovarian cancer Daughter    • Cancer Daughter    • Ovarian cancer Other         grandaughter   • Ovarian cancer Other         granddaughter   • Heart attack Father    • Hypertension Father    • Stroke Father    • Kidney disease Paternal Uncle    • Liver disease Paternal Uncle    • Cancer Other    • Stroke Other    • Diabetes Other    • Breast cancer Neg Hx      Social History     Socioeconomic History   • Marital status:      Spouse name: Not on file    • Number of children: Not on file   • Years of education: Not on file   • Highest education level: Not on file   Tobacco Use   • Smoking status: Never Smoker   • Smokeless tobacco: Never Used   Substance and Sexual Activity   • Alcohol use: No   • Drug use: No   • Sexual activity: Defer     Comment:       Current Outpatient Medications on File Prior to Visit   Medication Sig Dispense Refill   • amLODIPine (NORVASC) 5 MG tablet Take 1 tablet by mouth Daily. 90 tablet 1   • aspirin 81 MG chewable tablet Chew 1 tablet Daily. 30 tablet 0   • atorvastatin (LIPITOR) 80 MG tablet Take 1 tablet by mouth Every Night. 90 tablet 1   • benzonatate (TESSALON) 200 MG capsule Take 1 capsule by mouth 3 (Three) Times a Day As Needed for Cough. 30 capsule 0   • clopidogrel (PLAVIX) 75 MG tablet Take 1 tablet by mouth Daily. 90 tablet 1   • gabapentin (NEURONTIN) 400 MG capsule Take 1 capsule by mouth 3 (Three) Times a Day. 270 capsule 1   • glimepiride (AMARYL) 4 MG tablet TAKE 1 TABLET EVERY MORNING BEFORE BREAKFAST 90 tablet 3   • metFORMIN (GLUCOPHAGE) 500 MG tablet Take 1 tablet by mouth 2 (Two) Times a Day With Meals. 180 tablet 3   • omeprazole (priLOSEC) 20 MG capsule TAKE 1 CAPSULE TWICE DAILY 180 capsule 0   • Probiotic Product (PROBIOTIC DAILY) capsule Take as directed on package 60 capsule 0   • terbinafine (lamiSIL) 250 MG tablet TAKE 1 TABLET BY MOUTH ONCE DAILY FOR FUNGAL NAIL       No current facility-administered medications on file prior to visit.       Allergies   Allergen Reactions   • Codeine Hives        Review of Systems   Constitutional: Negative for activity change, appetite change, chills, diaphoresis, fatigue, fever and unexpected weight change.   HENT: Negative for congestion, dental problem, drooling, ear discharge, ear pain, facial swelling, hearing loss, mouth sores, nosebleeds, postnasal drip, rhinorrhea, sinus pressure, sneezing, sore throat, tinnitus, trouble swallowing and voice change.   "  Eyes: Negative for photophobia, pain, discharge, redness, itching and visual disturbance.   Respiratory: Negative for apnea, cough, choking, chest tightness, shortness of breath, wheezing and stridor.    Cardiovascular: Negative for chest pain, palpitations and leg swelling.   Gastrointestinal: Negative for abdominal distention, abdominal pain, anal bleeding, blood in stool, constipation, diarrhea, nausea, rectal pain and vomiting.   Endocrine: Negative for cold intolerance, heat intolerance, polydipsia, polyphagia and polyuria.   Genitourinary: Negative for decreased urine volume, difficulty urinating, dysuria, enuresis, flank pain, frequency, genital sores, hematuria and urgency.   Musculoskeletal: Positive for arthralgias. Negative for back pain, gait problem, joint swelling, myalgias, neck pain and neck stiffness.   Skin: Negative for color change, pallor, rash and wound.   Allergic/Immunologic: Negative for environmental allergies, food allergies and immunocompromised state.   Neurological: Negative for dizziness, tremors, seizures, syncope, facial asymmetry, speech difficulty, weakness, light-headedness, numbness and headaches.   Hematological: Negative for adenopathy. Does not bruise/bleed easily.   Psychiatric/Behavioral: Negative for agitation, behavioral problems, confusion, decreased concentration, dysphoric mood, hallucinations, self-injury, sleep disturbance and suicidal ideas. The patient is not nervous/anxious and is not hyperactive.         Objective      Physical Exam  Pulse 98   Ht 165.1 cm (65\")   Wt 80.7 kg (178 lb)   SpO2 95%   BMI 29.62 kg/m²     Body mass index is 29.62 kg/m².    General:   Mental Status:  Alert   Appearance: Cooperative, in no acute distress   Build and Nutrition: Well-nourished well-developed female   Orientation: Alert and oriented to person, place and time   Posture: Normal   Gait: Normal    Integument:   Left hip: Wound is well-healed with no signs of " infection    Lower Extremity:   Left Hip:    Tenderness:  Laterally, over the trochanteric region    Swelling:  None    Crepitus:  None    Range of motion:  External Rotation: 30°       Internal Rotation: 30°       Flexion:  100°       Extension:  0°    Deformities:  None  Functional testing: Negative Stinchfield    No leg length discrepancy      Imaging/Studies      Imaging Results (Last 24 Hours)     Procedure Component Value Units Date/Time    XR Hip With or Without Pelvis 2 - 3 View Left [[id number]] Resulted:  02/27/20 0839     Updated:  02/27/20 0841    Narrative:       Left Hip Radiographs  Indication: status-post left total hip arthroplasty  Views: low AP pelvis and lateral of the left hip    Comparison: no change compared to prior study, 8/21/2019    Findings:   The components are well aligned, with no signs of loosening or failure.          Assessment and Plan     Nely was seen today for follow-up.    Diagnoses and all orders for this visit:    Status post total hip replacement, left  -     XR Hip With or Without Pelvis 2 - 3 View Left    Postoperative examination    Trochanteric bursitis of left hip    Other orders  -     meloxicam (MOBIC) 7.5 MG tablet; 1 Oral Daily with food.        1. Status post total hip replacement, left    2. Postoperative examination    3. Trochanteric bursitis of left hip        I reviewed my findings with the patient today.  Her left total hip arthroplasty is functioning well, but she is having lateral pain, that is bothersome.  I believe that this is from trochanteric bursitis, and I offered her a trial of a trochanteric injection, but she declined.  She would prefer an anti-inflammatory, and this was provided.  I will see her back in 6 months to ensure improvement, but sooner for any problems.    Return in about 6 months (around 8/27/2020).      Medical Decision Making  Management Options : prescription/IM medicine  Data/Risk: radiology tests and independent visualization of  imaging, lab tests, or EMG/NCV      Carlos Pollack MD  02/27/20  8:57 AM

## 2020-03-23 ENCOUNTER — TELEPHONE (OUTPATIENT)
Dept: INTERNAL MEDICINE | Facility: CLINIC | Age: 75
End: 2020-03-23

## 2020-03-23 RX ORDER — OMEPRAZOLE 20 MG/1
CAPSULE, DELAYED RELEASE ORAL
Qty: 180 CAPSULE | Refills: 0 | Status: SHIPPED | OUTPATIENT
Start: 2020-03-23 | End: 2020-05-26 | Stop reason: SDUPTHER

## 2020-03-23 RX ORDER — AMLODIPINE BESYLATE 5 MG/1
TABLET ORAL
Qty: 90 TABLET | Refills: 1 | Status: SHIPPED | OUTPATIENT
Start: 2020-03-23 | End: 2020-08-03

## 2020-03-23 RX ORDER — ATORVASTATIN CALCIUM 80 MG/1
80 TABLET, FILM COATED ORAL NIGHTLY
Qty: 90 TABLET | Refills: 1 | Status: CANCELLED | OUTPATIENT
Start: 2020-03-23

## 2020-03-23 RX ORDER — CLOPIDOGREL BISULFATE 75 MG/1
TABLET ORAL
Qty: 90 TABLET | Refills: 1 | Status: SHIPPED | OUTPATIENT
Start: 2020-03-23 | End: 2020-08-03

## 2020-03-23 NOTE — TELEPHONE ENCOUNTER
PATIENT CALLED AND STATED THAT SHE RECEIVED A PHONE CALL FROM [facility] TELLING HER THAT THEY STILL HAVEN'T RECEIVED A REFILL FOR AND omeprazole (priLOSEC) 20 MG capsule (which shows it had been approved from today 03/23/2020 at 4:03pm). THEY TOLD HER THAT THEY HAD SENT IN A FAX REQUESTING THESE MEDICATIONS. PLEASE ADVISE.    [facility] MAIL DELIV[facility] CONFIRMED    PATIENT CALLBACK # [phone]

## 2020-03-23 NOTE — TELEPHONE ENCOUNTER
Nely notified rx was E-rd today at 353pm . She states she got a call from [facility] at 4pm.  Notified her that it was confirmed that the pharmacy received it today.   Verb understanding given

## 2020-03-30 RX ORDER — ATORVASTATIN CALCIUM 80 MG/1
TABLET, FILM COATED ORAL
Qty: 90 TABLET | Refills: 1 | Status: SHIPPED | OUTPATIENT
Start: 2020-03-30 | End: 2020-08-10

## 2020-04-29 RX ORDER — GLIMEPIRIDE 4 MG/1
TABLET ORAL
Qty: 90 TABLET | Refills: 3 | Status: SHIPPED | OUTPATIENT
Start: 2020-04-29 | End: 2021-03-25 | Stop reason: SDUPTHER

## 2020-05-26 RX ORDER — OMEPRAZOLE 20 MG/1
20 CAPSULE, DELAYED RELEASE ORAL 2 TIMES DAILY
Qty: 180 CAPSULE | Refills: 0 | Status: SHIPPED | OUTPATIENT
Start: 2020-05-26 | End: 2020-06-17

## 2020-05-26 NOTE — TELEPHONE ENCOUNTER
PATIENT CALLED AND STATED [facility] SENT OVER A REQUEST FOR HER omeprazole (priLOSEC) 20 MG capsule A WEEK AGO AND SHE STILL HAS NOT HEARD ANYTHING. [facility] STATED THEY HAVE NOT BEEN ABLE TO REACH ANYONE.    PATIENT IS REQUESTING FOR A NEW SCRIPT BE WRITTEN.      [facility] MAIL DELIVERY [place], [place]      PLEASE ADVISE

## 2020-06-09 NOTE — PROGRESS NOTES
Adult New Patient Visit:  Patient Care Team:  Demi Ponce MD as PCP - General (Internal Medicine)  Luis Gustafson OD (Optometry)    Chief Complaint   Patient presents with   • Establish Care   • Diabetes     f/u   • Hypertension     f/u       Nely Grider is a 74 y.o. female who presents to establish care. Previous PCP was Fred who will be retiring from this practice.    Overdue for W    HPI Issues discussed today:    1.  CVA:  In 2019.  On aspirin 81 mg daily, Plavix 75 mg daily, Lipitor 80 mg daily.  Lab Results   Component Value Date    CHOL 145 10/04/2019    CHLPL 190 08/31/2017    TRIG 201 (H) 10/04/2019    HDL 31 (L) 10/04/2019    LDL 74 10/04/2019     2.  Type 2 diabetes:  On metformin 500 mg twice daily and glimepiride 4 mg daily.  Last foot exam: Sees podiatry  Last eye exam: 12/7/19 (Dr. Chávez)  Last microalbumin: Overdue, will check today  FSBG: doesn't check.    CMP:  Lab Results   Component Value Date     (H) 07/05/2018    BUN 8 10/04/2019    CREATININE 1.06 (H) 10/04/2019    EGFRIFNONA 51 (L) 10/04/2019    EGFRIFAFRI 57 (L) 07/05/2018    BCR 7.5 10/04/2019     10/04/2019    K 4.2 10/04/2019    CO2 25.0 10/04/2019    CALCIUM 9.6 10/04/2019    PROTENTOTREF 6.8 07/05/2018    ALBUMIN 3.70 10/04/2019    LABGLOBREF 2.9 07/05/2018    LABIL2 1.4 (L) 07/05/2018    BILITOT 0.3 10/04/2019    ALKPHOS 77 10/04/2019    AST 23 10/04/2019    ALT 17 10/04/2019     HbA1c:  Lab Results   Component Value Date    HGBA1C 6.80 (H) 10/04/2019    HGBA1C 6.6 08/27/2019     3.  Hypertension:  On amlodipine 5 mg daily.  Denies headache, chest pain, shortness of breath, palpitations, PND, lower extremity edema.    4.  Chronic pain:  Secondary to arthritis and neuropathy.  She takes meloxicam 7.5 mg daily and gabapentin 400 mg 3 times daily. UDS/CSA not UTD. Last filled 4/21/20 by Dr. Ibarra, #270 tabs (90d supply) per JORGE from 6/9/20.    5. GERD:  Stable on prilosec 20mg daily.     Review of  Systems   Constitutional: Negative for activity change, appetite change and fever.   HENT: Negative for congestion, sneezing and sore throat.    Eyes: Negative for discharge and visual disturbance.   Respiratory: Negative for cough and shortness of breath.    Cardiovascular: Negative for chest pain and palpitations.   Gastrointestinal: Negative for abdominal distention, abdominal pain, blood in stool, constipation, nausea and vomiting.   Endocrine: Negative for cold intolerance and heat intolerance.   Genitourinary: Negative for dysuria.   Musculoskeletal: Positive for arthralgias (Chronic, stable). Negative for neck stiffness.   Skin: Negative for rash.   Allergic/Immunologic: Negative for environmental allergies and food allergies.   Neurological: Positive for numbness (And tingling, chronic/stable). Negative for headache.   Hematological: Negative for adenopathy.   Psychiatric/Behavioral: Negative for depressed mood.        History  Past Medical History:   Diagnosis Date   • Anemia 9/18/2016   • Anesthesia complication     per patient with hip surgery last april did not remember being in hospital or going home can only rmember from rehad on   • Anesthesia complication     with hip surgery in april 2018 had issues with swallowing after surgery called in speech   • Arthritis    • Cancer (CMS/HCC)     cervical   • Cataract    • Colon polyp 1/31/2019    Cecal polyp 5 years Dr. 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   • Wears glasses       Past Surgical History:   Procedure Laterality Date   • CARDIAC CATHETERIZATION     • CHOLECYSTECTOMY     •  COLONOSCOPY     • EYE SURGERY      bilateral cataract   • HYSTERECTOMY      partial   • JOINT REPLACEMENT      hip surgery 2018 in april   • OOPHORECTOMY      one removed   • TONSILLECTOMY     • TOTAL HIP ARTHROPLASTY Right 4/19/2018    Procedure: RIGHT TOTAL HIP ARTHROPLASTY;  Surgeon: Carlos Pollcak MD;  Location:  [facility];  Service: Orthopedics   • TOTAL HIP ARTHROPLASTY Left 2/12/2019    Procedure: TOTAL HIP ARTHROPLASTY LEFT;  Surgeon: Carlos Pollack MD;  Location:  [facility];  Service: Orthopedics   • WRIST SURGERY      metal plate      Allergies   Allergen Reactions   • Codeine Hives      Family History   Problem Relation Age of Onset   • Arthritis Mother    • Heart attack Mother    • Hypertension Mother    • Hyperlipidemia Mother    • Osteoporosis Mother    • Heart disease Sister    • Diabetes Sister    • Arthritis Sister    • Heart attack Sister    • Hypertension Sister    • Hyperlipidemia Sister    • Bleeding Disorder Sister    • Heart disease Brother    • Cancer Brother    • Diabetes Brother    • Arthritis Brother    • Heart attack Brother    • Hypertension Brother    • Hyperlipidemia Brother    • Ovarian cancer Daughter    • Cancer Daughter    • Ovarian cancer Other         grandaughter   • Ovarian cancer Other         granddaughter   • Heart attack Father    • Hypertension Father    • Stroke Father    • Kidney disease Paternal Uncle    • Liver disease Paternal Uncle    • Cancer Other    • Stroke Other    • Diabetes Other    • Breast cancer Neg Hx       Social History     Socioeconomic History   • Marital status:      Spouse name: Not on file   • Number of children: Not on file   • Years of education: Not on file   • Highest education level: Not on file   Tobacco Use   • Smoking status: Never Smoker   • Smokeless tobacco: Never Used   Substance and Sexual Activity   • Alcohol use: No   • Drug use: No   • Sexual activity: Defer     Comment:         Current Outpatient Medications:   •   amLODIPine (NORVASC) 5 MG tablet, TAKE 1 TABLET EVERY DAY, Disp: 90 tablet, Rfl: 1  •  aspirin 81 MG chewable tablet, Chew 1 tablet Daily., Disp: 30 tablet, Rfl: 0  •  atorvastatin (LIPITOR) 80 MG tablet, TAKE 1 TABLET EVERY NIGHT, Disp: 90 tablet, Rfl: 1  •  clopidogrel (PLAVIX) 75 MG tablet, TAKE 1 TABLET EVERY DAY, Disp: 90 tablet, Rfl: 1  •  gabapentin (NEURONTIN) 400 MG capsule, Take 1 capsule by mouth 3 (Three) Times a Day., Disp: 270 capsule, Rfl: 1  •  glimepiride (AMARYL) 4 MG tablet, TAKE 1 TABLET EVERY MORNING BEFORE BREAKFAST, Disp: 90 tablet, Rfl: 3  •  meloxicam (MOBIC) 7.5 MG tablet, 1 Oral Daily with food., Disp: 90 tablet, Rfl: 1  •  metFORMIN (GLUCOPHAGE) 500 MG tablet, Take 1 tablet by mouth 2 (Two) Times a Day With Meals., Disp: 180 tablet, Rfl: 3  •  omeprazole (priLOSEC) 20 MG capsule, Take 1 capsule by mouth Daily., Disp: 90 capsule, Rfl: 0  •  Probiotic Product (PROBIOTIC DAILY) capsule, Take as directed on package, Disp: 60 capsule, Rfl: 0    Health Maintenance   Topic Date Due   • URINE MICROALBUMIN  1945   • Pneumococcal Vaccine Once at 65 Years Old  12/30/2008   • ZOSTER VACCINE (2 of 3) 10/02/2013   • HEPATITIS C SCREENING  05/25/2016   • TDAP/TD VACCINES (2 - Td) 01/18/2019   • MEDICARE ANNUAL WELLNESS  01/22/2020   • HEMOGLOBIN A1C  04/04/2020   • INFLUENZA VACCINE  08/01/2020   • LIPID PANEL  10/04/2020   • DIABETIC EYE EXAM  12/07/2020   • MAMMOGRAM  01/31/2022   • COLONOSCOPY  01/30/2029       Immunizations  Td/Tdap(Booster Q 10 yrs): Not covered unless 2/2 trauma  Pneumovax: Discuss at [facility]  Shringrix:  Discuss at [facility]  Immunization History   Administered Date(s) Administered   • FLUAD TRI 65YR+ 10/14/2019   • Fluzone High Dose =>65 Years ([employer] ONLY) 10/05/2016, 10/05/2017, 10/27/2018   • Pneumococcal Conjugate 13-Valent (PCV13) 11/03/2015   • Pneumococcal, Unspecified 01/18/2009   • Tdap 01/18/2009   • Zostavax 08/07/2013     Hemoglobin A1C   Date Value Ref Range Status  "  10/04/2019 6.80 (H) 4.80 - 5.60 % Final     Lab Results   Component Value Date    CHOL 145 10/04/2019    CHLPL 190 2017    TRIG 201 (H) 10/04/2019    HDL 31 (L) 10/04/2019    LDL 74 10/04/2019       Colorectal Screenin19; 5 years  Pap: Aged out  Mammogram:  20; BI-RADS 1  PSA(Over age 50):  N/A  US Aorta (For male smokers, age 65):  N/A  CT for Smoker (Age 55-75, 30pk yr):  N/A  Bone Density/DEXA:  Discuss at [facility]  Hep C: Discuss at [facility]    Vitals:    20 1322   BP: 118/78   Pulse: 81   Resp: 20   Temp: 97.8 °F (36.6 °C)   TempSrc: Temporal   SpO2: 93%   Weight: 81.7 kg (180 lb 3.2 oz)   Height: 165.1 cm (65\")   PainSc: 0-No pain         Physical Exam   Constitutional: She is oriented to person, place, and time. She appears well-developed and well-nourished. No distress.   HENT:   Head: Normocephalic and atraumatic.   Right Ear: Tympanic membrane and external ear normal.   Left Ear: Tympanic membrane and external ear normal.   Nose: Nose normal.   Mouth/Throat: Uvula is midline, oropharynx is clear and moist and mucous membranes are normal. No oropharyngeal exudate.   Eyes: Pupils are equal, round, and reactive to light. Conjunctivae and EOM are normal. Right eye exhibits no discharge. Left eye exhibits no discharge. No scleral icterus.   Neck: Normal range of motion. Neck supple. No JVD present. No tracheal deviation present. No thyromegaly present.   Cardiovascular: Normal rate, regular rhythm and normal heart sounds. Exam reveals no gallop and no friction rub.   No murmur heard.  Pulmonary/Chest: Effort normal and breath sounds normal. No stridor. No respiratory distress. She has no wheezes. She has no rales.   Abdominal: Soft. Bowel sounds are normal. She exhibits no distension and no mass. There is no tenderness. There is no rebound and no guarding.   Musculoskeletal: She exhibits no edema.   Ambulates with steady, unassisted gait.   Lymphadenopathy:     She has no cervical adenopathy. "   Neurological: She is alert and oriented to person, place, and time. She exhibits normal muscle tone.   Skin: Skin is warm and dry. Capillary refill takes less than 2 seconds. No rash noted. She is not diaphoretic.   Psychiatric: She has a normal mood and affect.   Vitals reviewed.       Assessment and Plan: 74 y.o. female here to establish care  Chronic kidney disease (CKD), stage III (moderate) (CMS/HCC)  Stable.  Repeat CMP today.  She does take meloxicam 7.5 mg daily per orthopedics for chronic arthralgias in the setting of known OA.  Advised patient that I would take this sparingly/as needed and substitute Tylenol as able due to CKD.  She agrees to try this.    Chronic pain syndrome  Secondary to neuropathy and OA.  Manages with meloxicam 7.5 mg daily and gabapentin 400 mg 3 times daily.  As below, due to CKD/GERD/hypertension/anticoagulation history (already on aspirin/Plavix) history, advised sparing use of NSAIDs and to take as needed if possible to minimize risks (GI upset, ulcer, bleeding, renal failure).  May also use to excision Tylenol 3 times daily for breakthrough symptoms.    With regards to the gabapentin, discussed that per my practice policy I require an office visit every 3 months.  I do not give this medication with refills and that she would have to call monthly a few days before do so I could send this in for her electronically to a local pharmacy of her choice.  She also needs annual UDS/CSA and a Armin every 3 months per my practice policy.  Patient agrees to UDS/CSA today and Armin was run in anticipation of her visit as above.  She will see if her podiatrist can prescribe this medicine but if not she does agree to call me for refills and follow the policy as above.    The patient is taking the following controlled substance(s) on a long term (greater than three months) basis: gabapentin.  She is taking controlled substances for chronic pain.  A history was obtained from the patient and  the following were reviewed: family history, social history, psychiatric history, and substance abuse history.  A baseline assessment was performed and includes a controlled substance agreement, urine drug screen, JORGE (within 12 months prior to today's encounter), and a physical exam, if appropriate, was performed within the past year.  It is medically appropriate to prescribe her the medication(s) above.  If applicable, prior treatment records were obtained and reviewed to justify long term prescribing of controlled substances.  The patient was educated on the following: Limited use of the medication, need to discontinue the medication when her condition resolves, proper storage and disposal of medication(s), and risks and dangers associated with controlled substances including tolerance, dependence, and addiction.  A written informed consent was obtained and includes objectives of treatment, further diagnostic testing if appropriate, and an exit strategy for discontinuing the medication on the condition resolves, if appropriate.  In addition, if appropriate, the patient will be screened for other medical conditions that may impact the prescribing of controlled substances.  Previous treatments have been tried including trials of noncontrolled substances or lower doses of controlled substances.  The controlled medication(s) have been titrated to the level appropriate and necessary and the medication(s) are not causing unacceptable side effects.       Controlled type 2 diabetes mellitus without complication, without long-term current use of insulin (CMS/[facility])  A1c microalbumin today.  Continue glimepiride and metformin.  Continue follow-up with podiatry and ophthalmology as scheduled.  Consider blood sugar checks.    Gastroesophageal reflux disease without esophagitis  Stable on Prilosec.  Continue. Recommend avoiding trigger foods like acidic or spicy foods, head of bed elevation, do not lie flat immediately after  meals etc.     Mixed hyperlipidemia  Stable on Lipitor.  Continue.    Essential hypertension  Stable on amlodipine.  Continue.  CMP today.    Cerebrovascular accident (CVA) due to thrombosis of right middle cerebral artery (CMS/HCC)  History of CVA.  Continue aspirin, Plavix and statin as below.    Return in about 3 months (around 9/17/2020) for Medicare Wellness Visit, As needed if no improvement or new symptoms.    Demi Ponce MD  6/17/2020

## 2020-06-17 ENCOUNTER — RESULTS ENCOUNTER (OUTPATIENT)
Dept: INTERNAL MEDICINE | Facility: CLINIC | Age: 75
End: 2020-06-17

## 2020-06-17 ENCOUNTER — OFFICE VISIT (OUTPATIENT)
Dept: INTERNAL MEDICINE | Facility: CLINIC | Age: 75
End: 2020-06-17

## 2020-06-17 VITALS
DIASTOLIC BLOOD PRESSURE: 78 MMHG | TEMPERATURE: 97.8 F | OXYGEN SATURATION: 93 % | RESPIRATION RATE: 20 BRPM | HEART RATE: 81 BPM | SYSTOLIC BLOOD PRESSURE: 118 MMHG | WEIGHT: 180.2 LBS | HEIGHT: 65 IN | BODY MASS INDEX: 30.02 KG/M2

## 2020-06-17 DIAGNOSIS — I10 ESSENTIAL HYPERTENSION: ICD-10-CM

## 2020-06-17 DIAGNOSIS — E78.2 MIXED HYPERLIPIDEMIA: ICD-10-CM

## 2020-06-17 DIAGNOSIS — N18.30 CHRONIC KIDNEY DISEASE (CKD), STAGE III (MODERATE) (HCC): Chronic | ICD-10-CM

## 2020-06-17 DIAGNOSIS — K21.9 GASTROESOPHAGEAL REFLUX DISEASE WITHOUT ESOPHAGITIS: ICD-10-CM

## 2020-06-17 DIAGNOSIS — I63.311 CEREBROVASCULAR ACCIDENT (CVA) DUE TO THROMBOSIS OF RIGHT MIDDLE CEREBRAL ARTERY (HCC): ICD-10-CM

## 2020-06-17 DIAGNOSIS — Z79.899 HIGH RISK MEDICATION USE: ICD-10-CM

## 2020-06-17 DIAGNOSIS — G89.4 CHRONIC PAIN SYNDROME: ICD-10-CM

## 2020-06-17 DIAGNOSIS — E11.9 CONTROLLED TYPE 2 DIABETES MELLITUS WITHOUT COMPLICATION, WITHOUT LONG-TERM CURRENT USE OF INSULIN (HCC): Primary | ICD-10-CM

## 2020-06-17 PROBLEM — Z96.642 STATUS POST TOTAL REPLACEMENT OF LEFT HIP: Status: RESOLVED | Noted: 2018-04-19 | Resolved: 2020-06-17

## 2020-06-17 PROBLEM — K63.5 COLON POLYP: Status: RESOLVED | Noted: 2019-01-31 | Resolved: 2020-06-17

## 2020-06-17 PROBLEM — N28.9 RENAL INSUFFICIENCY: Status: RESOLVED | Noted: 2018-04-20 | Resolved: 2020-06-17

## 2020-06-17 PROBLEM — K57.90 DIVERTICULOSIS: Status: RESOLVED | Noted: 2019-01-31 | Resolved: 2020-06-17

## 2020-06-17 LAB
POC CREATININE URINE: 200
POC MICROALBUMIN URINE: 80

## 2020-06-17 PROCEDURE — 99214 OFFICE O/P EST MOD 30 MIN: CPT | Performed by: INTERNAL MEDICINE

## 2020-06-17 PROCEDURE — 36415 COLL VENOUS BLD VENIPUNCTURE: CPT | Performed by: INTERNAL MEDICINE

## 2020-06-17 PROCEDURE — 82044 UR ALBUMIN SEMIQUANTITATIVE: CPT | Performed by: INTERNAL MEDICINE

## 2020-06-17 RX ORDER — OMEPRAZOLE 20 MG/1
20 CAPSULE, DELAYED RELEASE ORAL DAILY
Qty: 90 CAPSULE | Refills: 0
Start: 2020-06-17 | End: 2020-07-23

## 2020-06-17 NOTE — ASSESSMENT & PLAN NOTE
A1c microalbumin today.  Continue glimepiride and metformin.  Continue follow-up with podiatry and ophthalmology as scheduled.  Consider blood sugar checks.

## 2020-06-17 NOTE — ASSESSMENT & PLAN NOTE
Secondary to neuropathy and OA.  Manages with meloxicam 7.5 mg daily and gabapentin 400 mg 3 times daily.  As below, due to CKD/GERD/hypertension/anticoagulation history (already on aspirin/Plavix) history, advised sparing use of NSAIDs and to take as needed if possible to minimize risks (GI upset, ulcer, bleeding, renal failure).  May also use to excision Tylenol 3 times daily for breakthrough symptoms.    With regards to the gabapentin, discussed that per my practice policy I require an office visit every 3 months.  I do not give this medication with refills and that she would have to call monthly a few days before do so I could send this in for her electronically to a local pharmacy of her choice.  She also needs annual UDS/CSA and a Jorge every 3 months per my practice policy.  Patient agrees to UDS/CSA today and Jorge was run in anticipation of her visit as above.  She will see if her podiatrist can prescribe this medicine but if not she does agree to call me for refills and follow the policy as above.    The patient is taking the following controlled substance(s) on a long term (greater than three months) basis: gabapentin.  She is taking controlled substances for chronic pain.  A history was obtained from the patient and the following were reviewed: family history, social history, psychiatric history, and substance abuse history.  A baseline assessment was performed and includes a controlled substance agreement, urine drug screen, JORGE (within 12 months prior to today's encounter), and a physical exam, if appropriate, was performed within the past year.  It is medically appropriate to prescribe her the medication(s) above.  If applicable, prior treatment records were obtained and reviewed to justify long term prescribing of controlled substances.  The patient was educated on the following: Limited use of the medication, need to discontinue the medication when her condition resolves, proper storage and  disposal of medication(s), and risks and dangers associated with controlled substances including tolerance, dependence, and addiction.  A written informed consent was obtained and includes objectives of treatment, further diagnostic testing if appropriate, and an exit strategy for discontinuing the medication on the condition resolves, if appropriate.  In addition, if appropriate, the patient will be screened for other medical conditions that may impact the prescribing of controlled substances.  Previous treatments have been tried including trials of noncontrolled substances or lower doses of controlled substances.  The controlled medication(s) have been titrated to the level appropriate and necessary and the medication(s) are not causing unacceptable side effects.

## 2020-06-17 NOTE — ASSESSMENT & PLAN NOTE
Stable.  Repeat CMP today.  She does take meloxicam 7.5 mg daily per orthopedics for chronic arthralgias in the setting of known OA.  Advised patient that I would take this sparingly/as needed and substitute Tylenol as able due to CKD.  She agrees to try this.

## 2020-06-17 NOTE — ASSESSMENT & PLAN NOTE
Stable on Prilosec.  Continue. Recommend avoiding trigger foods like acidic or spicy foods, head of bed elevation, do not lie flat immediately after meals etc.

## 2020-06-18 LAB
ALBUMIN SERPL-MCNC: 4.2 G/DL (ref 3.5–5.2)
ALBUMIN/GLOB SERPL: 2 G/DL
ALP SERPL-CCNC: 80 U/L (ref 39–117)
ALT SERPL-CCNC: 17 U/L (ref 1–33)
AST SERPL-CCNC: 24 U/L (ref 1–32)
BILIRUB SERPL-MCNC: 0.3 MG/DL (ref 0.2–1.2)
BUN SERPL-MCNC: 17 MG/DL (ref 8–23)
BUN/CREAT SERPL: 15.7 (ref 7–25)
CALCIUM SERPL-MCNC: 9.6 MG/DL (ref 8.6–10.5)
CHLORIDE SERPL-SCNC: 107 MMOL/L (ref 98–107)
CO2 SERPL-SCNC: 24.5 MMOL/L (ref 22–29)
CREAT SERPL-MCNC: 1.08 MG/DL (ref 0.57–1)
GLOBULIN SER CALC-MCNC: 2.1 GM/DL
GLUCOSE SERPL-MCNC: 177 MG/DL (ref 65–99)
HBA1C MFR BLD: 7.8 % (ref 4.8–5.6)
POTASSIUM SERPL-SCNC: 4.5 MMOL/L (ref 3.5–5.2)
PROT SERPL-MCNC: 6.3 G/DL (ref 6–8.5)
SODIUM SERPL-SCNC: 140 MMOL/L (ref 136–145)

## 2020-07-22 ENCOUNTER — TELEPHONE (OUTPATIENT)
Dept: INTERNAL MEDICINE | Facility: CLINIC | Age: 75
End: 2020-07-22

## 2020-07-22 NOTE — TELEPHONE ENCOUNTER
Olive called back and I went to talk to Dr. Ponce ans she said it was fine that Dr. Barnard took over the Gabapentin.

## 2020-07-22 NOTE — TELEPHONE ENCOUNTER
Olive Mahmood called and asked if Dr.Danny Barnard from [facility] wants to take over her medication Gabapentin. They said Fred use to prescribe it and now Kris wont? There phone # is [phone]

## 2020-07-22 NOTE — TELEPHONE ENCOUNTER
Noted, agreed. I informed pt that I do not write 90d supply for any/all controlled substances. If podiatry feels comfortable doing so, happy to have them take over.

## 2020-07-23 RX ORDER — OMEPRAZOLE 20 MG/1
CAPSULE, DELAYED RELEASE ORAL
Qty: 60 CAPSULE | Refills: 0 | Status: SHIPPED | OUTPATIENT
Start: 2020-07-23 | End: 2021-05-18 | Stop reason: SDUPTHER

## 2020-07-27 ENCOUNTER — OFFICE VISIT (OUTPATIENT)
Dept: ORTHOPEDIC SURGERY | Facility: CLINIC | Age: 75
End: 2020-07-27

## 2020-07-27 VITALS — HEIGHT: 65 IN | HEART RATE: 78 BPM | OXYGEN SATURATION: 98 % | WEIGHT: 180.12 LBS | BODY MASS INDEX: 30.01 KG/M2

## 2020-07-27 DIAGNOSIS — M70.62 TROCHANTERIC BURSITIS OF LEFT HIP: Primary | ICD-10-CM

## 2020-07-27 PROCEDURE — 99212 OFFICE O/P EST SF 10 MIN: CPT | Performed by: ORTHOPAEDIC SURGERY

## 2020-07-27 NOTE — PROGRESS NOTES
[facility] Note    Subjective     Chief Complaint   Patient presents with   • Follow-up     5 months- Trochanteric bursitis of left hip, s/p (L) DOROTHY 02/12/2019        HPI    It has been 5  month(s) since Ms. Grider's last visit. She returns to clinic today for follow-up of left hip arthroplasty. She rates her pain a 2/10 on the pain scale. Previous/current treatments: cane/walker. Current symptoms: same as prior visit. The pain is worse with climbing stairs; resting improve the pain. Overall, she is doing better.  Improved from her last visit.  100% improvement compared to her preoperative symptoms.  Bursitis improved with meloxicam.    I have reviewed the following portions of the patient's history:History of Present Illness     Patient Active Problem List   Diagnosis   • Gastroesophageal reflux disease without esophagitis   • Mixed hyperlipidemia   • Essential hypertension   • Controlled type 2 diabetes mellitus without complication, without long-term current use of insulin (CMS/HCC)   • Chronic kidney disease (CKD), stage III (moderate) (CMS/HCC)   • Diabetic polyneuropathy associated with type 2 diabetes mellitus (CMS/HCC)   • Primary osteoarthritis of both hips   • Cerebrovascular accident (CVA) due to thrombosis of right middle cerebral artery (CMS/HCC)   • Chronic pain syndrome   • High risk medication use     Past Medical History:   Diagnosis Date   • Anemia 9/18/2016   • Anesthesia complication     per patient with hip surgery last april did not remember being in hospital or going home can only rmember from [facility] on   • Anesthesia complication     with hip surgery in april 2018 had issues with swallowing after surgery called in speech   • Arthritis    • Cancer (CMS/HCC)     cervical   • Cataract    • Colon polyp 1/31/2019    Cecal polyp 5 years Dr. 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   • Wears glasses       Past Surgical History:   Procedure Laterality Date   • CARDIAC CATHETERIZATION     • CHOLECYSTECTOMY     • COLONOSCOPY     • EYE SURGERY      bilateral cataract   • HYSTERECTOMY      partial   • JOINT REPLACEMENT      hip surgery 2018 in april   • OOPHORECTOMY      one removed   • TONSILLECTOMY     • TOTAL HIP ARTHROPLASTY Right 4/19/2018    Procedure: RIGHT TOTAL HIP ARTHROPLASTY;  Surgeon: Carlos Pollack MD;  Location:  [facility];  Service: Orthopedics   • TOTAL HIP ARTHROPLASTY Left 2/12/2019    Procedure: TOTAL HIP ARTHROPLASTY LEFT;  Surgeon: Carlos Pollack MD;  Location:  [facility];  Service: Orthopedics   • WRIST SURGERY      metal plate      Family History   Problem Relation Age of Onset   • Arthritis Mother    • Heart attack Mother    • Hypertension Mother    • Hyperlipidemia Mother    • Osteoporosis Mother    • Heart disease Sister    • Diabetes Sister    • Arthritis Sister    • Heart attack Sister    • Hypertension Sister    • Hyperlipidemia Sister    • Bleeding Disorder Sister    • Heart disease Brother    • Cancer Brother    • Diabetes Brother    • Arthritis Brother    • Heart attack Brother    • Hypertension Brother    • Hyperlipidemia Brother    • Ovarian cancer Daughter    • Cancer Daughter    • Ovarian cancer Other         grandaughter   • Ovarian cancer Other         granddaughter   • Heart attack Father    • Hypertension Father    • Stroke Father    • Kidney disease Paternal Uncle    • Liver disease Paternal Uncle    • Cancer Other    • Stroke Other    • Diabetes Other    • Breast cancer Neg Hx      Social History     Socioeconomic History   • Marital status:      Spouse name: Not on  file   • Number of children: Not on file   • Years of education: Not on file   • Highest education level: Not on file   Tobacco Use   • Smoking status: Never Smoker   • Smokeless tobacco: Never Used   Substance and Sexual Activity   • Alcohol use: No   • Drug use: No   • Sexual activity: Defer     Comment:       Current Outpatient Medications on File Prior to Visit   Medication Sig Dispense Refill   • amLODIPine (NORVASC) 5 MG tablet TAKE 1 TABLET EVERY DAY 90 tablet 1   • aspirin 81 MG chewable tablet Chew 1 tablet Daily. 30 tablet 0   • atorvastatin (LIPITOR) 80 MG tablet TAKE 1 TABLET EVERY NIGHT 90 tablet 1   • clopidogrel (PLAVIX) 75 MG tablet TAKE 1 TABLET EVERY DAY 90 tablet 1   • gabapentin (NEURONTIN) 400 MG capsule Take 1 capsule by mouth 3 (Three) Times a Day. 270 capsule 1   • glimepiride (AMARYL) 4 MG tablet TAKE 1 TABLET EVERY MORNING BEFORE BREAKFAST 90 tablet 3   • meloxicam (MOBIC) 7.5 MG tablet 1 Oral Daily with food. 90 tablet 1   • metFORMIN (GLUCOPHAGE) 1000 MG tablet Take 1 tablet by mouth 2 (Two) Times a Day With Meals. 180 tablet 1   • omeprazole (priLOSEC) 20 MG capsule TAKE 1 CAPSULE TWICE DAILY 60 capsule 0   • Probiotic Product (PROBIOTIC DAILY) capsule Take as directed on package 60 capsule 0     No current facility-administered medications on file prior to visit.       Allergies   Allergen Reactions   • Codeine Hives        Review of Systems   Constitutional: Negative.  Negative for activity change, appetite change, chills, diaphoresis, fatigue, fever and unexpected weight change.   HENT: Negative.  Negative for congestion, dental problem, drooling, ear discharge, ear pain, facial swelling, hearing loss, mouth sores, nosebleeds, postnasal drip, rhinorrhea, sinus pressure, sneezing, sore throat, tinnitus, trouble swallowing and voice change.    Eyes: Negative.  Negative for photophobia, pain, discharge, redness, itching and visual disturbance.   Respiratory: Negative.  Negative  "for apnea, cough, choking, chest tightness, shortness of breath, wheezing and stridor.    Cardiovascular: Negative.  Negative for chest pain, palpitations and leg swelling.   Gastrointestinal: Negative.  Negative for abdominal distention, abdominal pain, anal bleeding, blood in stool, constipation, diarrhea, nausea, rectal pain and vomiting.   Endocrine: Negative.  Negative for cold intolerance, heat intolerance, polydipsia, polyphagia and polyuria.   Genitourinary: Negative.  Negative for decreased urine volume, difficulty urinating, dysuria, enuresis, flank pain, frequency, genital sores, hematuria and urgency.   Musculoskeletal: Positive for arthralgias. Negative for back pain, gait problem, joint swelling, myalgias, neck pain and neck stiffness.   Skin: Negative.  Negative for color change, pallor, rash and wound.   Allergic/Immunologic: Negative.  Negative for environmental allergies, food allergies and immunocompromised state.   Neurological: Negative.  Negative for dizziness, tremors, seizures, syncope, facial asymmetry, speech difficulty, weakness, light-headedness, numbness and headaches.   Hematological: Negative.  Negative for adenopathy. Does not bruise/bleed easily.   Psychiatric/Behavioral: Negative.  Negative for agitation, behavioral problems, confusion, decreased concentration, dysphoric mood, hallucinations, self-injury, sleep disturbance and suicidal ideas. The patient is not nervous/anxious and is not hyperactive.         Objective      Physical Exam  Pulse 78   Ht 165.1 cm (65\")   Wt 81.7 kg (180 lb 1.9 oz)   SpO2 98%   BMI 29.97 kg/m²     Body mass index is 29.97 kg/m².    General:   Mental Status:  Alert   Appearance: Cooperative, in no acute distress   Build and Nutrition: Well-nourished well-developed female   Orientation: Alert and oriented to person, place and time   Posture: Normal   Gait: Normal    Integument:              Left hip: Wound is well-healed with no signs of " infection     Lower Extremity:              Left Hip:                          Tenderness:     None                          Swelling:          None                          Crepitus:          None                          Range of motion:        External Rotation:       30°                                                              Internal Rotation:        30°                                                              Flexion:                       100°                                                              Extension:                   0°                       Deformities:     None  Functional testing: Negative Stinchfield                          No leg length discrepancy      Assessment and Plan     Nely was seen today for follow-up.    Diagnoses and all orders for this visit:    Trochanteric bursitis of left hip        1. Trochanteric bursitis of left hip        I reviewed my findings with patient today.  Her bursitis improved from her last visit, and I will see her back if there is any worsening or problems in the future.  Total hip arthroplasty is functioning well.  She is pleased with results.  Routine follow-up for her total hip in 4 years, which will be 5 years postop.    Return in about 4 years (around 7/27/2024) for Recheck with X-Rays.      Carlos Pollack MD  07/27/20  10:12    Dragon disclaimer:  Much of this encounter note is an electronic transcription/translation of spoken language to printed text. The electronic translation of spoken language may permit erroneous, or at times, nonsensical words or phrases to be inadvertently transcribed; Although I have reviewed the note for such errors, some may still exist.

## 2020-08-03 RX ORDER — AMLODIPINE BESYLATE 5 MG/1
TABLET ORAL
Qty: 90 TABLET | Refills: 1 | Status: SHIPPED | OUTPATIENT
Start: 2020-08-03 | End: 2020-12-29 | Stop reason: SDUPTHER

## 2020-08-03 RX ORDER — CLOPIDOGREL BISULFATE 75 MG/1
TABLET ORAL
Qty: 90 TABLET | Refills: 1 | Status: SHIPPED | OUTPATIENT
Start: 2020-08-03 | End: 2020-12-29 | Stop reason: SDUPTHER

## 2020-08-10 RX ORDER — ATORVASTATIN CALCIUM 80 MG/1
TABLET, FILM COATED ORAL
Qty: 90 TABLET | Refills: 1 | Status: SHIPPED | OUTPATIENT
Start: 2020-08-10 | End: 2021-02-04

## 2020-09-09 NOTE — PROGRESS NOTES
QUICK REFERENCE INFORMATION:  The ABCs of the Annual Wellness Visit    Medicare Subsequent Wellness Visit    Chief Complaint   Patient presents with   • Medicare Wellness-subsequent        HPI     Nely Grider is a 74 y.o. female presenting for [facility] and:    1.  CVA:  In 2019.  On aspirin 81 mg daily, Plavix 75 mg daily, Lipitor 80 mg daily.  Stable.  Lab Results   Component Value Date    CHOL 145 10/04/2019    CHLPL 190 08/31/2017    TRIG 201 (H) 10/04/2019    HDL 31 (L) 10/04/2019    LDL 74 10/04/2019     2.  Type 2 diabetes:  On metformin 1000 mg twice daily and glimepiride 4 mg daily. Has had loose stools, non bloody since increasing metformin 1000mg BID at her last visit.  Did not think to call and ask if this was a side effect.  Last foot exam: Sees podiatry. Next 9/23/20.  Last eye exam: 12/7/19 (Dr. Chávez)  Last microalbumin: 6/17/20  FSBG: doesn't check.    CMP:  Lab Results   Component Value Date     (H) 06/17/2020    BUN 17 06/17/2020    CREATININE 1.08 (H) 06/17/2020    EGFRIFNONA 49 (L) 06/17/2020    EGFRIFAFRI 60 (L) 06/17/2020    BCR 15.7 06/17/2020     06/17/2020    K 4.5 06/17/2020    CO2 24.5 06/17/2020    CALCIUM 9.6 06/17/2020    PROTENTOTREF 6.3 06/17/2020    ALBUMIN 4.20 06/17/2020    LABGLOBREF 2.1 06/17/2020    LABIL2 2.0 06/17/2020    BILITOT 0.3 06/17/2020    ALKPHOS 80 06/17/2020    AST 24 06/17/2020    ALT 17 06/17/2020     HbA1c:  Lab Results   Component Value Date    HGBA1C 7.80 (H) 06/17/2020    HGBA1C 6.80 (H) 10/04/2019     3.  Hypertension:  On amlodipine 5 mg daily.  Denies headache, chest pain, shortness of breath, palpitations, PND, lower extremity edema.     4.  Chronic pain:  Secondary to arthritis and neuropathy.  She takes meloxicam 7.5 mg daily and gabapentin 400 mg 3 times daily.  Per 7/22/2020 phone note, podiatry is now writing for gabapentin as I would not write 90-day supplies for controlled substance.     5. GERD:  Stable on prilosec 20mg daily.     Past  Medical History:   Diagnosis Date   • Anemia 9/18/2016   • Anesthesia complication     per patient with hip surgery last april did not remember being in hospital or going home can only rmember from rehad on   • Anesthesia complication     with hip surgery in april 2018 had issues with swallowing after surgery called in speech   • Arthritis    • Cancer (CMS/HCC)     cervical   • Cataract    • Colon polyp 1/31/2019    Cecal polyp 5 years Dr. 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   • Wears glasses       Past Surgical History:   Procedure Laterality Date   • CARDIAC CATHETERIZATION     • CHOLECYSTECTOMY     • COLONOSCOPY     • EYE SURGERY      bilateral cataract   • HYSTERECTOMY      partial   • JOINT REPLACEMENT      hip surgery 2018 in april   • OOPHORECTOMY      one removed   • TONSILLECTOMY     • TOTAL HIP ARTHROPLASTY Right 4/19/2018    Procedure: RIGHT TOTAL HIP ARTHROPLASTY;  Surgeon: Carlos Pollack MD;  Location:  [facility];  Service: Orthopedics   • TOTAL HIP ARTHROPLASTY Left 2/12/2019    Procedure: TOTAL HIP ARTHROPLASTY LEFT;  Surgeon: Carlos Pollack MD;  Location:  [facility];  Service: Orthopedics   • WRIST SURGERY      metal plate     Family History   Problem Relation Age of Onset   • Arthritis Mother    • Heart attack Mother    • Hypertension Mother    • Hyperlipidemia Mother    • Osteoporosis Mother    • Heart disease Sister    • Diabetes Sister    • Arthritis Sister    • Heart attack Sister    • Hypertension Sister    •  Hyperlipidemia Sister    • Bleeding Disorder Sister    • Heart disease Brother    • Cancer Brother    • Diabetes Brother    • Arthritis Brother    • Heart attack Brother    • Hypertension Brother    • Hyperlipidemia Brother    • Ovarian cancer Daughter    • Cancer Daughter    • Ovarian cancer Other         grandaughter   • Ovarian cancer Other         granddaughter   • Heart attack Father    • Hypertension Father    • Stroke Father    • Kidney disease Paternal Uncle    • Liver disease Paternal Uncle    • Cancer Other    • Stroke Other    • Diabetes Other    • Breast cancer Neg Hx       Social History     Socioeconomic History   • Marital status:      Spouse name: Not on file   • Number of children: Not on file   • Years of education: Not on file   • Highest education level: Not on file   Tobacco Use   • Smoking status: Never Smoker   • Smokeless tobacco: Never Used   Substance and Sexual Activity   • Alcohol use: No   • Drug use: No   • Sexual activity: Defer     Comment:       Allergies   Allergen Reactions   • Codeine Hives      Outpatient Medications Prior to Visit   Medication Sig Dispense Refill   • amLODIPine (NORVASC) 5 MG tablet TAKE 1 TABLET EVERY DAY 90 tablet 1   • aspirin 81 MG chewable tablet Chew 1 tablet Daily. 30 tablet 0   • atorvastatin (LIPITOR) 80 MG tablet TAKE 1 TABLET EVERY NIGHT 90 tablet 1   • clopidogrel (PLAVIX) 75 MG tablet TAKE 1 TABLET EVERY DAY 90 tablet 1   • gabapentin (NEURONTIN) 400 MG capsule Take 1 capsule by mouth 3 (Three) Times a Day. 270 capsule 1   • glimepiride (AMARYL) 4 MG tablet TAKE 1 TABLET EVERY MORNING BEFORE BREAKFAST 90 tablet 3   • meloxicam (MOBIC) 7.5 MG tablet 1 Oral Daily with food. 90 tablet 1   • omeprazole (priLOSEC) 20 MG capsule TAKE 1 CAPSULE TWICE DAILY 60 capsule 0   • Probiotic Product (PROBIOTIC DAILY) capsule Take as directed on package 60 capsule 0   • metFORMIN (GLUCOPHAGE) 1000 MG tablet Take 1 tablet by mouth 2 (Two) Times a Day  "With Meals. 180 tablet 1     No facility-administered medications prior to visit.        Reviewed use of high risk medication in the elderly: yes  Reviewed for potential of harmful drug interactions in the elderly: yes    The following portions of the patient's history were reviewed and updated as appropriate: allergies, current medications, past family history, past medical history, past social history, past surgical history and problem list.    Review of Systems   Constitutional: Negative for activity change, appetite change and fever.   HENT: Negative for congestion, sneezing and sore throat.    Eyes: Negative for discharge and visual disturbance.   Respiratory: Negative for cough and shortness of breath.    Cardiovascular: Negative for chest pain and palpitations.   Gastrointestinal: Negative for abdominal distention, abdominal pain, blood in stool, constipation, nausea and vomiting.   Endocrine: Negative for cold intolerance and heat intolerance.   Genitourinary: Negative for dysuria.   Musculoskeletal: Positive for arthralgias (Chronic, stable). Negative for neck stiffness.   Skin: Negative for rash.   Allergic/Immunologic: Negative for environmental allergies and food allergies.   Neurological: Positive for numbness (And tingling, chronic/stable). Negative for headache.   Hematological: Negative for adenopathy.   Psychiatric/Behavioral: Negative for depressed mood.     Vitals:    09/17/20 0830   BP: 118/68   BP Location: Right arm   Patient Position: Sitting   Cuff Size: Adult   Pulse: 68   Resp: 18   Temp: 97.5 °F (36.4 °C)   TempSrc: Temporal   SpO2: 99%   Weight: 78.6 kg (173 lb 3.2 oz)   Height: 165.1 cm (65\")   PainSc: 0-No pain       Objective    Physical Exam   Constitutional: She is oriented to person, place, and time. She appears well-developed and well-nourished. No distress.   HENT:   Head: Normocephalic and atraumatic.   Right Ear: Tympanic membrane and external ear normal.   Left Ear: Tympanic " membrane and external ear normal.   Nose: Nose normal.   Mouth/Throat: Uvula is midline, oropharynx is clear and moist and mucous membranes are normal. No oropharyngeal exudate.   Eyes: Pupils are equal, round, and reactive to light. Conjunctivae and EOM are normal. Right eye exhibits no discharge. Left eye exhibits no discharge. No scleral icterus.   Neck: Normal range of motion. Neck supple. No JVD present. No tracheal deviation present. No thyromegaly present.   Cardiovascular: Normal rate, regular rhythm and normal heart sounds. Exam reveals no gallop and no friction rub.   No murmur heard.  Pulmonary/Chest: Effort normal and breath sounds normal. No stridor. No respiratory distress. She has no wheezes. She has no rales.   Abdominal: Soft. Bowel sounds are normal. She exhibits no distension and no mass. There is no tenderness. There is no rebound and no guarding.   Musculoskeletal: She exhibits no edema.   Ambulates with steady, unassisted gait.   Lymphadenopathy:     She has no cervical adenopathy.   Neurological: She is alert and oriented to person, place, and time. She exhibits normal muscle tone.   Skin: Skin is warm and dry. Capillary refill takes less than 2 seconds. No rash noted. She is not diaphoretic.   Psychiatric: She has a normal mood and affect.   Vitals reviewed.    Finger Rub Hearing{Test (right ear):passed  Finger Rub Hearing{Test (left ear):passed      HEALTH RISK ASSESSMENT    1945    Recent Hospitalizations:  No hospitalization(s) within the last year..      Current Medical Providers:  Patient Care Team:  Demi Ponce MD as PCP - General (Internal Medicine)  Luis Gustafson OD (Optometry)      Smoking Status:  Social History     Tobacco Use   Smoking Status Never Smoker   Smokeless Tobacco Never Used       Alcohol Consumption:  Social History     Substance and Sexual Activity   Alcohol Use No       Depression Screen:   PHQ-2/PHQ-9 Depression Screening 9/17/2020   Little interest  or pleasure in doing things 0   Feeling down, depressed, or hopeless 0   Total Score 0       Health Habits and Functional and Cognitive Screening:  Functional & Cognitive Status 9/17/2020   Do you have difficulty preparing food and eating? No   Do you have difficulty bathing yourself, getting dressed or grooming yourself? No   Do you have difficulty using the toilet? No   Do you have difficulty moving around from place to place? No   Do you have trouble with steps or getting out of a bed or a chair? No   Current Diet Well Balanced Diet   Dental Exam Up to date   Eye Exam Up to date   Exercise (times per week) 0 times per week   Current Exercise Activities Include None   Do you need help using the phone?  No   Are you deaf or do you have serious difficulty hearing?  No   Do you need help with transportation? No   Do you need help shopping? No   Do you need help preparing meals?  No   Do you need help with housework?  No   Do you need help with laundry? No   Do you need help taking your medications? No   Do you need help managing money? No   Do you ever drive or ride in a car without wearing a seat belt? No   Have you felt unusual stress, anger or loneliness in the last month? No   Who do you live with? Spouse   If you need help, do you have trouble finding someone available to you? No   Have you been bothered in the last four weeks by sexual problems? No   Do you have difficulty concentrating, remembering or making decisions? Yes           Does the patient have evidence of cognitive impairment? No    Aspirin use counseling? Taking ASA appropriately as indicated      Recent Lab Results:  CMP:  Lab Results   Component Value Date     (H) 06/17/2020    BUN 17 06/17/2020    CREATININE 1.08 (H) 06/17/2020    EGFRIFNONA 49 (L) 06/17/2020    EGFRIFAFRI 60 (L) 06/17/2020    BCR 15.7 06/17/2020     06/17/2020    K 4.5 06/17/2020    CO2 24.5 06/17/2020    CALCIUM 9.6 06/17/2020    PROTENTOTREF 6.3 06/17/2020     ALBUMIN 4.20 06/17/2020    LABGLOBREF 2.1 06/17/2020    LABIL2 2.0 06/17/2020    BILITOT 0.3 06/17/2020    ALKPHOS 80 06/17/2020    AST 24 06/17/2020    ALT 17 06/17/2020     Lipid Panel:  Lab Results   Component Value Date    CHOL 145 10/04/2019    TRIG 201 (H) 10/04/2019    HDL 31 (L) 10/04/2019    VLDL 40.2 10/04/2019    LDLHDL 2.38 10/04/2019     HbA1c:  Lab Results   Component Value Date    HGBA1C 7.80 (H) 06/17/2020       Visual Acuity:  No exam data present    Age-appropriate Screening Schedule:  Refer to the list below for future screening recommendations based on patient's age, sex and/or medical conditions. Orders for these recommended tests are listed in the plan section. The patient has been provided with a written plan.    Health Maintenance   Topic Date Due   • INFLUENZA VACCINE  10/30/2020 (Originally 8/1/2020)   • ZOSTER VACCINE (2 of 3) 09/26/2021 (Originally 10/2/2013)   • TDAP/TD VACCINES (2 - Td) 12/27/2021 (Originally 1/18/2019)   • LIPID PANEL  10/04/2020   • DIABETIC EYE EXAM  12/07/2020   • HEMOGLOBIN A1C  12/17/2020   • DXA SCAN  01/29/2021   • URINE MICROALBUMIN  06/17/2021   • MAMMOGRAM  01/31/2022   • COLONOSCOPY  01/30/2029          Advance Care Planning:  ACP discussion was held with the patient during this visit. Patient does not have an advance directive, information provided.    Identification of Risk Factors:  Risk factors include: Advance Directive Discussion  Breast Cancer/Mammogram Screening  Cardiovascular risk  Chronic Pain   Colon Cancer Screening  Dementia/Memory   Depression/Dysphoria  Diabetic Lab Screening   Fall Risk  Glaucoma Risk  Hearing Problem  Immunizations Discussed/Encouraged (specific immunizations; adacel Tdap, Influenza, Pneumococcal 23 and Shingrix )  Inadequate Social Support, Isolation, Loneliness, Lack of Transportation, Financial Difficulties, or Caregiver Stress   Inactivity/Sedentary  Lung Cancer Risk  Obesity/Overweight   Osteoprorosis  Risk  Polypharmacy  Urinary Incontinence.    Compared to one year ago, the patient feels her physical health is the same.  Compared to one year ago, the patient feels her mental health is the same.      Osteopenia  2019 DEXA scan showed osteopenia meeting FRAX criteria.  Reviewed risk/benefits of treatment with bisphosphonates.  Patient declines.  Discussed focusing on regular weightbearing exercise and calcium/vitamin D supplementation.    Cerebrovascular accident (CVA) due to thrombosis of right middle cerebral artery (CMS/[facility])  History of CVA in 2018.  Stable.  Continue aspirin, Plavix and statin.    Essential hypertension  Stable on amlodipine.  Continue.  BMP today.    Mixed hyperlipidemia  Stable on Lipitor.  Continue.    Gastroesophageal reflux disease without esophagitis  Stable on Prilosec.  Continue.    Controlled type 2 diabetes mellitus without complication, without long-term current use of insulin (CMS/[facility])  A1c today.  Due to diarrhea reduce metformin back to 500 mg twice daily.  Continue glimepiride.  Pending A1c will discuss further medication adjustments/changes as indicated.  Continue to follow-up with podiatry and ophthalmology.  If diarrhea does not improve in the next few weeks after reducing metformin dose, patient will need to return for reevaluation.  Also needs to let us know for any fevers, chills, abdominal pain, blood in diarrhea or other concerns.    Chronic pain syndrome  Due to neuropathy and OA.  On meloxicam 7.5 mg daily and gabapentin 400 mg 3 times daily per podiatry.  Stable.    Chronic kidney disease (CKD), stage III (moderate) (CMS/[facility])  Stable.  BMP today.  Try to minimize use of meloxicam as able.            Patient Self-Management and Personalized Health Advice  The patient has been provided with information about: diet, exercise, weight management, prevention of cardiac or vascular disease, the relationship between weight and GERD, alcohol use, fall prevention, designing  advance directives, supplements and mental health concerns and preventive services including:     Td/Tdap(Booster Q 10 yrs): Not covered unless 2/2 trauma  Pneumovax: Discuss at [facility]  Shringrix: Advised. Pt to d/w insurance or pharmacy to determine coverage.   Flu: Advised in fall.    Immunization History   Administered Date(s) Administered   • FLUAD TRI 65YR+ 10/14/2019   • Fluzone High Dose =>65 Years ([employer] ONLY) 10/05/2016, 10/05/2017, 10/27/2018   • Pneumococcal Conjugate 13-Valent (PCV13) 2015   • Pneumococcal Polysaccharide (PPSV23) 2020   • Pneumococcal, Unspecified 2009   • Tdap 2009   • Zostavax 2013         Colorectal Screenin19; 5 years  Pap: Aged out  Mammogram:  20; BI-RADS 1  PSA(Over age 50):  N/A  US Aorta (For male smokers, age 65):  N/A  CT for Smoker (Age 55-75, 30pk yr):  N/A  Bone Density/DEXA:  19; osteopenia meeting FRAX   Hep C: Screen per guidelines.      Follow Up:  Return in about 4 months (around 2021) for Follow-up, As needed if no improvement or new symptoms.     An After Visit Summary and PPPS with all of these plans were given to the patient.      Demi Ponce MD  2020

## 2020-09-17 ENCOUNTER — OFFICE VISIT (OUTPATIENT)
Dept: INTERNAL MEDICINE | Facility: CLINIC | Age: 75
End: 2020-09-17

## 2020-09-17 VITALS
OXYGEN SATURATION: 99 % | BODY MASS INDEX: 28.86 KG/M2 | HEART RATE: 68 BPM | DIASTOLIC BLOOD PRESSURE: 68 MMHG | TEMPERATURE: 97.5 F | RESPIRATION RATE: 18 BRPM | SYSTOLIC BLOOD PRESSURE: 118 MMHG | HEIGHT: 65 IN | WEIGHT: 173.2 LBS

## 2020-09-17 DIAGNOSIS — M85.80 OSTEOPENIA, UNSPECIFIED LOCATION: ICD-10-CM

## 2020-09-17 DIAGNOSIS — I10 ESSENTIAL HYPERTENSION: ICD-10-CM

## 2020-09-17 DIAGNOSIS — N18.30 CHRONIC KIDNEY DISEASE (CKD), STAGE III (MODERATE) (HCC): Chronic | ICD-10-CM

## 2020-09-17 DIAGNOSIS — E11.9 CONTROLLED TYPE 2 DIABETES MELLITUS WITHOUT COMPLICATION, WITHOUT LONG-TERM CURRENT USE OF INSULIN (HCC): ICD-10-CM

## 2020-09-17 DIAGNOSIS — Z00.00 MEDICARE ANNUAL WELLNESS VISIT, SUBSEQUENT: Primary | ICD-10-CM

## 2020-09-17 DIAGNOSIS — E78.2 MIXED HYPERLIPIDEMIA: ICD-10-CM

## 2020-09-17 DIAGNOSIS — G89.4 CHRONIC PAIN SYNDROME: ICD-10-CM

## 2020-09-17 DIAGNOSIS — K21.9 GASTROESOPHAGEAL REFLUX DISEASE WITHOUT ESOPHAGITIS: ICD-10-CM

## 2020-09-17 DIAGNOSIS — Z11.59 ENCOUNTER FOR HEPATITIS C SCREENING TEST FOR LOW RISK PATIENT: ICD-10-CM

## 2020-09-17 DIAGNOSIS — I63.311 CEREBROVASCULAR ACCIDENT (CVA) DUE TO THROMBOSIS OF RIGHT MIDDLE CEREBRAL ARTERY (HCC): ICD-10-CM

## 2020-09-17 PROBLEM — M16.0 PRIMARY OSTEOARTHRITIS OF BOTH HIPS: Status: RESOLVED | Noted: 2018-01-31 | Resolved: 2020-09-17

## 2020-09-17 PROCEDURE — 96160 PT-FOCUSED HLTH RISK ASSMT: CPT | Performed by: INTERNAL MEDICINE

## 2020-09-17 PROCEDURE — 90732 PPSV23 VACC 2 YRS+ SUBQ/IM: CPT | Performed by: INTERNAL MEDICINE

## 2020-09-17 PROCEDURE — 99397 PER PM REEVAL EST PAT 65+ YR: CPT | Performed by: INTERNAL MEDICINE

## 2020-09-17 PROCEDURE — G0009 ADMIN PNEUMOCOCCAL VACCINE: HCPCS | Performed by: INTERNAL MEDICINE

## 2020-09-17 PROCEDURE — 36415 COLL VENOUS BLD VENIPUNCTURE: CPT | Performed by: INTERNAL MEDICINE

## 2020-09-17 PROCEDURE — G0439 PPPS, SUBSEQ VISIT: HCPCS | Performed by: INTERNAL MEDICINE

## 2020-09-17 NOTE — ASSESSMENT & PLAN NOTE
A1c today.  Due to diarrhea reduce metformin back to 500 mg twice daily.  Continue glimepiride.  Pending A1c will discuss further medication adjustments/changes as indicated.  Continue to follow-up with podiatry and ophthalmology.  If diarrhea does not improve in the next few weeks after reducing metformin dose, patient will need to return for reevaluation.  Also needs to let us know for any fevers, chills, abdominal pain, blood in diarrhea or other concerns.

## 2020-09-17 NOTE — ASSESSMENT & PLAN NOTE
2019 DEXA scan showed osteopenia meeting FRAX criteria.  Reviewed risk/benefits of treatment with bisphosphonates.  Patient declines.  Discussed focusing on regular weightbearing exercise and calcium/vitamin D supplementation.

## 2020-09-17 NOTE — ASSESSMENT & PLAN NOTE
Due to neuropathy and OA.  On meloxicam 7.5 mg daily and gabapentin 400 mg 3 times daily per podiatry.  Stable.

## 2020-09-18 ENCOUNTER — TELEPHONE (OUTPATIENT)
Dept: INTERNAL MEDICINE | Facility: CLINIC | Age: 75
End: 2020-09-18

## 2020-09-18 DIAGNOSIS — D64.9 ANEMIA, UNSPECIFIED TYPE: Primary | ICD-10-CM

## 2020-09-18 LAB
BUN SERPL-MCNC: 12 MG/DL (ref 8–23)
BUN/CREAT SERPL: 11.9 (ref 7–25)
CALCIUM SERPL-MCNC: 9.8 MG/DL (ref 8.6–10.5)
CHLORIDE SERPL-SCNC: 107 MMOL/L (ref 98–107)
CHOLEST SERPL-MCNC: 135 MG/DL (ref 0–200)
CO2 SERPL-SCNC: 24.9 MMOL/L (ref 22–29)
CREAT SERPL-MCNC: 1.01 MG/DL (ref 0.57–1)
ERYTHROCYTE [DISTWIDTH] IN BLOOD BY AUTOMATED COUNT: 12.6 % (ref 12.3–15.4)
GLUCOSE SERPL-MCNC: 82 MG/DL (ref 65–99)
HBA1C MFR BLD: 6.4 % (ref 4.8–5.6)
HCT VFR BLD AUTO: 35.3 % (ref 34–46.6)
HCV AB S/CO SERPL IA: <0.1 S/CO RATIO (ref 0–0.9)
HDLC SERPL-MCNC: 32 MG/DL (ref 40–60)
HGB BLD-MCNC: 11.8 G/DL (ref 12–15.9)
LDLC SERPL CALC-MCNC: 61 MG/DL (ref 0–100)
MCH RBC QN AUTO: 30.6 PG (ref 26.6–33)
MCHC RBC AUTO-ENTMCNC: 33.4 G/DL (ref 31.5–35.7)
MCV RBC AUTO: 91.5 FL (ref 79–97)
PLATELET # BLD AUTO: 367 10*3/MM3 (ref 140–450)
POTASSIUM SERPL-SCNC: 3.8 MMOL/L (ref 3.5–5.2)
RBC # BLD AUTO: 3.86 10*6/MM3 (ref 3.77–5.28)
SODIUM SERPL-SCNC: 144 MMOL/L (ref 136–145)
TRIGL SERPL-MCNC: 210 MG/DL (ref 0–150)
VLDLC SERPL CALC-MCNC: 42 MG/DL
WBC # BLD AUTO: 9.92 10*3/MM3 (ref 3.4–10.8)

## 2020-09-18 NOTE — TELEPHONE ENCOUNTER
----- Message from Demi Ponce MD sent at 9/18/2020  7:43 AM EDT -----  Triglycerides are slightly elevated but stable.  A1c is stable at 6.4.  Hepatitis C screen was negative.  Kidney function is stable  She has some mild borderline anemia.  I would like her to return in 3-4 weeks at her convenience for lab walk-in visit (nonfasting) to repeat this.

## 2020-12-29 RX ORDER — AMLODIPINE BESYLATE 5 MG/1
5 TABLET ORAL DAILY
Qty: 90 TABLET | Refills: 1 | Status: SHIPPED | OUTPATIENT
Start: 2020-12-29 | End: 2021-01-18 | Stop reason: SDUPTHER

## 2020-12-29 RX ORDER — CLOPIDOGREL BISULFATE 75 MG/1
75 TABLET ORAL DAILY
Qty: 90 TABLET | Refills: 1 | Status: SHIPPED | OUTPATIENT
Start: 2020-12-29 | End: 2021-01-18 | Stop reason: SDUPTHER

## 2020-12-29 NOTE — TELEPHONE ENCOUNTER
Caller: [facility] - [place], [place] - [address] - [phone] [facility] [phone] FX    Relationship: Pharmacy    Best call back number: [phone]    Medication needed:   Requested Prescriptions     Pending Prescriptions Disp Refills   • clopidogrel (PLAVIX) 75 MG tablet 90 tablet 1     Sig: Take 1 tablet by mouth Daily.   • amLODIPine (NORVASC) 5 MG tablet 90 tablet 1     Sig: Take 1 tablet by mouth Daily.       When do you need the refill by: ASAP    What details did the patient provide when requesting the medication: MAIL ORDER, PATIENT GETTING LOW  Does the patient have less than a 3 day supply:  [x] Yes  [] No    What is the patient's preferred pharmacy: [facility] - [place], [place] - [address] - [phone] [facility] [phone] FX

## 2021-01-04 ENCOUNTER — APPOINTMENT (OUTPATIENT)
Dept: GENERAL RADIOLOGY | Facility: HOSPITAL | Age: 76
End: 2021-01-04

## 2021-01-04 ENCOUNTER — HOSPITAL ENCOUNTER (EMERGENCY)
Facility: HOSPITAL | Age: 76
Discharge: HOME | End: 2021-01-04
Attending: EMERGENCY MEDICINE | Admitting: EMERGENCY MEDICINE

## 2021-01-04 VITALS
OXYGEN SATURATION: 96 % | HEIGHT: 66 IN | TEMPERATURE: 96.9 F | RESPIRATION RATE: 18 BRPM | SYSTOLIC BLOOD PRESSURE: 136 MMHG | WEIGHT: 180 LBS | BODY MASS INDEX: 28.93 KG/M2 | DIASTOLIC BLOOD PRESSURE: 107 MMHG | HEART RATE: 82 BPM

## 2021-01-04 DIAGNOSIS — S61.213A LACERATION OF LEFT MIDDLE FINGER WITHOUT FOREIGN BODY WITHOUT DAMAGE TO NAIL, INITIAL ENCOUNTER: Primary | ICD-10-CM

## 2021-01-04 PROCEDURE — 25010000002 TDAP 5-2.5-18.5 LF-MCG/0.5 SUSPENSION: Performed by: EMERGENCY MEDICINE

## 2021-01-04 PROCEDURE — 90715 TDAP VACCINE 7 YRS/> IM: CPT | Performed by: EMERGENCY MEDICINE

## 2021-01-04 PROCEDURE — 90471 IMMUNIZATION ADMIN: CPT | Performed by: EMERGENCY MEDICINE

## 2021-01-04 PROCEDURE — 99283 EMERGENCY DEPT VISIT LOW MDM: CPT

## 2021-01-04 PROCEDURE — 73140 X-RAY EXAM OF FINGER(S): CPT

## 2021-01-04 RX ADMIN — TETANUS TOXOID, REDUCED DIPHTHERIA TOXOID AND ACELLULAR PERTUSSIS VACCINE, ADSORBED 0.5 ML: 5; 2.5; 8; 8; 2.5 SUSPENSION INTRAMUSCULAR at 18:26

## 2021-01-04 NOTE — ED PROVIDER NOTES
Subjective   75-year-old female presents to the emergency department with a left third digit laceration.    Patient reports she was slicing an onion and sliced off the tip of her left third digit.  She reports discomfort at the site and bleeding since then.  She has no other injury of the remainder of the finger.  She denies any other trauma or injury since then.  She is on blood thinners by her report and had continued oozing prompting ED evaluation.  In review of her med list however she is on clopidogrel and aspirin without any Eliquis Xarelto Pradaxa or Coumadin.    She denies any fevers chills cough congestion loss of taste or smell sore throat rhinorrhea nausea vomiting diarrhea or coronavirus exposure or coronavirus symptoms otherwise.  She denies any dysuria frequency urgency or urinary symptoms.  No other complaints at the current time except as above          Review of Systems   Constitutional: Negative.  Negative for chills and fever.   HENT: Negative.  Negative for congestion.    Eyes: Negative.    Respiratory: Negative.  Negative for cough and shortness of breath.    Cardiovascular: Negative.  Negative for chest pain and palpitations.   Gastrointestinal: Negative.  Negative for abdominal pain, diarrhea, nausea and vomiting.   Genitourinary: Negative.    Musculoskeletal: Negative for myalgias.   Skin: Negative.    Neurological: Negative.    All other systems reviewed and are negative.      Past Medical History:   Diagnosis Date   • Anemia 9/18/2016   • Anesthesia complication     per patient with hip surgery last april did not remember being in hospital or going home can only rmember from rehad on   • Anesthesia complication     with hip surgery in april 2018 had issues with swallowing after surgery called in speech   • Arthritis    • Cancer (CMS/HCC)     cervical   • Cataract    • Colon polyp 1/31/2019    Cecal polyp 5 years Dr. 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   • Wears glasses        Allergies   Allergen Reactions   • Codeine Hives       Past Surgical History:   Procedure Laterality Date   • CARDIAC CATHETERIZATION     • CHOLECYSTECTOMY     • COLONOSCOPY     • EYE SURGERY      bilateral cataract   • HYSTERECTOMY      partial   • JOINT REPLACEMENT      hip surgery 2018 in april   • OOPHORECTOMY      one removed   • TONSILLECTOMY     • TOTAL HIP ARTHROPLASTY Right 4/19/2018    Procedure: RIGHT TOTAL HIP ARTHROPLASTY;  Surgeon: Carlos Pollack MD;  Location:  [facility];  Service: Orthopedics   • TOTAL HIP ARTHROPLASTY Left 2/12/2019    Procedure: TOTAL HIP ARTHROPLASTY LEFT;  Surgeon: Carlos Pollack MD;  Location:  [facility];  Service: Orthopedics   • WRIST SURGERY      metal plate       Family History   Problem Relation Age of Onset   • Arthritis Mother    • Heart attack Mother    • Hypertension Mother    • Hyperlipidemia Mother    • Osteoporosis Mother    • Heart disease Sister    • Diabetes Sister    • Arthritis Sister    • Heart attack Sister    • Hypertension Sister    • Hyperlipidemia Sister    • Bleeding Disorder Sister    • Heart disease Brother    • Cancer Brother    • Diabetes Brother    • Arthritis Brother    • Heart attack Brother    • Hypertension Brother    • Hyperlipidemia Brother    • Ovarian cancer Daughter    • Cancer Daughter    • Ovarian cancer Other         grandaughter   • Ovarian cancer Other         granddaughter   • Heart attack Father    • Hypertension Father    • Stroke Father    • Kidney disease Paternal  Uncle    • Liver disease Paternal Uncle    • Cancer Other    • Stroke Other    • Diabetes Other    • Breast cancer Neg Hx        Social History     Socioeconomic History   • Marital status:      Spouse name: Not on file   • Number of children: Not on file   • Years of education: Not on file   • Highest education level: Not on file   Tobacco Use   • Smoking status: Never Smoker   • Smokeless tobacco: Never Used   Substance and Sexual Activity   • Alcohol use: No   • Drug use: No   • Sexual activity: Defer     Comment:            Objective   Physical Exam  Vitals signs and nursing note reviewed.   Constitutional:       General: She is not in acute distress.  HENT:      Head: Normocephalic and atraumatic.   Eyes:      Conjunctiva/sclera: Conjunctivae normal.   Neck:      Musculoskeletal: Normal range of motion.   Cardiovascular:      Rate and Rhythm: Normal rate and regular rhythm.      Heart sounds: Normal heart sounds.   Pulmonary:      Effort: Pulmonary effort is normal. No respiratory distress.      Breath sounds: Normal breath sounds. No wheezing or rales.   Chest:      Chest wall: No tenderness.   Abdominal:      General: Bowel sounds are normal. There is no distension.      Palpations: Abdomen is soft.      Tenderness: There is no abdominal tenderness.   Musculoskeletal: Normal range of motion.      Comments: Avulsion laceration of the distal tip of the third digit left upper extremity only.  Approximately 0.5 by less than 0.5 cm.  Mild ooze from the site.  Good perfusion of the digit.  No other lesions or traumatic changes.  Normal sensation   Skin:     General: Skin is warm and dry.   Neurological:      Mental Status: She is alert.         Procedures           ED Course  ED Course as of Jan 04 2258   Mon Jan 04, 2021   1820 Patient is treated with Surgicel after evaluation of the wound and cleansing by me.  With minimal pressure the bleeding completely resolved.  I dressed the wound without any  pressure, however she had recurrent oozing.  She is redressed with light pressure to the tip only with resolution of bleeding.  Tetanus is updatedI discussed wound care with the patient and she understands that minimal simple pressure over the area of avulsed tissue is adequate for hemostasis.  I have given her ample supplies for discharge.  We discussed suturing the wound however she is very comfortable with discharge with current supplies and supportive care.I discussed indications for return to the ED and follow-upVery pleasant responsible patient verbalized understanding agreement plan of care, indications for follow-up and indications return    [HH]      ED Course User Index  [HH] Paulo Purvis MD                                           [facility]    Final diagnoses:   Laceration of left middle finger without foreign body without damage to nail, initial encounter            Paulo Purvis MD  01/04/21 [id number]

## 2021-01-04 NOTE — DISCHARGE INSTRUCTIONS
Elevate your finger.  Leave the Surgicel on the site.  You may replace this in 4 hours.  Keep the finger bandaged with some mild pressure on the site for several hours until it is completely clotted.  You may replace the Surgicel if needed and I have given you additional to go home with.    Return to the ED at once if you have signs of infection, significant bleeding, or any other acute urgent emergent or significant symptoms as discussed

## 2021-01-11 NOTE — PROGRESS NOTES
OFFICE PROGRESS NOTE    Chief Complaint   Patient presents with   • Follow-up     4 month f/u      Last seen 9/17/2020 ([facility])    HPI: 75 y.o. female here for:    1.  CVA:  In 2019.  On aspirin 81 mg daily, Plavix 75 mg daily, Lipitor 80 mg daily.  Stable.  Lab Results   Component Value Date    CHOL 145 10/04/2019    CHLPL 135 09/17/2020    TRIG 210 (H) 09/17/2020    HDL 32 (L) 09/17/2020    LDL 61 09/17/2020     2.  Type 2 diabetes:  On metformin  500 mg twice daily (intolerant of higher doses due to GI upset) and glimepiride 4 mg daily.   Last foot exam: Sees podiatry. Last 1/13/21.  Last eye exam: 12/7/19 (Dr. Chávez). Scheduled 2/1/21.   Last microalbumin: 6/17/20  FSBG: doesn't check.    CMP:  Lab Results   Component Value Date    GLU 82 09/17/2020    BUN 12 09/17/2020    CREATININE 1.01 (H) 09/17/2020    EGFRIFNONA 53 (L) 09/17/2020    EGFRIFAFRI 65 09/17/2020    BCR 11.9 09/17/2020     09/17/2020    K 3.8 09/17/2020    CO2 24.9 09/17/2020    CALCIUM 9.8 09/17/2020    PROTENTOTREF 6.3 06/17/2020    ALBUMIN 4.20 06/17/2020    LABGLOBREF 2.1 06/17/2020    LABIL2 2.0 06/17/2020    BILITOT 0.3 06/17/2020    ALKPHOS 80 06/17/2020    AST 24 06/17/2020    ALT 17 06/17/2020     HbA1c:  Lab Results   Component Value Date    HGBA1C 6.40 (H) 09/17/2020    HGBA1C 7.80 (H) 06/17/2020     3.  Hypertension:  On amlodipine 5 mg daily.  Denies headache, chest pain, shortness of breath, palpitations, PND, lower extremity edema.     4.  Chronic pain:  Secondary to arthritis and neuropathy.  She takes meloxicam 7.5 mg daily and gabapentin 400 mg 3 times daily.  Per 7/22/2020 phone note, podiatry is now writing for gabapentin as I would not write 90-day supplies for controlled substance.     5. GERD/Intestinal gas:  Stable on prilosec 20mg BID. C/o several weeks increased intestinal gas (flatus) not a/w new dietary changes, V/D, blood in stools, unintended wt loss, abd pain.    6. Anemia:  Was supposed to stop by in 2 to 3  "weeks after labs as below for repeat but has not done.  She denies any signs/symptoms of excess blood loss.  Lab Results   Component Value Date    WBC 9.92 09/17/2020    HGB 11.8 (L) 09/17/2020    HCT 35.3 09/17/2020    MCV 91.5 09/17/2020     09/17/2020       Review of Systems  Constitutional: Negative for activity change, appetite change and fever.   HENT: Negative for congestion, sneezing and sore throat.    Eyes: Negative for discharge and visual disturbance.   Respiratory: Negative for cough and shortness of breath.    Cardiovascular: Negative for chest pain and palpitations.   Gastrointestinal: Negative for abdominal distention, abdominal pain, blood in stool, constipation, nausea and vomiting.   Endocrine: Negative for cold intolerance and heat intolerance.   Genitourinary: Negative for dysuria.   Musculoskeletal: Positive for arthralgias (Chronic, stable). Negative for neck stiffness.   Skin: Negative for rash.   Allergic/Immunologic: Negative for environmental allergies and food allergies.   Neurological: Positive for numbness (And tingling, chronic/stable). Negative for headache.   Hematological: Negative for adenopathy.   Psychiatric/Behavioral: Negative for depressed mood.     The following portions of the patient's history were reviewed and updated as appropriate: allergies, current medications, past family history, past medical history, past social history, past surgical history and problem list.      Physical Exam:  Vitals:    01/18/21 1028   BP: 122/70   BP Location: Right arm   Patient Position: Sitting   Cuff Size: Adult   Pulse: 69   Resp: 18   Temp: 97.9 °F (36.6 °C)   TempSrc: Temporal   SpO2: 97%   Weight: 80 kg (176 lb 6.4 oz)   Height: 166.4 cm (65.5\")       Physical Exam   Constitutional: She is oriented to person, place, and time. She appears well-developed and well-nourished. No distress.   HENT:   Head: Normocephalic and atraumatic.   Right Ear: Tympanic membrane and external " ear normal.   Left Ear: Tympanic membrane and external ear normal.   Nose: Nose normal.   Mouth/Throat: Uvula is midline, oropharynx is clear and moist and mucous membranes are normal. No oropharyngeal exudate.   Eyes: Pupils are equal, round, and reactive to light. Conjunctivae and EOM are normal. Right eye exhibits no discharge. Left eye exhibits no discharge. No scleral icterus.   Neck: Normal range of motion. Neck supple. No JVD present. No tracheal deviation present. No thyromegaly present.   Cardiovascular: Normal rate, regular rhythm and normal heart sounds. Exam reveals no gallop and no friction rub.   No murmur heard.  Pulmonary/Chest: Effort normal and breath sounds normal. No stridor. No respiratory distress. She has no wheezes. She has no rales.   Abdominal: Soft. Bowel sounds are normal. She exhibits no distension and no mass. There is no tenderness. There is no rebound and no guarding.   Musculoskeletal: She exhibits no edema.   Ambulates with steady, unassisted gait.   Lymphadenopathy:     She has no cervical adenopathy.   Neurological: She is alert and oriented to person, place, and time. She exhibits normal muscle tone.   Skin: Skin is warm and dry. Capillary refill takes less than 2 seconds. No rash noted. She is not diaphoretic.   Psychiatric: She has a normal mood and affect.   Vitals reviewed.    Assesment and Plan: 75 y.o. female here for:  Essential hypertension  Stable on amlodipine.  Continue.  BMP today.    Mixed hyperlipidemia  Stable on Lipitor.  Continue.    Controlled type 2 diabetes mellitus without complication, without long-term current use of insulin (CMS/[facility])  A1c today.  Continue Metformin/glimepiride.  Foot exam up-to-date per podiatry.  Microalbumin up-to-date.  Eye exam is scheduled.    Gastroesophageal reflux disease without esophagitis  Stable on Prilosec.  Continue.  Due to increased intestinal gas, recommend trial of simethicone 4 times daily as needed; Rx sent.  Also  discussed lifestyle recommendations including avoiding any carbonated beverages.  If not better will let me know.    Chronic kidney disease (CKD), stage III (moderate) (CMS/HCC)  BMP today.  Continue to minimize NSAID use when able.    Anemia  Possibly lab error versus related to mild CKD.  Repeat CBC today.  If worsening, pursue further work-up.    Cerebrovascular accident (CVA) due to thrombosis of right middle cerebral artery (CMS/HCC)  History of CVA in 2018.  Stable on aspirin, Plavix and statin.    Diabetic polyneuropathy associated with type 2 diabetes mellitus (CMS/HCC)  Stable on gabapentin per podiatry.      Return in about 4 months (around 5/18/2021) for Follow-up 30 min re-establish, As needed if no improvement or new symptoms.    Demi Ponce MD  1/18/2021

## 2021-01-18 ENCOUNTER — OFFICE VISIT (OUTPATIENT)
Dept: INTERNAL MEDICINE | Facility: CLINIC | Age: 76
End: 2021-01-18

## 2021-01-18 VITALS
SYSTOLIC BLOOD PRESSURE: 122 MMHG | WEIGHT: 176.4 LBS | BODY MASS INDEX: 28.35 KG/M2 | HEIGHT: 66 IN | OXYGEN SATURATION: 97 % | RESPIRATION RATE: 18 BRPM | DIASTOLIC BLOOD PRESSURE: 70 MMHG | TEMPERATURE: 97.9 F | HEART RATE: 69 BPM

## 2021-01-18 DIAGNOSIS — E78.2 MIXED HYPERLIPIDEMIA: ICD-10-CM

## 2021-01-18 DIAGNOSIS — D64.9 ANEMIA, UNSPECIFIED TYPE: ICD-10-CM

## 2021-01-18 DIAGNOSIS — E11.42 DIABETIC POLYNEUROPATHY ASSOCIATED WITH TYPE 2 DIABETES MELLITUS (HCC): ICD-10-CM

## 2021-01-18 DIAGNOSIS — E11.9 CONTROLLED TYPE 2 DIABETES MELLITUS WITHOUT COMPLICATION, WITHOUT LONG-TERM CURRENT USE OF INSULIN (HCC): Primary | ICD-10-CM

## 2021-01-18 DIAGNOSIS — I10 ESSENTIAL HYPERTENSION: ICD-10-CM

## 2021-01-18 DIAGNOSIS — I63.311 CEREBROVASCULAR ACCIDENT (CVA) DUE TO THROMBOSIS OF RIGHT MIDDLE CEREBRAL ARTERY (HCC): ICD-10-CM

## 2021-01-18 DIAGNOSIS — N18.30 STAGE 3 CHRONIC KIDNEY DISEASE, UNSPECIFIED WHETHER STAGE 3A OR 3B CKD (HCC): Chronic | ICD-10-CM

## 2021-01-18 DIAGNOSIS — K21.9 GASTROESOPHAGEAL REFLUX DISEASE WITHOUT ESOPHAGITIS: ICD-10-CM

## 2021-01-18 PROBLEM — Z79.899 HIGH RISK MEDICATION USE: Status: RESOLVED | Noted: 2020-06-17 | Resolved: 2021-01-18

## 2021-01-18 PROCEDURE — 99214 OFFICE O/P EST MOD 30 MIN: CPT | Performed by: INTERNAL MEDICINE

## 2021-01-18 PROCEDURE — 36415 COLL VENOUS BLD VENIPUNCTURE: CPT | Performed by: INTERNAL MEDICINE

## 2021-01-18 RX ORDER — CLOPIDOGREL BISULFATE 75 MG/1
75 TABLET ORAL DAILY
Qty: 90 TABLET | Refills: 1 | Status: SHIPPED | OUTPATIENT
Start: 2021-01-18 | End: 2022-03-01 | Stop reason: SDUPTHER

## 2021-01-18 RX ORDER — AMLODIPINE BESYLATE 5 MG/1
5 TABLET ORAL DAILY
Qty: 90 TABLET | Refills: 1 | Status: SHIPPED | OUTPATIENT
Start: 2021-01-18 | End: 2021-05-18 | Stop reason: SDUPTHER

## 2021-01-18 RX ORDER — SIMETHICONE 80 MG
80 TABLET,CHEWABLE ORAL EVERY 6 HOURS PRN
Qty: 120 TABLET | Refills: 1 | Status: SHIPPED | OUTPATIENT
Start: 2021-01-18

## 2021-01-18 NOTE — ASSESSMENT & PLAN NOTE
Possibly lab error versus related to mild CKD.  Repeat CBC today.  If worsening, pursue further work-up.

## 2021-01-18 NOTE — ASSESSMENT & PLAN NOTE
A1c today.  Continue Metformin/glimepiride.  Foot exam up-to-date per podiatry.  Microalbumin up-to-date.  Eye exam is scheduled.

## 2021-01-18 NOTE — ASSESSMENT & PLAN NOTE
Stable on Prilosec.  Continue.  Due to increased intestinal gas, recommend trial of simethicone 4 times daily as needed; Rx sent.  Also discussed lifestyle recommendations including avoiding any carbonated beverages.  If not better will let me know.

## 2021-01-19 LAB
BUN SERPL-MCNC: 16 MG/DL (ref 8–23)
BUN/CREAT SERPL: 14.3 (ref 7–25)
CALCIUM SERPL-MCNC: 10.3 MG/DL (ref 8.6–10.5)
CHLORIDE SERPL-SCNC: 104 MMOL/L (ref 98–107)
CO2 SERPL-SCNC: 26.9 MMOL/L (ref 22–29)
CREAT SERPL-MCNC: 1.12 MG/DL (ref 0.57–1)
ERYTHROCYTE [DISTWIDTH] IN BLOOD BY AUTOMATED COUNT: 12.5 % (ref 12.3–15.4)
GLUCOSE SERPL-MCNC: 69 MG/DL (ref 65–99)
HBA1C MFR BLD: 6.6 % (ref 4.8–5.6)
HCT VFR BLD AUTO: 38 % (ref 34–46.6)
HGB BLD-MCNC: 12.5 G/DL (ref 12–15.9)
MCH RBC QN AUTO: 30.2 PG (ref 26.6–33)
MCHC RBC AUTO-ENTMCNC: 32.9 G/DL (ref 31.5–35.7)
MCV RBC AUTO: 91.8 FL (ref 79–97)
PLATELET # BLD AUTO: 415 10*3/MM3 (ref 140–450)
POTASSIUM SERPL-SCNC: 4.4 MMOL/L (ref 3.5–5.2)
RBC # BLD AUTO: 4.14 10*6/MM3 (ref 3.77–5.28)
SODIUM SERPL-SCNC: 139 MMOL/L (ref 136–145)
WBC # BLD AUTO: 10.22 10*3/MM3 (ref 3.4–10.8)

## 2021-02-04 RX ORDER — ATORVASTATIN CALCIUM 80 MG/1
TABLET, FILM COATED ORAL
Qty: 90 TABLET | Refills: 1 | Status: SHIPPED | OUTPATIENT
Start: 2021-02-04 | End: 2021-11-18 | Stop reason: SDUPTHER

## 2021-03-25 DIAGNOSIS — E11.9 CONTROLLED TYPE 2 DIABETES MELLITUS WITHOUT COMPLICATION, WITHOUT LONG-TERM CURRENT USE OF INSULIN (HCC): Primary | ICD-10-CM

## 2021-03-25 RX ORDER — GLIMEPIRIDE 4 MG/1
4 TABLET ORAL
Qty: 90 TABLET | Refills: 1 | Status: SHIPPED | OUTPATIENT
Start: 2021-03-25 | End: 2022-05-09 | Stop reason: SDUPTHER

## 2021-04-08 ENCOUNTER — TRANSCRIBE ORDERS (OUTPATIENT)
Dept: ADMINISTRATIVE | Facility: HOSPITAL | Age: 76
End: 2021-04-08

## 2021-04-08 DIAGNOSIS — Z12.31 VISIT FOR SCREENING MAMMOGRAM: Primary | ICD-10-CM

## 2021-04-16 ENCOUNTER — APPOINTMENT (OUTPATIENT)
Dept: MAMMOGRAPHY | Facility: HOSPITAL | Age: 76
End: 2021-04-16

## 2021-05-18 ENCOUNTER — OFFICE VISIT (OUTPATIENT)
Dept: INTERNAL MEDICINE | Facility: CLINIC | Age: 76
End: 2021-05-18

## 2021-05-18 VITALS
RESPIRATION RATE: 16 BRPM | DIASTOLIC BLOOD PRESSURE: 70 MMHG | BODY MASS INDEX: 28.93 KG/M2 | TEMPERATURE: 97.3 F | WEIGHT: 180 LBS | OXYGEN SATURATION: 96 % | HEART RATE: 76 BPM | HEIGHT: 66 IN | SYSTOLIC BLOOD PRESSURE: 118 MMHG

## 2021-05-18 DIAGNOSIS — R21 RASH AND NONSPECIFIC SKIN ERUPTION: ICD-10-CM

## 2021-05-18 DIAGNOSIS — I63.311 CEREBROVASCULAR ACCIDENT (CVA) DUE TO THROMBOSIS OF RIGHT MIDDLE CEREBRAL ARTERY (HCC): ICD-10-CM

## 2021-05-18 DIAGNOSIS — I10 ESSENTIAL HYPERTENSION: Primary | ICD-10-CM

## 2021-05-18 DIAGNOSIS — E78.2 MIXED HYPERLIPIDEMIA: ICD-10-CM

## 2021-05-18 DIAGNOSIS — K21.9 GASTROESOPHAGEAL REFLUX DISEASE WITHOUT ESOPHAGITIS: ICD-10-CM

## 2021-05-18 DIAGNOSIS — E11.42 DIABETIC POLYNEUROPATHY ASSOCIATED WITH TYPE 2 DIABETES MELLITUS (HCC): ICD-10-CM

## 2021-05-18 DIAGNOSIS — N18.30 STAGE 3 CHRONIC KIDNEY DISEASE, UNSPECIFIED WHETHER STAGE 3A OR 3B CKD (HCC): Chronic | ICD-10-CM

## 2021-05-18 DIAGNOSIS — E11.9 CONTROLLED TYPE 2 DIABETES MELLITUS WITHOUT COMPLICATION, WITHOUT LONG-TERM CURRENT USE OF INSULIN (HCC): ICD-10-CM

## 2021-05-18 LAB
EXPIRATION DATE: NORMAL
HBA1C MFR BLD: 6.7 %
Lab: NORMAL

## 2021-05-18 PROCEDURE — 3044F HG A1C LEVEL LT 7.0%: CPT | Performed by: PHYSICIAN ASSISTANT

## 2021-05-18 PROCEDURE — 99214 OFFICE O/P EST MOD 30 MIN: CPT | Performed by: PHYSICIAN ASSISTANT

## 2021-05-18 PROCEDURE — 83036 HEMOGLOBIN GLYCOSYLATED A1C: CPT | Performed by: PHYSICIAN ASSISTANT

## 2021-05-18 RX ORDER — AMLODIPINE BESYLATE 5 MG/1
5 TABLET ORAL DAILY
Qty: 90 TABLET | Refills: 1 | Status: SHIPPED | OUTPATIENT
Start: 2021-05-18 | End: 2021-09-20

## 2021-05-18 RX ORDER — OMEPRAZOLE 20 MG/1
20 CAPSULE, DELAYED RELEASE ORAL 2 TIMES DAILY
Qty: 180 CAPSULE | Refills: 1 | Status: SHIPPED | OUTPATIENT
Start: 2021-05-18 | End: 2021-09-20

## 2021-06-17 ENCOUNTER — HOSPITAL ENCOUNTER (OUTPATIENT)
Dept: MAMMOGRAPHY | Facility: HOSPITAL | Age: 76
Discharge: HOME | End: 2021-06-17
Admitting: PHYSICIAN ASSISTANT

## 2021-06-17 DIAGNOSIS — Z12.31 VISIT FOR SCREENING MAMMOGRAM: ICD-10-CM

## 2021-06-17 PROCEDURE — 77063 BREAST TOMOSYNTHESIS BI: CPT | Performed by: RADIOLOGY

## 2021-06-17 PROCEDURE — 77063 BREAST TOMOSYNTHESIS BI: CPT

## 2021-06-17 PROCEDURE — 77067 SCR MAMMO BI INCL CAD: CPT

## 2021-06-17 PROCEDURE — 77067 SCR MAMMO BI INCL CAD: CPT | Performed by: RADIOLOGY

## 2021-07-22 ENCOUNTER — OFFICE VISIT (OUTPATIENT)
Dept: FAMILY MEDICINE CLINIC | Facility: CLINIC | Age: 76
End: 2021-07-22

## 2021-07-22 VITALS
BODY MASS INDEX: 29.09 KG/M2 | DIASTOLIC BLOOD PRESSURE: 70 MMHG | TEMPERATURE: 96.9 F | HEART RATE: 68 BPM | HEIGHT: 66 IN | SYSTOLIC BLOOD PRESSURE: 138 MMHG | OXYGEN SATURATION: 97 % | RESPIRATION RATE: 16 BRPM | WEIGHT: 181 LBS

## 2021-07-22 DIAGNOSIS — E11.42 DIABETIC POLYNEUROPATHY ASSOCIATED WITH TYPE 2 DIABETES MELLITUS (HCC): ICD-10-CM

## 2021-07-22 DIAGNOSIS — E11.9 CONTROLLED TYPE 2 DIABETES MELLITUS WITHOUT COMPLICATION, WITHOUT LONG-TERM CURRENT USE OF INSULIN (HCC): ICD-10-CM

## 2021-07-22 DIAGNOSIS — I63.311 CEREBROVASCULAR ACCIDENT (CVA) DUE TO THROMBOSIS OF RIGHT MIDDLE CEREBRAL ARTERY (HCC): ICD-10-CM

## 2021-07-22 DIAGNOSIS — E78.2 MIXED HYPERLIPIDEMIA: Primary | ICD-10-CM

## 2021-07-22 DIAGNOSIS — N18.30 STAGE 3 CHRONIC KIDNEY DISEASE, UNSPECIFIED WHETHER STAGE 3A OR 3B CKD (HCC): Chronic | ICD-10-CM

## 2021-07-22 DIAGNOSIS — I10 ESSENTIAL HYPERTENSION: ICD-10-CM

## 2021-07-22 DIAGNOSIS — K21.9 GASTROESOPHAGEAL REFLUX DISEASE WITHOUT ESOPHAGITIS: ICD-10-CM

## 2021-07-22 PROCEDURE — 99213 OFFICE O/P EST LOW 20 MIN: CPT | Performed by: FAMILY MEDICINE

## 2021-07-22 NOTE — PROGRESS NOTES
Subjective   Nely Grider is a 75 y.o. female    Chief Complaint    Establish primary care   Hypertension   Type 2 diabetes   Previous stroke   Diabetic neuropathy   Osteoarthritis   Hyperlipidemia     History of Present Illness  The patient presents today for an establish primary care. She has a history of previous stroke, diabetic neuropathy, osteoarthritis, and hyperlipidemia. The patient has had 2 hip arthroplasties and broke her arm. The patient notes she had a stroke in the past. She has received her first dose of the COVID vaccine.    She sees a podiatrist, Dr. Gonzalez at [facility], for her toes every 3 months. The patient also sees a dermatologist, Dr. Nora Mathew, for little red spots on her legs, which were biopsied and were benign. She said they pop up and then get big and go away. She has applied hot compresses to it and then it bursts and comes back again.    The following portions of the patient's history were reviewed and updated as appropriate: allergies, current medications, past social history and problem list    Review of Systems   Constitutional: Negative for appetite change, chills, diaphoresis, fatigue, fever and unexpected weight change.   Eyes: Negative for visual disturbance.   Respiratory: Negative for cough, chest tightness, shortness of breath and wheezing.    Cardiovascular: Negative for chest pain, palpitations and leg swelling.   Gastrointestinal: Negative for abdominal pain, diarrhea, nausea and vomiting.   Endocrine: Negative for polydipsia, polyphagia and polyuria.   Genitourinary: Negative for dysuria, frequency and urgency.   Musculoskeletal: Negative for arthralgias, back pain and myalgias.   Skin: Negative for color change and rash.   Neurological: Negative for dizziness, syncope, weakness, light-headedness, numbness and headaches.   Hematological: Negative for adenopathy. Does not bruise/bleed easily.       Objective     Vitals:    07/22/21 1305   BP: 138/70    Pulse: 68   Resp: 16   Temp: 96.9 °F (36.1 °C)   SpO2: 97%       Physical Exam  Vitals and nursing note reviewed.   Constitutional:       Appearance: She is well-developed. She is not diaphoretic.   HENT:      Head: Normocephalic.   Eyes:      Conjunctiva/sclera: Conjunctivae normal.      Pupils: Pupils are equal, round, and reactive to light.   Neck:      Thyroid: No thyromegaly.      Vascular: No JVD.   Cardiovascular:      Rate and Rhythm: Normal rate and regular rhythm.      Pulses: Normal pulses.      Heart sounds: Normal heart sounds. No murmur heard.     Pulmonary:      Effort: Pulmonary effort is normal. No respiratory distress.      Breath sounds: Normal breath sounds.   Abdominal:      General: Bowel sounds are normal.      Palpations: Abdomen is soft.      Tenderness: There is no abdominal tenderness.   Musculoskeletal:      Cervical back: Neck supple.   Lymphadenopathy:      Cervical: No cervical adenopathy.   Skin:     General: Skin is warm and dry.      Findings: No rash.   Neurological:      Mental Status: She is alert and oriented to person, place, and time.      Sensory: No sensory deficit.   Psychiatric:         Behavior: Behavior normal.         Assessment/Plan   Problems Addressed this Visit        Cardiac and Vasculature    Mixed hyperlipidemia - Primary    Essential hypertension       Endocrine and Metabolic    Controlled type 2 diabetes mellitus without complication, without long-term current use of insulin (CMS/HCC)       Gastrointestinal Abdominal     Gastroesophageal reflux disease without esophagitis       Genitourinary and Reproductive     Chronic kidney disease (CKD), stage III (moderate) (CMS/HCC) (Chronic)       Neuro    Diabetic polyneuropathy associated with type 2 diabetes mellitus (CMS/HCC)    Cerebrovascular accident (CVA) due to thrombosis of right middle cerebral artery (CMS/HCC)      Diagnoses       Codes Comments    Mixed hyperlipidemia    -  Primary ICD-10-CM:  E78.2  ICD-9-CM: 272.2     Essential hypertension     ICD-10-CM: I10  ICD-9-CM: 401.9     Controlled type 2 diabetes mellitus without complication, without long-term current use of insulin (CMS/[facility])     ICD-10-CM: E11.9  ICD-9-CM: 250.00     Gastroesophageal reflux disease without esophagitis     ICD-10-CM: K21.9  ICD-9-CM: 530.81     Stage 3 chronic kidney disease, unspecified whether stage 3a or 3b CKD (CMS/[facility])     ICD-10-CM: N18.30  ICD-9-CM: 585.3     Diabetic polyneuropathy associated with type 2 diabetes mellitus (CMS/[facility])     ICD-10-CM: E11.42  ICD-9-CM: 250.60, 357.2     Cerebrovascular accident (CVA) due to thrombosis of right middle cerebral artery (CMS/[facility])     ICD-10-CM: I63.311  ICD-9-CM: 434.01         Continue current medications without changes  Refill medications when necessary.  Schedule annual health maintenance and Medicare wellness visit.  Lab studies at appointment visit.    Please note that portions of this document were completed with a voice recognition program. Efforts were made to edit the dictations, but occasionally words are mis-transcribed        Scribed for DEENA Foote MD by Yaquelin Brooks.  07/22/21   13:54 EDT    I have personally performed the services described in this document as scribed by the above individual, and it is both accurate and complete.  DEENA Foote MD  7/22/2021  16:34 EDT

## 2021-09-20 DIAGNOSIS — I10 ESSENTIAL HYPERTENSION: ICD-10-CM

## 2021-09-20 DIAGNOSIS — K21.9 GASTROESOPHAGEAL REFLUX DISEASE WITHOUT ESOPHAGITIS: ICD-10-CM

## 2021-09-20 RX ORDER — OMEPRAZOLE 20 MG/1
CAPSULE, DELAYED RELEASE ORAL
Qty: 180 CAPSULE | Refills: 1 | Status: SHIPPED | OUTPATIENT
Start: 2021-09-20 | End: 2022-02-22

## 2021-09-20 RX ORDER — AMLODIPINE BESYLATE 5 MG/1
TABLET ORAL
Qty: 90 TABLET | Refills: 1 | Status: SHIPPED | OUTPATIENT
Start: 2021-09-20 | End: 2022-02-22

## 2021-10-14 ENCOUNTER — OFFICE VISIT (OUTPATIENT)
Dept: FAMILY MEDICINE CLINIC | Facility: CLINIC | Age: 76
End: 2021-10-14

## 2021-10-14 VITALS
RESPIRATION RATE: 15 BRPM | OXYGEN SATURATION: 99 % | DIASTOLIC BLOOD PRESSURE: 88 MMHG | TEMPERATURE: 98 F | HEART RATE: 69 BPM | WEIGHT: 181.8 LBS | BODY MASS INDEX: 29.22 KG/M2 | HEIGHT: 66 IN | SYSTOLIC BLOOD PRESSURE: 134 MMHG

## 2021-10-14 DIAGNOSIS — E11.9 CONTROLLED TYPE 2 DIABETES MELLITUS WITHOUT COMPLICATION, WITHOUT LONG-TERM CURRENT USE OF INSULIN (HCC): ICD-10-CM

## 2021-10-14 DIAGNOSIS — E78.2 MIXED HYPERLIPIDEMIA: ICD-10-CM

## 2021-10-14 DIAGNOSIS — Z00.00 ANNUAL PHYSICAL EXAM: Primary | ICD-10-CM

## 2021-10-14 DIAGNOSIS — K21.9 GASTROESOPHAGEAL REFLUX DISEASE WITHOUT ESOPHAGITIS: ICD-10-CM

## 2021-10-14 DIAGNOSIS — N18.30 STAGE 3 CHRONIC KIDNEY DISEASE, UNSPECIFIED WHETHER STAGE 3A OR 3B CKD (HCC): ICD-10-CM

## 2021-10-14 DIAGNOSIS — Z00.00 MEDICARE ANNUAL WELLNESS VISIT, SUBSEQUENT: ICD-10-CM

## 2021-10-14 DIAGNOSIS — E11.42 DIABETIC POLYNEUROPATHY ASSOCIATED WITH TYPE 2 DIABETES MELLITUS (HCC): ICD-10-CM

## 2021-10-14 DIAGNOSIS — I63.311 CEREBROVASCULAR ACCIDENT (CVA) DUE TO THROMBOSIS OF RIGHT MIDDLE CEREBRAL ARTERY (HCC): ICD-10-CM

## 2021-10-14 DIAGNOSIS — Z23 FLU VACCINE NEED: ICD-10-CM

## 2021-10-14 DIAGNOSIS — I10 ESSENTIAL HYPERTENSION: ICD-10-CM

## 2021-10-14 PROCEDURE — G0439 PPPS, SUBSEQ VISIT: HCPCS | Performed by: FAMILY MEDICINE

## 2021-10-14 PROCEDURE — 99397 PER PM REEVAL EST PAT 65+ YR: CPT | Performed by: FAMILY MEDICINE

## 2021-10-14 PROCEDURE — 1159F MED LIST DOCD IN RCRD: CPT | Performed by: FAMILY MEDICINE

## 2021-10-14 PROCEDURE — 1126F AMNT PAIN NOTED NONE PRSNT: CPT | Performed by: FAMILY MEDICINE

## 2021-10-14 PROCEDURE — 96160 PT-FOCUSED HLTH RISK ASSMT: CPT | Performed by: FAMILY MEDICINE

## 2021-10-14 PROCEDURE — 1170F FXNL STATUS ASSESSED: CPT | Performed by: FAMILY MEDICINE

## 2021-10-14 NOTE — PROGRESS NOTES
Subjective   Nely Grider is a 75 y.o. female    Chief Complaint    Annual physical exam  Subsequent Medicare wellness visit  Hypertension  Hyperlipidemia  Type 2 diabetes mellitus  Chronic kidney disease    History of Present Illness  The patient presents today for an annual physical examination and subsequent Medicare wellness visit. She reports she is tired and thinks she is getting old.    The patient has had her pneumonia vaccine. She would like to get her influenza vaccine today. The patient asked about getting the Moderna booster.     The following portions of the patient's history were reviewed and updated as appropriate: allergies, current medications, past social history and problem list    Review of Systems   Constitutional: Negative.  Negative for appetite change, chills, diaphoresis, fatigue, fever and unexpected weight change.   HENT: Negative.    Eyes: Negative.  Negative for visual disturbance.   Respiratory: Negative.  Negative for cough, chest tightness, shortness of breath and wheezing.    Cardiovascular: Negative.  Negative for chest pain, palpitations and leg swelling.   Gastrointestinal: Negative.  Negative for abdominal distention, abdominal pain, diarrhea, nausea and vomiting.        Patient experiencing heartburn/acid reflux     Endocrine: Negative.  Negative for polydipsia, polyphagia and polyuria.   Genitourinary: Negative.  Negative for dysuria, frequency and urgency.   Musculoskeletal: Negative.  Negative for arthralgias, back pain and myalgias.   Skin: Negative.  Negative for color change and rash.   Allergic/Immunologic: Negative.    Neurological: Negative.  Negative for dizziness, syncope, weakness, light-headedness, numbness and headaches.   Hematological: Negative.  Negative for adenopathy. Does not bruise/bleed easily.   Psychiatric/Behavioral: Negative.    All other systems reviewed and are negative.      Objective     Vitals:    10/14/21 1406   BP: 134/88   Pulse: 69   Resp:  15   Temp: 98 °F (36.7 °C)   SpO2: 99%       Physical Exam  Vitals and nursing note reviewed.   Constitutional:       Appearance: She is well-developed. She is not diaphoretic.   HENT:      Head: Normocephalic and atraumatic.      Right Ear: External ear normal.      Left Ear: External ear normal.      Nose: Nose normal.   Eyes:      Conjunctiva/sclera: Conjunctivae normal.      Pupils: Pupils are equal, round, and reactive to light.   Neck:      Thyroid: No thyromegaly.      Vascular: No carotid bruit or JVD.   Cardiovascular:      Rate and Rhythm: Normal rate and regular rhythm.      Pulses: Normal pulses.      Heart sounds: Normal heart sounds. No murmur heard.      Pulmonary:      Effort: Pulmonary effort is normal. No respiratory distress.      Breath sounds: Normal breath sounds.   Abdominal:      General: Bowel sounds are normal. There is no distension.      Palpations: Abdomen is soft. There is no mass.      Tenderness: There is no abdominal tenderness. There is no guarding or rebound.      Hernia: No hernia is present.   Musculoskeletal:         General: Normal range of motion.      Cervical back: Normal range of motion and neck supple.   Lymphadenopathy:      Cervical: No cervical adenopathy.   Skin:     General: Skin is warm and dry.      Findings: No rash.   Neurological:      Mental Status: She is alert and oriented to person, place, and time.      Cranial Nerves: No cranial nerve deficit.      Sensory: No sensory deficit.      Deep Tendon Reflexes: Reflexes are normal and symmetric.   Psychiatric:         Behavior: Behavior normal.         Assessment/Plan   Problems Addressed this Visit        Cardiac and Vasculature    Mixed hyperlipidemia    Relevant Orders    Comprehensive Metabolic Panel    Lipid Panel    Essential hypertension    Relevant Orders    Comprehensive Metabolic Panel    TSH    T4, Free       Endocrine and Metabolic    Controlled type 2 diabetes mellitus without complication, without  long-term current use of insulin (HCC)    Relevant Orders    Comprehensive Metabolic Panel    Hemoglobin A1c       Gastrointestinal Abdominal     Gastroesophageal reflux disease without esophagitis    Relevant Orders    CBC (No Diff)    Comprehensive Metabolic Panel       Genitourinary and Reproductive     Chronic kidney disease (CKD), stage III (moderate) (HCC) (Chronic)    Relevant Orders    Comprehensive Metabolic Panel       Neuro    Diabetic polyneuropathy associated with type 2 diabetes mellitus (HCC)    Relevant Orders    Comprehensive Metabolic Panel    Hemoglobin A1c    Cerebrovascular accident (CVA) due to thrombosis of right middle cerebral artery (HCC)    Relevant Orders    CBC (No Diff)    Comprehensive Metabolic Panel    Hemoglobin A1c    Lipid Panel      Other Visit Diagnoses     Annual physical exam    -  Primary    Relevant Orders    CBC (No Diff)    Comprehensive Metabolic Panel    Hemoglobin A1c    Lipid Panel    TSH    T4, Free    Medicare annual wellness visit, subsequent        Flu vaccine need          Diagnoses       Codes Comments    Annual physical exam    -  Primary ICD-10-CM: Z00.00  ICD-9-CM: V70.0     Medicare annual wellness visit, subsequent     ICD-10-CM: Z00.00  ICD-9-CM: V70.0     Mixed hyperlipidemia     ICD-10-CM: E78.2  ICD-9-CM: 272.2     Essential hypertension     ICD-10-CM: I10  ICD-9-CM: 401.9     Controlled type 2 diabetes mellitus without complication, without long-term current use of insulin (HCC)     ICD-10-CM: E11.9  ICD-9-CM: 250.00     Gastroesophageal reflux disease without esophagitis     ICD-10-CM: K21.9  ICD-9-CM: 530.81     Stage 3 chronic kidney disease, unspecified whether stage 3a or 3b CKD (HCC)     ICD-10-CM: N18.30  ICD-9-CM: 585.3     Diabetic polyneuropathy associated with type 2 diabetes mellitus (HCC)     ICD-10-CM: E11.42  ICD-9-CM: 250.60, 357.2     Cerebrovascular accident (CVA) due to thrombosis of right middle cerebral artery (HCC)     ICD-10-CM:  I63.311  ICD-9-CM: 434.01     Flu vaccine need     ICD-10-CM: Z23  ICD-9-CM: V04.81         Preventive medicine discussed, diet, exercise, healthy living discussed at length.  Discussed nutrition, physical activity, healthy weight, injury prevention, missed use of tobacco, alcohol and drugs, dental health, mental health, immunizations, screening    Please note that portions of this document were completed with a voice recognition program. Efforts were made to edit the dictations, but occasionally words are mis-transcribed       Transcribed from ambient dictation for DEENA Foote MD by Yaquelin Brooks.  10/14/21   14:43 EDT    I have personally performed the services described in this document as transcribed by the above individual, and it is both accurate and complete.  DEENA Foote MD  10/14/2021  16:31 EDT

## 2021-10-14 NOTE — PROGRESS NOTES
The ABCs of the Annual Wellness Visit  Subsequent Medicare Wellness Visit    Chief Complaint   Patient presents with   • Medicare Wellness-subsequent      Subjective   History of Present Illness:  Nely Grider is a 75 y.o. female who presents for a Subsequent Medicare Wellness Visit.    The following portions of the patient's history were reviewed and   updated as appropriate: allergies, current medications, past family history, past medical history, past social history, past surgical history and problem list.    Compared to one year ago, the patient feels her physical   health is the same.    Compared to one year ago, the patient feels her mental   health is the same.    Recent Hospitalizations:  She was not admitted to the hospital during the last year.       Current Medical Providers:  Patient Care Team:  Zoie Mata PA-C as PCP - General (Physician Assistant)  Luis Gustafson OD (Optometry)    Outpatient Medications Prior to Visit   Medication Sig Dispense Refill   • amLODIPine (NORVASC) 5 MG tablet TAKE 1 TABLET EVERY DAY 90 tablet 1   • aspirin 81 MG chewable tablet Chew 1 tablet Daily. 30 tablet 0   • atorvastatin (LIPITOR) 80 MG tablet TAKE 1 TABLET EVERY NIGHT 90 tablet 1   • clopidogrel (PLAVIX) 75 MG tablet Take 1 tablet by mouth Daily. 90 tablet 1   • gabapentin (NEURONTIN) 400 MG capsule Take 1 capsule by mouth 3 (Three) Times a Day. 270 capsule 1   • glimepiride (AMARYL) 4 MG tablet Take 1 tablet by mouth Every Morning Before Breakfast. 90 tablet 1   • metFORMIN (GLUCOPHAGE) 500 MG tablet TAKE 1 TABLET TWICE DAILY WITH MEALS 180 tablet 1   • omeprazole (priLOSEC) 20 MG capsule TAKE 1 CAPSULE TWICE DAILY 180 capsule 1   • Probiotic Product (PROBIOTIC DAILY) capsule Take as directed on package 60 capsule 0   • simethicone (Gas-X) 80 MG chewable tablet Chew 1 tablet Every 6 (Six) Hours As Needed for Flatulence. 120 tablet 1     No facility-administered medications prior to visit.       No  "opioid medication identified on active medication list. I have reviewed chart for other potential  high risk medication/s and harmful drug interactions in the elderly.          Aspirin is on active medication list. Aspirin use is indicated based on review of current medical condition/s. Pros and cons of this therapy have been discussed today. Benefits of this medication outweigh potential harm.  Patient has been encouraged to continue taking this medication.  .      Patient Active Problem List   Diagnosis   • Gastroesophageal reflux disease without esophagitis   • Mixed hyperlipidemia   • Essential hypertension   • Controlled type 2 diabetes mellitus without complication, without long-term current use of insulin ([facility])   • Chronic kidney disease (CKD), stage III (moderate) ([facility])   • Anemia   • Diabetic polyneuropathy associated with type 2 diabetes mellitus (HCC)   • Cerebrovascular accident (CVA) due to thrombosis of right middle cerebral artery (HCC)   • Chronic pain syndrome   • Osteopenia     Advance Care Planning  has an advanced directive - a copy has been provided and is in file    Review of Systems      Objective      Vitals:    10/14/21 1406   BP: 134/88   Pulse: 69   Resp: 15   Temp: 98 °F (36.7 °C)   SpO2: 99%   Weight: 82.5 kg (181 lb 12.8 oz)   Height: 166.4 cm (65.5\")   PainSc: 0-No pain     BMI Readings from Last 1 Encounters:   10/14/21 29.79 kg/m²   BMI is above normal parameters.    Does the patient have evidence of cognitive impairment? No    Physical Exam            HEALTH RISK ASSESSMENT    Smoking Status:  Social History     Tobacco Use   Smoking Status Never Smoker   Smokeless Tobacco Never Used     Alcohol Consumption:  Social History     Substance and Sexual Activity   Alcohol Use No     Fall Risk Screen:    STEADI Fall Risk Assessment was completed, and patient is at LOW risk for falls.Assessment completed on:10/14/2021    Depression Screening:  PHQ-2/PHQ-9 Depression Screening 9/17/2020 "   Little interest or pleasure in doing things 0   Feeling down, depressed, or hopeless 0   Total Score 0       Health Habits and Functional and Cognitive Screening:  Functional & Cognitive Status 10/14/2021   Do you have difficulty preparing food and eating? No   Do you have difficulty bathing yourself, getting dressed or grooming yourself? No   Do you have difficulty using the toilet? No   Do you have difficulty moving around from place to place? No   Do you have trouble with steps or getting out of a bed or a chair? No   Current Diet Well Balanced Diet        Current Diet Comment -   Dental Exam Up to date   Eye Exam Up to date   Exercise (times per week) 0 times per week   Current Exercises Include No Regular Exercise   Current Exercise Activities Include -   Do you need help using the phone?  No   Are you deaf or do you have serious difficulty hearing?  No   Do you need help with transportation? No   Do you need help shopping? No   Do you need help preparing meals?  No   Do you need help with housework?  No   Do you need help with laundry? No   Do you need help taking your medications? No   Do you need help managing money? No   Do you ever drive or ride in a car without wearing a seat belt? No   Have you felt unusual stress, anger or loneliness in the last month? No   Who do you live with? Spouse   If you need help, do you have trouble finding someone available to you? No   Have you been bothered in the last four weeks by sexual problems? No   Do you have difficulty concentrating, remembering or making decisions? No       Age-appropriate Screening Schedule:  Refer to the list below for future screening recommendations based on patient's age, sex and/or medical conditions. Orders for these recommended tests are listed in the plan section. The patient has been provided with a written plan.    Health Maintenance   Topic Date Due   • ZOSTER VACCINE (2 of 3) 10/02/2013   • DXA SCAN  01/29/2021   • URINE MICROALBUMIN   06/17/2021   • LIPID PANEL  09/17/2021   • HEMOGLOBIN A1C  11/18/2021   • DIABETIC EYE EXAM  02/01/2022   • MAMMOGRAM  06/17/2023   • TDAP/TD VACCINES (3 - Td or Tdap) 01/04/2031   • INFLUENZA VACCINE  Completed              Assessment/Plan     CMS Preventative Services Quick Reference  Risk Factors Identified During Encounter  Cardiovascular Disease  Immunizations Discussed/Encouraged (specific Immunizations; Shingrix  The above risks/problems have been discussed with the patient.  Follow up actions/plans if indicated are seen below in the Assessment/Plan Section.  Pertinent information has been shared with the patient in the After Visit Summary.    Diagnoses and all orders for this visit:    1. Annual physical exam (Primary)  -     CBC (No Diff); Future  -     Comprehensive Metabolic Panel; Future  -     Hemoglobin A1c; Future  -     Lipid Panel; Future  -     TSH; Future  -     T4, Free; Future    2. Medicare annual wellness visit, subsequent    3. Mixed hyperlipidemia  -     Comprehensive Metabolic Panel; Future  -     Lipid Panel; Future    4. Essential hypertension  -     Comprehensive Metabolic Panel; Future  -     TSH; Future  -     T4, Free; Future    5. Controlled type 2 diabetes mellitus without complication, without long-term current use of insulin (HCC)  -     Comprehensive Metabolic Panel; Future  -     Hemoglobin A1c; Future    6. Gastroesophageal reflux disease without esophagitis  -     CBC (No Diff); Future  -     Comprehensive Metabolic Panel; Future    7. Stage 3 chronic kidney disease, unspecified whether stage 3a or 3b CKD (HCC)  -     Comprehensive Metabolic Panel; Future    8. Diabetic polyneuropathy associated with type 2 diabetes mellitus (HCC)  -     Comprehensive Metabolic Panel; Future  -     Hemoglobin A1c; Future    9. Cerebrovascular accident (CVA) due to thrombosis of right middle cerebral artery (HCC)  -     CBC (No Diff); Future  -     Comprehensive Metabolic Panel; Future  -      Hemoglobin A1c; Future  -     Lipid Panel; Future    10. Flu vaccine need        Follow Up:  Return in about 4 months (around 2/14/2022) for Recheck.     An After Visit Summary and PPPS were given to the patient.

## 2021-10-15 ENCOUNTER — LAB (OUTPATIENT)
Dept: LAB | Facility: HOSPITAL | Age: 76
End: 2021-10-15

## 2021-10-15 DIAGNOSIS — E11.9 CONTROLLED TYPE 2 DIABETES MELLITUS WITHOUT COMPLICATION, WITHOUT LONG-TERM CURRENT USE OF INSULIN (HCC): ICD-10-CM

## 2021-10-15 DIAGNOSIS — N18.30 STAGE 3 CHRONIC KIDNEY DISEASE, UNSPECIFIED WHETHER STAGE 3A OR 3B CKD (HCC): ICD-10-CM

## 2021-10-15 DIAGNOSIS — E11.42 DIABETIC POLYNEUROPATHY ASSOCIATED WITH TYPE 2 DIABETES MELLITUS (HCC): ICD-10-CM

## 2021-10-15 DIAGNOSIS — I10 ESSENTIAL HYPERTENSION: ICD-10-CM

## 2021-10-15 DIAGNOSIS — E78.2 MIXED HYPERLIPIDEMIA: ICD-10-CM

## 2021-10-15 DIAGNOSIS — Z00.00 ANNUAL PHYSICAL EXAM: ICD-10-CM

## 2021-10-15 DIAGNOSIS — K21.9 GASTROESOPHAGEAL REFLUX DISEASE WITHOUT ESOPHAGITIS: ICD-10-CM

## 2021-10-15 DIAGNOSIS — I63.311 CEREBROVASCULAR ACCIDENT (CVA) DUE TO THROMBOSIS OF RIGHT MIDDLE CEREBRAL ARTERY (HCC): ICD-10-CM

## 2021-10-16 LAB
ALBUMIN SERPL-MCNC: 4.5 G/DL (ref 3.5–5.2)
ALBUMIN/GLOB SERPL: 2 G/DL
ALP SERPL-CCNC: 97 U/L (ref 39–117)
ALT SERPL-CCNC: 15 U/L (ref 1–33)
AST SERPL-CCNC: 23 U/L (ref 1–32)
BILIRUB SERPL-MCNC: 0.3 MG/DL (ref 0–1.2)
BUN SERPL-MCNC: 14 MG/DL (ref 8–23)
BUN/CREAT SERPL: 13.7 (ref 7–25)
CALCIUM SERPL-MCNC: 10.3 MG/DL (ref 8.6–10.5)
CHLORIDE SERPL-SCNC: 109 MMOL/L (ref 98–107)
CHOLEST SERPL-MCNC: 139 MG/DL (ref 0–200)
CO2 SERPL-SCNC: 26.1 MMOL/L (ref 22–29)
CREAT SERPL-MCNC: 1.02 MG/DL (ref 0.57–1)
ERYTHROCYTE [DISTWIDTH] IN BLOOD BY AUTOMATED COUNT: 13 % (ref 12.3–15.4)
GLOBULIN SER CALC-MCNC: 2.2 GM/DL
GLUCOSE SERPL-MCNC: 97 MG/DL (ref 65–99)
HBA1C MFR BLD: 6.9 % (ref 4.8–5.6)
HCT VFR BLD AUTO: 36.3 % (ref 34–46.6)
HDLC SERPL-MCNC: 35 MG/DL (ref 40–60)
HGB BLD-MCNC: 11.8 G/DL (ref 12–15.9)
LDLC SERPL CALC-MCNC: 73 MG/DL (ref 0–100)
MCH RBC QN AUTO: 30.4 PG (ref 26.6–33)
MCHC RBC AUTO-ENTMCNC: 32.5 G/DL (ref 31.5–35.7)
MCV RBC AUTO: 93.6 FL (ref 79–97)
PLATELET # BLD AUTO: 372 10*3/MM3 (ref 140–450)
POTASSIUM SERPL-SCNC: 5.2 MMOL/L (ref 3.5–5.2)
PROT SERPL-MCNC: 6.7 G/DL (ref 6–8.5)
RBC # BLD AUTO: 3.88 10*6/MM3 (ref 3.77–5.28)
SODIUM SERPL-SCNC: 147 MMOL/L (ref 136–145)
T4 FREE SERPL-MCNC: 1.21 NG/DL (ref 0.93–1.7)
TRIGL SERPL-MCNC: 183 MG/DL (ref 0–150)
TSH SERPL DL<=0.005 MIU/L-ACNC: 1.53 UIU/ML (ref 0.27–4.2)
VLDLC SERPL CALC-MCNC: 31 MG/DL (ref 5–40)
WBC # BLD AUTO: 8.74 10*3/MM3 (ref 3.4–10.8)

## 2021-10-18 ENCOUNTER — TELEPHONE (OUTPATIENT)
Dept: INTERNAL MEDICINE | Facility: CLINIC | Age: 76
End: 2021-10-18

## 2021-10-18 NOTE — TELEPHONE ENCOUNTER
notified that labs look okay and that VB is out of the office until 10/25. He verbalized an understanding.

## 2021-10-18 NOTE — TELEPHONE ENCOUNTER
PATIENT'S  MONA CALLED TO REPORT THAT PATIENT HAS BEEN HAVING CHRONIC DIARRHEA.  HE STATED THEY WERE IN THE OFFICE ON 10/14/21 FOR THEIR WELLNESS AND PATIENT FORGOT TO MENTION IT. HE IS ASKING THAT PCP JUST LOOK AT THE BLOOD WORK TO SEE IF THERE MAY BE A REASON FOR THAT OR PATIENT CAN COME BACK IN IF NEEDED. PLEASE RETURN CALL TO PATIENT [phone].

## 2021-11-18 RX ORDER — ATORVASTATIN CALCIUM 80 MG/1
80 TABLET, FILM COATED ORAL NIGHTLY
Qty: 90 TABLET | Refills: 1 | Status: SHIPPED | OUTPATIENT
Start: 2021-11-18 | End: 2022-04-07

## 2021-11-18 NOTE — TELEPHONE ENCOUNTER
Caller: Nely Grider    Relationship: Self    Best call back number: [phone]    Requested Prescriptions:   Requested Prescriptions     Pending Prescriptions Disp Refills   • atorvastatin (LIPITOR) 80 MG tablet 90 tablet 1     Sig: Take 1 tablet by mouth Every Night.        Pharmacy where request should be sent: [facility] - [place] [id number] - [phone]  - [phone] FX     Additional details provided by patient: COMPLETELY OUT AND WANTED TO KNOW IF  WOULD FILL THIS MEDICATION    PLEASE ADVISE     Does the patient have less than a 3 day supply:  [x] Yes  [] No    Robin Guerra Rep   11/18/21 11:06 EST

## 2021-12-16 ENCOUNTER — TELEPHONE (OUTPATIENT)
Dept: FAMILY MEDICINE CLINIC | Facility: CLINIC | Age: 76
End: 2021-12-16

## 2021-12-16 RX ORDER — DICYCLOMINE HCL 20 MG
20 TABLET ORAL
Qty: 60 TABLET | Refills: 5 | Status: SHIPPED | OUTPATIENT
Start: 2021-12-16

## 2022-02-03 NOTE — ADDENDUM NOTE
Addended by: KATELYNN PARIKH on: 11/21/2019 08:53 AM     Modules accepted: Orders     Progress Note  [id number]       Room:0335/335-01  Patient Debbie Montes De Oca     YOB: 1939     Age:83 y.o. Subjective    Subjective: 78 y.o. female who presented to [facility] ED complaining of worsening shortness of breath having been discharged after evaluation of covid-19 infection from this facility . At that time, patient was not requiring supplemental oxygen use at home. She reports worsening shortness of breath at rest and with minimal activity. In ED, patient was found to be hypoxic with PO2 of 51/spo2 of 87%. CXR imaging with gross hypoaeration of the lungs with increased bibasilar infiltrates, no focal consolidation. Pt was admitted to hospitalist service for further evaluation of acute hypoxic respiratory failure due to covid-19 infection. Patient was examined in her room, says she is feeling a lot better than when she initially presented. Denied any chest pain or worsening dyspnea. Denied any nausea vomiting abdominal pain. Review of Systems: 12 point system review negative suggested above.      :         Vitals Last 24 Hours:  TEMPERATURE:  Temp  Av.3 °F (36.3 °C)  Min: 96.3 °F (35.7 °C)  Max: 98.1 °F (36.7 °C)  RESPIRATIONS RANGE: Resp  Av.2  Min: 16  Max: 18  PULSE OXIMETRY RANGE: SpO2  Av.9 %  Min: 92 %  Max: 98 %  PULSE RANGE: Pulse  Av.8  Min: 66  Max: 80  BLOOD PRESSURE RANGE: Systolic (66OZH), NLH:229 , Min:143 , QIV:449   ; Diastolic (12VCA), OTE:17, Min:50, Max:68    I/O (24Hr): Intake/Output Summary (Last 24 hours) at 2/3/2022 1411  Last data filed at 2022 1820  Gross per 24 hour   Intake 240 ml   Output --   Net 240 ml     \    Physical Examination:  General: Well-developed, no acute distress lying comfortably in bed.   HEENT: Atraumatic normocephalic, range of motion normal   Cardiac: Normal S1-S2   Respiratory: clear To auscultation bilaterally, + rhonchi, no wheezing  Abdomen: Soft, positive bowel sounds in all quadrants, no distention, nontender to palpation  Extremities: no tenderness, no edema, moves all extremities  Psych: Affect normal and good eye contact, behavioral normal.        Labs/Imaging/Diagnostics    Labs:  CBC:  Recent Labs     02/01/22  1606 02/02/22 0205 02/03/22 0337   WBC 10.1 7.6 5.6   RBC 4.00* 3.77* 3.67*   HGB 9.4* 8.9* 8.7*   HCT 32.4* 30.3* 30.7*   MCV 81.0 80.4* 83.7   RDW 21.4* 21.3* 21.8*    355 395     CHEMISTRIES:  Recent Labs     02/01/22  1606 02/01/22  1639 02/02/22 0205 02/03/22 0337     --  136 137   K 3.7 3.5 3.9 4.2   CL 99  --  100 101   CO2 21*  --  21* 22   BUN 12  --  10 14   CREATININE 0.8  --  0.7 0.7   GLUCOSE 171*  --  249* 226*     PT/INR:  Recent Labs     02/02/22 0205   PROTIME 13.1   INR 0.97     APTT:No results for input(s): APTT in the last 72 hours. LIVER PROFILE:  Recent Labs     02/01/22  1606 02/02/22 0205 02/03/22 0337   AST 15 11 11   ALT 8 7 6   BILITOT 0.3 0.3 <0.2   ALKPHOS 93 92 89       Imaging Last 24 Hours:  XR CHEST PORTABLE    Result Date: 2/1/2022  EXAMINATION: XR CHEST PORTABLE 2/1/2022 5:32 PM HISTORY: Shortness of breath. Report: A portable upright frontal view of the chest was obtained. COMPARISON: Portable chest x-ray 1/20/2022. The lungs are grossly hypoaerated and there are increased infiltrates in both lung bases without consolidation. Much of the basilar lung opacity may be due to atelectasis. Heart size is normal. No pneumothorax or pleural effusion is identified. The right subclavian port catheter appears in good position as before. There is mild dextroscoliosis of the thoracic spine. Cholecystectomy clips are present. Gross hypoaeration of the lungs with increased bibasilar infiltrates, no focal consolidation. No pneumothorax or pleural effusion is identified. The right subclavian port catheter remains in good position. Signed by Dr Nella Page.  [facility]    Assessment//Plan           Hospital Problems           Last Modified POA    * (Principal) Acute hypoxemic respiratory failure due to COVID-19 ([facility] [address] 2/1/2022 Yes    Essential hypertension 2/1/2022 Yes    History of DVT (deep vein thrombosis) 2/1/2022 Yes    History of atrial fibrillation 2/1/2022 Yes        Assessment & Plan      Acute hypoxic respiratory failure secondary to Covid  Hypertension  DVT  Atrial fibrillation  GERD  Fibromyalgia  Bilateral lower extremity neuropathy  Restless syndrome  Type 2 diabetes  Vitamin D deficiency. Plan:  Pharmacy to dose baricitinib  Continue steroids as ordered  Insulin regimen as currently ordered  Droplet precautions  Continue adjuvant therapy  Inhalers  Vitamin D supplementation  Home O2 eval prior to discharge from the hospital  PT OT following      Electronically signed by   Vika Bartlett MD   Internal Medicine Hospitalist  On 2/3/2022  At 2:11 PM    EMR Dragon/Transcription disclaimer:   Much of this encounter note is an electronic transcription/translation of spoken language to printed text.  The electronic translation of spoken language may permit erroneous, or at times, nonsensical words or phrases to be inadvertently transcribed; although attempts have made to review the note for such errors, some may still exist.

## 2022-02-17 DIAGNOSIS — K21.9 GASTROESOPHAGEAL REFLUX DISEASE WITHOUT ESOPHAGITIS: ICD-10-CM

## 2022-02-17 DIAGNOSIS — I10 ESSENTIAL HYPERTENSION: ICD-10-CM

## 2022-02-22 RX ORDER — OMEPRAZOLE 20 MG/1
CAPSULE, DELAYED RELEASE ORAL
Qty: 180 CAPSULE | Refills: 1 | Status: SHIPPED | OUTPATIENT
Start: 2022-02-22 | End: 2022-07-13

## 2022-02-22 RX ORDER — AMLODIPINE BESYLATE 5 MG/1
TABLET ORAL
Qty: 90 TABLET | Refills: 1 | Status: SHIPPED | OUTPATIENT
Start: 2022-02-22 | End: 2022-07-13

## 2022-03-01 NOTE — TELEPHONE ENCOUNTER
Caller: Nely Grider    Relationship: Self    Best call back number: [phone]     Requested Prescriptions:   Requested Prescriptions     Pending Prescriptions Disp Refills   • clopidogrel (PLAVIX) 75 MG tablet 90 tablet 1     Sig: Take 1 tablet by mouth Daily.        Pharmacy where request should be sent: [facility] - [place] [id number] - [phone]  - [phone] FX     Additional details provided by patient: PATIENT HAS CALLED REQUESTING A REFILL ON ABOVE MEDICATION    Does the patient have less than a 3 day supply:  [] Yes  [x] No    Lydia Vidal   03/01/22 09:10 EST

## 2022-03-02 RX ORDER — CLOPIDOGREL BISULFATE 75 MG/1
75 TABLET ORAL DAILY
Qty: 90 TABLET | Refills: 1 | Status: SHIPPED | OUTPATIENT
Start: 2022-03-02 | End: 2022-07-18

## 2022-04-06 DIAGNOSIS — E78.2 MIXED HYPERLIPIDEMIA: Primary | ICD-10-CM

## 2022-04-07 RX ORDER — ATORVASTATIN CALCIUM 80 MG/1
80 TABLET, FILM COATED ORAL NIGHTLY
Qty: 30 TABLET | Refills: 0 | Status: SHIPPED | OUTPATIENT
Start: 2022-04-07 | End: 2022-06-22 | Stop reason: SDUPTHER

## 2022-04-07 NOTE — TELEPHONE ENCOUNTER
LMOM for patient to call    HUB please inform patient    Patient is due for a follow-up.  Please advise patient she needs to schedule and keep her appointment to continue to receive refills in the future.  If she is unable to keep her appointment we will not be able to provider further refills.  Once appointment has been scheduled please update message with date and time so we can process the request.  We will forward the message to the provider to review the refill request.

## 2022-04-08 NOTE — TELEPHONE ENCOUNTER
Caller: Nely Grider    Relationship: Self    Best call back number: [phone]     Requested Prescriptions:   Requested Prescriptions     Pending Prescriptions Disp Refills   • metFORMIN (GLUCOPHAGE) 500 MG tablet 180 tablet 1     Sig: Take 1 tablet by mouth 2 (Two) Times a Day With Meals.        Pharmacy where request should be sent: [facility] - [address] - [phone]  - [phone]      Additional details provided by patient: PATIENT HAS CALLED REQUESTING A REFILL ON ABOVE MEDICATION  Does the patient have less than a 3 day supply:  [] Yes  [x] No    Lydia Vidal   04/08/22 09:47 EDT

## 2022-05-09 DIAGNOSIS — E11.9 CONTROLLED TYPE 2 DIABETES MELLITUS WITHOUT COMPLICATION, WITHOUT LONG-TERM CURRENT USE OF INSULIN: ICD-10-CM

## 2022-05-09 RX ORDER — GLIMEPIRIDE 4 MG/1
4 TABLET ORAL
Qty: 90 TABLET | Refills: 1 | Status: SHIPPED | OUTPATIENT
Start: 2022-05-09 | End: 2022-06-06 | Stop reason: SDUPTHER

## 2022-05-09 NOTE — TELEPHONE ENCOUNTER
Caller: Nely Grider    Relationship: Self    Best call back number: [phone]    Requested Prescriptions:   Requested Prescriptions     Pending Prescriptions Disp Refills   • glimepiride (AMARYL) 4 MG tablet 90 tablet 1     Sig: Take 1 tablet by mouth Every Morning Before Breakfast.        Pharmacy where request should be sent: [facility] - [place] [id number] - [phone]  - [phone] FX     Additional details provided by patient: Nely says she has no days LEFT.  FOR A 90 DAY SUPPLEY    Does the patient have less than a 3 day supply:  [x] Yes  [] No    Robin Hinds Rep   05/09/22 13:07 EDT

## 2022-05-18 ENCOUNTER — TELEPHONE (OUTPATIENT)
Dept: FAMILY MEDICINE CLINIC | Facility: CLINIC | Age: 77
End: 2022-05-18

## 2022-05-18 NOTE — TELEPHONE ENCOUNTER
Caller: Nely Grider    Relationship: Self    Best call back number: [phone]    Requested Prescriptions:    atorvastatin (LIPITOR) 80 MG tablet  Pharmacy where request should be sent: [facility] - [place] [id number] - [phone]  - [phone] FX     Additional details provided by patient: PLEASE SEND REFILLS- [facility] STATES NO REFILLS    Does the patient have less than a 3 day supply:  [] Yes  [x] No AT LEAST A WEEK WORTH  Robin Gross Rep   05/18/22 11:41 EDT

## 2022-05-20 ENCOUNTER — APPOINTMENT (OUTPATIENT)
Dept: GENERAL RADIOLOGY | Facility: HOSPITAL | Age: 77
End: 2022-05-20

## 2022-05-20 ENCOUNTER — HOSPITAL ENCOUNTER (EMERGENCY)
Facility: HOSPITAL | Age: 77
Discharge: HOME | End: 2022-05-20
Attending: EMERGENCY MEDICINE | Admitting: EMERGENCY MEDICINE

## 2022-05-20 ENCOUNTER — APPOINTMENT (OUTPATIENT)
Dept: CT IMAGING | Facility: HOSPITAL | Age: 77
End: 2022-05-20

## 2022-05-20 VITALS
RESPIRATION RATE: 18 BRPM | BODY MASS INDEX: 29.82 KG/M2 | HEART RATE: 81 BPM | WEIGHT: 179 LBS | HEIGHT: 65 IN | SYSTOLIC BLOOD PRESSURE: 98 MMHG | OXYGEN SATURATION: 97 % | DIASTOLIC BLOOD PRESSURE: 66 MMHG | TEMPERATURE: 98.5 F
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
## 2022-05-20 DIAGNOSIS — S16.1XXA ACUTE CERVICAL MYOFASCIAL STRAIN, INITIAL ENCOUNTER: ICD-10-CM

## 2022-05-20 DIAGNOSIS — G44.209 ACUTE NON INTRACTABLE TENSION-TYPE HEADACHE: ICD-10-CM

## 2022-05-20 PROCEDURE — 70450 CT HEAD/BRAIN W/O DYE: CPT

## 2022-05-20 PROCEDURE — 72125 CT NECK SPINE W/O DYE: CPT

## 2022-05-20 PROCEDURE — 99283 EMERGENCY DEPT VISIT LOW MDM: CPT
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
## 2022-05-24 ENCOUNTER — PATIENT OUTREACH (OUTPATIENT)
Dept: CASE MANAGEMENT | Facility: OTHER | Age: 77
End: 2022-05-24
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
## 2022-05-25 ENCOUNTER — TRANSCRIBE ORDERS (OUTPATIENT)
Dept: ADMINISTRATIVE | Facility: HOSPITAL | Age: 77
End: 2022-05-25

## 2022-05-25 DIAGNOSIS — Z12.31 VISIT FOR SCREENING MAMMOGRAM: Primary | ICD-10-CM

## 2022-06-06 ENCOUNTER — OFFICE VISIT (OUTPATIENT)
Dept: FAMILY MEDICINE CLINIC | Facility: CLINIC | Age: 77
End: 2022-06-06

## 2022-06-06 VITALS
BODY MASS INDEX: 28.99 KG/M2 | DIASTOLIC BLOOD PRESSURE: 82 MMHG | TEMPERATURE: 98 F | HEART RATE: 79 BPM | SYSTOLIC BLOOD PRESSURE: 126 MMHG | WEIGHT: 174 LBS | OXYGEN SATURATION: 98 % | HEIGHT: 65 IN | RESPIRATION RATE: 16 BRPM

## 2022-06-06 DIAGNOSIS — S16.1XXD CERVICAL MYOFASCIAL STRAIN, SUBSEQUENT ENCOUNTER: ICD-10-CM

## 2022-06-06 DIAGNOSIS — M25.551 RIGHT HIP PAIN: ICD-10-CM

## 2022-06-06 DIAGNOSIS — E11.9 CONTROLLED TYPE 2 DIABETES MELLITUS WITHOUT COMPLICATION, WITHOUT LONG-TERM CURRENT USE OF INSULIN: Primary | ICD-10-CM

## 2022-06-06 LAB
EXPIRATION DATE: NORMAL
HBA1C MFR BLD: 6.8 %
Lab: NORMAL

## 2022-06-06 PROCEDURE — 99213 OFFICE O/P EST LOW 20 MIN: CPT | Performed by: FAMILY MEDICINE

## 2022-06-06 PROCEDURE — 3044F HG A1C LEVEL LT 7.0%: CPT | Performed by: FAMILY MEDICINE

## 2022-06-06 PROCEDURE — 83036 HEMOGLOBIN GLYCOSYLATED A1C: CPT | Performed by: FAMILY MEDICINE

## 2022-06-06 RX ORDER — GLIMEPIRIDE 4 MG/1
4 TABLET ORAL
Qty: 90 TABLET | Refills: 3 | Status: SHIPPED | OUTPATIENT
Start: 2022-06-06
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
## 2022-06-22 ENCOUNTER — OFFICE VISIT (OUTPATIENT)
Dept: FAMILY MEDICINE CLINIC | Facility: CLINIC | Age: 77
End: 2022-06-22

## 2022-06-22 VITALS
OXYGEN SATURATION: 96 % | TEMPERATURE: 96.9 F | HEART RATE: 82 BPM | SYSTOLIC BLOOD PRESSURE: 122 MMHG | HEIGHT: 66 IN | DIASTOLIC BLOOD PRESSURE: 82 MMHG | BODY MASS INDEX: 28.32 KG/M2 | RESPIRATION RATE: 16 BRPM | WEIGHT: 176.2 LBS

## 2022-06-22 DIAGNOSIS — U07.1 COVID-19 VIRUS INFECTION: Primary | ICD-10-CM

## 2022-06-22 DIAGNOSIS — E78.2 MIXED HYPERLIPIDEMIA: ICD-10-CM

## 2022-06-22 LAB
EXPIRATION DATE: ABNORMAL
FLUAV AG UPPER RESP QL IA.RAPID: NOT DETECTED
FLUBV AG UPPER RESP QL IA.RAPID: NOT DETECTED
INTERNAL CONTROL: ABNORMAL
Lab: ABNORMAL
SARS-COV-2 AG UPPER RESP QL IA.RAPID: DETECTED

## 2022-06-22 PROCEDURE — 99213 OFFICE O/P EST LOW 20 MIN: CPT | Performed by: FAMILY MEDICINE

## 2022-06-22 PROCEDURE — 87428 SARSCOV & INF VIR A&B AG IA: CPT | Performed by: FAMILY MEDICINE

## 2022-06-22 RX ORDER — METHYLPREDNISOLONE 4 MG/1
TABLET ORAL
Qty: 1 EACH | Refills: 0 | Status: SHIPPED | OUTPATIENT
Start: 2022-06-22 | End: 2022-10-06

## 2022-06-22 RX ORDER — BROMPHENIRAMINE MALEATE, PSEUDOEPHEDRINE HYDROCHLORIDE, AND DEXTROMETHORPHAN HYDROBROMIDE 2; 30; 10 MG/5ML; MG/5ML; MG/5ML
5 SYRUP ORAL 4 TIMES DAILY PRN
Qty: 180 ML | Refills: 0 | Status: SHIPPED | OUTPATIENT
Start: 2022-06-22 | End: 2022-10-06

## 2022-06-22 RX ORDER — ATORVASTATIN CALCIUM 80 MG/1
80 TABLET, FILM COATED ORAL NIGHTLY
Qty: 90 TABLET | Refills: 3 | Status: SHIPPED | OUTPATIENT
Start: 2022-06-22

## 2022-06-22 NOTE — PROGRESS NOTES
Subjective   Nely Grider is a 76 y.o. female    Chief Complaint  Cough and congestion    History of Present Illness    The patient presents to the office today after recent COVID-19 exposure. She is complaining of a stuffy head, cough, and congested for 2 days. The patient is afebrile with an oxygen saturation of 96 percent on room air.    She reports she does not feel like moving. The patient said she has trouble breathing. The patient reports she can take prednisone because codeine is the only thing she could. She said she woke up in the middle of the night sick and was fine. The patient said her sister-in-law has been ill. She said she had been dealing with her brother because they just lost her son.      The following portions of the patient's history were reviewed and updated as appropriate: allergies, current medications, past social history and problem list    Review of Systems   Constitutional: Negative for chills, fatigue and fever.   HENT: Negative.    Respiratory: Positive for cough, chest tightness and wheezing. Negative for shortness of breath.    Cardiovascular: Negative for chest pain.   Gastrointestinal: Negative for nausea.       Objective     Vitals:    06/22/22 1459   BP: 122/82   Pulse: 82   Resp: 16   Temp: 96.9 °F (36.1 °C)   SpO2: 96%       Physical Exam  Vitals and nursing note reviewed.   Constitutional:       General: She is not in acute distress.     Appearance: She is well-developed. She is not diaphoretic.   HENT:      Head: Normocephalic and atraumatic.      Nose: Nose normal.   Neck:      Vascular: No JVD.   Cardiovascular:      Rate and Rhythm: Normal rate and regular rhythm.      Heart sounds: Normal heart sounds. No murmur heard.  Pulmonary:      Effort: Pulmonary effort is normal. No respiratory distress.      Breath sounds: No stridor. Wheezing present.   Musculoskeletal:      Cervical back: Neck supple.   Lymphadenopathy:      Cervical: No cervical adenopathy.          Assessment & Plan   Problems Addressed this Visit        Cardiac and Vasculature    Mixed hyperlipidemia    Relevant Medications    atorvastatin (LIPITOR) 80 MG tablet      Other Visit Diagnoses     COVID-19 virus infection    -  Primary    Relevant Medications    methylPREDNISolone (MEDROL) 4 MG dose pack    brompheniramine-pseudoephedrine-DM 30-2-10 MG/5ML syrup    Other Relevant Orders    POCT SARS-CoV-2 Antigen RAJIV + Flu (Completed)      Diagnoses       Codes Comments    COVID-19 virus infection    -  Primary ICD-10-CM: U07.1  ICD-9-CM: 079.89     Mixed hyperlipidemia     ICD-10-CM: E78.2  ICD-9-CM: 272.2         Isolation per current guidelines.            Transcribed from ambient dictation for R Natalio Foote MD by Kapil Gonzalez.  06/22/22   15:54 EDT    Patient verbalized consent to the visit recording.

## 2022-06-28 NOTE — THERAPY EVALUATION
Acute Care - Physical Therapy Initial Evaluation  [facility]     Patient Name: Nely Grider  : 1945  MRN: [id number]  Today's Date: 2019   Onset of Illness/Injury or Date of Surgery: 19  Date of Referral to PT: 19  Referring Physician: MD Ranjeet      Admit Date: 2019    Visit Dx:     ICD-10-CM ICD-9-CM   1. Impaired functional mobility, balance, gait, and endurance Z74.09 V49.89   2. Primary osteoarthritis of left hip M16.12 715.15     Patient Active Problem List   Diagnosis   • Neck sprain   • Cough   • Gastroesophageal reflux disease without esophagitis   • Mixed hyperlipidemia   • Essential hypertension   • Cervical sprain   • Controlled type 2 diabetes mellitus without complication, without long-term current use of insulin (CMS/HCC)   • Mass of right hip region   • Anemia   • Constipation   • Diabetic polyneuropathy associated with type 2 diabetes mellitus (CMS/HCC)   • Primary osteoarthritis of both hips   • Status post total replacement of left hip   • Renal insufficiency   • Colon polyp   • Diverticulosis   • Primary osteoarthritis of left hip     Past Medical History:   Diagnosis Date   • Anesthesia complication     per patient with hip surgery last april did not remember being in hospital or going home can only rmember from rehad on   • Anesthesia complication     with hip surgery in 2018 had issues with swallowing after surgery called in speech   • Arthritis    • Cancer (CMS/HCC)     cervical   • Cataract    • Diabetes mellitus (CMS/HCC)     type 2 dm, check blood sugar every morning, diagnosed 3 or 4 years   • Full dentures    • Full dentures    • GERD (gastroesophageal reflux disease)    • High cholesterol    • History of IBS    • Hypertension     been off bp meds since last 2018   • Neuropathy    • Wears glasses      Past Surgical History:   Procedure Laterality Date   • CARDIAC CATHETERIZATION     • CHOLECYSTECTOMY     • COLONOSCOPY     • EYE SURGERY       bilateral cataract   • HYSTERECTOMY      partial   • JOINT REPLACEMENT      hip surgery 2018 in april   • OOPHORECTOMY      one removed   • TONSILLECTOMY     • TOTAL HIP ARTHROPLASTY Right 4/19/2018    Procedure: RIGHT TOTAL HIP ARTHROPLASTY;  Surgeon: Carlos Pollack MD;  Location: [facility];  Service: Orthopedics   • WRIST SURGERY      metal plate        PT ASSESSMENT (last 12 hours)      Physical Therapy Evaluation     Row Name 02/12/19 1426          PT Evaluation Time/Intention    Subjective Information  complains of;pain;numbness  -LR     Document Type  evaluation  -LR     Mode of Treatment  physical therapy;individual therapy  -LR     Patient Effort  good  -LR     Symptoms Noted During/After Treatment  other (see comments) decreased pain  -LR     Row Name 02/12/19 1426          General Information    Patient Profile Reviewed?  yes  -LR     Onset of Illness/Injury or Date of Surgery  02/12/19  -LR     Referring Physician  MD Ranjeet  -LR     Patient Observations  alert;cooperative;agree to therapy  -LR     Patient/Family Observations   and children present.   -LR     General Observations of Patient  Patient supine in bed upon arrival. IV, SCDs, O2, exit alarm.   -LR     Prior Level of Function  min assist:;all household mobility;gait;transfer;bed mobility;ADL's;home management;cooking;cleaning;using stairs;independent:;driving;dependent:;community mobility;shopping no long distances,scooter at store;all mobility limited    -LR     Equipment Currently Used at Home  grab bar;shower chair;cane, straight;cane, quad;commode, bedside;walker, rolling;other (see comments) not currently using AD  -LR     Pertinent History of Current Functional Problem  Patient presents for surgical management of persistent and progressive L hip pain and dysfunction that has failed to improve with conservative management. Pt is s/p R DOROTHY on 4/19/18.   -LR     Existing Precautions/Restrictions  fall;left;hip;other (see comments) L  lateral hip precautions  -LR     Limitations/Impairments  -- none  -LR     Equipment Issued to Patient  -- none  -LR     Equipment Ordered for Patient  -- none  -LR     Risks Reviewed  patient:;spouse/S.O.:;family:;LOB;nausea/vomiting;dizziness;increased discomfort;lines disloged  -LR     Benefits Reviewed  patient:;spouse/S.O.:;family:;improve function;increase independence;increase strength;increase balance;decrease pain;decrease risk of DVT;increase knowledge  -LR     Barriers to Rehab  previous functional deficit  -LR     Row Name 02/12/19 1426          Relationship/Environment    Primary Source of Support/Comfort  spouse;child(marcus)  available to assist at all times upon d/c home.   -LR     Lives With  spouse  -LR     Row Name 02/12/19 1426          Resource/Environmental Concerns    Current Living Arrangements  home/apartment/condo  -LR     Resource/Environmental Concerns  none  -LR     Row Name 02/12/19 1426          Home Main Entrance    Number of Stairs, Main Entrance  one  -LR     Stair Railings, Main Entrance  none  -LR     Row Name 02/12/19 1426          Cognitive Assessment/Intervention- PT/OT    Orientation Status (Cognition)  oriented x 4  -LR     Follows Commands (Cognition)  WFL;follows one step commands;over 90% accuracy;verbal cues/prompting required;repetition of directions required  -LR     Safety Deficit (Cognitive)  mild deficit;safety precautions awareness;safety precautions follow-through/compliance  -LR     Row Name 02/12/19 1426          Safety Issues, Functional Mobility    Safety Issues Affecting Function (Mobility)  safety precaution awareness;safety precautions follow-through/compliance;sequencing abilities  -LR     Row Name 02/12/19 1426          Mobility Assessment/Treatment    Extremity Weight-bearing Status  left lower extremity  -LR     Left Lower Extremity (Weight-bearing Status)  weight-bearing as tolerated (WBAT)  -LR     Row Name 02/12/19 1426          Bed Mobility  Assessment/Treatment    Bed Mobility Assessment/Treatment  supine-sit  -LR     Supine-Sit [place] (Bed Mobility)  verbal cues;minimum assist (75% patient effort)  -LR     Bed Mobility, Safety Issues  decreased use of legs for bridging/pushing  -LR     Assistive Device (Bed Mobility)  head of bed elevated;bed rails  -LR     Comment (Bed Mobility)  Verbal cues to move LEs towards EOB and to push up from bed to raise trunk into sitting and to scoot hips out to get feet on floor. Min assist to L LE. Required increased time to perform. Denied dizziness upon sitting up.   -LR     Row Name 02/12/19 1426          Transfer Assessment/Treatment    Transfer Assessment/Treatment  sit-stand transfer;stand-sit transfer  -LR     Comment (Transfers)  Verbal cues to push up from bed to stand and to reach back for chair to lower into sitting. Verbal cues to step L LE out before t/f for comfort.   -LR     Sit-Stand [place] (Transfers)  verbal cues;contact guard;2 person assist  -LR     Stand-Sit [place] (Transfers)  verbal cues;contact guard;2 person assist  -LR     Row Name 02/12/19 1426          Sit-Stand Transfer    Assistive Device (Sit-Stand Transfers)  walker, front-wheeled  -LR     Row Name 02/12/19 1426          Stand-Sit Transfer    Assistive Device (Stand-Sit Transfers)  walker, front-wheeled  -LR     Row Name 02/12/19 1426          Gait/Stairs Assessment/Training    67705 - Gait Training Minutes   8  -LR     [place] Level (Gait)  verbal cues;contact guard;2 person assist  -LR     Assistive Device (Gait)  walker, front-wheeled  -LR     Distance in Feet (Gait)  100  -LR     Pattern (Gait)  step-to  -LR     Deviations/Abnormal Patterns (Gait)  left sided deviations;antalgic;bilateral deviations;marika decreased;gait speed decreased;stride length decreased  -LR     Bilateral Gait Deviations  forward flexed posture;heel strike decreased  -LR     Left Sided Gait Deviations  weight shift ability decreased;knee  buckling, left side  -LR     Comment (Gait/Stairs)  Patient ambulated with step to gait pattern at slow pace. Verbal cues for correct sequencing of steps, increased L LE weight bearing/stance phase, and decreased UE weight bearing. Improved with cues for correction. Persistent L knee buckling with gait d/t residual numbness from spinal. Gait limited by fatigue and knee buckling.   -LR     Row Name 02/12/19 1426          General ROM    RT Lower Ext  Comment  -LR     LT Lower Ext  Comment  -LR     Row Name 02/12/19 1426          Right Lower Ext    RT Lower Extremity Comments  R LE AROM WFL  -LR     Row Name 02/12/19 1426          Left Lower Ext    LT Lower Extremity Comments  L hip AROM impaired 50%  -LR     Row Name 02/12/19 1426          MMT (Manual Muscle Testing)    Rt Lower Ext  Rt Hip WFL;Rt Knee WFL;Rt Ankle WFL  -LR     Lt Lower Ext  Lt Knee WFL;Lt Ankle WFL;Comments  -LR     Row Name 02/12/19 1426          MMT Left Lower Ext    Lt Lower Extremity Comments   L hip functionally 2/5, unable to perform SLR independently, knee buckling with gait.   -LR     Row Name 02/12/19 1426          Motor Assessment/Intervention    Additional Documentation  Therapeutic Exercise (Group);Therapeutic Exercise Interventions (Group)  -LR     Row Name 02/12/19 1426          Therapeutic Exercise    11351 - PT Therapeutic Exercise Minutes  2  -LR     Row Name 02/12/19 1426          Therapeutic Exercise    Lower Extremity (Therapeutic Exercise)  gluteal sets;quad sets, left  -LR     Lower Extremity Range of Motion (Therapeutic Exercise)  ankle dorsiflexion/plantar flexion, left  -LR     Exercise Type (Therapeutic Exercise)  AROM (active range of motion);isometric contraction, static;isotonic contraction, concentric  -LR     Position (Therapeutic Exercise)  supine  -LR     Sets/Reps (Therapeutic Exercise)  x10 reps each  -LR     Comment (Therapeutic Exercise)  cues for technique; able to actively DF bilaterally  -LR     Row Name  02/12/19 1426          Sensory Assessment/Intervention    Sensory General Assessment  light touch sensation deficits identified;other (see comments) c/o numbness in L LE  -LR     Row Name 02/12/19 1426          Light Touch Sensation Assessment    Left Lower Extremity: Light Touch Sensation Assessment  mild impairment, 75% or more correct responses  -LR     Comment, Left Lower Extremity: Light Touch Sensation Assessment  L thigh and medial lower leg  -LR     Row Name 02/12/19 1426          Vision Assessment/Intervention    Visual Impairment/Limitations  WFL  -LR     Row Name 02/12/19 1426          Pain Assessment    Additional Documentation  Pain Scale: Numbers Pre/Post-Treatment (Group)  -LR     Row Name 02/12/19 1426          Pain Scale: Numbers Pre/Post-Treatment    Pain Scale: Numbers, Pretreatment  4/10  -LR     Pain Scale: Numbers, Post-Treatment  2/10  -LR     Pain Location - Side  Left  -LR     Pain Location  hip  -LR     Pain Intervention(s)  Repositioned;Ambulation/increased activity  -LR     Row Name             Wound 02/12/19 0835 Left hip incision    Wound - Properties Group Date first assessed: 02/12/19  -LD Time first assessed: 0835  -LD Side: Left  -LD Location: hip  -LD Type: incision  -LD    Row Name 02/12/19 1426          Coping    Observed Emotional State  accepting;cooperative  -LR     Verbalized Emotional State  acceptance  -LR     Row Name 02/12/19 1426          Physical Therapy Clinical Impression    Date of Referral to PT  02/12/19  -LR     PT Diagnosis (PT Clinical Impression)  s/p elective L DOROTHY via lateral approach  -LR     Prognosis (PT Clinical Impression)  good  -LR     Patient/Family Goals Statement (PT Clinical Impression)  go home, decrease pain  -LR     Criteria for Skilled Interventions Met (PT Clinical Impression)  yes;treatment indicated  -LR     Rehab Potential (PT Clinical Summary)  good, to achieve stated therapy goals  -LR     Care Plan Review (PT)  evaluation/treatment  results reviewed;care plan/treatment goals reviewed;risks/benefits reviewed;current/potential barriers reviewed;patient/other agree to care plan  -LR     Care Plan Review, Other Participant (PT Clinical Impression)  spouse;daughter;family  -LR     Row Name 02/12/19 1426          Physical Therapy Goals    Bed Mobility Goal Selection (PT)  bed mobility, PT goal 1  -LR     Transfer Goal Selection (PT)  transfer, PT goal 1  -LR     Gait Training Goal Selection (PT)  gait training, PT goal 1  -LR     Stairs Goal Selection (PT)  stairs, PT goal 1  -LR     Additional Documentation  Stairs Goal Selection (PT) (Row)  -LR     Row Name 02/12/19 1426          Bed Mobility Goal 1 (PT)    Activity/Assistive Device (Bed Mobility Goal 1, PT)  sit to supine/supine to sit  -LR     [place] Level/Cues Needed (Bed Mobility Goal 1, PT)  conditional independence  -LR     Time Frame (Bed Mobility Goal 1, PT)  long term goal (LTG);3 days  -LR     Progress/Outcomes (Bed Mobility Goal 1, PT)  goal ongoing  -     Row Name 02/12/19 1426          Transfer Goal 1 (PT)    Activity/Assistive Device (Transfer Goal 1, PT)  sit-to-stand/stand-to-sit;walker, rolling  -LR     [place] Level/Cues Needed (Transfer Goal 1, PT)  conditional independence  -LR     Time Frame (Transfer Goal 1, PT)  long term goal (LTG);3 days  -LR     Progress/Outcome (Transfer Goal 1, PT)  goal ongoing  -     Row Name 02/12/19 1426          Gait Training Goal 1 (PT)    Activity/Assistive Device (Gait Training Goal 1, PT)  gait (walking locomotion);walker, rolling  -LR     [place] Level (Gait Training Goal 1, PT)  conditional independence  -LR     Distance (Gait Goal 1, PT)  500 feet  -LR     Time Frame (Gait Training Goal 1, PT)  long term goal (LTG);3 days  -LR     Progress/Outcome (Gait Training Goal 1, PT)  goal ongoing  -     Row Name 02/12/19 1426          Stairs Goal 1 (PT)    Activity/Assistive Device (Stairs Goal 1, PT)  ascending stairs;descending  stairs;step-to-step;walker, rolling;other (see comments) backwards  -LR     [place] Level/Cues Needed (Stairs Goal 1, PT)  contact guard assist  -LR     Number of Stairs (Stairs Goal 1, PT)  1  -LR     Time Frame (Stairs Goal 1, PT)  long term goal (LTG);3 days  -LR     Progress/Outcome (Stairs Goal 1, PT)  goal ongoing  -LR     Row Name 02/12/19 1426          Positioning and Restraints    Pre-Treatment Position  in bed  -LR     Post Treatment Position  chair  -LR     In Chair  notified nsg;reclined;sitting;call light within reach;encouraged to call for assist;exit alarm on;with family/caregiver;compression device;legs elevated  -LR     Row Name 02/12/19 1426          Living Environment    Home Accessibility  not wheelchair accessible;stairs to enter home;other (see comments) walk in shower  -LR       User Key  (r) = Recorded By, (t) = Taken By, (c) = Cosigned By    Initials Name Provider Type    Selin Rawls, PT Physical Therapist    Tona Villatoro RN Registered Nurse        Physical Therapy Education     Title: PT OT SLP Therapies (In Progress)     Topic: Physical Therapy (Done)     Point: Mobility training (Done)     Learning Progress Summary           Patient Acceptance, E,D, VU,NR by LR at 2/12/2019  2:26 PM    Comment:  Educated on hip precautions, weight bearing status, correct supine to sit t/f technique, correct sit<->stand t/f technique, correct gait mechanics, and progression of POC.   Family Acceptance, E,D, VU,NR by LR at 2/12/2019  2:26 PM    Comment:  Educated on hip precautions, weight bearing status, correct supine to sit t/f technique, correct sit<->stand t/f technique, correct gait mechanics, and progression of POC.   Significant Other Acceptance, E,D, VU,NR by LR at 2/12/2019  2:26 PM    Comment:  Educated on hip precautions, weight bearing status, correct supine to sit t/f technique, correct sit<->stand t/f technique, correct gait mechanics, and progression of POC.                    Point: Home exercise program (Done)     Learning Progress Summary           Patient Acceptance, E,D, VU,NR by LR at 2/12/2019  2:26 PM    Comment:  Educated on hip precautions, weight bearing status, correct supine to sit t/f technique, correct sit<->stand t/f technique, correct gait mechanics, and progression of POC.   Family Acceptance, E,D, VU,NR by LR at 2/12/2019  2:26 PM    Comment:  Educated on hip precautions, weight bearing status, correct supine to sit t/f technique, correct sit<->stand t/f technique, correct gait mechanics, and progression of POC.   Significant Other Acceptance, E,D, VU,NR by LR at 2/12/2019  2:26 PM    Comment:  Educated on hip precautions, weight bearing status, correct supine to sit t/f technique, correct sit<->stand t/f technique, correct gait mechanics, and progression of POC.                   Point: Body mechanics (Done)     Learning Progress Summary           Patient Acceptance, E,D, VU,NR by LR at 2/12/2019  2:26 PM    Comment:  Educated on hip precautions, weight bearing status, correct supine to sit t/f technique, correct sit<->stand t/f technique, correct gait mechanics, and progression of POC.   Family Acceptance, E,D, VU,NR by LR at 2/12/2019  2:26 PM    Comment:  Educated on hip precautions, weight bearing status, correct supine to sit t/f technique, correct sit<->stand t/f technique, correct gait mechanics, and progression of POC.   Significant Other Acceptance, E,D, VU,NR by LR at 2/12/2019  2:26 PM    Comment:  Educated on hip precautions, weight bearing status, correct supine to sit t/f technique, correct sit<->stand t/f technique, correct gait mechanics, and progression of POC.                   Point: Precautions (Done)     Learning Progress Summary           Patient Acceptance, E,D, VU,NR by LR at 2/12/2019  2:26 PM    Comment:  Educated on hip precautions, weight bearing status, correct supine to sit t/f technique, correct sit<->stand t/f technique,  correct gait mechanics, and progression of POC.   Family Acceptance, E,D, VU,NR by LR at 2/12/2019  2:26 PM    Comment:  Educated on hip precautions, weight bearing status, correct supine to sit t/f technique, correct sit<->stand t/f technique, correct gait mechanics, and progression of POC.   Significant Other Acceptance, E,D, VU,NR by LR at 2/12/2019  2:26 PM    Comment:  Educated on hip precautions, weight bearing status, correct supine to sit t/f technique, correct sit<->stand t/f technique, correct gait mechanics, and progression of POC.                               User Key     Initials Effective Dates Name Provider Type Discipline    LR 06/19/15 -  Selin Cochran, PT Physical Therapist PT              PT Recommendation and Plan  Anticipated Discharge Disposition (PT): home with assist, home with home health  Planned Therapy Interventions (PT Eval): balance training, bed mobility training, gait training, home exercise program, patient/family education, ROM (range of motion), stair training, strengthening, transfer training  Therapy Frequency (PT Clinical Impression): 2 times/day  Outcome Summary/Treatment Plan (PT)  Anticipated Equipment Needs at Discharge (PT): (none)  Anticipated Discharge Disposition (PT): home with assist, home with home health  Plan of Care Reviewed With: spouse, daughter, family, patient  Progress: improving  Outcome Summary: Patient ambulated 100 feet with RW, limited by fatigue and L knee buckling d/t residual numbness from spinal. Plan is d/c home with family and HHPT. Encouraged patient to ambulate again with nursing tonight once numbness has fully resolved. Will continue to progress mobility training, strengthening, and ROM as able. PADD score of 8.   Outcome Measures     Row Name 02/12/19 5464             How much help from another person do you currently need...    Turning from your back to your side while in flat bed without using bedrails?  3  -LR      Moving from lying  on back to sitting on the side of a flat bed without bedrails?  3  -LR      Moving to and from a bed to a chair (including a wheelchair)?  3  -LR      Standing up from a chair using your arms (e.g., wheelchair, bedside chair)?  3  -LR      Climbing 3-5 steps with a railing?  2  -LR      To walk in hospital room?  3  -LR      AM-PAC 6 Clicks Score  17  -LR         Functional Assessment    Outcome Measure Options  AM-PAC 6 Clicks Basic Mobility (PT)  -LR        User Key  (r) = Recorded By, (t) = Taken By, (c) = Cosigned By    Initials Name Provider Type    LR Selin Cochran, PT Physical Therapist         Time Calculation:   PT Charges     Row Name 02/12/19 1426             Time Calculation    Start Time  1426  -LR      PT Received On  02/12/19  -LR      PT Goal Re-Cert Due Date  02/22/19  -LR         Time Calculation- PT    Total Timed Code Minutes- PT  10 minute(s)  -LR         Timed Charges    17008 - PT Therapeutic Exercise Minutes  2  -LR      82908 - Gait Training Minutes   8  -LR        User Key  (r) = Recorded By, (t) = Taken By, (c) = Cosigned By    Initials Name Provider Type    LR Selin Cochran, PT Physical Therapist        Therapy Suggested Charges     Code   Minutes Charges    23624 (CPT®) Hc Pt Neuromusc Re Education Ea 15 Min      13400 (CPT®) Hc Pt Ther Proc Ea 15 Min 2     99729 (CPT®) Hc Gait Training Ea 15 Min 8 1    44836 (CPT®) Hc Pt Therapeutic Act Ea 15 Min      64881 (CPT®) Hc Pt Manual Therapy Ea 15 Min      80268 (CPT®) Hc Pt Iontophoresis Ea 15 Min      95290 (CPT®) Hc Pt Elec Stim Ea-Per 15 Min      24610 (CPT®) Hc Pt Ultrasound Ea 15 Min      30012 (CPT®) Hc Pt Self Care/Mgmt/Train Ea 15 Min      15477 (CPT®) Hc Pt Prosthetic (S) Train Initial Encounter, Each 15 Min      34091 (CPT®) Hc Pt Orthotic(S)/Prosthetic(S) Encounter, Each 15 Min      89681 (CPT®) Hc Orthotic(S) Mgmt/Train Initial Encounter, Each 15min      Total  10 1        Therapy Charges for Today     Code  Description Service Date Service Provider Modifiers Qty    [id number]  GAIT TRAINING EA 15 MIN 2/12/2019 Selin Cochran, PT GP 1    [id number] HC PT THER SUPP EA 15 MIN 2/12/2019 Selin Cochran, PT GP 2    [id number]  PT EVAL LOW COMPLEXITY 3 2/12/2019 Selin Cochran, PT GP 1          PT G-Codes  Outcome Measure Options: AM-PAC 6 Clicks Basic Mobility (PT)  AM-PAC 6 Clicks Score: 17      Selin Cochran, PT  2/12/2019        Tranexamic Acid Counseling:  Patient advised of the small risk of bleeding problems with tranexamic acid. They were also instructed to call if they developed any nausea, vomiting or diarrhea. All of the patient's questions and concerns were addressed.

## 2022-07-13 DIAGNOSIS — I10 ESSENTIAL HYPERTENSION: ICD-10-CM

## 2022-07-13 DIAGNOSIS — K21.9 GASTROESOPHAGEAL REFLUX DISEASE WITHOUT ESOPHAGITIS: ICD-10-CM

## 2022-07-13 RX ORDER — OMEPRAZOLE 20 MG/1
CAPSULE, DELAYED RELEASE ORAL
Qty: 180 CAPSULE | Refills: 1 | Status: SHIPPED | OUTPATIENT
Start: 2022-07-13 | End: 2023-03-09 | Stop reason: SDUPTHER

## 2022-07-13 RX ORDER — AMLODIPINE BESYLATE 5 MG/1
TABLET ORAL
Qty: 90 TABLET | Refills: 1 | Status: SHIPPED | OUTPATIENT
Start: 2022-07-13 | End: 2023-03-09 | Stop reason: SDUPTHER

## 2022-07-18 RX ORDER — CLOPIDOGREL BISULFATE 75 MG/1
TABLET ORAL
Qty: 90 TABLET | Refills: 1 | Status: SHIPPED | OUTPATIENT
Start: 2022-07-18 | End: 2022-12-08 | Stop reason: SDUPTHER

## 2022-07-27 ENCOUNTER — HOSPITAL ENCOUNTER (OUTPATIENT)
Dept: MAMMOGRAPHY | Facility: HOSPITAL | Age: 77
Discharge: HOME | End: 2022-07-27
Admitting: FAMILY MEDICINE

## 2022-07-27 DIAGNOSIS — Z12.31 VISIT FOR SCREENING MAMMOGRAM: ICD-10-CM

## 2022-07-27 PROCEDURE — 77067 SCR MAMMO BI INCL CAD: CPT | Performed by: RADIOLOGY

## 2022-07-27 PROCEDURE — 77067 SCR MAMMO BI INCL CAD: CPT

## 2022-07-27 PROCEDURE — 77063 BREAST TOMOSYNTHESIS BI: CPT

## 2022-07-27 PROCEDURE — 77063 BREAST TOMOSYNTHESIS BI: CPT | Performed by: RADIOLOGY

## 2022-10-06 ENCOUNTER — OFFICE VISIT (OUTPATIENT)
Dept: FAMILY MEDICINE CLINIC | Facility: CLINIC | Age: 77
End: 2022-10-06

## 2022-10-06 VITALS
SYSTOLIC BLOOD PRESSURE: 122 MMHG | RESPIRATION RATE: 15 BRPM | HEIGHT: 66 IN | WEIGHT: 174 LBS | TEMPERATURE: 97.5 F | HEART RATE: 78 BPM | BODY MASS INDEX: 27.97 KG/M2 | DIASTOLIC BLOOD PRESSURE: 80 MMHG | OXYGEN SATURATION: 95 %

## 2022-10-06 DIAGNOSIS — E11.9 CONTROLLED TYPE 2 DIABETES MELLITUS WITHOUT COMPLICATION, WITHOUT LONG-TERM CURRENT USE OF INSULIN: Primary | ICD-10-CM

## 2022-10-06 LAB
EXPIRATION DATE: NORMAL
HBA1C MFR BLD: 6.6 %
Lab: NORMAL

## 2022-10-06 PROCEDURE — 83036 HEMOGLOBIN GLYCOSYLATED A1C: CPT | Performed by: FAMILY MEDICINE

## 2022-10-06 PROCEDURE — 3044F HG A1C LEVEL LT 7.0%: CPT | Performed by: FAMILY MEDICINE

## 2022-10-06 PROCEDURE — 99213 OFFICE O/P EST LOW 20 MIN: CPT | Performed by: FAMILY MEDICINE

## 2022-10-06 NOTE — PROGRESS NOTES
"Subjective   Nely Grider is a 76 y.o. female    Chief Complaint    Diabetes mellitus    History of Present Illness     The patient presents today for a follow-up regarding her diabetes mellitus. Her hemoglobin A1c is 6.6 percent.    Diabetes mellitus  The patient states that she is doing well. She mentions that she has not cut out her pets yet. She claims to drink 3-5 regular Pepsi per day but notes that her blood glucose stays low. The patient complains of feeling gassy all the time, especially in her hypogastric area.     Family history  The patient claims that her sister is not doing good. She mentions that her sister just had amputation surgery on her toes and will have another one on the other toe. She states that her sister has \"20 little tumors\" in her throat and notes that her sister just had a biopsy last week and is waiting for the results. The patient claims that her sister is experiencing memory loss.      The following portions of the patient's history were reviewed and updated as appropriate: allergies, current medications, past social history and problem list    Review of Systems   Constitutional: Negative for appetite change, diaphoresis, fatigue and unexpected weight change.   Eyes: Negative for visual disturbance.   Respiratory: Negative for chest tightness and shortness of breath.    Cardiovascular: Negative for chest pain, palpitations and leg swelling.   Gastrointestinal: Negative for diarrhea, nausea and vomiting.   Endocrine: Negative for polydipsia, polyphagia and polyuria.   Skin: Negative for color change.   Neurological: Negative for dizziness, weakness, light-headedness and numbness.       Objective     Vitals:    10/06/22 0809   BP: 122/80   Pulse: 78   Resp: 15   Temp: 97.5 °F (36.4 °C)   SpO2: 95%       Physical Exam  Vitals and nursing note reviewed.   Constitutional:       General: She is not in acute distress.     Appearance: Normal appearance. She is well-developed. She is not " ill-appearing, toxic-appearing or diaphoretic.   Neck:      Thyroid: No thyromegaly.      Vascular: No JVD.   Cardiovascular:      Rate and Rhythm: Normal rate and regular rhythm.      Pulses: Normal pulses.      Heart sounds: Normal heart sounds. No murmur heard.  Pulmonary:      Effort: Pulmonary effort is normal.      Breath sounds: Normal breath sounds.   Abdominal:      Palpations: Abdomen is soft. There is no mass.      Tenderness: There is no abdominal tenderness.   Musculoskeletal:      Cervical back: Neck supple.   Lymphadenopathy:      Cervical: No cervical adenopathy.   Skin:     General: Skin is warm and dry.   Neurological:      Mental Status: She is alert.      Sensory: No sensory deficit.         Assessment & Plan     Problems Addressed this Visit        Endocrine and Metabolic    Controlled type 2 diabetes mellitus without complication, without long-term current use of insulin (HCC) - Primary    Relevant Orders    POC Glycosylated Hemoglobin (Hb A1C) (Completed)      Diagnoses       Codes Comments    Controlled type 2 diabetes mellitus without complication, without long-term current use of insulin (HCC)    -  Primary ICD-10-CM: E11.9  ICD-9-CM: 250.00         I spent 20 minutes in patient care: Reviewing records prior to the visit, examining the patient, entering orders and documentation    Part of this note may be an electronic transcription/translation of spoken language to printed text using the Dragon Dictation System.             Transcribed from ambient dictation for R Natalio Foote MD by Carol Iyer.  10/06/22   13:25 EDT  I have personally performed the services described in this document as transcribed by the above individual, and it is both accurate and complete.  Patient verbalized consent to the visit recording.

## 2022-11-03 ENCOUNTER — OFFICE VISIT (OUTPATIENT)
Dept: FAMILY MEDICINE CLINIC | Facility: CLINIC | Age: 77
End: 2022-11-03

## 2022-11-03 VITALS
BODY MASS INDEX: 27.97 KG/M2 | HEART RATE: 80 BPM | OXYGEN SATURATION: 98 % | WEIGHT: 174 LBS | TEMPERATURE: 97.9 F | DIASTOLIC BLOOD PRESSURE: 60 MMHG | HEIGHT: 66 IN | SYSTOLIC BLOOD PRESSURE: 128 MMHG

## 2022-11-03 DIAGNOSIS — E11.9 CONTROLLED TYPE 2 DIABETES MELLITUS WITHOUT COMPLICATION, WITHOUT LONG-TERM CURRENT USE OF INSULIN: ICD-10-CM

## 2022-11-03 DIAGNOSIS — R68.83 CHILLS: ICD-10-CM

## 2022-11-03 DIAGNOSIS — J18.9 COMMUNITY ACQUIRED PNEUMONIA OF RIGHT LOWER LOBE OF LUNG: Primary | ICD-10-CM

## 2022-11-03 LAB
EXPIRATION DATE: NORMAL
FLUAV AG UPPER RESP QL IA.RAPID: NOT DETECTED
FLUBV AG UPPER RESP QL IA.RAPID: NOT DETECTED
INTERNAL CONTROL: NORMAL
Lab: NORMAL
SARS-COV-2 AG UPPER RESP QL IA.RAPID: NOT DETECTED

## 2022-11-03 PROCEDURE — 99214 OFFICE O/P EST MOD 30 MIN: CPT | Performed by: PHYSICIAN ASSISTANT

## 2022-11-03 PROCEDURE — 87428 SARSCOV & INF VIR A&B AG IA: CPT | Performed by: PHYSICIAN ASSISTANT

## 2022-11-03 PROCEDURE — 96372 THER/PROPH/DIAG INJ SC/IM: CPT | Performed by: PHYSICIAN ASSISTANT

## 2022-11-03 RX ORDER — CEFDINIR 300 MG/1
300 CAPSULE ORAL 2 TIMES DAILY
Qty: 20 CAPSULE | Refills: 0 | Status: SHIPPED | OUTPATIENT
Start: 2022-11-03 | End: 2023-02-06

## 2022-11-03 RX ORDER — CEFTRIAXONE 500 MG/1
500 INJECTION, POWDER, FOR SOLUTION INTRAMUSCULAR; INTRAVENOUS ONCE
Status: COMPLETED | OUTPATIENT
Start: 2022-11-03 | End: 2022-11-03

## 2022-11-03 RX ADMIN — CEFTRIAXONE 500 MG: 500 INJECTION, POWDER, FOR SOLUTION INTRAMUSCULAR; INTRAVENOUS at 11:39

## 2022-11-03 NOTE — PROGRESS NOTES
Subjective   Nely Grider is a 76 y.o. female  Cough (Productive cough with green mucus, sore throat and wheezing, intermittent ear pain)      History of Present Illness  The patient presents today for evaluation of respiratory symptoms.     The patient states that she has not been feeling well for the past week. The patient reports that her symptoms began with a sore throat and ear pain. She states that she now has a cough that is discolored. The patient denies any history of asthma or chronic obstructive pulmonary disease. She reports that she is breathing well through her nose. The patient denies any allergies to antibiotics. The patient confirms she is allergic to codeine. The patient confirms she is diabetic and takes Plavix. The patient notes she uses a vaporizer. The patient denies chills or fever.   The following portions of the patient's history were reviewed and updated as appropriate: allergies, current medications, past social history and problem list    Review of Systems   Constitutional: Positive for fatigue. Negative for chills and fever.   HENT: Positive for sore throat.    Respiratory: Positive for cough and chest tightness. Negative for shortness of breath and wheezing.    Cardiovascular: Negative for chest pain.   Gastrointestinal: Negative for nausea.   Allergic/Immunologic: Positive for immunocompromised state.       Objective     Vitals:    11/03/22 1108   BP: 128/60   Pulse: 80   Temp: 97.9 °F (36.6 °C)   SpO2: 98%       Physical Exam  Vitals and nursing note reviewed.   Constitutional:       General: She is not in acute distress.     Appearance: Normal appearance. She is well-developed. She is not ill-appearing, toxic-appearing or diaphoretic.   HENT:      Head: Normocephalic and atraumatic.      Nose: Nose normal.   Neck:      Vascular: No JVD.   Cardiovascular:      Rate and Rhythm: Normal rate and regular rhythm.      Heart sounds: Normal heart sounds. No murmur heard.  Pulmonary:       Effort: Pulmonary effort is normal. No respiratory distress.      Breath sounds: No stridor. Rales ( RLL) present. No wheezing.   Musculoskeletal:      Cervical back: Neck supple.   Lymphadenopathy:      Cervical: No cervical adenopathy.   Skin:     General: Skin is dry.      Coloration: Skin is not pale.   Neurological:      Mental Status: She is alert and oriented to person, place, and time.         Assessment & Plan     Diagnoses and all orders for this visit:    1. Community acquired pneumonia of right lower lobe of lung (Primary)  -     cefTRIAXone (ROCEPHIN) injection 500 mg    2. Controlled type 2 diabetes mellitus without complication, without long-term current use of insulin (HCC)    3. Chills  -     POCT SARS-CoV-2 Antigen RAJIV + Flu    Other orders  -     cefdinir (OMNICEF) 300 MG capsule; Take 1 capsule by mouth 2 (Two) Times a Day.  Dispense: 20 capsule; Refill: 0     The patient will receive a Rocephin injection in the office today. She will start cefdinir twice a day for 10 days. If her symptoms do not improve over the course of the next several days, she will call the provider.    The patient will follow up as needed.     Transcribed from ambient dictation for Elisa Rincon PA-C by Fátima Hester  11/03/22   13:57 EDT    Patient or patient representative verbalized consent to the visit recording.  I have personally performed the services described in this document as transcribed by the above individual, and it is both accurate and complete.  Elisa Rincon PA-C  11/3/2022  16:52 EDT

## 2022-11-16 NOTE — TELEPHONE ENCOUNTER
Caller: Nely Grider    Relationship: Self    Best call back number: [phone]  Requested Prescriptions:   Requested Prescriptions     Pending Prescriptions Disp Refills   • metFORMIN (GLUCOPHAGE) 500 MG tablet 180 tablet 0     Sig: Take 1 tablet by mouth 2 (Two) Times a Day With Meals.        Pharmacy where request should be sent:      [facility] - [facility] [address] - [phone]  - [phone]         Additional details provided by patient:     Does the patient have less than a 3 day supply:  [] Yes  [x] No    Robin Davenport Rep   11/16/22 11:13 EST

## 2022-12-08 NOTE — TELEPHONE ENCOUNTER
Caller: Nely Grider    Relationship: Self    Best call back number: [phone]  Requested Prescriptions:   Requested Prescriptions     Pending Prescriptions Disp Refills   • clopidogrel (PLAVIX) 75 MG tablet 90 tablet 1     Sig: Take 1 tablet by mouth Daily.        Pharmacy where request should be sent:      [facility] - [facility] [address] - [phone]  - [phone] FX    Additional details provided by patient:     Does the patient have less than a 3 day supply:  [x] Yes  [] No    Would you like a call back once the refill request has been completed: [] Yes [x] No    If the office needs to give you a call back, can they leave a voicemail: [] Yes [x] No    Robin Davenport Rep   12/08/22 15:30 EST       ”

## 2022-12-09 RX ORDER — CLOPIDOGREL BISULFATE 75 MG/1
75 TABLET ORAL DAILY
Qty: 90 TABLET | Refills: 1 | Status: SHIPPED | OUTPATIENT
Start: 2022-12-09

## 2023-02-06 ENCOUNTER — OFFICE VISIT (OUTPATIENT)
Dept: FAMILY MEDICINE CLINIC | Facility: CLINIC | Age: 78
End: 2023-02-06
Payer: MEDICARE

## 2023-02-06 VITALS
HEIGHT: 66 IN | DIASTOLIC BLOOD PRESSURE: 66 MMHG | BODY MASS INDEX: 28.28 KG/M2 | WEIGHT: 176 LBS | HEART RATE: 115 BPM | RESPIRATION RATE: 16 BRPM | SYSTOLIC BLOOD PRESSURE: 130 MMHG | TEMPERATURE: 96.8 F | OXYGEN SATURATION: 97 %

## 2023-02-06 DIAGNOSIS — I10 ESSENTIAL HYPERTENSION: ICD-10-CM

## 2023-02-06 DIAGNOSIS — E78.2 MIXED HYPERLIPIDEMIA: ICD-10-CM

## 2023-02-06 DIAGNOSIS — K21.9 GASTROESOPHAGEAL REFLUX DISEASE WITHOUT ESOPHAGITIS: ICD-10-CM

## 2023-02-06 DIAGNOSIS — R19.7 DIARRHEA, UNSPECIFIED TYPE: ICD-10-CM

## 2023-02-06 DIAGNOSIS — E11.9 CONTROLLED TYPE 2 DIABETES MELLITUS WITHOUT COMPLICATION, WITHOUT LONG-TERM CURRENT USE OF INSULIN: Primary | ICD-10-CM

## 2023-02-06 PROBLEM — Z96.641 PRESENCE OF RIGHT ARTIFICIAL HIP JOINT: Status: ACTIVE | Noted: 2018-04-25

## 2023-02-06 PROBLEM — E11.40 TYPE 2 DIABETES MELLITUS WITH DIABETIC NEUROPATHY, UNSPECIFIED (HCC): Status: ACTIVE | Noted: 2018-04-25

## 2023-02-06 PROBLEM — R26.2 DIFFICULTY IN WALKING, NOT ELSEWHERE CLASSIFIED: Status: ACTIVE | Noted: 2018-04-25

## 2023-02-06 PROBLEM — M19.90 UNSPECIFIED OSTEOARTHRITIS, UNSPECIFIED SITE: Status: ACTIVE | Noted: 2018-04-25

## 2023-02-06 PROBLEM — M16.9 OSTEOARTHRITIS OF HIP, UNSPECIFIED: Status: ACTIVE | Noted: 2018-04-25

## 2023-02-06 PROBLEM — S72.011A: Status: ACTIVE | Noted: 2018-04-25

## 2023-02-06 PROBLEM — E78.5 HYPERLIPIDEMIA, UNSPECIFIED: Status: ACTIVE | Noted: 2018-04-25

## 2023-02-06 PROBLEM — M25.551 PAIN IN RIGHT HIP: Status: ACTIVE | Noted: 2018-04-25

## 2023-02-06 PROBLEM — M16.0 BILATERAL PRIMARY OSTEOARTHRITIS OF HIP: Status: ACTIVE | Noted: 2018-04-25

## 2023-02-06 PROBLEM — M62.81 GENERALIZED MUSCLE WEAKNESS: Status: ACTIVE | Noted: 2018-04-25

## 2023-02-06 LAB
EXPIRATION DATE: NORMAL
HBA1C MFR BLD: 6.4 %
Lab: NORMAL

## 2023-02-06 PROCEDURE — 3044F HG A1C LEVEL LT 7.0%: CPT | Performed by: FAMILY MEDICINE

## 2023-02-06 PROCEDURE — 99213 OFFICE O/P EST LOW 20 MIN: CPT | Performed by: FAMILY MEDICINE

## 2023-02-06 PROCEDURE — 83036 HEMOGLOBIN GLYCOSYLATED A1C: CPT | Performed by: FAMILY MEDICINE

## 2023-02-06 NOTE — PROGRESS NOTES
Subjective   Nely Grider is a 77 y.o. female    Diabetes mellitus  Hypertension  Hyperlipidemia  Gastroesophageal reflux disease    History of Present Illness  The patient presents today for a follow-up visit regarding the above chronic medical problems. Her hemoglobin A1c continues to improve and is 6.4 percent today. We will need to schedule her for her annual exam and Medicare wellness as she is past due for these.    She reports she is doing well. She has neuropathy.    The patient's diarrhea is still there but not as bad as before..     The following portions of the patient's history were reviewed and updated as appropriate: allergies, current medications, past social history and problem list    Review of Systems   Constitutional: Negative for appetite change, chills, diaphoresis, fatigue, fever and unexpected weight change.   Eyes: Negative for visual disturbance.   Respiratory: Negative for cough, chest tightness, shortness of breath and wheezing.    Cardiovascular: Negative for chest pain, palpitations and leg swelling.   Gastrointestinal: Negative for abdominal distention, abdominal pain, diarrhea, nausea and vomiting.        Patient experiencing heartburn/acid reflux     Endocrine: Negative for polydipsia, polyphagia and polyuria.   Genitourinary: Negative for dysuria, frequency and urgency.   Musculoskeletal: Negative for arthralgias, back pain and myalgias.   Skin: Negative for color change and rash.   Neurological: Negative for dizziness, syncope, weakness, light-headedness, numbness and headaches.   Hematological: Negative for adenopathy. Does not bruise/bleed easily.       Objective     Vitals:    02/06/23 1104   BP: 130/66   Pulse: 115   Resp: 16   Temp: 96.8 °F (36 °C)   SpO2: 97%       Physical Exam  Vitals and nursing note reviewed.   Constitutional:       General: She is not in acute distress.     Appearance: Normal appearance. She is well-developed. She is not ill-appearing, toxic-appearing  or diaphoretic.   HENT:      Head: Normocephalic.   Eyes:      General: No scleral icterus.     Conjunctiva/sclera: Conjunctivae normal.      Pupils: Pupils are equal, round, and reactive to light.   Neck:      Thyroid: No thyromegaly.      Vascular: No carotid bruit or JVD.   Cardiovascular:      Rate and Rhythm: Normal rate and regular rhythm.      Pulses: Normal pulses.      Heart sounds: Normal heart sounds. No murmur heard.  Pulmonary:      Effort: Pulmonary effort is normal. No respiratory distress.      Breath sounds: Normal breath sounds. No wheezing or rales.   Abdominal:      General: Bowel sounds are normal. There is no distension.      Palpations: Abdomen is soft. There is no mass.      Tenderness: There is no abdominal tenderness. There is no guarding or rebound.      Hernia: No hernia is present.   Musculoskeletal:      Cervical back: Neck supple.   Lymphadenopathy:      Cervical: No cervical adenopathy.   Skin:     General: Skin is warm and dry.      Coloration: Skin is not jaundiced or pale.      Findings: No rash.   Neurological:      Mental Status: She is alert and oriented to person, place, and time.      Sensory: No sensory deficit.   Psychiatric:         Behavior: Behavior normal.         Assessment & Plan     Problems Addressed this Visit        Cardiac and Vasculature    Mixed hyperlipidemia    Essential hypertension       Endocrine and Metabolic    Controlled type 2 diabetes mellitus without complication, without long-term current use of insulin (HCC) - Primary    Relevant Orders    POC Glycosylated Hemoglobin (Hb A1C) (Completed)       Gastrointestinal Abdominal     Gastroesophageal reflux disease without esophagitis   Other Visit Diagnoses     Diarrhea, unspecified type          Diagnoses       Codes Comments    Controlled type 2 diabetes mellitus without complication, without long-term current use of insulin (HCC)    -  Primary ICD-10-CM: E11.9  ICD-9-CM: 250.00     Essential hypertension      ICD-10-CM: I10  ICD-9-CM: 401.9     Mixed hyperlipidemia     ICD-10-CM: E78.2  ICD-9-CM: 272.2     Gastroesophageal reflux disease without esophagitis     ICD-10-CM: K21.9  ICD-9-CM: 530.81     Diarrhea, unspecified type     ICD-10-CM: R19.7  ICD-9-CM: 787.91           I spent 25 minutes in patient care: Reviewing records prior to the visit, examining the patient, entering orders and documentation    Part of this note may be an electronic transcription/translation of spoken language to printed text using the Dragon Dictation System.           Transcribed from ambient dictation for DEENA Foote MD by Danitza Rubio.  02/06/23   12:44 EST  Patient or patient representative verbalized consent to the visit recording.  I have personally performed the services described in this document as transcribed by the above individual, and it is both accurate and complete.

## 2023-03-09 DIAGNOSIS — I10 ESSENTIAL HYPERTENSION: ICD-10-CM

## 2023-03-09 DIAGNOSIS — K21.9 GASTROESOPHAGEAL REFLUX DISEASE WITHOUT ESOPHAGITIS: ICD-10-CM

## 2023-03-09 RX ORDER — OMEPRAZOLE 20 MG/1
20 CAPSULE, DELAYED RELEASE ORAL 2 TIMES DAILY
Qty: 180 CAPSULE | Refills: 1 | Status: SHIPPED | OUTPATIENT
Start: 2023-03-09 | End: 2023-03-09 | Stop reason: SDUPTHER

## 2023-03-09 RX ORDER — OMEPRAZOLE 20 MG/1
20 CAPSULE, DELAYED RELEASE ORAL 2 TIMES DAILY
Qty: 180 CAPSULE | Refills: 3 | Status: SHIPPED | OUTPATIENT
Start: 2023-03-09

## 2023-03-09 RX ORDER — AMLODIPINE BESYLATE 5 MG/1
5 TABLET ORAL DAILY
Qty: 90 TABLET | Refills: 1 | Status: SHIPPED | OUTPATIENT
Start: 2023-03-09 | End: 2023-03-09 | Stop reason: SDUPTHER

## 2023-03-09 RX ORDER — AMLODIPINE BESYLATE 5 MG/1
5 TABLET ORAL DAILY
Qty: 90 TABLET | Refills: 3 | Status: SHIPPED | OUTPATIENT
Start: 2023-03-09

## 2023-03-09 NOTE — TELEPHONE ENCOUNTER
Caller: Nely Grider    Relationship: Self    Best call back number: [phone]    Requested Prescriptions:   Requested Prescriptions     Pending Prescriptions Disp Refills   • omeprazole (priLOSEC) 20 MG capsule 180 capsule 1     Sig: Take 1 capsule by mouth 2 (Two) Times a Day.   • amLODIPine (NORVASC) 5 MG tablet 90 tablet 1     Sig: Take 1 tablet by mouth Daily.        Pharmacy where request should be sent: [facility] - [place] [id number] [facility] - [phone]  - [phone]     Additional details provided by patient: ONE WEEK LEFT     Does the patient have less than a 3 day supply:  [] Yes  [x] No    Would you like a call back once the refill request has been completed: [] Yes [x] No    If the office needs to give you a call back, can they leave a voicemail: [] Yes [x] No    Robin Martinez Rep   03/09/23 13:32 EST

## 2023-04-11 DIAGNOSIS — E11.9 CONTROLLED TYPE 2 DIABETES MELLITUS WITHOUT COMPLICATION, WITHOUT LONG-TERM CURRENT USE OF INSULIN: ICD-10-CM

## 2023-04-11 RX ORDER — GLIMEPIRIDE 4 MG/1
4 TABLET ORAL
Qty: 90 TABLET | Refills: 3 | Status: SHIPPED | OUTPATIENT
Start: 2023-04-11

## 2023-05-01 DIAGNOSIS — E78.2 MIXED HYPERLIPIDEMIA: ICD-10-CM

## 2023-05-01 RX ORDER — ATORVASTATIN CALCIUM 80 MG/1
80 TABLET, FILM COATED ORAL NIGHTLY
Qty: 90 TABLET | Refills: 3 | Status: SHIPPED | OUTPATIENT
Start: 2023-05-01

## 2023-05-10 RX ORDER — CLOPIDOGREL BISULFATE 75 MG/1
TABLET ORAL
Qty: 90 TABLET | Refills: 1 | Status: SHIPPED | OUTPATIENT
Start: 2023-05-10

## 2023-06-07 ENCOUNTER — OFFICE VISIT (OUTPATIENT)
Dept: FAMILY MEDICINE CLINIC | Facility: CLINIC | Age: 78
End: 2023-06-07
Payer: MEDICARE

## 2023-06-07 VITALS
RESPIRATION RATE: 16 BRPM | DIASTOLIC BLOOD PRESSURE: 70 MMHG | SYSTOLIC BLOOD PRESSURE: 130 MMHG | WEIGHT: 174 LBS | HEIGHT: 66 IN | OXYGEN SATURATION: 97 % | TEMPERATURE: 98 F | BODY MASS INDEX: 27.97 KG/M2 | HEART RATE: 68 BPM

## 2023-06-07 DIAGNOSIS — K58.2 IRRITABLE BOWEL SYNDROME WITH BOTH CONSTIPATION AND DIARRHEA: ICD-10-CM

## 2023-06-07 DIAGNOSIS — E11.9 CONTROLLED TYPE 2 DIABETES MELLITUS WITHOUT COMPLICATION, WITHOUT LONG-TERM CURRENT USE OF INSULIN: ICD-10-CM

## 2023-06-07 DIAGNOSIS — E78.2 MIXED HYPERLIPIDEMIA: ICD-10-CM

## 2023-06-07 DIAGNOSIS — Z51.81 MEDICATION MONITORING ENCOUNTER: ICD-10-CM

## 2023-06-07 DIAGNOSIS — Z00.00 ANNUAL PHYSICAL EXAM: Primary | ICD-10-CM

## 2023-06-07 DIAGNOSIS — I10 ESSENTIAL HYPERTENSION: ICD-10-CM

## 2023-06-07 DIAGNOSIS — N18.30 STAGE 3 CHRONIC KIDNEY DISEASE, UNSPECIFIED WHETHER STAGE 3A OR 3B CKD: Chronic | ICD-10-CM

## 2023-06-07 DIAGNOSIS — E11.42 DIABETIC POLYNEUROPATHY ASSOCIATED WITH TYPE 2 DIABETES MELLITUS: ICD-10-CM

## 2023-06-07 DIAGNOSIS — K21.9 GASTROESOPHAGEAL REFLUX DISEASE WITHOUT ESOPHAGITIS: ICD-10-CM

## 2023-06-07 DIAGNOSIS — Z00.00 MEDICARE ANNUAL WELLNESS VISIT, SUBSEQUENT: ICD-10-CM

## 2023-06-07 LAB
EXPIRATION DATE: NORMAL
HBA1C MFR BLD: 6.4 %
Lab: NORMAL

## 2023-06-07 NOTE — PROGRESS NOTES
The ABCs of the Annual Wellness Visit  Subsequent Medicare Wellness Visit    Chief Complaint   Patient presents with    Medicare Wellness-subsequent      Subjective   History of Present Illness:  Nely Grider is a 77 y.o. female who presents for a Subsequent Medicare Wellness Visit.    The following portions of the patient's history were reviewed and   updated as appropriate: allergies, current medications, past family history, past medical history, past social history, past surgical history, and problem list.    Compared to one year ago, the patient feels her physical   health is the same.    Compared to one year ago, the patient feels her mental   health is the same.    Recent Hospitalizations:  She was not admitted to the hospital during the last year.       Current Medical Providers:  Patient Care Team:  Guy Foote MD as PCP - General (Family Medicine)  Luis Gustafson OD (Optometry)    Outpatient Medications Prior to Visit   Medication Sig Dispense Refill    amLODIPine (NORVASC) 5 MG tablet Take 1 tablet by mouth Daily. 90 tablet 3    aspirin 81 MG chewable tablet Chew 1 tablet Daily. 30 tablet 0    atorvastatin (LIPITOR) 80 MG tablet TAKE 1 TABLET BY MOUTH EVERY NIGHT. 90 tablet 3    clopidogrel (PLAVIX) 75 MG tablet TAKE 1 TABLET EVERY DAY 90 tablet 1    dicyclomine (BENTYL) 20 MG tablet Take 1 tablet by mouth 4 (Four) Times a Day Before Meals & at Bedtime As Needed (abdominal cramps or diarrhea). 60 tablet 5    gabapentin (NEURONTIN) 400 MG capsule Take 1 capsule by mouth 3 (Three) Times a Day. 270 capsule 1    glimepiride (AMARYL) 4 MG tablet TAKE 1 TABLET BY MOUTH EVERY MORNING BEFORE BREAKFAST. 90 tablet 3    metFORMIN (GLUCOPHAGE) 500 MG tablet TAKE 1 TABLET TWICE DAILY WITH MEALS 180 tablet 0    omeprazole (priLOSEC) 20 MG capsule Take 1 capsule by mouth 2 (Two) Times a Day. 180 capsule 3    Probiotic Product (PROBIOTIC DAILY) capsule Take as directed on package 60 capsule 0     simethicone (Gas-X) 80 MG chewable tablet Chew 1 tablet Every 6 (Six) Hours As Needed for Flatulence. 120 tablet 1     No facility-administered medications prior to visit.       No opioid medication identified on active medication list. I have reviewed chart for other potential  high risk medication/s and harmful drug interactions in the elderly.        Aspirin is on active medication list. Aspirin use is indicated based on review of current medical condition/s. Pros and cons of this therapy have been discussed today. Benefits of this medication outweigh potential harm.  Patient has been encouraged to continue taking this medication.  .      Patient Active Problem List   Diagnosis    Gastroesophageal reflux disease without esophagitis    Mixed hyperlipidemia    Essential hypertension    Controlled type 2 diabetes mellitus without complication, without long-term current use of insulin    Chronic kidney disease (CKD), stage III (moderate)    Anemia    Diabetic polyneuropathy associated with type 2 diabetes mellitus    Cerebrovascular accident (CVA) due to thrombosis of right middle cerebral artery    Chronic pain syndrome    Osteopenia    Difficulty in walking, not elsewhere classified    Generalized muscle weakness    Type 2 diabetes mellitus with diabetic neuropathy, unspecified    Unspecified intracapsular fracture of right femur, initial encounter for closed fracture    Essential (primary) hypertension    Gastro-esophageal reflux disease without esophagitis    Presence of right artificial hip joint    Hyperlipidemia, unspecified    Pain in right hip    Bilateral primary osteoarthritis of hip    Osteoarthritis of hip, unspecified    Unspecified osteoarthritis, unspecified site    Diverticulitis of large intestine    Low back pain    Other staphylococcus as the cause of diseases classified elsewhere    Thrombophlebitis of superficial veins of upper extremities    Chronic renal failure syndrome    Polyneuropathy in  "diabetes    Effusion, right hip     Advance Care Planning  ACP discussion was declined by the patient. Patient has an advance directive in EMR which is still valid.     Review of Systems      Objective      Vitals:    23 1031   BP: 130/70   Pulse: 68   Resp: 16   Temp: 98 °F (36.7 °C)   SpO2: 97%   Weight: 78.9 kg (174 lb)   Height: 166.4 cm (65.5\")   PainSc: 0-No pain     BMI Readings from Last 1 Encounters:   23 28.51 kg/m²   BMI is above normal parameters. Recommendations include: nutrition counseling    Does the patient have evidence of cognitive impairment? No    Physical Exam  Lab Results   Component Value Date    HGBA1C 6.4 2023            HEALTH RISK ASSESSMENT    Smoking Status:  Social History     Tobacco Use   Smoking Status Never   Smokeless Tobacco Never     Alcohol Consumption:  Social History     Substance and Sexual Activity   Alcohol Use No     Fall Risk Screen:    KARLA Fall Risk Assessment was completed, and patient is at LOW risk for falls.Assessment completed on:2023    Depression Screenin/7/2023    10:38 AM   PHQ-2/PHQ-9 Depression Screening   Little Interest or Pleasure in Doing Things 0-->not at all   Feeling Down, Depressed or Hopeless 0-->not at all   PHQ-9: Brief Depression Severity Measure Score 0       Health Habits and Functional and Cognitive Screening:      10/14/2021     2:10 PM   Functional & Cognitive Status   Do you have difficulty preparing food and eating? No   Do you have difficulty bathing yourself, getting dressed or grooming yourself? No   Do you have difficulty using the toilet? No   Do you have difficulty moving around from place to place? No   Do you have trouble with steps or getting out of a bed or a chair? No   Current Diet Well Balanced Diet   Dental Exam Up to date   Eye Exam Up to date   Exercise (times per week) 0 times per week   Current Exercises Include No Regular Exercise   Do you need help using the phone?  No   Are you deaf or " do you have serious difficulty hearing?  No   Do you need help with transportation? No   Do you need help shopping? No   Do you need help preparing meals?  No   Do you need help with housework?  No   Do you need help with laundry? No   Do you need help taking your medications? No   Do you need help managing money? No   Do you ever drive or ride in a car without wearing a seat belt? No   Have you felt unusual stress, anger or loneliness in the last month? No   Who do you live with? Spouse   If you need help, do you have trouble finding someone available to you? No   Have you been bothered in the last four weeks by sexual problems? No   Do you have difficulty concentrating, remembering or making decisions? No       Age-appropriate Screening Schedule:  Refer to the list below for future screening recommendations based on patient's age, sex and/or medical conditions. Orders for these recommended tests are listed in the plan section. The patient has been provided with a written plan.    Health Maintenance   Topic Date Due    URINE MICROALBUMIN  06/17/2021    DIABETIC EYE EXAM  02/01/2022    LIPID PANEL  10/15/2022    COVID-19 Vaccine (5 - Moderna series) 06/07/2023    ZOSTER VACCINE (2 of 3) 06/07/2023 (Originally 10/2/2013)    INFLUENZA VACCINE  08/01/2023    HEMOGLOBIN A1C  12/07/2023    DXA SCAN  05/31/2024    ANNUAL WELLNESS VISIT  06/07/2024    MAMMOGRAM  07/27/2024    COLORECTAL CANCER SCREENING  01/30/2029    TDAP/TD VACCINES (3 - Td or Tdap) 01/04/2031    HEPATITIS C SCREENING  Completed    Pneumococcal Vaccine 65+  Completed              Assessment & Plan     CMS Preventative Services Quick Reference  Risk Factors Identified During Encounter  None Identified  The above risks/problems have been discussed with the patient.  Follow up actions/plans if indicated are seen below in the Assessment/Plan Section.  Pertinent information has been shared with the patient in the After Visit Summary.    Diagnoses and all  orders for this visit:    1. Annual physical exam (Primary)  -     CBC (No Diff); Future  -     Comprehensive Metabolic Panel; Future  -     Lipid Panel; Future  -     TSH; Future  -     T4, Free; Future    2. Medicare annual wellness visit, subsequent    3. Controlled type 2 diabetes mellitus without complication, without long-term current use of insulin  -     POC Glycosylated Hemoglobin (Hb A1C)  -     Comprehensive Metabolic Panel; Future  -     Hemoglobin A1c; Future    4. Essential hypertension  -     Comprehensive Metabolic Panel; Future  -     TSH; Future  -     T4, Free; Future    5. Mixed hyperlipidemia  -     Comprehensive Metabolic Panel; Future  -     Lipid Panel; Future    6. Gastroesophageal reflux disease without esophagitis  -     CBC (No Diff); Future  -     Comprehensive Metabolic Panel; Future    7. Stage 3 chronic kidney disease, unspecified whether stage 3a or 3b CKD  -     Comprehensive Metabolic Panel; Future    8. Diabetic polyneuropathy associated with type 2 diabetes mellitus  -     Comprehensive Metabolic Panel; Future  -     Hemoglobin A1c; Future    9. Medication monitoring encounter  -     Comprehensive Metabolic Panel; Future    10. Irritable bowel syndrome with both constipation and diarrhea        Follow Up:  Return in about 6 months (around 12/7/2023) for Recheck.     An After Visit Summary and PPPS were given to the patient.

## 2023-06-07 NOTE — PROGRESS NOTES
Subjective   Nely Grider is a 77 y.o. female    Chief Complaint    Annual physical examination  Annual Medicare wellness visit  Diabetes mellitus  Hypertension  Hyperlipidemia    History of Present Illness  The patient presents today for her annual physical examination and Medicare annual wellness visit. This is also a follow-up visit for multiple medical problems including diabetes, hypertension, hyperlipidemia, gastroesophageal reflux disease, chronic kidney disease, and peripheral neuropathy secondary to her diabetes. We will update any of her health maintenance issues and arrange laboratory testing as indicated as well as refill medications as indicated.    She reports she has good days and bad. The patient said she has a bad bowel problem. She never knows when she is going to use the bathroom a lot of times. The patient said when she does, it is about big as a pencil. She said a lot of times it is just water. The patient said she does not take anything for it. She said she was given a pill 2 to 3 years ago, but she does not take it. The patient said it comes and goes. She said she had a colonoscopy in 2019, but they did not see anything wrong.    The following portions of the patient's history were reviewed and updated as appropriate: allergies, current medications, past social history and problem list    Review of Systems   Constitutional: Negative.  Negative for appetite change, chills, diaphoresis, fatigue, fever and unexpected weight change.   HENT: Negative.     Eyes: Negative.  Negative for visual disturbance.   Respiratory: Negative.  Negative for cough, chest tightness, shortness of breath and wheezing.    Cardiovascular: Negative.  Negative for chest pain, palpitations and leg swelling.   Gastrointestinal: Negative.  Negative for abdominal distention, abdominal pain, diarrhea, nausea and vomiting.        Patient experiencing heartburn/acid reflux     Endocrine: Negative.  Negative for polydipsia,  polyphagia and polyuria.   Genitourinary: Negative.  Negative for dysuria, frequency and urgency.   Musculoskeletal: Negative.  Negative for arthralgias, back pain and myalgias.   Skin: Negative.  Negative for color change and rash.   Allergic/Immunologic: Negative.    Neurological: Negative.  Negative for dizziness, syncope, weakness, light-headedness, numbness and headaches.   Hematological: Negative.  Negative for adenopathy. Does not bruise/bleed easily.   Psychiatric/Behavioral: Negative.     All other systems reviewed and are negative.    Objective     Vitals:    06/07/23 1031   BP: 130/70   Pulse: 68   Resp: 16   Temp: 98 °F (36.7 °C)   SpO2: 97%       Physical Exam  Vitals and nursing note reviewed.   Constitutional:       General: She is not in acute distress.     Appearance: Normal appearance. She is well-developed. She is not ill-appearing, toxic-appearing or diaphoretic.   HENT:      Head: Normocephalic and atraumatic.      Right Ear: External ear normal.      Left Ear: External ear normal.   Eyes:      General: No scleral icterus.     Conjunctiva/sclera: Conjunctivae normal.      Pupils: Pupils are equal, round, and reactive to light.   Neck:      Thyroid: No thyromegaly.      Vascular: No carotid bruit or JVD.   Cardiovascular:      Rate and Rhythm: Normal rate and regular rhythm.      Pulses: Normal pulses.      Heart sounds: Normal heart sounds. No murmur heard.  Pulmonary:      Effort: Pulmonary effort is normal. No respiratory distress.      Breath sounds: Normal breath sounds. No wheezing or rales.   Abdominal:      General: Bowel sounds are normal. There is no distension.      Palpations: Abdomen is soft. There is no mass.      Tenderness: There is no abdominal tenderness. There is no guarding or rebound.      Hernia: No hernia is present.   Musculoskeletal:         General: No swelling. Normal range of motion.      Cervical back: Normal range of motion and neck supple.   Lymphadenopathy:       Cervical: No cervical adenopathy.   Skin:     General: Skin is warm and dry.      Coloration: Skin is not jaundiced or pale.      Findings: No lesion or rash.   Neurological:      Mental Status: She is alert and oriented to person, place, and time.      Cranial Nerves: No cranial nerve deficit.      Sensory: No sensory deficit.      Motor: No weakness.      Coordination: Coordination normal.      Gait: Gait normal.      Deep Tendon Reflexes: Reflexes are normal and symmetric.   Psychiatric:         Mood and Affect: Mood normal.         Behavior: Behavior normal.         Thought Content: Thought content normal.         Judgment: Judgment normal.       Assessment & Plan   Problems Addressed this Visit          Cardiac and Vasculature    Mixed hyperlipidemia    Relevant Orders    Comprehensive Metabolic Panel    Lipid Panel    Essential hypertension    Relevant Orders    Comprehensive Metabolic Panel    TSH    T4, Free       Endocrine and Metabolic    Controlled type 2 diabetes mellitus without complication, without long-term current use of insulin    Relevant Orders    POC Glycosylated Hemoglobin (Hb A1C) (Completed)    Comprehensive Metabolic Panel    Hemoglobin A1c       Gastrointestinal Abdominal     Gastroesophageal reflux disease without esophagitis    Relevant Orders    CBC (No Diff)    Comprehensive Metabolic Panel       Genitourinary and Reproductive     Chronic kidney disease (CKD), stage III (moderate) (Chronic)    Relevant Orders    Comprehensive Metabolic Panel       Neuro    Diabetic polyneuropathy associated with type 2 diabetes mellitus    Relevant Orders    Comprehensive Metabolic Panel    Hemoglobin A1c     Other Visit Diagnoses       Annual physical exam    -  Primary    Relevant Orders    CBC (No Diff)    Comprehensive Metabolic Panel    Lipid Panel    TSH    T4, Free    Medicare annual wellness visit, subsequent        Medication monitoring encounter        Relevant Orders    Comprehensive  Metabolic Panel    Irritable bowel syndrome with both constipation and diarrhea              Diagnoses         Codes Comments    Annual physical exam    -  Primary ICD-10-CM: Z00.00  ICD-9-CM: V70.0     Medicare annual wellness visit, subsequent     ICD-10-CM: Z00.00  ICD-9-CM: V70.0     Controlled type 2 diabetes mellitus without complication, without long-term current use of insulin     ICD-10-CM: E11.9  ICD-9-CM: 250.00     Essential hypertension     ICD-10-CM: I10  ICD-9-CM: 401.9     Mixed hyperlipidemia     ICD-10-CM: E78.2  ICD-9-CM: 272.2     Gastroesophageal reflux disease without esophagitis     ICD-10-CM: K21.9  ICD-9-CM: 530.81     Stage 3 chronic kidney disease, unspecified whether stage 3a or 3b CKD     ICD-10-CM: N18.30  ICD-9-CM: 585.3     Diabetic polyneuropathy associated with type 2 diabetes mellitus     ICD-10-CM: E11.42  ICD-9-CM: 250.60, 357.2     Medication monitoring encounter     ICD-10-CM: Z51.81  ICD-9-CM: V58.83     Irritable bowel syndrome with both constipation and diarrhea     ICD-10-CM: K58.2  ICD-9-CM: 564.1           Recommend one of the daily fiber supplement such as Benefiber, Metamucil or Citrucel.  Take every day.    Continue current medications without changes.  Renewals when due.    Preventive medicine discussed, diet, exercise, healthy living discussed at length.  Discussed nutrition, physical activity, healthy weight, injury prevention, misuse of tobacco, alcohol and drugs, dental health, mental health, immunizations, screening    Part of this note may be an electronic transcription/translation of spoken language to printed text using the Dragon Dictation System.            Transcribed from ambient dictation for DEENA Foote MD by Starr Buckner.  06/07/23   14:38 EDT    Patient or patient representative verbalized consent to the visit recording.  I have personally performed the services described in this document as transcribed by the above individual, and it is  both accurate and complete.

## 2023-09-04 ENCOUNTER — HOSPITAL ENCOUNTER (EMERGENCY)
Facility: HOSPITAL | Age: 78
Discharge: HOME | End: 2023-09-04
Attending: EMERGENCY MEDICINE | Admitting: EMERGENCY MEDICINE
Payer: MEDICARE

## 2023-09-04 ENCOUNTER — APPOINTMENT (OUTPATIENT)
Dept: GENERAL RADIOLOGY | Facility: HOSPITAL | Age: 78
End: 2023-09-04
Payer: MEDICARE

## 2023-09-04 VITALS
RESPIRATION RATE: 16 BRPM | DIASTOLIC BLOOD PRESSURE: 85 MMHG | OXYGEN SATURATION: 98 % | HEIGHT: 66 IN | BODY MASS INDEX: 28.93 KG/M2 | TEMPERATURE: 98.3 F | HEART RATE: 87 BPM | WEIGHT: 180 LBS | SYSTOLIC BLOOD PRESSURE: 121 MMHG

## 2023-09-04 DIAGNOSIS — S83.92XA SPRAIN OF LEFT KNEE, UNSPECIFIED LIGAMENT, INITIAL ENCOUNTER: Primary | ICD-10-CM

## 2023-09-04 PROCEDURE — 73560 X-RAY EXAM OF KNEE 1 OR 2: CPT

## 2023-09-04 PROCEDURE — 99283 EMERGENCY DEPT VISIT LOW MDM: CPT

## 2023-09-04 RX ORDER — HYDROCODONE BITARTRATE AND ACETAMINOPHEN 5; 325 MG/1; MG/1
1 TABLET ORAL ONCE
Status: COMPLETED | OUTPATIENT
Start: 2023-09-04 | End: 2023-09-04

## 2023-09-04 RX ADMIN — HYDROCODONE BITARTRATE AND ACETAMINOPHEN 1 TABLET: 5; 325 TABLET ORAL at 16:11

## 2023-09-04 NOTE — ED PROVIDER NOTES
EMERGENCY DEPARTMENT ENCOUNTER    Pt Name: Nely Grider  MRN: [id number]  Pt :   1945  Room Number:  25SF/25  Date of encounter:  2023  PCP: Guy Foote MD  ED Provider: SKYLAR Baker    Historian: Patient    HPI:  Chief Complaint: Left knee pain.    Context: Nely Grider is a 77 y.o. female who presents to the ED c/o Lt knee pain.  Patient explains that she has issues with her right knee.  She explains that she has for quite some time.  She reports that her right knee will give out.  On Saturday patient was walking her right knee gave out causing her to twist her left knee.  Patient complains of increasing pain to the left knee.  Complains of swelling to the anterior portion of the left knee.  Patient advises that she did not fully go down.  HPI     REVIEW OF SYSTEMS  A chief complaint appropriate review of systems was completed and is negative except as noted in the HPI.     PAST MEDICAL HISTORY  Past Medical History:   Diagnosis Date    Anemia 2016    Anesthesia complication     per patient with hip surgery last april did not remember being in hospital or going home can only rmember from rehad on    Anesthesia complication     with hip surgery in 2018 had issues with swallowing after surgery called in speech    Arthritis     Cancer     cervical    Cataract     Colon polyp 2019    Cecal polyp 5 years Dr. Fatima awaiting pathology     Diabetes mellitus     type 2 dm, check blood sugar every morning, diagnosed 3 or 4 years    Diverticulosis 2019    Full dentures     Full dentures     GERD (gastroesophageal reflux disease)     High cholesterol     History of IBS     Hypertension     been off bp meds since last 2018    Neuropathy     Primary osteoarthritis of both hips 2018    Added automatically from request for surgery [id number]    Status post total replacement of left hip 2018    Wears glasses        PAST SURGICAL  HISTORY  Past Surgical History:   Procedure Laterality Date    CARDIAC CATHETERIZATION      CHOLECYSTECTOMY      COLONOSCOPY      EYE SURGERY      bilateral cataract    HYSTERECTOMY      partial    JOINT REPLACEMENT      hip surgery 2018 in april    OOPHORECTOMY      one removed    TONSILLECTOMY      TOTAL HIP ARTHROPLASTY Right 04/19/2018    Procedure: RIGHT TOTAL HIP ARTHROPLASTY;  Surgeon: Carlos Pollack MD;  Location:  [facility];  Service: Orthopedics    TOTAL HIP ARTHROPLASTY Left 02/12/2019    Procedure: TOTAL HIP ARTHROPLASTY LEFT;  Surgeon: Carlos Pollack MD;  Location:  [facility];  Service: Orthopedics    WRIST SURGERY      metal plate       FAMILY HISTORY  Family History   Problem Relation Age of Onset    Arthritis Mother     Heart attack Mother     Hypertension Mother     Hyperlipidemia Mother     Osteoporosis Mother     Heart disease Sister     Diabetes Sister     Arthritis Sister     Heart attack Sister     Hypertension Sister     Hyperlipidemia Sister     Bleeding Disorder Sister     Heart disease Brother     Cancer Brother     Diabetes Brother     Arthritis Brother     Heart attack Brother     Hypertension Brother     Hyperlipidemia Brother     Ovarian cancer Daughter     Cancer Daughter     Ovarian cancer Other         grandaughter    Ovarian cancer Other         granddaughter    Heart attack Father     Hypertension Father     Stroke Father     Kidney disease Paternal Uncle     Liver disease Paternal Uncle     Cancer Other     Stroke Other     Diabetes Other        SOCIAL HISTORY  Social History     Socioeconomic History    Marital status:    Tobacco Use    Smoking status: Never    Smokeless tobacco: Never   Substance and Sexual Activity    Alcohol use: No    Drug use: No    Sexual activity: Defer     Comment:        ALLERGIES  Codeine    PHYSICAL EXAM  Physical Exam  Vitals and nursing note reviewed.   Constitutional:       General: She is not in acute distress.     Appearance: Normal  appearance. She is not ill-appearing.   HENT:      Head: Normocephalic and atraumatic.      Nose: Nose normal.      Mouth/Throat:      Mouth: Mucous membranes are moist.   Eyes:      Extraocular Movements: Extraocular movements intact.      Pupils: Pupils are equal, round, and reactive to light.   Cardiovascular:      Rate and Rhythm: Normal rate and regular rhythm.      Pulses: Normal pulses.      Heart sounds: Normal heart sounds.   Pulmonary:      Effort: Pulmonary effort is normal.      Breath sounds: Normal breath sounds.   Abdominal:      General: Bowel sounds are normal. There is no distension.      Tenderness: There is no abdominal tenderness.   Musculoskeletal:      Cervical back: Normal range of motion.      Left knee: Swelling present. No erythema or ecchymosis. Decreased range of motion. Tenderness present. Normal pulse.   Skin:     General: Skin is warm and dry.   Neurological:      General: No focal deficit present.      Mental Status: She is alert and oriented to person, place, and time.   Psychiatric:         Mood and Affect: Mood normal.         LAB RESULTS  Results for orders placed or performed in visit on 06/12/23   CBC (No Diff)    Specimen: Blood    Blood  Release to [facility]   Result Value Ref Range    WBC 9.68 3.40 - 10.80 10*3/mm3    RBC 3.77 3.77 - 5.28 10*6/mm3    Hemoglobin 11.2 (L) 12.0 - 15.9 g/dL    Hematocrit 34.4 34.0 - 46.6 %    MCV 91.2 79.0 - 97.0 fL    MCH 29.7 26.6 - 33.0 pg    MCHC 32.6 31.5 - 35.7 g/dL    RDW 13.2 12.3 - 15.4 %    Platelets 360 140 - 450 10*3/mm3   Comprehensive Metabolic Panel    Specimen: Blood    Blood  Release to [facility]   Result Value Ref Range    Glucose 64 (L) 65 - 99 mg/dL    BUN 11 8 - 23 mg/dL    Creatinine 1.30 (H) 0.57 - 1.00 mg/dL    EGFR Result 41.9 (L) >60.0 mL/min/1.73    BUN/Creatinine Ratio 8.5 7.0 - 25.0    Sodium 146 (H) 136 - 145 mmol/L    Potassium 4.8 3.5 - 5.2 mmol/L    Chloride 111 (H) 98 - 107 mmol/L    Total CO2 26.9 22.0 - 29.0 mmol/L     Calcium 9.9 8.6 - 10.5 mg/dL    Total Protein 6.0 6.0 - 8.5 g/dL    Albumin 4.1 3.5 - 5.2 g/dL    Globulin 1.9 gm/dL    A/G Ratio 2.2 g/dL    Total Bilirubin 0.4 0.0 - 1.2 mg/dL    Alkaline Phosphatase 88 39 - 117 U/L    AST (SGOT) 18 1 - 32 U/L    ALT (SGPT) 12 1 - 33 U/L   Hemoglobin A1c    Specimen: Blood    Blood  Release to [facility]   Result Value Ref Range    Hemoglobin A1C 6.30 (H) 4.80 - 5.60 %   Lipid Panel    Specimen: Blood    Blood  Release to [facility]   Result Value Ref Range    Total Cholesterol 129 0 - 200 mg/dL    Triglycerides 160 (H) 0 - 150 mg/dL    HDL Cholesterol 37 (L) 40 - 60 mg/dL    VLDL Cholesterol Figueroa 27 5 - 40 mg/dL    LDL Chol Calc (NIH) 65 0 - 100 mg/dL   TSH    Specimen: Blood    Blood  Release to [facility]   Result Value Ref Range    TSH 3.250 0.270 - 4.200 uIU/mL   T4, Free    Specimen: Blood    Blood  Release to [facility]   Result Value Ref Range    Free T4 1.20 0.93 - 1.70 ng/dL       If labs were ordered, I independently reviewed the results and considered them in treating the patient.    RADIOLOGY  XR Knee 1 or 2 View Left   Final Result   Impression:   Minimal degenerative arthrosis and chondrocalcinosis.         Electronically Signed: Jimi Swann MD     9/4/2023 3:30 PM CDT     Workstation ID: [id number]        [x] Radiologist's Report Reviewed:  I ordered and independently reviewed the above noted radiographic studies.  See radiologist's dictation for official interpretation.      PROCEDURES    Procedures    No orders to display       MEDICATIONS GIVEN IN ER    Medications   HYDROcodone-acetaminophen (NORCO) 5-325 MG per tablet 1 tablet (1 tablet Oral Given 9/4/23 1611)       MEDICAL DECISION MAKING, PROGRESS, and CONSULTS   Medical Decision Making  Nely Grider is a 77 y.o. female who presents to the ED c/o Lt knee pain.  Patient explains that she has issues with her right knee.  She explains that she has for quite some time.  She reports that her right knee will give out.  On Saturday  patient was walking her right knee gave out causing her to twist her left knee.  Patient complains of increasing pain to the left knee.  Complains of swelling to the anterior portion of the left knee.  Patient advises that she did not fully go down.      Problems Addressed:  Sprain of left knee, unspecified ligament, initial encounter: acute illness or injury     Details: Patient to wear knee brace.  Patient to rest, ice, compression elevate extremity.  Patient to follow-up with Ortho.  Patient will need an MRI if the pain continues.    Amount and/or Complexity of Data Reviewed  Radiology: ordered. Decision-making details documented in ED Course.    Risk  Prescription drug management.        All labs have been independently reviewed by me.  All radiology studies have been reviewed by me and the radiologist dictating the report.  All EKG's have been independently viewed by me.    [] Discussed with radiology regarding test interpretation:    Discussion below represents my analysis of pertinent findings related to patient's condition, differential diagnosis, treatment plan and final disposition.    Differential diagnosis:  The differential diagnosis associated with the patient's presentation includes: Sprain, strain, fracture, dislocation    Additional sources  Discussed/ obtained information from independent historians:   [] Spouse  [] Parent  [] Family member  [] Friend  [] EMS   [] Other:  External (non-ED) record review:   [] Inpatient record:   [] Office record:   [] Outpatient record:   [] Prior Outpatient labs:   [] Prior Outpatient radiology:   [] Primary Care record:   [] Outside ED record:   [] Other:   Patient's care impacted by:   [x] Diabetes  [x] Hypertension  [] Hyperlipidemia  [] Hypothyroidism   [] Coronary Artery Disease   [] COPD   [] Cancer   [] Obesity  [x] GERD   [] Tobacco Abuse   [] Substance Abuse    [] Anxiety   [] Depression   [] Other:   Care significantly affected by Social Determinants of  Health (housing and economic circumstances, unemployment)    [] Yes     [x] No   If yes, Patient's care significantly limited by  Social Determinants of Health including:   [] Inadequate housing   [] Low income   [] Alcoholism and drug addiction in family   [] Problems related to primary support group   [] Unemployment   [] Problems related to employment   [] Other Social Determinants of Health:     Shared decision making: Shared decision making with patient patient to wear knee brace.  Patient to rest, ice, compression elevate extremity.  Patient to follow-up with Ortho.    Orders placed during this visit:  Orders Placed This Encounter   Procedures    [facility] 04.  Hinged Knee Brace    XR Knee 1 or 2 View Left       I considered prescription management  with:   [] Pain medication  [] Antiviral  [] Antibiotic   [] Other:   Rationale:  Additional orders considered but not ordered:  The following testing was considered but ultimately not selected after discussion with patient/family:    ED Course:    ED Course as of 09/04/23 1647   Mon Sep 04, 2023   1640 Imaging reviewed  IMPRESSION:  Impression:  Minimal degenerative arthrosis and chondrocalcinosis.      [KG]      ED Course User Index  [KG] Bijal Salmeron, SKYLAR            DIAGNOSIS  Final diagnoses:   Sprain of left knee, unspecified ligament, initial encounter       DISPOSITION    DISCHARGE    Patient discharged in stable condition.    Reviewed implications of results, diagnosis, meds, responsibility to follow up, warning signs and symptoms of possible worsening, potential complications and reasons to return to ER.    Patient/Family voiced understanding of above instructions.    Discussed plan for discharge, as there is no emergent indication for admission.  Pt/family is agreeable and understands need for follow up and possible repeat testing.  Pt/family is aware that discharge does not mean that nothing is wrong but that it indicates no emergency  is currently present that requires admission and they must continue care with follow-up as given below or with a physician of their choice.     FOLLOW-UP  Guy Foote MD  [address] [place] [address] [address] [address]  [phone]          Carlos Pollack MD  [address] [address]  [phone]               Medication List      No changes were made to your prescriptions during this visit.          ED Disposition       ED Disposition   Discharge    Condition   Stable    Comment   --               Please note that portions of this document were completed with voice recognition software.       iBjal Salmeron, APRN  09/04/23 8300

## 2023-09-04 NOTE — DISCHARGE INSTRUCTIONS
Imaging reveals degenerative changes.    Wear knee brace.      Rest, ice, compression and elevate.   Tylenol for pain.     Follow up with ortho.      If pain continues she will need an MRI of your left knee.

## 2023-09-05 ENCOUNTER — PATIENT OUTREACH (OUTPATIENT)
Dept: CASE MANAGEMENT | Facility: OTHER | Age: 78
End: 2023-09-05
Payer: MEDICARE

## 2023-09-05 NOTE — OUTREACH NOTE
Patient Outreach    AMBULATORY CASE MANAGEMENT NOTE    Name and Relationship of Patient/Support Person: Nely Grider OPAL - Self    Call placed to patient to follow up recent ED visit. Introduced self and role of ACM. Mrs Grider states she is doing well and she has an appointment with her orthopedic tomorrow. No questions, concerns or needs regarding health wellness.   Pt appreciative of phone call and declined to participate in  Case Management Program. No needs identified.         Radha MCMULLEN  Ambulatory Case Management    9/5/2023, 13:20 EDT

## 2023-09-06 ENCOUNTER — HOSPITAL ENCOUNTER (OUTPATIENT)
Dept: MAMMOGRAPHY | Facility: HOSPITAL | Age: 78
Discharge: HOME | End: 2023-09-06
Admitting: FAMILY MEDICINE
Payer: MEDICARE

## 2023-09-06 ENCOUNTER — OFFICE VISIT (OUTPATIENT)
Dept: ORTHOPEDIC SURGERY | Facility: CLINIC | Age: 78
End: 2023-09-06
Payer: MEDICARE

## 2023-09-06 VITALS
BODY MASS INDEX: 27.16 KG/M2 | DIASTOLIC BLOOD PRESSURE: 58 MMHG | HEIGHT: 65 IN | SYSTOLIC BLOOD PRESSURE: 110 MMHG | WEIGHT: 163 LBS

## 2023-09-06 DIAGNOSIS — Z12.31 VISIT FOR SCREENING MAMMOGRAM: ICD-10-CM

## 2023-09-06 DIAGNOSIS — M17.12 OSTEOARTHRITIS OF LEFT KNEE, UNSPECIFIED OSTEOARTHRITIS TYPE: Primary | ICD-10-CM

## 2023-09-06 PROCEDURE — 77063 BREAST TOMOSYNTHESIS BI: CPT

## 2023-09-06 PROCEDURE — 77067 SCR MAMMO BI INCL CAD: CPT

## 2023-09-06 PROCEDURE — 99203 OFFICE O/P NEW LOW 30 MIN: CPT | Performed by: ORTHOPAEDIC SURGERY

## 2023-09-06 NOTE — PROGRESS NOTES
[facility] Note    Subjective     Chief Complaint   Patient presents with    Left Knee - Pain        HPI    Nely Grider is a 77 y.o. female who presents with new problem of: left knee pain.  Onset: twisting injury. The issue has been ongoing for 5 day(s). Pain is a 5/10 on the pain scale. Pain is described as aching. Associated symptoms include stiffness. The pain is worse with walking, standing, climbing stairs, and rising from seated position; sitting, ice, and assistive device (cane/walker) improve the pain. Previous treatments have included: bracing and cane/walker.  Pain has improved since she made the appointment.  Ambulatory without external aids.    I have reviewed the following portions of the patient's history and agree with: History of Present Illness and Review of Systems    Patient Active Problem List   Diagnosis    Gastroesophageal reflux disease without esophagitis    Mixed hyperlipidemia    Essential hypertension    Controlled type 2 diabetes mellitus without complication, without long-term current use of insulin    Chronic kidney disease (CKD), stage III (moderate)    Anemia    Diabetic polyneuropathy associated with type 2 diabetes mellitus    Cerebrovascular accident (CVA) due to thrombosis of right middle cerebral artery    Chronic pain syndrome    Osteopenia    Difficulty in walking, not elsewhere classified    Generalized muscle weakness    Type 2 diabetes mellitus with diabetic neuropathy, unspecified    Unspecified intracapsular fracture of right femur, initial encounter for closed fracture    Essential (primary) hypertension    Gastro-esophageal reflux disease without esophagitis    Presence of right artificial hip joint    Hyperlipidemia, unspecified    Pain in right hip    Bilateral primary osteoarthritis of hip    Osteoarthritis of hip, unspecified    Unspecified osteoarthritis, unspecified site    Diverticulitis of large intestine    Low back pain    Other  staphylococcus as the cause of diseases classified elsewhere    Thrombophlebitis of superficial veins of upper extremities    Chronic renal failure syndrome    Polyneuropathy in diabetes    Effusion, right hip     Past Medical History:   Diagnosis Date    Anemia 09/18/2016    Anesthesia complication     per patient with hip surgery last april did not remember being in hospital or going home can only ryan from [facility]    Anesthesia complication     with hip surgery in april 2018 had issues with swallowing after surgery called in speech    Arthritis     Cancer     cervical    Cataract     Colon polyp 01/31/2019    Cecal polyp 5 years Dr. Fatima awaiting pathology 1/19    Diabetes mellitus     type 2 dm, check blood sugar every morning, diagnosed 3 or 4 years    Diverticulosis 01/31/2019    Full dentures     Full dentures     GERD (gastroesophageal reflux disease)     High cholesterol     History of IBS     Hypertension     been off bp meds since last april 2018    Neuropathy     Primary osteoarthritis of both hips 01/31/2018    Added automatically from request for surgery [id number]    Status post total replacement of left hip 04/19/2018    Wears glasses       Past Surgical History:   Procedure Laterality Date    CARDIAC CATHETERIZATION      CHOLECYSTECTOMY      COLONOSCOPY      EYE SURGERY      bilateral cataract    HYSTERECTOMY      partial    JOINT REPLACEMENT      hip surgery 2018 in april    OOPHORECTOMY      one removed    TONSILLECTOMY      TOTAL HIP ARTHROPLASTY Right 04/19/2018    Procedure: RIGHT TOTAL HIP ARTHROPLASTY;  Surgeon: Carlos Pollack MD;  Location:  [facility];  Service: Orthopedics    TOTAL HIP ARTHROPLASTY Left 02/12/2019    Procedure: TOTAL HIP ARTHROPLASTY LEFT;  Surgeon: Carlos Pollack MD;  Location:  [facility];  Service: Orthopedics    WRIST SURGERY      metal plate      Family History   Problem Relation Age of Onset    Arthritis Mother     Heart attack Mother     Hypertension Mother      Hyperlipidemia Mother     Osteoporosis Mother     Heart disease Sister     Diabetes Sister     Arthritis Sister     Heart attack Sister     Hypertension Sister     Hyperlipidemia Sister     Bleeding Disorder Sister     Heart disease Brother     Cancer Brother     Diabetes Brother     Arthritis Brother     Heart attack Brother     Hypertension Brother     Hyperlipidemia Brother     Ovarian cancer Daughter     Cancer Daughter     Ovarian cancer Other         grandaughter    Ovarian cancer Other         granddaughter    Heart attack Father     Hypertension Father     Stroke Father     Kidney disease Paternal Uncle     Liver disease Paternal Uncle     Cancer Other     Stroke Other     Diabetes Other      Social History     Socioeconomic History    Marital status:    Tobacco Use    Smoking status: Never    Smokeless tobacco: Never   Substance and Sexual Activity    Alcohol use: No    Drug use: No    Sexual activity: Defer     Comment:       Current Outpatient Medications on File Prior to Visit   Medication Sig Dispense Refill    amLODIPine (NORVASC) 5 MG tablet Take 1 tablet by mouth Daily. 90 tablet 3    aspirin 81 MG chewable tablet Chew 1 tablet Daily. 30 tablet 0    atorvastatin (LIPITOR) 80 MG tablet TAKE 1 TABLET BY MOUTH EVERY NIGHT. 90 tablet 3    clopidogrel (PLAVIX) 75 MG tablet TAKE 1 TABLET EVERY DAY 90 tablet 1    dicyclomine (BENTYL) 20 MG tablet Take 1 tablet by mouth 4 (Four) Times a Day Before Meals & at Bedtime As Needed (abdominal cramps or diarrhea). 60 tablet 5    gabapentin (NEURONTIN) 400 MG capsule Take 1 capsule by mouth 3 (Three) Times a Day. 270 capsule 1    glimepiride (AMARYL) 4 MG tablet TAKE 1 TABLET BY MOUTH EVERY MORNING BEFORE BREAKFAST. 90 tablet 3    metFORMIN (GLUCOPHAGE) 500 MG tablet TAKE 1 TABLET TWICE DAILY WITH MEALS 180 tablet 0    omeprazole (priLOSEC) 20 MG capsule Take 1 capsule by mouth 2 (Two) Times a Day. 180 capsule 3    Probiotic Product (PROBIOTIC DAILY)  capsule Take as directed on package 60 capsule 0    simethicone (Gas-X) 80 MG chewable tablet Chew 1 tablet Every 6 (Six) Hours As Needed for Flatulence. 120 tablet 1     No current facility-administered medications on file prior to visit.      Allergies   Allergen Reactions    Codeine Hives        Review of Systems   Constitutional:  Negative for activity change, appetite change, chills, diaphoresis, fatigue, fever and unexpected weight change.   HENT:  Negative for congestion, dental problem, drooling, ear discharge, ear pain, facial swelling, hearing loss, mouth sores, nosebleeds, postnasal drip, rhinorrhea, sinus pressure, sneezing, sore throat, tinnitus, trouble swallowing and voice change.    Eyes:  Negative for photophobia, pain, discharge, redness, itching and visual disturbance.   Respiratory:  Negative for apnea, cough, choking, chest tightness, shortness of breath, wheezing and stridor.    Cardiovascular:  Negative for chest pain, palpitations and leg swelling.   Gastrointestinal:  Negative for abdominal distention, abdominal pain, anal bleeding, blood in stool, constipation, diarrhea, nausea, rectal pain and vomiting.   Endocrine: Negative for cold intolerance, heat intolerance, polydipsia, polyphagia and polyuria.   Genitourinary:  Negative for decreased urine volume, difficulty urinating, dysuria, enuresis, flank pain, frequency, genital sores, hematuria and urgency.   Musculoskeletal:  Positive for arthralgias. Negative for back pain, gait problem, joint swelling, myalgias, neck pain and neck stiffness.   Skin:  Negative for color change, pallor, rash and wound.   Allergic/Immunologic: Negative for environmental allergies, food allergies and immunocompromised state.   Neurological:  Negative for dizziness, tremors, seizures, syncope, facial asymmetry, speech difficulty, weakness, light-headedness, numbness and headaches.   Hematological:  Negative for adenopathy. Does not bruise/bleed easily.  "  Psychiatric/Behavioral:  Negative for agitation, behavioral problems, confusion, decreased concentration, dysphoric mood, hallucinations, self-injury, sleep disturbance and suicidal ideas. The patient is not nervous/anxious and is not hyperactive.       Objective      Physical Exam  /58   Ht 165.1 cm (65\")   Wt 73.9 kg (163 lb)   BMI 27.12 kg/m²     Body mass index is 27.12 kg/m².    General:   Mental Status:  Alert   Appearance: Cooperative, in no acute distress   Build and Nutrition: Well-nourished well-developed female   Orientation: Alert and oriented to person, place and time   Posture: Normal   Gait: Normal/nonantalgic    Integument:   Left knee: No skin lesions, no rash, no ecchymosis    Lower Extremities:   Left Knee:    Tenderness:  Mild lateral joint line tenderness    Effusion:  Trace    Swelling:  None    Crepitus: Soft    Range of motion:  Extension: 0°       Flexion: 130°  Instability: No varus laxity, no valgus laxity, negative anterior drawer  Deformities:  None      Imaging/Studies      Imaging Results (Last 24 Hours)       ** No results found for the last 24 hours. **          XR KNEE 1 OR 2 VW LEFT     Date of Exam: 9/4/2023 3:06 PM CDT     Indication: lt knee pain     Comparison: None available.     Findings:  No evidence of fracture nor dislocation. Alignment is normal. There is minimal lateral compartment degenerative arthrosis with lateral compartment chondrocalcinosis. No significant effusion identified. Soft tissues are unremarkable.     IMPRESSION:  Impression:  Minimal degenerative arthrosis and chondrocalcinosis.        Electronically Signed: Jimi Swann MD    9/4/2023 3:30 PM CDT    Workstation ID: [id number]    I reviewed the above imaging, and agree with findings.    Assessment and Plan     Diagnoses and all orders for this visit:    1. Osteoarthritis of left knee, unspecified osteoarthritis type (Primary)        1. Osteoarthritis of left knee, unspecified osteoarthritis " type        I reviewed my findings with the patient and her granddaughter.  She likely sprained her knee, and has underlying arthritis.  Symptoms have improved since making the appointment, and I anticipate further improvement over time.  If her knee flares up in the future, I am happy to see her back for an injection if appropriate.    Return if symptoms worsen or fail to improve.      Carlos Pollack MD  09/06/23  14:04 EDT

## 2023-12-18 ENCOUNTER — TELEPHONE (OUTPATIENT)
Dept: FAMILY MEDICINE CLINIC | Facility: CLINIC | Age: 78
End: 2023-12-18

## 2023-12-18 NOTE — TELEPHONE ENCOUNTER
Caller: Nely Grider    Relationship: Self    Best call back number: [phone]    What is the best time to reach you: ANYTIME    What was the call regarding: WOULD LIKE A RECOMMENDATION OF A NEW DR SINCE THEY ARE NOW OUT OF NETWORK WITH [facility]. SHE ALSO NEEDS FOR HER  AS WELL WHO IS A [facility]. HIS NAME IS MONA    Is it okay if the provider responds through MyChart: NO

## 2024-05-20 ENCOUNTER — APPOINTMENT (OUTPATIENT)
Dept: CT IMAGING | Facility: HOSPITAL | Age: 79
End: 2024-05-20
Payer: MEDICARE

## 2024-05-20 ENCOUNTER — APPOINTMENT (OUTPATIENT)
Dept: GENERAL RADIOLOGY | Facility: HOSPITAL | Age: 79
End: 2024-05-20
Payer: MEDICARE

## 2024-05-20 ENCOUNTER — HOSPITAL ENCOUNTER (OUTPATIENT)
Facility: HOSPITAL | Age: 79
Setting detail: OBSERVATION
Discharge: HOME | End: 2024-05-21
Attending: EMERGENCY MEDICINE | Admitting: STUDENT IN AN ORGANIZED HEALTH CARE EDUCATION/TRAINING PROGRAM
Payer: MEDICARE

## 2024-05-20 DIAGNOSIS — E11.42 DIABETIC POLYNEUROPATHY ASSOCIATED WITH TYPE 2 DIABETES MELLITUS: ICD-10-CM

## 2024-05-20 DIAGNOSIS — S40.011A CONTUSION OF RIGHT SHOULDER, INITIAL ENCOUNTER: ICD-10-CM

## 2024-05-20 DIAGNOSIS — I10 ESSENTIAL (PRIMARY) HYPERTENSION: ICD-10-CM

## 2024-05-20 DIAGNOSIS — S70.02XA CONTUSION OF LEFT HIP, INITIAL ENCOUNTER: ICD-10-CM

## 2024-05-20 DIAGNOSIS — R29.818 TRANSIENT NEUROLOGICAL SYMPTOMS: ICD-10-CM

## 2024-05-20 DIAGNOSIS — R20.0 LEFT FACIAL NUMBNESS: ICD-10-CM

## 2024-05-20 DIAGNOSIS — E78.2 MIXED HYPERLIPIDEMIA: ICD-10-CM

## 2024-05-20 DIAGNOSIS — E11.9 CONTROLLED TYPE 2 DIABETES MELLITUS WITHOUT COMPLICATION, WITHOUT LONG-TERM CURRENT USE OF INSULIN: ICD-10-CM

## 2024-05-20 DIAGNOSIS — N18.9 CHRONIC RENAL FAILURE SYNDROME: ICD-10-CM

## 2024-05-20 DIAGNOSIS — K21.9 GASTRO-ESOPHAGEAL REFLUX DISEASE WITHOUT ESOPHAGITIS: ICD-10-CM

## 2024-05-20 DIAGNOSIS — Z78.9 IMPAIRED MOBILITY AND ADLS: ICD-10-CM

## 2024-05-20 DIAGNOSIS — R26.2 DIFFICULTY IN WALKING, NOT ELSEWHERE CLASSIFIED: ICD-10-CM

## 2024-05-20 DIAGNOSIS — G89.4 CHRONIC PAIN SYNDROME: ICD-10-CM

## 2024-05-20 DIAGNOSIS — Z74.09 IMPAIRED MOBILITY AND ADLS: ICD-10-CM

## 2024-05-20 DIAGNOSIS — M62.81 GENERALIZED MUSCLE WEAKNESS: ICD-10-CM

## 2024-05-20 DIAGNOSIS — B95.7 OTHER STAPHYLOCOCCUS AS THE CAUSE OF DISEASES CLASSIFIED ELSEWHERE: ICD-10-CM

## 2024-05-20 DIAGNOSIS — Z96.641 PRESENCE OF RIGHT ARTIFICIAL HIP JOINT: ICD-10-CM

## 2024-05-20 DIAGNOSIS — S72.011A UNSPECIFIED INTRACAPSULAR FRACTURE OF RIGHT FEMUR, INITIAL ENCOUNTER FOR CLOSED FRACTURE: ICD-10-CM

## 2024-05-20 DIAGNOSIS — K21.9 GASTROESOPHAGEAL REFLUX DISEASE WITHOUT ESOPHAGITIS: ICD-10-CM

## 2024-05-20 DIAGNOSIS — M25.451 EFFUSION, RIGHT HIP: ICD-10-CM

## 2024-05-20 DIAGNOSIS — R29.810 FACIAL DROOP: Primary | ICD-10-CM

## 2024-05-20 DIAGNOSIS — M25.551 PAIN IN RIGHT HIP: ICD-10-CM
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
## 2024-05-20 DIAGNOSIS — M85.80 OSTEOPENIA, UNSPECIFIED LOCATION: ICD-10-CM

## 2024-05-20 DIAGNOSIS — M16.0 BILATERAL PRIMARY OSTEOARTHRITIS OF HIP: ICD-10-CM

## 2024-05-20 DIAGNOSIS — I10 ESSENTIAL HYPERTENSION: ICD-10-CM

## 2024-05-20 DIAGNOSIS — S09.90XA INJURY OF HEAD, INITIAL ENCOUNTER: ICD-10-CM

## 2024-05-20 DIAGNOSIS — I63.311 CEREBROVASCULAR ACCIDENT (CVA) DUE TO THROMBOSIS OF RIGHT MIDDLE CEREBRAL ARTERY: ICD-10-CM

## 2024-05-20 DIAGNOSIS — R20.0 RIGHT SIDED NUMBNESS: ICD-10-CM

## 2024-05-20 LAB
ALBUMIN SERPL-MCNC: 3.8 G/DL (ref 3.5–5.2)
ALBUMIN/GLOB SERPL: 1.6 G/DL
ALP SERPL-CCNC: 77 U/L (ref 39–117)
ALT SERPL W P-5'-P-CCNC: 13 U/L (ref 1–33)
ALT SERPL W P-5'-P-CCNC: 13 U/L (ref 1–33)
ANION GAP SERPL CALCULATED.3IONS-SCNC: 11 MMOL/L (ref 5–15)
APTT PPP: 30.3 SECONDS (ref 22–39)
AST SERPL-CCNC: 21 U/L (ref 1–32)
AST SERPL-CCNC: 22 U/L (ref 1–32)
BASOPHILS # BLD AUTO: 0.06 10*3/MM3 (ref 0–0.2)
BASOPHILS NFR BLD AUTO: 0.7 % (ref 0–1.5)
BILIRUB SERPL-MCNC: 0.3 MG/DL (ref 0–1.2)
BUN SERPL-MCNC: 17 MG/DL (ref 8–23)
BUN/CREAT SERPL: 13.1 (ref 7–25)
CALCIUM SPEC-SCNC: 9 MG/DL (ref 8.6–10.5)
CHLORIDE SERPL-SCNC: 102 MMOL/L (ref 98–107)
CO2 SERPL-SCNC: 24 MMOL/L (ref 22–29)
CREAT SERPL-MCNC: 1.3 MG/DL (ref 0.57–1)
DEPRECATED RDW RBC AUTO: 50.8 FL (ref 37–54)
EGFRCR SERPLBLD CKD-EPI 2021: 41.7 ML/MIN/1.73
EOSINOPHIL # BLD AUTO: 0.17 10*3/MM3 (ref 0–0.4)
EOSINOPHIL NFR BLD AUTO: 1.9 % (ref 0.3–6.2)
ERYTHROCYTE [DISTWIDTH] IN BLOOD BY AUTOMATED COUNT: 14 % (ref 12.3–15.4)
GLOBULIN UR ELPH-MCNC: 2.4 GM/DL
GLUCOSE BLDC GLUCOMTR-MCNC: 183 MG/DL (ref 70–130)
GLUCOSE BLDC GLUCOMTR-MCNC: 58 MG/DL (ref 70–130)
GLUCOSE BLDC GLUCOMTR-MCNC: 95 MG/DL (ref 70–130)
GLUCOSE SERPL-MCNC: 120 MG/DL (ref 65–99)
HBA1C MFR BLD: 6.3 % (ref 4.8–5.6)
HCT VFR BLD AUTO: 34.8 % (ref 34–46.6)
HGB BLD-MCNC: 10.6 G/DL (ref 12–15.9)
HOLD SPECIMEN: NORMAL
IMM GRANULOCYTES # BLD AUTO: 0.05 10*3/MM3 (ref 0–0.05)
IMM GRANULOCYTES NFR BLD AUTO: 0.6 % (ref 0–0.5)
LYMPHOCYTES # BLD AUTO: 2.58 10*3/MM3 (ref 0.7–3.1)
LYMPHOCYTES NFR BLD AUTO: 29.5 % (ref 19.6–45.3)
MCH RBC QN AUTO: 29.9 PG (ref 26.6–33)
MCHC RBC AUTO-ENTMCNC: 30.5 G/DL (ref 31.5–35.7)
MCV RBC AUTO: 98 FL (ref 79–97)
MONOCYTES # BLD AUTO: 0.67 10*3/MM3 (ref 0.1–0.9)
MONOCYTES NFR BLD AUTO: 7.7 % (ref 5–12)
NEUTROPHILS NFR BLD AUTO: 5.21 10*3/MM3 (ref 1.7–7)
NEUTROPHILS NFR BLD AUTO: 59.6 % (ref 42.7–76)
NRBC BLD AUTO-RTO: 0 /100 WBC (ref 0–0.2)
PA ADP PRP-ACNC: 201 PRU
PLATELET # BLD AUTO: 336 10*3/MM3 (ref 140–450)
PMV BLD AUTO: 10.1 FL (ref 6–12)
POTASSIUM SERPL-SCNC: 4.3 MMOL/L (ref 3.5–5.2)
PROT SERPL-MCNC: 6.2 G/DL (ref 6–8.5)
RBC # BLD AUTO: 3.55 10*6/MM3 (ref 3.77–5.28)
SODIUM SERPL-SCNC: 137 MMOL/L (ref 136–145)
TROPONIN T SERPL HS-MCNC: 14 NG/L
WBC NRBC COR # BLD AUTO: 8.74 10*3/MM3 (ref 3.4–10.8)
WHOLE BLOOD HOLD COAG: NORMAL
WHOLE BLOOD HOLD SPECIMEN: NORMAL

## 2024-05-20 PROCEDURE — 82948 REAGENT STRIP/BLOOD GLUCOSE: CPT

## 2024-05-20 PROCEDURE — 99223 1ST HOSP IP/OBS HIGH 75: CPT | Performed by: INTERNAL MEDICINE

## 2024-05-20 PROCEDURE — 83036 HEMOGLOBIN GLYCOSYLATED A1C: CPT | Performed by: INTERNAL MEDICINE

## 2024-05-20 PROCEDURE — 92610 EVALUATE SWALLOWING FUNCTION: CPT

## 2024-05-20 PROCEDURE — 93005 ELECTROCARDIOGRAM TRACING: CPT | Performed by: EMERGENCY MEDICINE

## 2024-05-20 PROCEDURE — G0378 HOSPITAL OBSERVATION PER HR: HCPCS

## 2024-05-20 PROCEDURE — 99285 EMERGENCY DEPT VISIT HI MDM: CPT

## 2024-05-20 PROCEDURE — 85025 COMPLETE CBC W/AUTO DIFF WBC: CPT | Performed by: EMERGENCY MEDICINE

## 2024-05-20 PROCEDURE — 80053 COMPREHEN METABOLIC PANEL: CPT | Performed by: EMERGENCY MEDICINE

## 2024-05-20 PROCEDURE — 85730 THROMBOPLASTIN TIME PARTIAL: CPT | Performed by: EMERGENCY MEDICINE

## 2024-05-20 PROCEDURE — 71045 X-RAY EXAM CHEST 1 VIEW: CPT

## 2024-05-20 PROCEDURE — 70498 CT ANGIOGRAPHY NECK: CPT

## 2024-05-20 PROCEDURE — 70496 CT ANGIOGRAPHY HEAD: CPT

## 2024-05-20 PROCEDURE — 0042T HC CT CEREBRAL PERFUSION W/WO CONTRAST: CPT

## 2024-05-20 PROCEDURE — 92523 SPEECH SOUND LANG COMPREHEN: CPT

## 2024-05-20 PROCEDURE — 70450 CT HEAD/BRAIN W/O DYE: CPT

## 2024-05-20 PROCEDURE — 73502 X-RAY EXAM HIP UNI 2-3 VIEWS: CPT

## 2024-05-20 PROCEDURE — 99214 OFFICE O/P EST MOD 30 MIN: CPT

## 2024-05-20 PROCEDURE — 73030 X-RAY EXAM OF SHOULDER: CPT

## 2024-05-20 PROCEDURE — 84484 ASSAY OF TROPONIN QUANT: CPT | Performed by: EMERGENCY MEDICINE

## 2024-05-20 PROCEDURE — 25510000001 IOPAMIDOL PER 1 ML: Performed by: EMERGENCY MEDICINE

## 2024-05-20 PROCEDURE — 85576 BLOOD PLATELET AGGREGATION: CPT

## 2024-05-20 PROCEDURE — 36415 COLL VENOUS BLD VENIPUNCTURE: CPT

## 2024-05-20 PROCEDURE — 63710000001 INSULIN LISPRO (HUMAN) PER 5 UNITS: Performed by: INTERNAL MEDICINE

## 2024-05-20 RX ORDER — INSULIN LISPRO 100 [IU]/ML
2-7 INJECTION, SOLUTION INTRAVENOUS; SUBCUTANEOUS
Status: DISCONTINUED | OUTPATIENT
Start: 2024-05-20 | End: 2024-05-21 | Stop reason: HOSPADM

## 2024-05-20 RX ORDER — IBUPROFEN 600 MG/1
1 TABLET ORAL
Status: DISCONTINUED | OUTPATIENT
Start: 2024-05-20 | End: 2024-05-21 | Stop reason: HOSPADM

## 2024-05-20 RX ORDER — BISACODYL 5 MG/1
5 TABLET, DELAYED RELEASE ORAL DAILY PRN
Status: DISCONTINUED | OUTPATIENT
Start: 2024-05-20 | End: 2024-05-21 | Stop reason: HOSPADM

## 2024-05-20 RX ORDER — NICOTINE POLACRILEX 4 MG
15 LOZENGE BUCCAL
Status: DISCONTINUED | OUTPATIENT
Start: 2024-05-20 | End: 2024-05-21 | Stop reason: HOSPADM

## 2024-05-20 RX ORDER — ONDANSETRON 2 MG/ML
4 INJECTION INTRAMUSCULAR; INTRAVENOUS EVERY 6 HOURS PRN
Status: DISCONTINUED | OUTPATIENT
Start: 2024-05-20 | End: 2024-05-21 | Stop reason: HOSPADM

## 2024-05-20 RX ORDER — SODIUM CHLORIDE 0.9 % (FLUSH) 0.9 %
10 SYRINGE (ML) INJECTION AS NEEDED
Status: DISCONTINUED | OUTPATIENT
Start: 2024-05-20 | End: 2024-05-21 | Stop reason: HOSPADM

## 2024-05-20 RX ORDER — ASPIRIN 81 MG/1
81 TABLET, CHEWABLE ORAL DAILY
Status: DISCONTINUED | OUTPATIENT
Start: 2024-05-20 | End: 2024-05-21 | Stop reason: HOSPADM

## 2024-05-20 RX ORDER — ATORVASTATIN CALCIUM 40 MG/1
80 TABLET, FILM COATED ORAL NIGHTLY
Status: DISCONTINUED | OUTPATIENT
Start: 2024-05-20 | End: 2024-05-21 | Stop reason: HOSPADM

## 2024-05-20 RX ORDER — ASPIRIN 300 MG/1
300 SUPPOSITORY RECTAL DAILY
Status: DISCONTINUED | OUTPATIENT
Start: 2024-05-20 | End: 2024-05-21 | Stop reason: HOSPADM

## 2024-05-20 RX ORDER — CLOPIDOGREL BISULFATE 75 MG/1
300 TABLET ORAL ONCE
Status: COMPLETED | OUTPATIENT
Start: 2024-05-20 | End: 2024-05-20

## 2024-05-20 RX ORDER — SODIUM CHLORIDE 0.9 % (FLUSH) 0.9 %
10 SYRINGE (ML) INJECTION EVERY 12 HOURS SCHEDULED
Status: DISCONTINUED | OUTPATIENT
Start: 2024-05-20 | End: 2024-05-21 | Stop reason: HOSPADM

## 2024-05-20 RX ORDER — BISACODYL 10 MG
10 SUPPOSITORY, RECTAL RECTAL DAILY PRN
Status: DISCONTINUED | OUTPATIENT
Start: 2024-05-20 | End: 2024-05-21 | Stop reason: HOSPADM

## 2024-05-20 RX ORDER — AMOXICILLIN 250 MG
2 CAPSULE ORAL 2 TIMES DAILY PRN
Status: DISCONTINUED | OUTPATIENT
Start: 2024-05-20 | End: 2024-05-21 | Stop reason: HOSPADM

## 2024-05-20 RX ORDER — SODIUM CHLORIDE 9 MG/ML
40 INJECTION, SOLUTION INTRAVENOUS AS NEEDED
Status: DISCONTINUED | OUTPATIENT
Start: 2024-05-20 | End: 2024-05-21 | Stop reason: HOSPADM

## 2024-05-20 RX ORDER — POLYETHYLENE GLYCOL 3350 17 G/17G
17 POWDER, FOR SOLUTION ORAL DAILY PRN
Status: DISCONTINUED | OUTPATIENT
Start: 2024-05-20 | End: 2024-05-21 | Stop reason: HOSPADM

## 2024-05-20 RX ORDER — DEXTROSE MONOHYDRATE 25 G/50ML
25 INJECTION, SOLUTION INTRAVENOUS
Status: DISCONTINUED | OUTPATIENT
Start: 2024-05-20 | End: 2024-05-21 | Stop reason: HOSPADM

## 2024-05-20 RX ORDER — ACETAMINOPHEN 325 MG/1
650 TABLET ORAL EVERY 4 HOURS PRN
Status: DISCONTINUED | OUTPATIENT
Start: 2024-05-20 | End: 2024-05-21 | Stop reason: HOSPADM

## 2024-05-20 RX ORDER — PANTOPRAZOLE SODIUM 40 MG/1
40 TABLET, DELAYED RELEASE ORAL
Status: DISCONTINUED | OUTPATIENT
Start: 2024-05-21 | End: 2024-05-21 | Stop reason: HOSPADM

## 2024-05-20 RX ORDER — CLOPIDOGREL BISULFATE 75 MG/1
75 TABLET ORAL DAILY
Status: DISCONTINUED | OUTPATIENT
Start: 2024-05-21 | End: 2024-05-21 | Stop reason: HOSPADM

## 2024-05-20 RX ORDER — GABAPENTIN 400 MG/1
400 CAPSULE ORAL EVERY 8 HOURS SCHEDULED
Status: DISCONTINUED | OUTPATIENT
Start: 2024-05-20 | End: 2024-05-21 | Stop reason: HOSPADM

## 2024-05-20 RX ORDER — ONDANSETRON 4 MG/1
4 TABLET, ORALLY DISINTEGRATING ORAL EVERY 6 HOURS PRN
Status: DISCONTINUED | OUTPATIENT
Start: 2024-05-20 | End: 2024-05-21 | Stop reason: HOSPADM

## 2024-05-20 RX ADMIN — CLOPIDOGREL BISULFATE 300 MG: 75 TABLET ORAL at 15:56

## 2024-05-20 RX ADMIN — Medication 10 ML: at 21:58

## 2024-05-20 RX ADMIN — INSULIN LISPRO 2 UNITS: 100 INJECTION, SOLUTION INTRAVENOUS; SUBCUTANEOUS at 21:57

## 2024-05-20 RX ADMIN — ATORVASTATIN CALCIUM 80 MG: 40 TABLET, FILM COATED ORAL at 21:57

## 2024-05-20 RX ADMIN — ASPIRIN 81 MG CHEWABLE TABLET 81 MG: 81 TABLET CHEWABLE at 15:56

## 2024-05-20 RX ADMIN — IOPAMIDOL 115 ML: 755 INJECTION, SOLUTION INTRAVENOUS at 13:39

## 2024-05-20 RX ADMIN — GABAPENTIN 400 MG: 400 CAPSULE ORAL at 21:57

## 2024-05-20 NOTE — Clinical Note
Level of Care: Telemetry [5]   Diagnosis: Left sided numbness [654284]   Bed Request Comments: stroke floor

## 2024-05-20 NOTE — H&P
"    [facility] Medicine Services  HISTORY AND PHYSICAL    Patient Name: Nely Grider  : 1945  MRN: [id number]  Primary Care Physician: Guy Foote MD  Date of admission: 2024      Subjective   Subjective     Chief Complaint:  Fall, left facial droop/numbness, and RLE numb    HPI:  Nely Grider is a 78 y.o. female with h/o T2DM, neuropathy on gabapentin, HTN, HL, TIA in past with left weakness that resolved s/p fall last night with left sided facial droop and RLE numbness.  Patient states that she was dreaming about her sister who passed away in March and she was up trying to push a door open to sister who said \"its not your time\" and she fell to floor.  Hit her head,right shoulder, and left hip.  She crawled and pulled her self up and got back in bed and didn't tell family she fell until this AM when daughter noticed left facial droop.  Patient states that left feels a \"little funny\" and RLE feels numb as well.        Personal History     Past Medical History:   Diagnosis Date    Anemia 2016    Anesthesia complication     per patient with hip surgery last april did not remember being in hospital or going home can only rmember from rehad on    Anesthesia complication     with hip surgery in 2018 had issues with swallowing after surgery called in speech    Arthritis     Cancer     cervical    Cataract     Colon polyp 2019    Cecal polyp 5 years Dr. Fatima awaiting pathology     Diabetes mellitus     type 2 dm, check blood sugar every morning, diagnosed 3 or 4 years    Diverticulosis 2019    Full dentures     Full dentures     GERD (gastroesophageal reflux disease)     High cholesterol     History of IBS     Hypertension     been off bp meds since last 2018    Neuropathy     Primary osteoarthritis of both hips 2018    Added automatically from request for surgery [id number]    Status post total replacement of left hip " 04/19/2018    Wears glasses            Past Surgical History:   Procedure Laterality Date    CARDIAC CATHETERIZATION      CHOLECYSTECTOMY      COLONOSCOPY      EYE SURGERY      bilateral cataract    HYSTERECTOMY      partial    JOINT REPLACEMENT      hip surgery 2018 in april    OOPHORECTOMY      one removed    TONSILLECTOMY      TOTAL HIP ARTHROPLASTY Right 04/19/2018    Procedure: RIGHT TOTAL HIP ARTHROPLASTY;  Surgeon: Carlos Pollack MD;  Location: [facility] OR;  Service: Orthopedics    TOTAL HIP ARTHROPLASTY Left 02/12/2019    Procedure: TOTAL HIP ARTHROPLASTY LEFT;  Surgeon: Carlos Pollack MD;  Location: [facility] OR;  Service: Orthopedics    WRIST SURGERY      metal plate       Family History: family history includes Arthritis in her brother, mother, and sister; Bleeding Disorder in her sister; Cancer in her brother, daughter, and another family member; Diabetes in her brother, sister, and another family member; Heart attack in her brother, father, mother, and sister; Heart disease in her brother and sister; Hyperlipidemia in her brother, mother, and sister; Hypertension in her brother, father, mother, and sister; Kidney disease in her paternal uncle; Liver disease in her paternal uncle; Osteoporosis in her mother; Ovarian cancer in her daughter and other family members; Stroke in her father and another family member.     Social History:  reports that she has never smoked. She has never been exposed to tobacco smoke. She has never used smokeless tobacco. She reports that she does not drink alcohol and does not use drugs.  Social History     Social History Narrative    Not on file       Medications:  Available home medication information reviewed.  amLODIPine, atorvastatin, clopidogrel, gabapentin, glimepiride, metFORMIN, and omeprazole    Allergies   Allergen Reactions    Codeine Hives       Objective   Objective     Vital Signs:   Temp:  [97.6 °F (36.4 °C)-98.2 °F (36.8 °C)] 97.6 °F (36.4 °C)  Heart Rate:   [70-89] 85  Resp:  [14-16] 16  BP: (113-157)/(54-84) 142/62  Total (NIH Stroke Scale): 0    Physical Exam   Constitutional: Awake, alert  Eyes: PERRLA, sclerae anicteric, no conjunctival injection  HENT: NCAT, mucous membranes moist  Neck: Supple, no thyromegaly, no lymphadenopathy, trachea midline  Respiratory: Clear to auscultation bilaterally, nonlabored respirations   Cardiovascular: RRR, no murmurs, rubs, or gallops, palpable pedal pulses bilaterally  Gastrointestinal: Positive bowel sounds, soft, nontender, nondistended  Musculoskeletal: No bilateral ankle edema, no clubbing or cyanosis to extremities  Psychiatric: Appropriate affect, cooperative  Neurologic: Oriented x 3, left facial droop, a little weaker in RLE vs LLE, speech clear  Skin: No rashes      Result Review:  I have personally reviewed the results from the time of this admission to 5/20/2024 16:33 EDT and agree with these findings:  [x]  Laboratory list / accordion  [x]  Microbiology  [x]  Radiology  [x]  EKG/Telemetry   []  Cardiology/Vascular   []  Pathology  []  Old records  []  Other:  Most notable findings include:       LAB RESULTS:      Lab 05/20/24  1354   WBC 8.74   HEMOGLOBIN 10.6*   HEMATOCRIT 34.8   PLATELETS 336   NEUTROS ABS 5.21   IMMATURE GRANS (ABS) 0.05   LYMPHS ABS 2.58   MONOS ABS 0.67   EOS ABS 0.17   MCV 98.0*   APTT 30.3         Lab 05/20/24  1354   SODIUM 137   POTASSIUM 4.3   CHLORIDE 102   CO2 24.0   ANION GAP 11.0   BUN 17   CREATININE 1.30*   EGFR 41.7*   GLUCOSE 120*   CALCIUM 9.0         Lab 05/20/24  1354   TOTAL PROTEIN 6.2   ALBUMIN 3.8   GLOBULIN 2.4   ALT (SGPT) 13  13   AST (SGOT) 21  22   BILIRUBIN 0.3   ALK PHOS 77         Lab 05/20/24  1354   HSTROP T 14*                     Microbiology Results (last 10 days)       ** No results found for the last 240 hours. **            XR Hip With or Without Pelvis 2 - 3 View Left    Result Date: 5/20/2024  XR HIP W OR WO PELVIS 2-3 VIEW LEFT Date of Exam: 5/20/2024  1:47 PM EDT Indication: Fall, left hip pain Comparison: Pelvis and left hip radiographs dated 2/27/2020 Findings: There are bilateral total hip prostheses. There is a left total hip prosthesis. The hardware appears in expected position. There is no evidence of periprosthetic fracture or loosening. There is excreted contrast within the urinary bladder from prior contrast enhanced CT.     Impression: Impression: 1. Bilateral total hip prostheses without evidence of hardware complication. Electronically Signed: Tomas Frazier  5/20/2024 2:21 PM EDT  Workstation ID: [id number]    XR Shoulder 2+ View Right    Result Date: 5/20/2024  XR SHOULDER 2+ VW RIGHT Date of Exam: 5/20/2024 1:47 PM EDT Indication: fall, R shoulder pain Comparison: None available. Findings: Subtle findings are limited by diffuse osteopenia. No definite acute fracture or dislocation is noted. Mild degenerative changes are noted at the AC joint. Glenohumeral joint appears grossly unremarkable in appearance. Soft tissues and limited imaging of  the chest is unremarkable.     Impression: Impression: No acute osseous abnormality given limitations of exam Electronically Signed: Bairon Funes MD  5/20/2024 2:17 PM EDT  Workstation ID: [id number]    XR Chest 1 View    Result Date: 5/20/2024  XR CHEST 1 VW Date of Exam: 5/20/2024 1:47 PM EDT Indication: Acute Stroke Protocol (onset < 12 hrs) Comparison: 10/3/2019. FINDINGS: No focal airspace opacity. No pleural effusion or pneumothorax. Normal heart and mediastinal contours.     Impression: No evidence of acute disease in the chest. Electronically Signed: Richardson Ga MD  5/20/2024 2:10 PM EDT  Workstation ID: [id number]    CT Angiogram Head w AI Analysis of LVO    Result Date: 5/20/2024  CT ANGIOGRAM HEAD W AI ANALYSIS OF LVO, CT CEREBRAL PERFUSION W WO CONTRAST, CT ANGIOGRAM NECK Date of Exam: 5/20/2024 1:27 PM EDT Indication: Neuro Deficit, acute, Stroke suspected Neuro deficit, acute stroke suspected.  Comparison: None available. Technique: Axial CT images of the brain were obtained prior to and after the administration of 115 mL Isovue-370. CT Perfusion protocol was utilized. Automated post processing was performed by RAPID software and submitted to PACS for interpretation.  CTA  of the head and neck were performed after the administration of iodinated contrast.  Reconstructed coronal and sagittal images were also obtained. A 3-D volume rendered image was created for interpretation. Automated exposure control and iterative reconstruction methods were used.   Findings: CT perfusion: Color maps are symmetric, without evidence of core infarct or significant territorial ischemic tissue at risk. CT ANGIOGRAM: The lung apices are clear. Evaluation of the neck soft tissues demonstrates no pathologic cervical adenopathy or unexpected aerodigestive tract mass. The osseous structures demonstrate no evidence of acute fracture or suspicious osseous lesion. Patent aortic arch and great vessel origins, with common origin of the brachiocephalic and left common carotid arteries noted. The visualized subclavian arteries are patent bilaterally. Calcific atherosclerotic changes are present at the right ICA origin with associated approximate 50 to 60% stenosis present by NASCET criteria. Irregular soft plaque is present and there is a prominent area of focal ulceration, image 192 of series 9. Atherosclerotic changes are noted at the left ICA origin with  mild, less than 50% stenosis present by NASCET criteria. The vertebral arteries are normal in course and caliber bilaterally. Intracranially, the carotid siphons are patent. The anterior cerebral arteries are normal in course and caliber bilaterally. The right middle cerebral artery demonstrates some mild to moderate narrowing most apparent at the distal M1 and proximal M2 segments otherwise without evidence of high-grade stenosis or occlusion. The left middle cerebral artery  appears patent. The vertebral basilar system is patent. The posterior cerebral arteries are normal in course and caliber bilaterally, with fetal configuration noted on the right.     Impression: Impression: Atherosclerotic changes are present in the head and neck as above, without evidence of associated high-grade flow-limiting stenosis, large vessel occlusion or aneurysm. Atherosclerotic changes at the right ICA origin results in approximate 50 to 60% narrowing and there is also an immediately adjacent area of ulcerated soft plaque present, likely a high risk embolic lesion. CT perfusion demonstrates no evidence of core infarct or significant territorial ischemic tissue at risk. Electronically Signed: Richardson Ga MD  5/20/2024 1:54 PM EDT  Workstation ID: [id number]    CT Angiogram Neck    Result Date: 5/20/2024  CT ANGIOGRAM HEAD W AI ANALYSIS OF LVO, CT CEREBRAL PERFUSION W WO CONTRAST, CT ANGIOGRAM NECK Date of Exam: 5/20/2024 1:27 PM EDT Indication: Neuro Deficit, acute, Stroke suspected Neuro deficit, acute stroke suspected. Comparison: None available. Technique: Axial CT images of the brain were obtained prior to and after the administration of 115 mL Isovue-370. CT Perfusion protocol was utilized. Automated post processing was performed by RAPID software and submitted to PACS for interpretation.  CTA  of the head and neck were performed after the administration of iodinated contrast.  Reconstructed coronal and sagittal images were also obtained. A 3-D volume rendered image was created for interpretation. Automated exposure control and iterative reconstruction methods were used.   Findings: CT perfusion: Color maps are symmetric, without evidence of core infarct or significant territorial ischemic tissue at risk. CT ANGIOGRAM: The lung apices are clear. Evaluation of the neck soft tissues demonstrates no pathologic cervical adenopathy or unexpected aerodigestive tract mass. The osseous structures demonstrate  no evidence of acute fracture or suspicious osseous lesion. Patent aortic arch and great vessel origins, with common origin of the brachiocephalic and left common carotid arteries noted. The visualized subclavian arteries are patent bilaterally. Calcific atherosclerotic changes are present at the right ICA origin with associated approximate 50 to 60% stenosis present by NASCET criteria. Irregular soft plaque is present and there is a prominent area of focal ulceration, image 192 of series 9. Atherosclerotic changes are noted at the left ICA origin with  mild, less than 50% stenosis present by NASCET criteria. The vertebral arteries are normal in course and caliber bilaterally. Intracranially, the carotid siphons are patent. The anterior cerebral arteries are normal in course and caliber bilaterally. The right middle cerebral artery demonstrates some mild to moderate narrowing most apparent at the distal M1 and proximal M2 segments otherwise without evidence of high-grade stenosis or occlusion. The left middle cerebral artery appears patent. The vertebral basilar system is patent. The posterior cerebral arteries are normal in course and caliber bilaterally, with fetal configuration noted on the right.     Impression: Impression: Atherosclerotic changes are present in the head and neck as above, without evidence of associated high-grade flow-limiting stenosis, large vessel occlusion or aneurysm. Atherosclerotic changes at the right ICA origin results in approximate 50 to 60% narrowing and there is also an immediately adjacent area of ulcerated soft plaque present, likely a high risk embolic lesion. CT perfusion demonstrates no evidence of core infarct or significant territorial ischemic tissue at risk. Electronically Signed: Richardson Ga MD  5/20/2024 1:54 PM EDT  Workstation ID: [id number]    CT CEREBRAL PERFUSION WITH & WITHOUT CONTRAST    Result Date: 5/20/2024  CT ANGIOGRAM HEAD W AI ANALYSIS OF LVO, CT CEREBRAL  PERFUSION W WO CONTRAST, CT ANGIOGRAM NECK Date of Exam: 5/20/2024 1:27 PM EDT Indication: Neuro Deficit, acute, Stroke suspected Neuro deficit, acute stroke suspected. Comparison: None available. Technique: Axial CT images of the brain were obtained prior to and after the administration of 115 mL Isovue-370. CT Perfusion protocol was utilized. Automated post processing was performed by RAPID software and submitted to PACS for interpretation.  CTA  of the head and neck were performed after the administration of iodinated contrast.  Reconstructed coronal and sagittal images were also obtained. A 3-D volume rendered image was created for interpretation. Automated exposure control and iterative reconstruction methods were used.   Findings: CT perfusion: Color maps are symmetric, without evidence of core infarct or significant territorial ischemic tissue at risk. CT ANGIOGRAM: The lung apices are clear. Evaluation of the neck soft tissues demonstrates no pathologic cervical adenopathy or unexpected aerodigestive tract mass. The osseous structures demonstrate no evidence of acute fracture or suspicious osseous lesion. Patent aortic arch and great vessel origins, with common origin of the brachiocephalic and left common carotid arteries noted. The visualized subclavian arteries are patent bilaterally. Calcific atherosclerotic changes are present at the right ICA origin with associated approximate 50 to 60% stenosis present by NASCET criteria. Irregular soft plaque is present and there is a prominent area of focal ulceration, image 192 of series 9. Atherosclerotic changes are noted at the left ICA origin with  mild, less than 50% stenosis present by NASCET criteria. The vertebral arteries are normal in course and caliber bilaterally. Intracranially, the carotid siphons are patent. The anterior cerebral arteries are normal in course and caliber bilaterally. The right middle cerebral artery demonstrates some mild to moderate  narrowing most apparent at the distal M1 and proximal M2 segments otherwise without evidence of high-grade stenosis or occlusion. The left middle cerebral artery appears patent. The vertebral basilar system is patent. The posterior cerebral arteries are normal in course and caliber bilaterally, with fetal configuration noted on the right.     Impression: Impression: Atherosclerotic changes are present in the head and neck as above, without evidence of associated high-grade flow-limiting stenosis, large vessel occlusion or aneurysm. Atherosclerotic changes at the right ICA origin results in approximate 50 to 60% narrowing and there is also an immediately adjacent area of ulcerated soft plaque present, likely a high risk embolic lesion. CT perfusion demonstrates no evidence of core infarct or significant territorial ischemic tissue at risk. Electronically Signed: Richardson Ga MD  5/20/2024 1:54 PM EDT  Workstation ID: [id number]    CT Head Without Contrast Stroke Protocol    Result Date: 5/20/2024  CT HEAD WO CONTRAST STROKE PROTOCOL Date of Exam: 5/20/2024 1:27 PM EDT Indication: Neuro deficit, acute, stroke suspected, Neuro Deficit, acute, Stroke suspected. Comparison: Head CT 5/20/2022. Technique: Axial CT images were obtained of the head without contrast administration.  Reconstructed coronal images were also obtained. Automated exposure control and iterative construction methods were used. Scan Time: 13:19 Results discussed with the stroke team at the scanner by Benito Dumont MD at 13:22. Findings: No evidence of acute intracranial hemorrhage or mass effect. No extra-axial collection. The gray white matter differentiation is preserved. Ventricles and sulci are symmetric. The mastoid air cells and paranasal sinuses are well aerated. Globes and extraocular muscles are unremarkable. No acute or suspicious osseous abnormality. Soft tissues within normal limits.     Impression: Impression: No evidence of acute  intracranial abnormality. Electronically Signed: Benito Dumont MD  5/20/2024 1:33 PM EDT  Workstation ID: [id number]     Results for orders placed during the hospital encounter of 10/03/19    Adult Transthoracic Echo Complete W/ Cont if Necessary Per Protocol (With Agitated Saline)    Interpretation Summary  · Estimated EF = 65%.  · The cardiac valves are anatomically and functionally normal.      Assessment & Plan   Assessment & Plan       Left sided numbness    Nely Grider is a 78 y.o. female with h/o T2DM, neuropathy on gabapentin, HTN, HL, TIA in past with left weakness that resolved s/p fall last night with left sided facial droop and RLE numbness.    S/p fall  Left facial droop, RLE numbness/r/o CVA  --CTA shows artherosclerotic changes in head and neck  --CT perfusion ok  --MRI head pending  --FLP in AM.    --Echo pending  --PT/OT/SLP  --stroke neruo following recs plavix load (concern for inadequate response) and Asa.  High dose statin    T2DM  --hold metformin and glyburide  --Hba1c 6.30  --low dose ssi for now, titrate PRN    HTN  --hold meds to allow permissive HTN for now    HL  --continue home lipitor  --FLP in AM    CKD 3  --creatinine baseline about 1.2        DVT prophylaxis:  Mechanical DVT prophylaxis orders are signed and held.            CODE STATUS:  DNR, d/w patient and daughter/wishes for no vent/CPR  Code Status and Medical Interventions:   Ordered at: 05/20/24 1602     Medical Intervention Limits:    NO intubation (DNI)     Level Of Support Discussed With:    Patient    Health Care Surrogate     Code Status (Patient has no pulse and is not breathing):    No CPR (Do Not Attempt to Resuscitate)     Medical Interventions (Patient has pulse or is breathing):    Limited Support     Comments:    DNR no vent or CPR but would want a feeding tube if needed.  d/w patient and her daughter.  Daugher and  are POA if needed       Expected Discharge   Expected discharge date/ time has not been  documented.     Evan David MD  05/20/24

## 2024-05-20 NOTE — THERAPY EVALUATION
Acute Care - Speech Language Pathology   Swallow Initial Evaluation [facility]  Clinical Swallow Evaluation  Cognitive-Communication Evaluation       Patient Name: Nely Grider  : 1945  MRN: [id number]  Today's Date: 2024               Admit Date: 2024    Visit Dx:     ICD-10-CM ICD-9-CM   1. Facial droop  R29.810 781.94   2. Left facial numbness  R20.0 782.0   3. Injury of head, initial encounter  S09.90XA 959.01   4. Contusion of right shoulder, initial encounter  S40.011A 923.00   5. Contusion of left hip, initial encounter  S70.02XA 924.01     Patient Active Problem List   Diagnosis    Gastroesophageal reflux disease without esophagitis    Mixed hyperlipidemia    Essential hypertension    Controlled type 2 diabetes mellitus without complication, without long-term current use of insulin    Chronic kidney disease (CKD), stage III (moderate)    Anemia    Diabetic polyneuropathy associated with type 2 diabetes mellitus    Cerebrovascular accident (CVA) due to thrombosis of right middle cerebral artery    Chronic pain syndrome    Osteopenia    Difficulty in walking, not elsewhere classified    Generalized muscle weakness    Type 2 diabetes mellitus with diabetic neuropathy, unspecified    Unspecified intracapsular fracture of right femur, initial encounter for closed fracture    Essential (primary) hypertension    Gastro-esophageal reflux disease without esophagitis    Presence of right artificial hip joint    Hyperlipidemia, unspecified    Pain in right hip    Bilateral primary osteoarthritis of hip    Osteoarthritis of hip, unspecified    Unspecified osteoarthritis, unspecified site    Diverticulitis of large intestine    Low back pain    Other staphylococcus as the cause of diseases classified elsewhere    Thrombophlebitis of superficial veins of upper extremities    Chronic renal failure syndrome    Polyneuropathy in diabetes    Effusion, right hip    Left sided numbness     Past Medical  History:   Diagnosis Date    Anemia 09/18/2016    Anesthesia complication     per patient with hip surgery last april did not remember being in hospital or going home can only rmember from rehad on    Anesthesia complication     with hip surgery in april 2018 had issues with swallowing after surgery called in speech    Arthritis     Cancer     cervical    Cataract     Colon polyp 01/31/2019    Cecal polyp 5 years Dr. Fatima awaiting pathology 1/19    Diabetes mellitus     type 2 dm, check blood sugar every morning, diagnosed 3 or 4 years    Diverticulosis 01/31/2019    Full dentures     Full dentures     GERD (gastroesophageal reflux disease)     High cholesterol     History of IBS     Hypertension     been off bp meds since last april 2018    Neuropathy     Primary osteoarthritis of both hips 01/31/2018    Added automatically from request for surgery [id number]    Status post total replacement of left hip 04/19/2018    Wears glasses      Past Surgical History:   Procedure Laterality Date    CARDIAC CATHETERIZATION      CHOLECYSTECTOMY      COLONOSCOPY      EYE SURGERY      bilateral cataract    HYSTERECTOMY      partial    JOINT REPLACEMENT      hip surgery 2018 in april    OOPHORECTOMY      one removed    TONSILLECTOMY      TOTAL HIP ARTHROPLASTY Right 04/19/2018    Procedure: RIGHT TOTAL HIP ARTHROPLASTY;  Surgeon: Carlos Pollack MD;  Location: [facility];  Service: Orthopedics    TOTAL HIP ARTHROPLASTY Left 02/12/2019    Procedure: TOTAL HIP ARTHROPLASTY LEFT;  Surgeon: Carlos Pollack MD;  Location:  [facility] OR;  Service: Orthopedics    WRIST SURGERY      metal plate       SLP Recommendation and Plan  SLP Swallowing Diagnosis: R/O pharyngeal dysphagia, suspected esophageal dysphagia (05/20/24 1540)  SLP Diet Recommendation: regular textures, thin liquids (05/20/24 1540)  Recommended Precautions and Strategies: general aspiration precautions, reflux precautions, upright posture during/after eating (05/20/24  1540)  SLP Rec. for Method of Medication Administration: meds whole, with thin liquids, meds crushed, with puree, as tolerated (05/20/24 1540)     Monitor for Signs of Aspiration: yes, notify SLP if any concerns (05/20/24 1540)  Recommended Diagnostics: other (see comments) (diet tolerance) (05/20/24 1540)  Swallow Criteria for Skilled Therapeutic Interventions Met: demonstrates skilled criteria (05/20/24 1540)  Anticipated Discharge Disposition (SLP): unknown, home with assist (05/20/24 1540)  Rehab Potential/Prognosis, Swallowing: good, to achieve stated therapy goals (05/20/24 1540)  Therapy Frequency (Swallow): PRN (05/20/24 1540)  Predicted Duration Therapy Intervention (Days): until discharge (05/20/24 1540)  Oral Care Recommendations: Oral Care BID/PRN, Toothbrush (05/20/24 1540)                                               SWALLOW EVALUATION (Last 72 Hours)       SLP Adult Swallow Evaluation       Row Name 05/20/24 1540                   Rehab Evaluation    Document Type evaluation  -        Subjective Information no complaints  -        Patient Observations alert;cooperative  -        Patient/Family/Caregiver Comments/Observations Family present  -        Patient Effort good  -        Symptoms Noted During/After Treatment none  -           General Information    Patient Profile Reviewed yes  -        Pertinent History Of Current Problem Adm with c/o L facial weakness. Hx: HTN, HLD, T2DM, IBS, neuropathy, remote CVA w/ residual R sided weakness. Head CT negative for acute intracranial abnormalities, MRI pending  -        Current Method of Nutrition NPO  -        Precautions/Limitations, Vision WFL;for purposes of eval  -        Precautions/Limitations, Hearing WFL;for purposes of eval  -        Prior Level of Function-Communication unknown  -        Prior Level of Function-Swallowing no diet consistency restrictions  -        Plans/Goals Discussed with patient;family;agreed upon   -        Barriers to Rehab none identified  -        Patient's Goals for Discharge patient did not state  -        Family Goals for Discharge family did not state  -           Pain    Additional Documentation Pain Scale: FACES Pre/Post-Treatment (Group)  -           Pain Scale: FACES Pre/Post-Treatment    Pain: FACES Scale, Pretreatment 0-->no hurt  -        Posttreatment Pain Rating 0-->no hurt  -           Oral Motor Structure and Function    Dentition Assessment upper dentures/partial in place;lower dentures/partial in place  -        Secretion Management WNL/WFL  -        Mucosal Quality moist, healthy  -           General Eating/Swallowing Observations    Respiratory Support Currently in Use room air  -        Eating/Swallowing Skills self-fed;fed by SLP  -        Positioning During Eating upright in bed  -        Utensils Used spoon;cup;straw  -        Consistencies Trialed ice chips;thin liquids;pureed;regular textures  -           Clinical Swallow Eval    Pharyngeal Phase --  r/o pharyngeal impairment  -        Esophageal Phase suspected esophageal impairment  -        Clinical Swallow Evaluation Summary Pt w/ delayed throat clear following regular solid trials. Pt also c/o material sticking & points to region of upper 1/3 esophagus. No additional s/s of aspiration accross trials. Dtr reports pt takes Omeprazole for reflux. Discussed dwongrading solids per pt preference, pt requesting to continue regular solid diet. (MBS completed in 2018 & 2018, both studies revealed grossly functional oral and pharyngeal phases of swallow. Per chart, pt noted w/ minimal retrograde flow of material & prominent CP) Recommend regular diet w/ thin liquids, general aspiration/reflux precautions. SLP will f/u for diet tolerance to ensure no additional concerns  -           Esophageal Phase Concerns    Esophageal Phase Concerns globus  -        Globus regular consistencies  -           SLP  Evaluation Clinical Impression    SLP Swallowing Diagnosis R/O pharyngeal dysphagia;suspected esophageal dysphagia  -        Functional Impact risk of aspiration/pneumonia  -        Rehab Potential/Prognosis, Swallowing good, to achieve stated therapy goals  -        Swallow Criteria for Skilled Therapeutic Interventions Met demonstrates skilled criteria  -           Recommendations    Therapy Frequency (Swallow) PRN  -        Predicted Duration Therapy Intervention (Days) until discharge  -        SLP Diet Recommendation regular textures;thin liquids  -        Recommended Diagnostics other (see comments)  diet tolerance  -        Recommended Precautions and Strategies general aspiration precautions;reflux precautions;upright posture during/after eating  -        Oral Care Recommendations Oral Care BID/PRN;Toothbrush  -        SLP Rec. for Method of Medication Administration meds whole;with thin liquids;meds crushed;with puree;as tolerated  -        Monitor for Signs of Aspiration yes;notify SLP if any concerns  -        Anticipated Discharge Disposition (SLP) unknown;home with assist  -                  User Key  (r) = Recorded By, (t) = Taken By, (c) = Cosigned By      Initials Name Effective Dates     Samantha Fatima, MS [facility]-SLP 05/12/23 -                     EDUCATION  The patient has been educated in the following areas:   Cognitive Impairment Communication Impairment.        SLP GOALS       Row Name 05/20/24 9743             (LTG) Patient will demonstrate functional swallow for    Diet Texture (Demonstrate functional swallow) regular textures  -      Liquid viscosity (Demonstrate functional swallow) thin liquids  -      [place] (Demonstrate functional swallow) independently (over 90% accuracy)  -      Time Frame (Demonstrate functional swallow) by discharge  -      Progress/Outcomes (Demonstrate functional swallow) new goal  -         (STG) Patient will tolerate  trials of    Consistencies Trialed (Tolerate trials) regular textures;thin liquids  -      Desired Outcome (Tolerate trials) without signs/symptoms of aspiration  -      [place] (Tolerate trials) independently (over 90% accuracy)  -      Time Frame (Tolerate trials) 1 week  -      Progress/Outcomes (Tolerate trials) new goal  -                User Key  (r) = Recorded By, (t) = Taken By, (c) = Cosigned By      Initials Name Provider Type     Samantha Fatima, MS CCC-SLP Speech and Language Pathologist                         Time Calculation:    Time Calculation- SLP       Row Name 24 1633             Time Calculation- [facility]    SLP Start Time 1540  -      SLP Received On 24  -         Untimed Charges    27854-XU Eval Speech and Production w/ Language Minutes 26  -      83110-IB Eval Oral Pharyng Swallow Minutes 25  -         Total Minutes    Untimed Charges Total Minutes 51  -       Total Minutes 51  -                User Key  (r) = Recorded By, (t) = Taken By, (c) = Cosigned By      Initials Name Provider Type     Samantha Fatima, MS CCC-SLP Speech and Language Pathologist                    Therapy Charges for Today       Code Description Service Date Service Provider Modifiers Qty    45920852080 HC ST EVAL ORAL PHARYNG SWALLOW 2 2024 Samantha Fatima, MS CCC-SLP GN 1    [id number] HC ST EVAL SPEECH AND PROD W LANG  2 2024 Samantha Fatima MS CCC-SLP GN 1                 Samantha Fatima MS CCC-SLP  2024   and Acute Care - Speech Language Pathology Initial Evaluation  [facility]     Patient Name: Nely Grider  : 1945  MRN: [id number]  Today's Date: 2024               Admit Date: 2024     Visit Dx:    ICD-10-CM ICD-9-CM   1. Facial droop  R29.810 781.94   2. Left facial numbness  R20.0 782.0   3. Injury of head, initial encounter  S09.90XA 959.01   4. Contusion of right shoulder, initial encounter  S40.011A 923.00   5. Contusion  of left hip, initial encounter  S70.02XA 924.01     Patient Active Problem List   Diagnosis    Gastroesophageal reflux disease without esophagitis    Mixed hyperlipidemia    Essential hypertension    Controlled type 2 diabetes mellitus without complication, without long-term current use of insulin    Chronic kidney disease (CKD), stage III (moderate)    Anemia    Diabetic polyneuropathy associated with type 2 diabetes mellitus    Cerebrovascular accident (CVA) due to thrombosis of right middle cerebral artery    Chronic pain syndrome    Osteopenia    Difficulty in walking, not elsewhere classified    Generalized muscle weakness    Type 2 diabetes mellitus with diabetic neuropathy, unspecified    Unspecified intracapsular fracture of right femur, initial encounter for closed fracture    Essential (primary) hypertension    Gastro-esophageal reflux disease without esophagitis    Presence of right artificial hip joint    Hyperlipidemia, unspecified    Pain in right hip    Bilateral primary osteoarthritis of hip    Osteoarthritis of hip, unspecified    Unspecified osteoarthritis, unspecified site    Diverticulitis of large intestine    Low back pain    Other staphylococcus as the cause of diseases classified elsewhere    Thrombophlebitis of superficial veins of upper extremities    Chronic renal failure syndrome    Polyneuropathy in diabetes    Effusion, right hip    Left sided numbness     Past Medical History:   Diagnosis Date    Anemia 09/18/2016    Anesthesia complication     per patient with hip surgery last april did not remember being in hospital or going home can only rmember from [facility] on    Anesthesia complication     with hip surgery in april 2018 had issues with swallowing after surgery called in speech    Arthritis     Cancer     cervical    Cataract     Colon polyp 01/31/2019    Cecal polyp 5 years Dr. Fatima awaiting pathology 1/19    Diabetes mellitus     type 2 dm, check blood sugar every morning,  diagnosed 3 or 4 years    Diverticulosis 01/31/2019    Full dentures     Full dentures     GERD (gastroesophageal reflux disease)     High cholesterol     History of IBS     Hypertension     been off bp meds since last april 2018    Neuropathy     Primary osteoarthritis of both hips 01/31/2018    Added automatically from request for surgery [id number]    Status post total replacement of left hip 04/19/2018    Wears glasses      Past Surgical History:   Procedure Laterality Date    CARDIAC CATHETERIZATION      CHOLECYSTECTOMY      COLONOSCOPY      EYE SURGERY      bilateral cataract    HYSTERECTOMY      partial    JOINT REPLACEMENT      hip surgery 2018 in april    OOPHORECTOMY      one removed    TONSILLECTOMY      TOTAL HIP ARTHROPLASTY Right 04/19/2018    Procedure: RIGHT TOTAL HIP ARTHROPLASTY;  Surgeon: Carlos Pollack MD;  Location:  [facility] OR;  Service: Orthopedics    TOTAL HIP ARTHROPLASTY Left 02/12/2019    Procedure: TOTAL HIP ARTHROPLASTY LEFT;  Surgeon: Carlos Pollack MD;  Location:  [facility] OR;  Service: Orthopedics    WRIST SURGERY      metal plate       SLP Recommendation and Plan  SLP Diagnosis: functional speech/language skills, mild, baseline, cognitive-linguistic disorder (05/20/24 1540)  SLP Diagnosis Comments: Speech and language abilities are grossly functional @ simple level. Pt presents w/ mild immediate memory deficits, cognitive linguistic abilties are otherwise WFL. Dtr reports memory deficits @ baseline and denies acute concerns. SLP will sign off for communication. Please reconsult if further concerns arise (05/20/24 1540)     Monitor for Signs of Aspiration: yes, notify SLP if any concerns (05/20/24 1540)  Swallow Criteria for Skilled Therapeutic Interventions Met: demonstrates skilled criteria (05/20/24 1540)  SLC Criteria for Skilled Therapy Interventions Met: no problems identified which require skilled intervention (05/20/24 1540)  Anticipated Discharge Disposition (SLP): unknown, home  with assist (05/20/24 1540)     Therapy Frequency (Swallow): PRN (05/20/24 1540)  Therapy Frequency (SLP SLC): evaluation only (05/20/24 1540)  Predicted Duration Therapy Intervention (Days): until discharge (05/20/24 1540)  Oral Care Recommendations: Oral Care BID/PRN, Toothbrush (05/20/24 1540)                                 SLP EVALUATION (Last 72 Hours)       SLP SLC Evaluation       Row Name 05/20/24 1540                   General Information    Prior Level of Function-Communication unknown  -        Patient's Goals for Discharge patient did not state  -        Family Goals for Discharge family did not state  -           Comprehension Assessment/Intervention    Comprehension Assessment/Intervention Reading Comprehension;Auditory Comprehension  -           Auditory Comprehension Assessment/Intervention    Auditory Comprehension (Communication) [facility]  -           Reading Comprehension Assessment/Intervention    Reading Comprehension (Communication) [facility]  -           Expression Assessment/Intervention    Expression Assessment/Intervention graphic expression;verbal expression  -           Verbal Expression Assessment/Intervention    Verbal Expression [facility]  -           Graphic Expression Assessment/Intervention    Graphic Expression [facility]  -           Motor Speech Assessment/Intervention    Motor Speech Function [facility]  -           Cognitive Assessment Intervention- SLP    Cognitive Function (Cognition) mild impairment  -        Orientation Status (Cognition) awareness of basic personal information;place;time;situation;person;[facility]        Memory (Cognitive) simple;immediate;mild impairment  -        Attention (Cognitive) selective;sustained;[facility]        Thought Organization (Cognitive) concrete divergent;concrete convergent;[facility]        Reasoning (Cognitive) simple;[facility]  -        Problem Solving (Cognitive) simple;temporal;[facility]  -        Functional Math (Cognitive) simple;word problems;[facility]   -        Executive Function (Cognition) deficit awareness;WFL  -           SLP Evaluation Clinical Impressions    SLP Diagnosis functional speech/language skills;mild;baseline;cognitive-linguistic disorder  -        SLP Diagnosis Comments Speech and language abilities are grossly functional @ simple level. Pt presents w/ mild immediate memory deficits, cognitive linguistic abilties are otherwise WFL. Dtr reports memory deficits @ baseline and denies acute concerns. SLP will sign off for communication. Please reconsult if further concerns arise  -        SLC Criteria for Skilled Therapy Interventions Met no problems identified which require skilled intervention  -           Recommendations    Therapy Frequency (SLP SLC) evaluation only  -                  User Key  (r) = Recorded By, (t) = Taken By, (c) = Cosigned By      Initials Name Effective Dates     Samantha Fatima, MS CCC-SLP 05/12/23 -                        EDUCATION  The patient has been educated in the following areas:     Dysphagia (Swallowing Impairment).           SLP GOALS       Row Name 05/20/24 5780             (LTG) Patient will demonstrate functional swallow for    Diet Texture (Demonstrate functional swallow) regular textures  -      Liquid viscosity (Demonstrate functional swallow) thin liquids  -      [place] (Demonstrate functional swallow) independently (over 90% accuracy)  -      Time Frame (Demonstrate functional swallow) by discharge  -      Progress/Outcomes (Demonstrate functional swallow) new goal  -         (STG) Patient will tolerate trials of    Consistencies Trialed (Tolerate trials) regular textures;thin liquids  -      Desired Outcome (Tolerate trials) without signs/symptoms of aspiration  -      [place] (Tolerate trials) independently (over 90% accuracy)  -      Time Frame (Tolerate trials) 1 week  -      Progress/Outcomes (Tolerate trials) new goal  -                User Key  (r) =  Recorded By, (t) = Taken By, (c) = Cosigned By      Initials Name Provider Type    Samantha Nolan, MS CCC-SLP Speech and Language Pathologist                              Time Calculation:      Time Calculation- SLP       Row Name 05/20/24 1633             Time Calculation- SLP    SLP Start Time 1540  -      SLP Received On 05/20/24  -         Untimed Charges    68478-AH Eval Speech and Production w/ Language Minutes 26  -MH      20125-DO Eval Oral Pharyng Swallow Minutes 25  -MH         Total Minutes    Untimed Charges Total Minutes 51  -MH       Total Minutes 51  -MH                User Key  (r) = Recorded By, (t) = Taken By, (c) = Cosigned By      Initials Name Provider Type     Samantha Fatima MS CCC-SLP Speech and Language Pathologist                    Therapy Charges for Today       Code Description Service Date Service Provider Modifiers Qty    52208198734 HC ST EVAL ORAL PHARYNG SWALLOW 2 5/20/2024 Samantha Fatima MS CCC-SLP GN 1    [id number] HC ST EVAL SPEECH AND PROD W LANG  2 5/20/2024 Samantha Fatima MS CCC-SLP GN 1                       MS YOLANDA Gil  5/20/2024

## 2024-05-20 NOTE — PLAN OF CARE
Goal Outcome Evaluation:                     Anticipated Discharge Disposition (SLP): unknown, home with assist    SLP Diagnosis: functional speech/language skills, mild, baseline, cognitive-linguistic disorder (05/20/24 1540)    SLP Swallowing Diagnosis: R/O pharyngeal dysphagia, suspected esophageal dysphagia (05/20/24 1540)

## 2024-05-20 NOTE — ED PROVIDER NOTES
[place]    EMERGENCY DEPARTMENT ENCOUNTER      Pt Name: Nely Grider  MRN: [id number]  YOB: 1945  Date of evaluation: 5/20/2024  Provider: Mariusz Rosen MD    CHIEF COMPLAINT       Chief Complaint   Patient presents with    Head Injury    Fall         HISTORY OF PRESENT ILLNESS   Nely Grider is a 78 y.o. female who presents to the emergency department after fall that occurred last night. Pt states that she got up, fell, hit head, R shoulder, L hip. Has been walking. No chest pain, abd pain, back pain. Daughter noted L facial droop and pt complained of L sided numbness.       Nursing notes were reviewed.    REVIEW OF SYSTEMS     ROS:  A chief complaint appropriate review of systems was completed and is negative except as noted in the HPI.      PAST MEDICAL HISTORY     Past Medical History:   Diagnosis Date    Anemia 09/18/2016    Anesthesia complication     per patient with hip surgery last april did not remember being in hospital or going home can only rmember from rehad on    Anesthesia complication     with hip surgery in april 2018 had issues with swallowing after surgery called in speech    Arthritis     Cancer     cervical    Cataract     Colon polyp 01/31/2019    Cecal polyp 5 years Dr. Fatima awaiting pathology 1/19    Diabetes mellitus     type 2 dm, check blood sugar every morning, diagnosed 3 or 4 years    Diverticulosis 01/31/2019    Full dentures     Full dentures     GERD (gastroesophageal reflux disease)     High cholesterol     History of IBS     Hypertension     been off bp meds since last april 2018    Neuropathy     Primary osteoarthritis of both hips 01/31/2018    Added automatically from request for surgery [id number]    Status post total replacement of left hip 04/19/2018    Wears glasses          SURGICAL HISTORY       Past Surgical History:   Procedure Laterality Date    CARDIAC CATHETERIZATION      CHOLECYSTECTOMY      COLONOSCOPY      EYE SURGERY      bilateral  cataract    HYSTERECTOMY      partial    JOINT REPLACEMENT      hip surgery 2018 in april    OOPHORECTOMY      one removed    TONSILLECTOMY      TOTAL HIP ARTHROPLASTY Right 04/19/2018    Procedure: RIGHT TOTAL HIP ARTHROPLASTY;  Surgeon: Carlos Pollack MD;  Location:  [facility];  Service: Orthopedics    TOTAL HIP ARTHROPLASTY Left 02/12/2019    Procedure: TOTAL HIP ARTHROPLASTY LEFT;  Surgeon: Carlos Pollack MD;  Location:  [facility];  Service: Orthopedics    WRIST SURGERY      metal plate         CURRENT MEDICATIONS       Current Facility-Administered Medications:     acetaminophen (TYLENOL) tablet 650 mg, 650 mg, Oral, Q4H PRN, Evan David MD    aspirin chewable tablet 81 mg, 81 mg, Oral, Daily, 81 mg at 05/20/24 1556 **OR** aspirin suppository 300 mg, 300 mg, Rectal, Daily, Allison Bennett APRN    atorvastatin (LIPITOR) tablet 80 mg, 80 mg, Oral, Nightly, Allison Bennett APRN, 80 mg at 05/20/24 2157    sennosides-docusate (PERICOLACE) 8.6-50 MG per tablet 2 tablet, 2 tablet, Oral, BID PRN **AND** polyethylene glycol (MIRALAX) packet 17 g, 17 g, Oral, Daily PRN **AND** bisacodyl (DULCOLAX) EC tablet 5 mg, 5 mg, Oral, Daily PRN **AND** bisacodyl (DULCOLAX) suppository 10 mg, 10 mg, Rectal, Daily PRN, Evan David MD    [START ON 5/21/2024] clopidogrel (PLAVIX) tablet 75 mg, 75 mg, Oral, Daily, Allison Bennett, APRN    dextrose (D50W) (25 g/50 mL) IV injection 25 g, 25 g, Intravenous, Q15 Min PRN, Evan David MD    dextrose (GLUTOSE) oral gel 15 g, 15 g, Oral, Q15 Min PRN, Evan David MD    gabapentin (NEURONTIN) capsule 400 mg, 400 mg, Oral, Q8H, Evan David MD, 400 mg at 05/20/24 2157    glucagon (GLUCAGEN) injection 1 mg, 1 mg, Intramuscular, Q15 Min PRN, Evan David MD    Insulin Lispro (humaLOG) injection 2-7 Units, 2-7 Units, Subcutaneous, 4x Daily AC & at Bedtime, Evan David MD, 2 Units at 05/20/24 2157    ondansetron ODT (ZOFRAN-ODT)  disintegrating tablet 4 mg, 4 mg, Oral, Q6H PRN **OR** ondansetron (ZOFRAN) injection 4 mg, 4 mg, Intravenous, Q6H PRN, Evan David MD    [START ON 5/21/2024] pantoprazole (PROTONIX) EC tablet 40 mg, 40 mg, Oral, Q AM, Evan David MD    sodium chloride 0.9 % flush 10 mL, 10 mL, Intravenous, PRN, Mariusz Rosen MD    sodium chloride 0.9 % flush 10 mL, 10 mL, Intravenous, Q12H, Allison Bennett APRN, 10 mL at 05/20/24 2158    sodium chloride 0.9 % flush 10 mL, 10 mL, Intravenous, PRN, Allison Bennett APRN    sodium chloride 0.9 % flush 10 mL, 10 mL, Intravenous, Q12H, Evan David MD, 10 mL at 05/20/24 2158    sodium chloride 0.9 % flush 10 mL, 10 mL, Intravenous, PRN, Evan David MD    sodium chloride 0.9 % infusion 40 mL, 40 mL, Intravenous, PRN, Allison Bennett APRN    sodium chloride 0.9 % infusion 40 mL, 40 mL, Intravenous, PRN, Evan David MD    ALLERGIES     Codeine    FAMILY HISTORY       Family History   Problem Relation Age of Onset    Arthritis Mother     Heart attack Mother     Hypertension Mother     Hyperlipidemia Mother     Osteoporosis Mother     Heart disease Sister     Diabetes Sister     Arthritis Sister     Heart attack Sister     Hypertension Sister     Hyperlipidemia Sister     Bleeding Disorder Sister     Heart disease Brother     Cancer Brother     Diabetes Brother     Arthritis Brother     Heart attack Brother     Hypertension Brother     Hyperlipidemia Brother     Ovarian cancer Daughter     Cancer Daughter     Ovarian cancer Other         grandaughter    Ovarian cancer Other         granddaughter    Heart attack Father     Hypertension Father     Stroke Father     Kidney disease Paternal Uncle     Liver disease Paternal Uncle     Cancer Other     Stroke Other     Diabetes Other           SOCIAL HISTORY       Social History     Socioeconomic History    Marital status:    Tobacco Use    Smoking status: Never     Passive exposure:  "Never    Smokeless tobacco: Never   Vaping Use    Vaping status: Never Used   Substance and Sexual Activity    Alcohol use: No    Drug use: No    Sexual activity: Defer     Comment:          PHYSICAL EXAM    (up to 7 for level 4, 8 or more for level 5)     Vitals:    05/20/24 1530 05/20/24 1600 05/20/24 1631 05/20/24 1905   BP: 133/54 132/77 142/62 139/71   BP Location:   Right arm Left arm   Patient Position:   Lying Lying   Pulse: 73 89 85 84   Resp:   16 16   Temp:   97.6 °F (36.4 °C) 97.7 °F (36.5 °C)   TempSrc:   Oral Oral   SpO2: 98% 99% 99% 95%   Weight:   78.6 kg (173 lb 3 oz)    Height:   165.1 cm (65\")        General: Awake, alert, no acute distress.  HEENT: Conjunctivae normal.  Neck: Trachea midline.  Cardiac: Heart regular rate, rhythm, no murmurs, rubs, or gallops  Lungs: Lungs are clear to auscultation, there is no wheezing, rhonchi, or rales. There is no use of accessory muscles.  Chest wall: There is no tenderness to palpation over the chest wall or over ribs  Abdomen: Abdomen is soft, nontender, nondistended. There are no firm or pulsatile masses, no rebound rigidity or guarding.   Musculoskeletal: No deformity.  Neuro: Alert and oriented x 4. Mild L sided facial droop, L facial numbness.  Dermatology: Skin is warm and dry  Psych: Mentation is grossly normal, cognition is grossly normal. Affect is appropriate.        DIAGNOSTIC RESULTS     EKG: All EKGs are interpreted by the Emergency Department Physician who either signs or Co-signs this chart in the absence of a cardiologist.    ECG 12 Lead ED Triage Standing Order; Acute Stroke (Onset <24 hrs)   Preliminary Result   Test Reason : ED Triage Standing Order~   Blood Pressure :   */*   mmHG   Vent. Rate :  76 BPM     Atrial Rate :  76 BPM      P-R Int : 170 ms          QRS Dur :  74 ms       QT Int : 398 ms       P-R-T Axes :  46  -5  12 degrees      QTc Int : 447 ms      Normal sinus rhythm   Cannot rule out Anterior infarct , age " undetermined   Abnormal ECG   When compared with ECG of 03-OCT-2019 22:36,   Nonspecific T wave abnormality no longer evident in Anterior leads      Referred By: EDMD           Confirmed By:             RADIOLOGY:   [x] Radiologist's Report Reviewed:  XR Shoulder 2+ View Right   Final Result   Impression:   No acute osseous abnormality given limitations of exam         Electronically Signed: Bairon Funes MD     5/20/2024 2:17 PM EDT     Workstation ID: [id number]      XR Hip With or Without Pelvis 2 - 3 View Left   Final Result   Impression:   1. Bilateral total hip prostheses without evidence of hardware complication.         Electronically Signed: Tomas Frazier     5/20/2024 2:21 PM EDT     Workstation ID: [id number]      XR Chest 1 View   Final Result   No evidence of acute disease in the chest.      Electronically Signed: Richardson Ga MD     5/20/2024 2:10 PM EDT     Workstation ID: [id number]      CT Angiogram Head w AI Analysis of LVO   Final Result   Impression:   Atherosclerotic changes are present in the head and neck as above, without evidence of associated high-grade flow-limiting stenosis, large vessel occlusion or aneurysm.      Atherosclerotic changes at the right ICA origin results in approximate 50 to 60% narrowing and there is also an immediately adjacent area of ulcerated soft plaque present, likely a high risk embolic lesion.      CT perfusion demonstrates no evidence of core infarct or significant territorial ischemic tissue at risk.            Electronically Signed: Richardson Ga MD     5/20/2024 1:54 PM EDT     Workstation ID: [id number]      CT Angiogram Neck   Final Result   Impression:   Atherosclerotic changes are present in the head and neck as above, without evidence of associated high-grade flow-limiting stenosis, large vessel occlusion or aneurysm.      Atherosclerotic changes at the right ICA origin results in approximate 50 to 60% narrowing and there is also an immediately adjacent  area of ulcerated soft plaque present, likely a high risk embolic lesion.      CT perfusion demonstrates no evidence of core infarct or significant territorial ischemic tissue at risk.            Electronically Signed: Richardson Ga MD     5/20/2024 1:54 PM EDT     Workstation ID: [id number]      CT CEREBRAL PERFUSION WITH & WITHOUT CONTRAST   Final Result   Impression:   Atherosclerotic changes are present in the head and neck as above, without evidence of associated high-grade flow-limiting stenosis, large vessel occlusion or aneurysm.      Atherosclerotic changes at the right ICA origin results in approximate 50 to 60% narrowing and there is also an immediately adjacent area of ulcerated soft plaque present, likely a high risk embolic lesion.      CT perfusion demonstrates no evidence of core infarct or significant territorial ischemic tissue at risk.            Electronically Signed: Richardson Ga MD     5/20/2024 1:54 PM EDT     Workstation ID: [id number]      CT Head Without Contrast Stroke Protocol   Final Result   Impression:      No evidence of acute intracranial abnormality.         Electronically Signed: Benito Dumont MD     5/20/2024 1:33 PM EDT     Workstation ID: [id number]      MRI Brain Without Contrast    (Results Pending)       I ordered and independently reviewed the above noted radiographic studies.        LABS:    I have reviewed and interpreted all of the currently available lab results from this visit (if applicable):  Results for orders placed or performed during the hospital encounter of 05/20/24   Single High Sensitivity Troponin T    Specimen: Arm, Right; Blood   Result Value Ref Range    HS Troponin T 14 (H) <14 ng/L   aPTT    Specimen: Arm, Right; Blood   Result Value Ref Range    PTT 30.3 22.0 - 39.0 seconds   AST    Specimen: Arm, Right; Blood   Result Value Ref Range    AST (SGOT) 22 1 - 32 U/L   ALT    Specimen: Arm, Right; Blood   Result Value Ref Range    ALT (SGPT) 13 1 - 33 U/L   CBC  Auto Differential    Specimen: Arm, Right; Blood   Result Value Ref Range    WBC 8.74 3.40 - 10.80 10*3/mm3    RBC 3.55 (L) 3.77 - 5.28 10*6/mm3    Hemoglobin 10.6 (L) 12.0 - 15.9 g/dL    Hematocrit 34.8 34.0 - 46.6 %    MCV 98.0 (H) 79.0 - 97.0 fL    MCH 29.9 26.6 - 33.0 pg    MCHC 30.5 (L) 31.5 - 35.7 g/dL    RDW 14.0 12.3 - 15.4 %    RDW-SD 50.8 37.0 - 54.0 fl    MPV 10.1 6.0 - 12.0 fL    Platelets 336 140 - 450 10*3/mm3    Neutrophil % 59.6 42.7 - 76.0 %    Lymphocyte % 29.5 19.6 - 45.3 %    Monocyte % 7.7 5.0 - 12.0 %    Eosinophil % 1.9 0.3 - 6.2 %    Basophil % 0.7 0.0 - 1.5 %    Immature Grans % 0.6 (H) 0.0 - 0.5 %    Neutrophils, Absolute 5.21 1.70 - 7.00 10*3/mm3    Lymphocytes, Absolute 2.58 0.70 - 3.10 10*3/mm3    Monocytes, Absolute 0.67 0.10 - 0.90 10*3/mm3    Eosinophils, Absolute 0.17 0.00 - 0.40 10*3/mm3    Basophils, Absolute 0.06 0.00 - 0.20 10*3/mm3    Immature Grans, Absolute 0.05 0.00 - 0.05 10*3/mm3    nRBC 0.0 0.0 - 0.2 /100 WBC   P2Y12 Platelet Inhibition    Specimen: Arm, Right; Blood   Result Value Ref Range    P2Y12 Reactivity Unit 201 PRU   Comprehensive Metabolic Panel    Specimen: Arm, Right; Blood   Result Value Ref Range    Glucose 120 (H) 65 - 99 mg/dL    BUN 17 8 - 23 mg/dL    Creatinine 1.30 (H) 0.57 - 1.00 mg/dL    Sodium 137 136 - 145 mmol/L    Potassium 4.3 3.5 - 5.2 mmol/L    Chloride 102 98 - 107 mmol/L    CO2 24.0 22.0 - 29.0 mmol/L    Calcium 9.0 8.6 - 10.5 mg/dL    Total Protein 6.2 6.0 - 8.5 g/dL    Albumin 3.8 3.5 - 5.2 g/dL    ALT (SGPT) 13 1 - 33 U/L    AST (SGOT) 21 1 - 32 U/L    Alkaline Phosphatase 77 39 - 117 U/L    Total Bilirubin 0.3 0.0 - 1.2 mg/dL    Globulin 2.4 gm/dL    A/G Ratio 1.6 g/dL    BUN/Creatinine Ratio 13.1 7.0 - 25.0    Anion Gap 11.0 5.0 - 15.0 mmol/L    eGFR 41.7 (L) >60.0 mL/min/1.73   Hemoglobin A1c    Specimen: Arm, Right; Blood   Result Value Ref Range    Hemoglobin A1C 6.30 (H) 4.80 - 5.60 %   POC Glucose Once    Specimen: Blood   Result  Value Ref Range    Glucose 58 (L) 70 - 130 mg/dL   POC Glucose Once    Specimen: Blood   Result Value Ref Range    Glucose 95 70 - 130 mg/dL   POC Glucose Once    Specimen: Blood   Result Value Ref Range    Glucose 183 (H) 70 - 130 mg/dL   ECG 12 Lead ED Triage Standing Order; Acute Stroke (Onset <24 hrs)   Result Value Ref Range    QT Interval 398 ms    QTC Interval 447 ms   Green Top (Gel)   Result Value Ref Range    Extra Tube Hold for add-ons.    Lavender Top   Result Value Ref Range    Extra Tube hold for add-on    Gold Top - SST   Result Value Ref Range    Extra Tube Hold for add-ons.    Gray Top   Result Value Ref Range    Extra Tube Hold for add-ons.    Light Blue Top   Result Value Ref Range    Extra Tube Hold for add-ons.         If labs were ordered, I independently reviewed the results and considered them in treating the patient.      EMERGENCY DEPARTMENT COURSE and DIFFERENTIAL DIAGNOSIS/MDM:   Vitals:  AS OF 23:18 EDT    BP - 139/71  HR - 84  TEMP - 97.7 °F (36.5 °C) (Oral)  O2 SATS - 95%        Discussion below represents my analysis of pertinent findings related to patient's condition, differential diagnosis, treatment plan and final disposition.      Differential diagnosis:  The differential diagnosis associated with the patient's presentation includes: cva, ich, intracranial mass, skull fx      Independent interpretations (ECG/rhythm strip/X-ray/US/CT scan): I independently interpreted the pt head CT and cardiac monitor - there is no ICH and the pt is in NSR      Additional sources:  Discussed/obtained information from independent historians:   [] Spouse:   [] Parent:   [] Friend:   [] EMS:   [x] Other: Addl history obtained from pt daughter at bedside and is as noted in the HPI  External (non-ED) record review:   [] Inpatient record:   [] Office record:   [] Outpatient record:   [] Prior Outpatient labs:   [] Prior Outpatient radiology:   [] Primary Care record:   [] Outside ED record:   [] Other:        Patient's care impacted by:   [x] Diabetes   [] Hypertension   [] Coronary Artery Disease   [] Cancer   [] Other:     Care significantly affected by Social Determinants of Health (housing and economic circumstances, unemployment)    [] Yes     [x] No   If yes, Patient's care significantly limited by  Social Determinants of Health including:    [] Inadequate housing    [] Low income    [] Alcoholism and drug addiction in family    [] Problems related to primary support group    [] Unemployment    [] Problems related to employment    [] Other Social Determinants of Health:       Consideration of admission/observation vs discharge: PT w findings concerning for cva and req admission      ED Course:    ED Course as of 05/20/24 2318   Mon May 20, 2024   1504 Patient presented after fall that occurred last night at home with new left-sided facial droop and patient with numbness. Patient states that she got out of bed last night and suffered a fall, hitting her head, right shoulder, and left hip. Daughter noticed that patient's left lower face was drooping. Her speech has been normal but she does have discrete decree sensation to the left side of the face. Extremities seem okay. She does have history of small stroke in the past as seen on her noncontrast head CT. Initial CT imaging today looks okay. Stroke team is following and adding aspirin onto the Plavix that the patient is already taking. X-rays of her right shoulder and left hip are unremarkable. [NS]   1504 I discussed case with hospitalist Dr. David.  Discussed history, presentation, workup.  Accepts patient for admission. [NS]      ED Course User Index  [NS] Mariusz Rosen MD           PROCEDURES:  Procedures    CRITICAL CARE TIME        FINAL IMPRESSION      1. Facial droop    2. Left facial numbness    3. Injury of head, initial encounter    4. Contusion of right shoulder, initial encounter    5. Contusion of left hip, initial encounter           DISPOSITION/PLAN     ED Disposition       ED Disposition   Decision to Admit    Condition   --    Comment   Level of Care: Telemetry [5]   Diagnosis: Left sided numbness [235150]   Bed Request Comments: stroke floor                   Comment: Please note this report has been produced using speech recognition software.      Mariusz Rosen MD  Attending Emergency Physician             Mariusz Rosen MD  05/20/24 9429

## 2024-05-20 NOTE — ED NOTES
Nely Grider    Nursing Report ED to Floor:  Mental status: A+Ox4  Ambulatory status: Ambulatory/1 assist  Oxygen Therapy:  RA  Cardiac Rhythm: NSR  Admitted from: Home  Safety Concerns:  Falls  Social Issues: N/A  ED Room #:  14    ED Nurse Phone Extension - [phone] or may call [phone].      HPI:   Chief Complaint   Patient presents with    Head Injury    Fall       Past Medical History:  Past Medical History:   Diagnosis Date    Anemia 09/18/2016    Anesthesia complication     per patient with hip surgery last april did not remember being in hospital or going home can only rmember from rehad on    Anesthesia complication     with hip surgery in april 2018 had issues with swallowing after surgery called in speech    Arthritis     Cancer     cervical    Cataract     Colon polyp 01/31/2019    Cecal polyp 5 years Dr. 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    Wears glasses         Past Surgical History:  Past Surgical History:   Procedure Laterality Date    CARDIAC CATHETERIZATION      CHOLECYSTECTOMY      COLONOSCOPY      EYE SURGERY      bilateral cataract    HYSTERECTOMY      partial    JOINT REPLACEMENT      hip surgery 2018 in april    OOPHORECTOMY      one removed    TONSILLECTOMY      TOTAL HIP ARTHROPLASTY Right 04/19/2018    Procedure: RIGHT TOTAL HIP ARTHROPLASTY;  Surgeon: Carlos Pollack MD;  Location: [facility];  Service: Orthopedics    TOTAL HIP ARTHROPLASTY Left 02/12/2019    Procedure: TOTAL HIP ARTHROPLASTY LEFT;  Surgeon: Carlos Pollack MD;   "Location: [facility] OR;  Service: Orthopedics    WRIST SURGERY      metal plate        Admitting Doctor:   Evan David MD    Consulting Provider(s):  Consults       Date and Time Order Name Status Description    5/20/2024  1:15 PM Inpatient Neurology Consult Stroke Completed              Admitting Diagnosis:   The primary encounter diagnosis was Facial droop. Diagnoses of Left facial numbness, Injury of head, initial encounter, Contusion of right shoulder, initial encounter, and Contusion of left hip, initial encounter were also pertinent to this visit.    Most Recent Vitals:   Vitals:    05/20/24 1256   BP: 113/76   BP Location: Right arm   Patient Position: Sitting   Pulse: 70   Resp: 14   Temp: 98.2 °F (36.8 °C)   TempSrc: Oral   SpO2: 97%   Weight: 72.6 kg (160 lb)   Height: 165.1 cm (65\")       Active LDAs/IV Access:   Lines, Drains & Airways       Active LDAs       Name Placement date Placement time Site Days    Peripheral IV Anterior;Proximal;Right Forearm --  --  Forearm  --                    Labs (abnormal labs have a star):   Labs Reviewed   SINGLE HS TROPONIN T - Abnormal; Notable for the following components:       Result Value    HS Troponin T 14 (*)     All other components within normal limits    Narrative:     High Sensitive Troponin T Reference Range:  <14.0 ng/L- Negative Female for AMI  <22.0 ng/L- Negative Male for AMI  >=14 - Abnormal Female indicating possible myocardial injury.  >=22 - Abnormal Male indicating possible myocardial injury.   Clinicians would have to utilize clinical acumen, EKG, Troponin, and serial changes to determine if it is an Acute Myocardial Infarction or myocardial injury due to an underlying chronic condition.        CBC WITH AUTO DIFFERENTIAL - Abnormal; Notable for the following components:    RBC 3.55 (*)     Hemoglobin 10.6 (*)     MCV 98.0 (*)     MCHC 30.5 (*)     Immature Grans % 0.6 (*)     All other components within normal limits   COMPREHENSIVE METABOLIC " PANEL - Abnormal; Notable for the following components:    Glucose 120 (*)     Creatinine 1.30 (*)     eGFR 41.7 (*)     All other components within normal limits    Narrative:     GFR Normal >60  Chronic Kidney Disease <60  Kidney Failure <15    The GFR formula is only valid for adults with stable renal function between ages 18 and 70.   APTT - Normal    Narrative:     PTT = The equivalent PTT values for the therapeutic range of heparin levels at 0.3 to 0.5 U/ml are 60 to 70 seconds.   AST - Normal   ALT - Normal   RAINBOW DRAW    Narrative:     The following orders were created for panel order Vian Draw.  Procedure                               Abnormality         Status                     ---------                               -----------         ------                     Green Top (Gel)[[id number]]                                  Final result               Lavender Top[[id number]]                                     Final result               Gold Top - SST[[id number]]                                   Final result               Bernstein Top[[id number]]                                         Final result               Light Blue Top[[id number]]                                   Final result                 Please view results for these tests on the individual orders.   P2Y12 PLATELET INHIBITION    Narrative:     P2Y12 Interpretation:  Pre-Drug normal reference range is 194-418 PRU.  Test results are reported in P2Y12 reaction units (PRU). This measures the extent of platelet aggregation in the presence of P2Y12 inhibitor drugs, such as clopidogrel (Plavix), prasugrel (Effient), ticagrelor (Brilinta), ticlopidine (Ticlid).  P2Y12 values <194 PRU (low end of reference range) are specific evidence of a P2Y12 inhibitor effect.  Patients who have been treated with Glycoprotein IIb/IIIa inhibitors should not be tested until platelet function has recovered. This time period is approximately 14 days after discontinuation  of abciximab (ReoPro) and up to 48 hours after discontinuation of eptifibatide (Integrilin) and tirofiban (Aggrastat).   The P2Y12 test results should be interpreted in conjunction with other clinical and lab data available to the clinician.   POCT CHEM 8   POCT PROTIME - INR   CBC AND DIFFERENTIAL    Narrative:     The following orders were created for panel order CBC & Differential.  Procedure                               Abnormality         Status                     ---------                               -----------         ------                     CBC Auto Differential[[id number]]        Abnormal            Final result                 Please view results for these tests on the individual orders.   GREEN TOP   LAVENDER TOP   GOLD TOP - SST   GRAY TOP   LIGHT BLUE TOP       Meds Given in ED:   Medications   sodium chloride 0.9 % flush 10 mL (has no administration in time range)   aspirin chewable tablet 81 mg (has no administration in time range)     Or   aspirin suppository 300 mg (has no administration in time range)   clopidogrel (PLAVIX) tablet 300 mg (has no administration in time range)   iopamidol (ISOVUE-370) 76 % injection 115 mL (115 mL Intravenous Given 5/20/24 1339)           Last NIH score:  Interval: baseline  1a. Level of Consciousness: 0-->Alert, keenly responsive  1b. LOC Questions: 0-->Answers both questions correctly  1c. LOC Commands: 0-->Performs both tasks correctly  2. Best Gaze: 0-->Normal  3. Visual: 0-->No visual loss  4. Facial Palsy: 0-->Normal symmetrical movements  5a. Motor Arm, Left: 0-->No drift, limb holds 90 (or 45) degrees for full 10 secs  5b. Motor Arm, Right: 0-->No drift, limb holds 90 (or 45) degrees for full 10 secs  6a. Motor Leg, Left: 0-->No drift, leg holds 30 degree position for full 5 secs  6b. Motor Leg, Right: 0-->No drift, leg holds 30 degree position for full 5 secs  7. Limb Ataxia: 0-->Absent  8. Sensory: 1-->Mild-to-moderate sensory loss, patient feels  pinprick is less sharp or is dull on the affected side, or there is a loss of superficial pain with pinprick, but patient is aware of being touched  9. Best Language: 0-->No aphasia, normal  10. Dysarthria: 1-->Mild-to-moderate dysarthria, patient slurs at least some words and, at worst, can be understood with some difficulty  11. Extinction and Inattention (formerly Neglect): 0-->No abnormality    Total (NIH Stroke Scale): 2     Dysphagia screening results:  Patient Factors Component (Dysphagia:Stroke or Rule-out)  Best Eye Response: 4-->(E4) spontaneous (05/20/24 1509)  Best Motor Response: 6-->(M6) obeys commands (05/20/24 1509)  Best Verbal Response: 5-->(V5) oriented (05/20/24 1509)  Alex Coma Scale Score: 15 (05/20/24 1509)  Is there Facial Asymmetry/Weakness?: No (05/20/24 1509)  Is there Tongue Asymmetry/Weakness?: No (05/20/24 1509)  Is there Palatal Asymmetry/Weakness?: No (05/20/24 1509)  Patient Assessment Result: Pass - Proceed to Water Test (05/20/24 1509)     Alex Coma Scale:  No data recorded     CIWA:        Restraint Type:            Isolation Status:  No active isolations

## 2024-05-20 NOTE — CONSULTS
Stroke Consult Note    Patient Name: Nely Grider   MRN: [id number]  Age: 78 y.o.  Sex: female  : 1945    Primary Care Physician: Guy Foote MD  Referring Physician:  Dr. Mariusz Rosen    TIME STROKE TEAM CALLED: 1314 EST     TIME PATIENT SEEN: 1316 EST    Handedness: Right  Race:     Chief Complaint/Reason for Consultation: Left facial and lower extremity numbness    HPI: Mrs. Grider is an 78-year-old female with known medical diagnoses of essential hypertension, hyperlipidemia, diabetes mellitus type 2, palpitations, IBS, neuropathy, and remote stroke (residual mild right-sided weakness) who presents to the emergency department via private vehicle for further evaluation of left facial and lower extremity numbness which she noted upon awakening this morning.  Patient does report that last evening she was having visual hallucinations and thought that she saw her sister who recently passed away in January.  She reports that she got up out of bed and sustained a fall with injury to her head however is unsure if she lost consciousness.  She does not recall if she had any weakness or dizziness at that time.  When her numbness continued to persist today she came to the emergency department for further evaluation.    The patient tells me that she does take Plavix 75 mg daily however no other antiplatelet or anticoagulation medications.  She is a non-smoker, denies EtOH use, and denies illicit drug use.    Last Known Normal Date/Time: 2024 EST     Review of Systems   Constitutional:  Negative for activity change, chills, fatigue and fever.   HENT:  Positive for trouble swallowing (Occasional and intermittent).    Eyes:  Negative for photophobia and visual disturbance.   Respiratory: Negative.  Negative for cough, chest tightness and shortness of breath.    Cardiovascular:  Positive for palpitations. Negative for chest pain.   Gastrointestinal:  Positive for diarrhea.  Negative for blood in stool, constipation, nausea and vomiting.   Genitourinary: Negative.  Negative for hematuria.   Musculoskeletal:  Positive for gait problem.   Neurological:  Positive for weakness and numbness. Negative for dizziness, seizures, speech difficulty and headaches.   Hematological: Negative.    Psychiatric/Behavioral:  Positive for hallucinations.       Past Medical History:   Diagnosis Date    Anemia 09/18/2016    Anesthesia complication     per patient with hip surgery last april did not remember being in hospital or going home can only rmember from rehad on    Anesthesia complication     with hip surgery in april 2018 had issues with swallowing after surgery called in speech    Arthritis     Cancer     cervical    Cataract     Colon polyp 01/31/2019    Cecal polyp 5 years Dr. Fatima awaiting pathology 1/19    Diabetes mellitus     type 2 dm, check blood sugar every morning, diagnosed 3 or 4 years    Diverticulosis 01/31/2019    Full dentures     Full dentures     GERD (gastroesophageal reflux disease)     High cholesterol     History of IBS     Hypertension     been off bp meds since last april 2018    Neuropathy     Primary osteoarthritis of both hips 01/31/2018    Added automatically from request for surgery [id number]    Status post total replacement of left hip 04/19/2018    Wears glasses      Past Surgical History:   Procedure Laterality Date    CARDIAC CATHETERIZATION      CHOLECYSTECTOMY      COLONOSCOPY      EYE SURGERY      bilateral cataract    HYSTERECTOMY      partial    JOINT REPLACEMENT      hip surgery 2018 in april    OOPHORECTOMY      one removed    TONSILLECTOMY      TOTAL HIP ARTHROPLASTY Right 04/19/2018    Procedure: RIGHT TOTAL HIP ARTHROPLASTY;  Surgeon: Carlos Pollack MD;  Location: [facility] OR;  Service: Orthopedics    TOTAL HIP ARTHROPLASTY Left 02/12/2019    Procedure: TOTAL HIP ARTHROPLASTY LEFT;  Surgeon: Carlos Pollack MD;  Location: [facility] OR;  Service: Orthopedics     WRIST SURGERY      metal plate     Family History   Problem Relation Age of Onset    Arthritis Mother     Heart attack Mother     Hypertension Mother     Hyperlipidemia Mother     Osteoporosis Mother     Heart disease Sister     Diabetes Sister     Arthritis Sister     Heart attack Sister     Hypertension Sister     Hyperlipidemia Sister     Bleeding Disorder Sister     Heart disease Brother     Cancer Brother     Diabetes Brother     Arthritis Brother     Heart attack Brother     Hypertension Brother     Hyperlipidemia Brother     Ovarian cancer Daughter     Cancer Daughter     Ovarian cancer Other         grandaughter    Ovarian cancer Other         granddaughter    Heart attack Father     Hypertension Father     Stroke Father     Kidney disease Paternal Uncle     Liver disease Paternal Uncle     Cancer Other     Stroke Other     Diabetes Other      Social History     Socioeconomic History    Marital status:    Tobacco Use    Smoking status: Never    Smokeless tobacco: Never   Substance and Sexual Activity    Alcohol use: No    Drug use: No    Sexual activity: Defer     Comment:      Allergies   Allergen Reactions    Codeine Hives     Prior to Admission medications    Medication Sig Start Date End Date Taking? Authorizing Provider   amLODIPine (NORVASC) 5 MG tablet Take 1 tablet by mouth Daily. 3/9/23   Guy Foote MD   aspirin 81 MG chewable tablet Chew 1 tablet Daily. 10/6/19   Herlinda Dahl,    atorvastatin (LIPITOR) 80 MG tablet TAKE 1 TABLET BY MOUTH EVERY NIGHT. 5/1/23   Guy Foote MD   clopidogrel (PLAVIX) 75 MG tablet TAKE 1 TABLET EVERY DAY 5/10/23   Guy Foote MD   dicyclomine (BENTYL) 20 MG tablet Take 1 tablet by mouth 4 (Four) Times a Day Before Meals & at Bedtime As Needed (abdominal cramps or diarrhea). 12/16/21   Guy Foote MD   gabapentin (NEURONTIN) 400 MG capsule Take 1 capsule by mouth 3 (Three) Times a Day.  11/19/19   Raimundo Ibarra MD   glimepiride (AMARYL) 4 MG tablet TAKE 1 TABLET BY MOUTH EVERY MORNING BEFORE BREAKFAST. 4/11/23   Guy Foote MD   metFORMIN (GLUCOPHAGE) 500 MG tablet TAKE 1 TABLET TWICE DAILY WITH MEALS 9/28/23   Guy Foote MD   omeprazole (priLOSEC) 20 MG capsule Take 1 capsule by mouth 2 (Two) Times a Day. 3/9/23   Guy Foote MD   Probiotic Product (PROBIOTIC DAILY) capsule Take as directed on package 6/14/19   Bairon Solorio MD   simethicone (Gas-X) 80 MG chewable tablet Chew 1 tablet Every 6 (Six) Hours As Needed for Flatulence. 1/18/21   Demi Ponce MD         Temp:  [98.2 °F (36.8 °C)] 98.2 °F (36.8 °C)  Heart Rate:  [70] 70  Resp:  [14] 14  BP: (113)/(76) 113/76  Neurological Exam  Mental Status  Awake and alert. Oriented to person, place, time and situation. Oriented to person, place, and time. Speech is normal. Language is fluent with no aphasia. Attention and concentration are normal.    Cranial Nerves  CN II: Visual fields full to confrontation.  CN III, IV, VI: Extraocular movements intact bilaterally. Pupils equal round and reactive to light bilaterally.  CN V: Facial sensation is normal.  CN VII: Full and symmetric facial movement.  CN VIII: Hearing appears to be intact bilaterally.    Motor  Decreased muscle bulk throughout. Decreased muscle tone.  Mild right upper extremity/lower extremity weakness on push/pulls, no drift, 4+/5 strength  Left upper and lower extremity with 5/5 strength  .    Sensory  Light touch abnormality: Left face and lower extremity.     Coordination    No obvious dysmetria noted.    Gait    Unsteady.      Physical Exam  Vitals and nursing note reviewed.   Constitutional:       General: She is awake. She is not in acute distress.     Appearance: She is not ill-appearing.   HENT:      Head: Normocephalic.      Mouth/Throat:      Mouth: Mucous membranes are moist.   Eyes:      Extraocular  Movements: Extraocular movements intact.      Pupils: Pupils are equal, round, and reactive to light.   Cardiovascular:      Rate and Rhythm: Normal rate.   Pulmonary:      Effort: Pulmonary effort is normal. No respiratory distress.      Comments: On room air  Skin:     General: Skin is warm and dry.   Neurological:      Mental Status: She is alert and oriented to person, place, and time.      Cranial Nerves: No cranial nerve deficit.      Sensory: Sensory deficit present.      Motor: Weakness present.      Coordination: Coordination normal.   Psychiatric:         Mood and Affect: Mood normal.         Speech: Speech normal.         Behavior: Behavior normal.         Acute Stroke Data    Thrombolytic Inclusion / Exclusion Criteria    Time: 13:24 EDT  Person Administering Scale: SKYLAR Cortes    Inclusion Criteria  [x]   18 years of age or greater   []   Onset of symptoms < 4.5 hours before beginning treatment (stroke onset = time patient was last seen well or without symptoms).   []   Diagnosis of acute ischemic stroke causing measurable disabling deficit (Complete Hemianopia, Any Aphasia, Visual or Sensory Extinction, Any weakness limiting sustained effort against gravity)   []   Any remaining deficit considered potentially disabling in view of patient and practitioner   Exclusion criteria (Do not proceed with Alteplase if any are checked under exclusion criteria)  [x]   Onset unknown or GREATER than 4.5 hours   []   ICH on CT/MRI   []   CT demonstrates hypodensity representing acute or subacute infarct   []   Significant head trauma or prior stroke in the previous 3 months   []   Symptoms suggestive of subarachnoid hemorrhage   []   History of un-ruptured intracranial aneurysm GREATER than 10 mm   []   Recent intracranial or intraspinal surgery within the last 3 months   []   Arterial puncture at a non-compressible site in the previous 7 days   []   Active internal bleeding   []   Acute bleeding  tendency   []   Platelet count LESS than 100,000 for known hematological diseases such as leukemia, thrombocytopenia or chronic cirrhosis   []   Current use of anticoagulant with INR GREATER than 1.7 or PT GREATER than 15 seconds, aPTT GREATER than 40 seconds   []   Heparin received within 48 hours, resulting in abnormally elevated aPTT GREATER than upper limit of normal   []   Current use of direct thrombin inhibitors or direct factor Xa inhibitors in the past 48 hours   []   Elevated blood pressure refractory to treatment (systolic GREATER than 185 mm/Hg or diastolic  GREATER than 110 mm/Hg   []   Suspected infective endocarditis and aortic arch dissection   []   Current use of therapeutic treatment dose of low-molecular-weight heparin (LMWH) within the previous 24 hours   []   Structural GI malignancy or bleed   Relative exclusion for all patients  []   Only minor non-disabling symptoms   []   Pregnancy   []   Seizure at onset with postictal residual neurological impairments   []   Major surgery or previous trauma within past 14 days   []   History of previous spontaneous ICH, intracranial neoplasm, or AV malformation   []   Postpartum (within previous 14 days)   []   Recent GI or urinary tract hemorrhage (within previous 21 days)   []   Recent acute MI (within previous 3 months)   []   History of un-ruptured intracranial aneurysm LESS than 10 mm   []   History of ruptured intracranial aneurysm   []   Blood glucose LESS than 50 mg/dL (2.7 mmol/L)   []   Dural puncture within the last 7 days   []   Known GREATER than 10 cerebral microbleeds   Additional exclusions for patients with symptoms onset between 3 and 4.5 hours.  []   Age > 80.   []   On any anticoagulants regardless of INR  >>> Warfarin (Coumadin), Heparin, Enoxaparin (Lovenox), fondaparinux (Arixtra), bivalirudin (Angiomax), Argatroban, dabigatran (Pradaxa), rivaroxaban (Xarelto), or apixaban (Eliquis)   []   Severe stroke (NIHSS > 25).   []   History  of BOTH diabetes and previous ischemic stroke.   []   The risks and benefits have been discussed with the patient or family related to the administration of IV thrombolytic therapy for stroke symptoms.   []   I have discussed and reviewed the patient's case and imaging with the attending prior to IV thrombolytic therapy.   N/A Time IV thrombolytic administered       Hospital Meds:  Scheduled-    Infusions-     PRNs-   sodium chloride    Functional Status Prior to Current Stroke/[place] Score: 1; residual mild right sided weakness from prior stroke; walks without assistive device.    NIH Stroke Scale  Time: 13:24 EDT  Person Administering Scale: SKYLAR Cortes  Interval: baseline  1a. Level of Consciousness: 0-->Alert, keenly responsive  1b. LOC Questions: 0-->Answers both questions correctly  1c. LOC Commands: 0-->Performs both tasks correctly  2. Best Gaze: 0-->Normal  3. Visual: 0-->No visual loss  4. Facial Palsy: 0-->Normal symmetrical movements  5a. Motor Arm, Left: 0-->No drift, limb holds 90 (or 45) degrees for full 10 secs  5b. Motor Arm, Right: 0-->No drift, limb holds 90 (or 45) degrees for full 10 secs  6a. Motor Leg, Left: 0-->No drift, leg holds 30 degree position for full 5 secs  6b. Motor Leg, Right: 0-->No drift, leg holds 30 degree position for full 5 secs  7. Limb Ataxia: 0-->Absent  8. Sensory: 1-->Mild-to-moderate sensory loss, patient feels pinprick is less sharp or is dull on the affected side, or there is a loss of superficial pain with pinprick, but patient is aware of being touched  9. Best Language: 0-->No aphasia, normal  10. Dysarthria: 1-->Mild-to-moderate dysarthria, patient slurs at least some words and, at worst, can be understood with some difficulty  11. Extinction and Inattention (formerly Neglect): 0-->No abnormality    Total (NIH Stroke Scale): 2      Results Reviewed:  I have personally reviewed current lab, radiology, and data and agree with results.    CT of the  head without contrast is negative for hemorrhage.  Questionable hypodensity noted in the right insula.    CTA head/neck reveals mild bilateral carotid artery atherosclerotic disease and multifocal bilateral intracranial atherosclerotic disease however no evidence of LVO.    CT perfusion is negative for LVO.    WBC 8.74  H/H 10.6/34.8  Platelets 336  P2Y12 201  AST 22  ALT13    Assessment/Plan:    This is an 78-year-old female with known medical diagnoses of essential hypertension, hyperlipidemia, diabetes mellitus type 2, palpitations, IBS, neuropathy, and remote stroke (residual mild right-sided weakness) who presents to the emergency department via private vehicle for further evaluation of left facial and lower extremity numbness which she noted upon awakening this morning.  Due to her last known well she is a candidate for IV thrombolytic therapy.  CTA head/neck and CT perfusion scan are negative for LVO therefore she is not a candidate for endovascular therapy.  She will be admitted to the hospital service for further stroke workup.    Antiplatelet PTA: Plavix  Anticoagulant PTA: None        Left face and lower extremity numbness        History of stroke, residual right-sided weakness  -TIA/CVA order set without thrombotherapy is been initiated  -N.p.o. until bedside nursing dysphagia screen completed, then the patient can be started on cardiac healthy diet  -Add ASA 81mg daily  -P2Y12 201, slightly elevated which could indicate inadequate Plavix responsiveness. Will load with 300mg now and recheck P2Y12 in AM.  -MRI of the brain without contrast  -A1c and lipid panel in a.m.  -TTE; if negative for cardiac etiology patient may need event monitor at discharge to evaluate for PAF given report of occasional/intermittent palpitations  -Activity as tolerated, fall risk precautions  -PT/OT/SLP evaluation    Essential hypertension  -Allow autoregulation of blood pressure for adequate cerebral blood flow x 24  hours  -Nicardipine for SBP>220     Hyperlipidemia  -Lipid panel in a.m.  -Atorvastatin 80 mg nightly    Diabetes mellitus type 2  -A1c in a.m.  -Maintain euglycemia, goal blood glucose 140-160  -Management per hospitalist    Plan of care was discussed with the patient and Dr. Rosen.  Stroke neurology will continue to follow.  Please call with any questions or concerns.  Thank you for this consult.    Allison Bennett, APRN  May 20, 2024  13:24 EDT

## 2024-05-21 ENCOUNTER — APPOINTMENT (OUTPATIENT)
Dept: CARDIOLOGY | Facility: HOSPITAL | Age: 79
End: 2024-05-21
Payer: MEDICARE

## 2024-05-21 ENCOUNTER — READMISSION MANAGEMENT (OUTPATIENT)
Dept: CALL CENTER | Facility: HOSPITAL | Age: 79
End: 2024-05-21
Payer: MEDICARE

## 2024-05-21 ENCOUNTER — APPOINTMENT (OUTPATIENT)
Dept: MRI IMAGING | Facility: HOSPITAL | Age: 79
End: 2024-05-21
Payer: MEDICARE

## 2024-05-21 VITALS
TEMPERATURE: 98.1 F | RESPIRATION RATE: 18 BRPM | BODY MASS INDEX: 28.65 KG/M2 | OXYGEN SATURATION: 98 % | SYSTOLIC BLOOD PRESSURE: 139 MMHG | HEIGHT: 65 IN | WEIGHT: 171.96 LBS | HEART RATE: 91 BPM | DIASTOLIC BLOOD PRESSURE: 80 MMHG

## 2024-05-21 LAB
ANION GAP SERPL CALCULATED.3IONS-SCNC: 6 MMOL/L (ref 5–15)
BH CV ECHO MEAS - AO MAX PG: 8.6 MMHG
BH CV ECHO MEAS - AO MEAN PG: 4.5 MMHG
BH CV ECHO MEAS - AO ROOT DIAM: 2.9 CM
BH CV ECHO MEAS - AO V2 MAX: 146.5 CM/SEC
BH CV ECHO MEAS - AO V2 VTI: 30.5 CM
BH CV ECHO MEAS - AVA(I,D): 2.07 CM2
BH CV ECHO MEAS - EDV(CUBED): 103.8 ML
BH CV ECHO MEAS - EDV(MOD-SP2): 72.4 ML
BH CV ECHO MEAS - EDV(MOD-SP4): 83.8 ML
BH CV ECHO MEAS - EF(MOD-BP): 62.9 %
BH CV ECHO MEAS - EF(MOD-SP2): 57.3 %
BH CV ECHO MEAS - EF(MOD-SP4): 59.9 %
BH CV ECHO MEAS - ESV(CUBED): 29.8 ML
BH CV ECHO MEAS - ESV(MOD-SP2): 30.9 ML
BH CV ECHO MEAS - ESV(MOD-SP4): 33.6 ML
BH CV ECHO MEAS - FS: 34 %
BH CV ECHO MEAS - IVS/LVPW: 1.2 CM
BH CV ECHO MEAS - IVSD: 1.2 CM
BH CV ECHO MEAS - LA DIMENSION: 3.5 CM
BH CV ECHO MEAS - LAT PEAK E' VEL: 8.9 CM/SEC
BH CV ECHO MEAS - LV DIASTOLIC VOL/BSA (35-75): 45.2 CM2
BH CV ECHO MEAS - LV MASS(C)D: 187.5 GRAMS
BH CV ECHO MEAS - LV MAX PG: 3.2 MMHG
BH CV ECHO MEAS - LV MEAN PG: 1.5 MMHG
BH CV ECHO MEAS - LV SYSTOLIC VOL/BSA (12-30): 18.1 CM2
BH CV ECHO MEAS - LV V1 MAX: 88.6 CM/SEC
BH CV ECHO MEAS - LV V1 VTI: 20.1 CM
BH CV ECHO MEAS - LVIDD: 4.7 CM
BH CV ECHO MEAS - LVIDS: 3.1 CM
BH CV ECHO MEAS - LVOT AREA: 3.1 CM2
BH CV ECHO MEAS - LVOT DIAM: 2 CM
BH CV ECHO MEAS - LVPWD: 1 CM
BH CV ECHO MEAS - MED PEAK E' VEL: 9.1 CM/SEC
BH CV ECHO MEAS - MV A MAX VEL: 110 CM/SEC
BH CV ECHO MEAS - MV DEC SLOPE: 436 CM/SEC2
BH CV ECHO MEAS - MV DEC TIME: 0.22 SEC
BH CV ECHO MEAS - MV E MAX VEL: 99 CM/SEC
BH CV ECHO MEAS - MV E/A: 0.9
BH CV ECHO MEAS - MV MAX PG: 4.7 MMHG
BH CV ECHO MEAS - MV MEAN PG: 2 MMHG
BH CV ECHO MEAS - MV P1/2T: 72.6 MSEC
BH CV ECHO MEAS - MV V2 VTI: 41 CM
BH CV ECHO MEAS - MVA(P1/2T): 3 CM2
BH CV ECHO MEAS - MVA(VTI): 1.54 CM2
BH CV ECHO MEAS - PA ACC TIME: 0.11 SEC
BH CV ECHO MEAS - PA V2 MAX: 109 CM/SEC
BH CV ECHO MEAS - RAP SYSTOLE: 8 MMHG
BH CV ECHO MEAS - RVSP: 17 MMHG
BH CV ECHO MEAS - SV(LVOT): 63 ML
BH CV ECHO MEAS - SV(MOD-SP2): 41.5 ML
BH CV ECHO MEAS - SV(MOD-SP4): 50.2 ML
BH CV ECHO MEAS - SVI(LVOT): 34 ML/M2
BH CV ECHO MEAS - SVI(MOD-SP2): 22.4 ML/M2
BH CV ECHO MEAS - SVI(MOD-SP4): 27.1 ML/M2
BH CV ECHO MEAS - TAPSE (>1.6): 2.03 CM
BH CV ECHO MEAS - TR MAX PG: 9.1 MMHG
BH CV ECHO MEAS - TR MAX VEL: 151 CM/SEC
BH CV ECHO MEASUREMENTS AVERAGE E/E' RATIO: 11
BH CV XLRA - RV BASE: 3.5 CM
BH CV XLRA - RV LENGTH: 7.2 CM
BH CV XLRA - RV MID: 2.6 CM
BH CV XLRA - TDI S': 11.7 CM/SEC
BUN SERPL-MCNC: 15 MG/DL (ref 8–23)
BUN/CREAT SERPL: 12.4 (ref 7–25)
CALCIUM SPEC-SCNC: 9.1 MG/DL (ref 8.6–10.5)
CHLORIDE SERPL-SCNC: 108 MMOL/L (ref 98–107)
CHOLEST SERPL-MCNC: 114 MG/DL (ref 0–200)
CO2 SERPL-SCNC: 25 MMOL/L (ref 22–29)
CREAT SERPL-MCNC: 1.21 MG/DL (ref 0.57–1)
DEPRECATED RDW RBC AUTO: 47.2 FL (ref 37–54)
EGFRCR SERPLBLD CKD-EPI 2021: 45.4 ML/MIN/1.73
ERYTHROCYTE [DISTWIDTH] IN BLOOD BY AUTOMATED COUNT: 13.9 % (ref 12.3–15.4)
GLUCOSE BLDC GLUCOMTR-MCNC: 115 MG/DL (ref 70–130)
GLUCOSE BLDC GLUCOMTR-MCNC: 132 MG/DL (ref 70–130)
GLUCOSE BLDC GLUCOMTR-MCNC: 185 MG/DL (ref 70–130)
GLUCOSE SERPL-MCNC: 144 MG/DL (ref 65–99)
HBA1C MFR BLD: 6.2 % (ref 4.8–5.6)
HCT VFR BLD AUTO: 31.6 % (ref 34–46.6)
HDLC SERPL-MCNC: 39 MG/DL (ref 40–60)
HGB BLD-MCNC: 10 G/DL (ref 12–15.9)
LDLC SERPL CALC-MCNC: 50 MG/DL (ref 0–100)
LDLC/HDLC SERPL: 1.16 {RATIO}
LEFT ATRIUM VOLUME INDEX: 29.3 ML/M2
MCH RBC QN AUTO: 29.3 PG (ref 26.6–33)
MCHC RBC AUTO-ENTMCNC: 31.6 G/DL (ref 31.5–35.7)
MCV RBC AUTO: 92.7 FL (ref 79–97)
PA ADP PRP-ACNC: 195 PRU
PLATELET # BLD AUTO: 323 10*3/MM3 (ref 140–450)
PMV BLD AUTO: 9.7 FL (ref 6–12)
POTASSIUM SERPL-SCNC: 4.4 MMOL/L (ref 3.5–5.2)
RBC # BLD AUTO: 3.41 10*6/MM3 (ref 3.77–5.28)
SODIUM SERPL-SCNC: 139 MMOL/L (ref 136–145)
TRIGL SERPL-MCNC: 148 MG/DL (ref 0–150)
VLDLC SERPL-MCNC: 25 MG/DL (ref 5–40)
WBC NRBC COR # BLD AUTO: 7.85 10*3/MM3 (ref 3.4–10.8)

## 2024-05-21 PROCEDURE — 97530 THERAPEUTIC ACTIVITIES: CPT | Performed by: OCCUPATIONAL THERAPIST

## 2024-05-21 PROCEDURE — 97161 PT EVAL LOW COMPLEX 20 MIN: CPT

## 2024-05-21 PROCEDURE — G0378 HOSPITAL OBSERVATION PER HR: HCPCS

## 2024-05-21 PROCEDURE — 85027 COMPLETE CBC AUTOMATED: CPT | Performed by: INTERNAL MEDICINE

## 2024-05-21 PROCEDURE — 82948 REAGENT STRIP/BLOOD GLUCOSE: CPT

## 2024-05-21 PROCEDURE — 85576 BLOOD PLATELET AGGREGATION: CPT

## 2024-05-21 PROCEDURE — 83036 HEMOGLOBIN GLYCOSYLATED A1C: CPT

## 2024-05-21 PROCEDURE — 93306 TTE W/DOPPLER COMPLETE: CPT | Performed by: INTERNAL MEDICINE

## 2024-05-21 PROCEDURE — 97116 GAIT TRAINING THERAPY: CPT

## 2024-05-21 PROCEDURE — 70551 MRI BRAIN STEM W/O DYE: CPT

## 2024-05-21 PROCEDURE — 80061 LIPID PANEL: CPT

## 2024-05-21 PROCEDURE — 99239 HOSP IP/OBS DSCHRG MGMT >30: CPT | Performed by: STUDENT IN AN ORGANIZED HEALTH CARE EDUCATION/TRAINING PROGRAM

## 2024-05-21 PROCEDURE — 93306 TTE W/DOPPLER COMPLETE: CPT

## 2024-05-21 PROCEDURE — 92526 ORAL FUNCTION THERAPY: CPT

## 2024-05-21 PROCEDURE — 80048 BASIC METABOLIC PNL TOTAL CA: CPT | Performed by: INTERNAL MEDICINE

## 2024-05-21 PROCEDURE — 97165 OT EVAL LOW COMPLEX 30 MIN: CPT | Performed by: OCCUPATIONAL THERAPIST

## 2024-05-21 PROCEDURE — 63710000001 INSULIN LISPRO (HUMAN) PER 5 UNITS: Performed by: INTERNAL MEDICINE

## 2024-05-21 RX ORDER — CLOPIDOGREL BISULFATE 75 MG/1
75 TABLET ORAL DAILY
Qty: 90 TABLET | Refills: 0 | Status: SHIPPED | OUTPATIENT
Start: 2024-05-21

## 2024-05-21 RX ORDER — ASPIRIN 81 MG/1
81 TABLET, CHEWABLE ORAL DAILY
Qty: 30 TABLET | Refills: 0 | Status: SHIPPED | OUTPATIENT
Start: 2024-05-22

## 2024-05-21 RX ADMIN — PANTOPRAZOLE SODIUM 40 MG: 40 TABLET, DELAYED RELEASE ORAL at 06:18

## 2024-05-21 RX ADMIN — Medication 10 ML: at 09:43

## 2024-05-21 RX ADMIN — GABAPENTIN 400 MG: 400 CAPSULE ORAL at 06:18

## 2024-05-21 RX ADMIN — CLOPIDOGREL BISULFATE 75 MG: 75 TABLET ORAL at 09:43

## 2024-05-21 RX ADMIN — ACETAMINOPHEN 650 MG: 325 TABLET ORAL at 09:43

## 2024-05-21 RX ADMIN — ASPIRIN 81 MG CHEWABLE TABLET 81 MG: 81 TABLET CHEWABLE at 09:43

## 2024-05-21 RX ADMIN — GABAPENTIN 400 MG: 400 CAPSULE ORAL at 16:00

## 2024-05-21 RX ADMIN — INSULIN LISPRO 2 UNITS: 100 INJECTION, SOLUTION INTRAVENOUS; SUBCUTANEOUS at 09:43

## 2024-05-21 NOTE — PLAN OF CARE
Goal Outcome Evaluation:  Plan of Care Reviewed With: patient, daughter           Outcome Evaluation: PT PRESENTS BELOW BASELINE WITH FUNCTIONAL MOBILITY DUE TO  IMPAIRED STRENGTH/ BALANCE AND DECREASED ENDURANCE. GAIT IS SLOW, GUARDED AND UNSTEADY WITHOUT A.D. PT DEMONSTRATED MUCH IMPROVED QUALITY AND SPEED OF GAIT WITH USE OF R WALKER. RECOMMEND HOME WITH R WALKER AND OPPT AT D/C. DTR REPORTS PT WILL HAVE TRANSPORTATION.      Anticipated Discharge Disposition (PT): home with outpatient therapy services, home with assist

## 2024-05-21 NOTE — PLAN OF CARE
Goal Outcome Evaluation:  Plan of Care Reviewed With: patient, daughter                  Anticipated Discharge Disposition (SLP): No further SLP services warranted             Treatment Assessment (SLP): toleration of diet (05/21/24 1108)     Plan for Continued Treatment (SLP): treatment no longer indicated as all goals met (05/21/24 1108)

## 2024-05-21 NOTE — PLAN OF CARE
Problem: Adult Inpatient Plan of Care  Goal: Plan of Care Review  Recent Flowsheet Documentation  Taken 5/21/2024 0816 by Shari Velasco OT  Progress: no change  Plan of Care Reviewed With:   patient   grandchild(marcus)  Outcome Evaluation: OT IE completed. Pt. presents below functional baseline in balance, strength, and occupational endurance. Pt. with pain in L hip unrelated to fall, per pt. Pt. with slightly diminished L UE sensation & strength. OT skilled services are warranted to return pt. to PLOF. Recommend home with assist upon d/c.   Goal Outcome Evaluation:  Plan of Care Reviewed With: patient, grandchild(marcus)        Progress: no change  Outcome Evaluation: OT IE completed. Pt. presents below functional baseline in balance, strength, and occupational endurance. Pt. with pain in L hip unrelated to fall, per pt. Pt. with slightly diminished L UE sensation & strength. OT skilled services are warranted to return pt. to PLOF. Recommend home with assist upon d/c.      Anticipated Discharge Disposition (OT): home with assist

## 2024-05-21 NOTE — THERAPY TREATMENT NOTE
Acute Care - Speech Language Pathology   Swallow Treatment Note  Laura     Patient Name: Nely Grider  : 1945  MRN: [id number]  Today's Date: 2024               Admit Date: 2024    Visit Dx:     ICD-10-CM ICD-9-CM   1. Facial droop  R29.810 781.94   2. Left facial numbness  R20.0 782.0   3. Injury of head, initial encounter  S09.90XA 959.01   4. Contusion of right shoulder, initial encounter  S40.011A 923.00   5. Contusion of left hip, initial encounter  S70.02XA 924.01   6. Impaired mobility and ADLs  Z74.09 V49.89    Z78.9      Patient Active Problem List   Diagnosis    Gastroesophageal reflux disease without esophagitis    Mixed hyperlipidemia    Essential hypertension    Controlled type 2 diabetes mellitus without complication, without long-term current use of insulin    Chronic kidney disease (CKD), stage III (moderate)    Anemia    Diabetic polyneuropathy associated with type 2 diabetes mellitus    Cerebrovascular accident (CVA) due to thrombosis of right middle cerebral artery    Chronic pain syndrome    Osteopenia    Difficulty in walking, not elsewhere classified    Generalized muscle weakness    Type 2 diabetes mellitus with diabetic neuropathy, unspecified    Unspecified intracapsular fracture of right femur, initial encounter for closed fracture    Essential (primary) hypertension    Gastro-esophageal reflux disease without esophagitis    Presence of right artificial hip joint    Hyperlipidemia, unspecified    Pain in right hip    Bilateral primary osteoarthritis of hip    Osteoarthritis of hip, unspecified    Unspecified osteoarthritis, unspecified site    Diverticulitis of large intestine    Low back pain    Other staphylococcus as the cause of diseases classified elsewhere    Thrombophlebitis of superficial veins of upper extremities    Chronic renal failure syndrome    Polyneuropathy in diabetes    Effusion, right hip    Right sided numbness    Fall     Past Medical  History:   Diagnosis Date    Anemia 09/18/2016    Anesthesia complication     per patient with hip surgery last april did not remember being in hospital or going home can only rmember from rehad on    Anesthesia complication     with hip surgery in april 2018 had issues with swallowing after surgery called in speech    Arthritis     Cancer     cervical    Cataract     Colon polyp 01/31/2019    Cecal polyp 5 years Dr. Fatima awaiting pathology 1/19    Diabetes mellitus     type 2 dm, check blood sugar every morning, diagnosed 3 or 4 years    Diverticulosis 01/31/2019    Full dentures     Full dentures     GERD (gastroesophageal reflux disease)     High cholesterol     History of IBS     Hypertension     been off bp meds since last april 2018    Neuropathy     Primary osteoarthritis of both hips 01/31/2018    Added automatically from request for surgery [id number]    Status post total replacement of left hip 04/19/2018    Wears glasses      Past Surgical History:   Procedure Laterality Date    CARDIAC CATHETERIZATION      CHOLECYSTECTOMY      COLONOSCOPY      EYE SURGERY      bilateral cataract    HYSTERECTOMY      partial    JOINT REPLACEMENT      hip surgery 2018 in april    OOPHORECTOMY      one removed    TONSILLECTOMY      TOTAL HIP ARTHROPLASTY Right 04/19/2018    Procedure: RIGHT TOTAL HIP ARTHROPLASTY;  Surgeon: Carlos Pollack MD;  Location:  [facility];  Service: Orthopedics    TOTAL HIP ARTHROPLASTY Left 02/12/2019    Procedure: TOTAL HIP ARTHROPLASTY LEFT;  Surgeon: Carlos Pollack MD;  Location:  [facility];  Service: Orthopedics    WRIST SURGERY      metal plate       SLP Recommendation and Plan     SLP Diet Recommendation: regular textures, thin liquids (05/21/24 1108)  Recommended Precautions and Strategies: general aspiration precautions, reflux precautions, upright posture during/after eating (05/21/24 1108)  SLP Rec. for Method of Medication Administration: meds whole, with thin liquids, meds crushed, with  puree, as tolerated (05/21/24 1108)     Monitor for Signs of Aspiration: yes, notify SLP if any concerns (05/21/24 1108)        Anticipated Discharge Disposition (SLP): No further SLP services warranted (05/21/24 1108)     Therapy Frequency (Swallow): evaluation only (05/21/24 1108)     Oral Care Recommendations: Oral Care BID/PRN, Toothbrush (05/21/24 1108)        Daily Summary of Progress (SLP): progress toward functional goals as expected (05/21/24 1108)               Treatment Assessment (SLP): toleration of diet (05/21/24 1108)     Plan for Continued Treatment (SLP): treatment no longer indicated as all goals met (05/21/24 1108)         Plan of Care Reviewed With: patient, daughter      SWALLOW EVALUATION (Last 72 Hours)       SLP Adult Swallow Evaluation       Row Name 05/21/24 1108 05/20/24 6927                Rehab Evaluation    Document Type therapy note (daily note)  - evaluation  -       Subjective Information no complaints  - no complaints  -       Patient Observations alert;cooperative  - alert;cooperative  -       Patient/Family/Caregiver Comments/Observations Dtr present  - Family present  -       Patient Effort good  - good  -       Symptoms Noted During/After Treatment -- none  -          General Information    Patient Profile Reviewed -- yes  -       Pertinent History Of Current Problem -- Adm with c/o L facial weakness. Hx: HTN, HLD, T2DM, IBS, neuropathy, remote CVA w/ residual R sided weakness. Head CT negative for acute intracranial abnormalities, MRI pending  -       Current Method of Nutrition -- NPO  -       Precautions/Limitations, Vision -- WFL;for purposes of eval  -       Precautions/Limitations, Hearing -- WFL;for purposes of eval  -       Prior Level of Function-Communication -- unknown  -       Prior Level of Function-Swallowing -- no diet consistency restrictions  -       Plans/Goals Discussed with -- patient;family;agreed upon  -       Barriers  to Rehab -- none identified  -       Patient's Goals for Discharge -- patient did not state  -       Family Goals for Discharge -- family did not state  -          Pain    Additional Documentation Pain Scale: FACES Pre/Post-Treatment (Group)  - Pain Scale: FACES Pre/Post-Treatment (Group)  -          Pain Scale: FACES Pre/Post-Treatment    Pain: FACES Scale, Pretreatment 0-->no hurt  -MP 0-->no hurt  -       Posttreatment Pain Rating 0-->no hurt  -MP 0-->no hurt  -          Oral Motor Structure and Function    Dentition Assessment -- upper dentures/partial in place;lower dentures/partial in place  -       Secretion Management -- WNL/WFL  -       Mucosal Quality -- moist, healthy  -          General Eating/Swallowing Observations    Respiratory Support Currently in Use -- room air  -       Eating/Swallowing Skills -- self-fed;fed by SLP  -       Positioning During Eating -- upright in bed  -       Utensils Used -- spoon;cup;straw  -       Consistencies Trialed -- ice chips;thin liquids;pureed;regular textures  -          Clinical Swallow Eval    Pharyngeal Phase -- --  r/o pharyngeal impairment  -       Esophageal Phase -- suspected esophageal impairment  -       Clinical Swallow Evaluation Summary -- Pt w/ delayed throat clear following regular solid trials. Pt also c/o material sticking & points to region of upper 1/3 esophagus. No additional s/s of aspiration accross trials. Dtr reports pt takes Omeprazole for reflux. Discussed dwongrading solids per pt preference, pt requesting to continue regular solid diet. (MBS completed in 2018 & 2018, both studies revealed grossly functional oral and pharyngeal phases of swallow. Per chart, pt noted w/ minimal retrograde flow of material & prominent CP) Recommend regular diet w/ thin liquids, general aspiration/reflux precautions. SLP will f/u for diet tolerance to ensure no additional concerns  -          Esophageal Phase Concerns     Esophageal Phase Concerns -- globus  -       Globus -- regular consistencies  -          SLP Evaluation Clinical Impression    SLP Swallowing Diagnosis -- R/O pharyngeal dysphagia;suspected esophageal dysphagia  -       Functional Impact -- risk of aspiration/pneumonia  -       Rehab Potential/Prognosis, Swallowing -- good, to achieve stated therapy goals  -       Swallow Criteria for Skilled Therapeutic Interventions Met -- demonstrates skilled criteria  -          SLP Treatment Clinical Impressions    Treatment Assessment (SLP) toleration of diet  - --       Daily Summary of Progress (SLP) progress toward functional goals as expected  - --       Plan for Continued Treatment (SLP) treatment no longer indicated as all goals met  - --       Care Plan Review care plan/treatment goals reviewed;patient/other agree to care plan  - --       Care Plan Review, Other Participant(s) daughter  - --          Recommendations    Therapy Frequency (Swallow) evaluation only  - PRN  -       Predicted Duration Therapy Intervention (Days) -- until discharge  -       SLP Diet Recommendation regular textures;thin liquids  - regular textures;thin liquids  -       Recommended Diagnostics -- other (see comments)  diet tolerance  -       Recommended Precautions and Strategies general aspiration precautions;reflux precautions;upright posture during/after eating  - general aspiration precautions;reflux precautions;upright posture during/after eating  -       Oral Care Recommendations Oral Care BID/PRN;Toothbrush  - Oral Care BID/PRN;Toothbrush  -       SLP Rec. for Method of Medication Administration meds whole;with thin liquids;meds crushed;with puree;as tolerated  - meds whole;with thin liquids;meds crushed;with puree;as tolerated  -       Monitor for Signs of Aspiration yes;notify SLP if any concerns  - yes;notify SLP if any concerns  -       Anticipated Discharge Disposition (SLP) No  further SLP services warranted  - unknown;home with assist  -                 User Key  (r) = Recorded By, (t) = Taken By, (c) = Cosigned By      Initials Name Effective Dates    Jay Jay Ventura MS CCC-SLP 12/28/21 -      Samantha Fatima, MS CCC-SLP 05/12/23 -                     EDUCATION  The patient has been educated in the following areas:   Dysphagia (Swallowing Impairment).        SLP GOALS       Row Name 05/21/24 1108 05/20/24 1540          (LTG) Patient will demonstrate functional swallow for    Diet Texture (Demonstrate functional swallow) regular textures  - regular textures  -     Liquid viscosity (Demonstrate functional swallow) thin liquids  - thin liquids  -     [place] (Demonstrate functional swallow) independently (over 90% accuracy)  -MP independently (over 90% accuracy)  -     Time Frame (Demonstrate functional swallow) by discharge  - by discharge  -     Progress/Outcomes (Demonstrate functional swallow) goal met  -MP new goal  -        (STG) Patient will tolerate trials of    Consistencies Trialed (Tolerate trials) regular textures;thin liquids  - regular textures;thin liquids  -     Desired Outcome (Tolerate trials) without signs/symptoms of aspiration  - without signs/symptoms of aspiration  -     [place] (Tolerate trials) independently (over 90% accuracy)  -MP independently (over 90% accuracy)  -     Time Frame (Tolerate trials) 1 week  - 1 week  -     Progress/Outcomes (Tolerate trials) goal met  - new goal  -               User Key  (r) = Recorded By, (t) = Taken By, (c) = Cosigned By      Initials Name Provider Type    Jay Jay Ventura, MS CCC-SLP Speech and Language Pathologist    Samantha Nolan, MS CCC-SLP Speech and Language Pathologist                         Time Calculation:    Time Calculation- SLP       Row Name 05/21/24 1127             Time Calculation- SLP    SLP Start Time 1108  -MP      SLP Received On  05/21/24  -MP         Untimed Charges    07519-GO Treatment Swallow Minutes 24  -MP         Total Minutes    Untimed Charges Total Minutes 24  -MP       Total Minutes 24  -MP                User Key  (r) = Recorded By, (t) = Taken By, (c) = Cosigned By      Initials Name Provider Type    Jay Jay Ventura MS CCC-SLP Speech and Language Pathologist                    Therapy Charges for Today       Code Description Service Date Service Provider Modifiers Qty    [id number]  ST TREATMENT SWALLOW 2 5/21/2024 Jay Jay Waldrop MS CCC-SLP GN 1                 Jay Jay Shaw MS CCC-DOUG  5/21/2024

## 2024-05-21 NOTE — THERAPY EVALUATION
Patient Name: Nely Grdier  : 1945    MRN: [id number]                              Today's Date: 2024       Admit Date: 2024    Visit Dx:     ICD-10-CM ICD-9-CM   1. Facial droop  R29.810 781.94   2. Left facial numbness  R20.0 782.0   3. Injury of head, initial encounter  S09.90XA 959.01   4. Contusion of right shoulder, initial encounter  S40.011A 923.00   5. Contusion of left hip, initial encounter  S70.02XA 924.01   6. Impaired mobility and ADLs  Z74.09 V49.89    Z78.9      Patient Active Problem List   Diagnosis    Gastroesophageal reflux disease without esophagitis    Mixed hyperlipidemia    Essential hypertension    Controlled type 2 diabetes mellitus without complication, without long-term current use of insulin    Chronic kidney disease (CKD), stage III (moderate)    Anemia    Diabetic polyneuropathy associated with type 2 diabetes mellitus    Cerebrovascular accident (CVA) due to thrombosis of right middle cerebral artery    Chronic pain syndrome    Osteopenia    Difficulty in walking, not elsewhere classified    Generalized muscle weakness    Type 2 diabetes mellitus with diabetic neuropathy, unspecified    Unspecified intracapsular fracture of right femur, initial encounter for closed fracture    Essential (primary) hypertension    Gastro-esophageal reflux disease without esophagitis    Presence of right artificial hip joint    Hyperlipidemia, unspecified    Pain in right hip    Bilateral primary osteoarthritis of hip    Osteoarthritis of hip, unspecified    Unspecified osteoarthritis, unspecified site    Diverticulitis of large intestine    Low back pain    Other staphylococcus as the cause of diseases classified elsewhere    Thrombophlebitis of superficial veins of upper extremities    Chronic renal failure syndrome    Polyneuropathy in diabetes    Effusion, right hip    Right sided numbness    Fall     Past Medical History:   Diagnosis Date    Anemia 2016    Anesthesia  complication     per patient with hip surgery last april did not remember being in hospital or going home can only rmember from rehad on    Anesthesia complication     with hip surgery in april 2018 had issues with swallowing after surgery called in speech    Arthritis     Cancer     cervical    Cataract     Colon polyp 01/31/2019    Cecal polyp 5 years Dr. 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/19/2018    Wears glasses      Past Surgical History:   Procedure Laterality Date    CARDIAC CATHETERIZATION      CHOLECYSTECTOMY      COLONOSCOPY      EYE SURGERY      bilateral cataract    HYSTERECTOMY      partial    JOINT REPLACEMENT      hip surgery 2018 in april    OOPHORECTOMY      one removed    TONSILLECTOMY      TOTAL HIP ARTHROPLASTY Right 04/19/2018    Procedure: RIGHT TOTAL HIP ARTHROPLASTY;  Surgeon: Carlos Pollack MD;  Location:  [facility];  Service: Orthopedics    TOTAL HIP ARTHROPLASTY Left 02/12/2019    Procedure: TOTAL HIP ARTHROPLASTY LEFT;  Surgeon: Carlos Pollack MD;  Location:  [facility];  Service: Orthopedics    WRIST SURGERY      metal plate      General Information       Row Name 05/21/24 1118          Physical Therapy Time and Intention    Document Type evaluation  -CD     Mode of Treatment physical therapy  -CD       Row Name 05/21/24 1118          General Information    Patient Profile Reviewed yes  -CD     Prior Level of Function independent:;all household mobility;community mobility;gait;transfer;bed mobility;ADL's;using stairs;driving;shopping  " PT REPORTS TO OFTEN REACH FOR EXTERNAL SUPPORT FOR BALANCE AND NEEDS TO HOLD TO GROCERY CART AT STORE AT [facility]. WAS NOT USING DME AT PTA. REPORTS A FALL FROM BED 3 WEEKS AGO AND ANOTHER FALL YESTERDAY.  -CD     Existing Precautions/Restrictions fall  -CD     Barriers to Rehab none identified  -CD       Row Name 05/21/24 1118          Living Environment    People in Home spouse  -CD       Row Name 05/21/24 1118          Home Main Entrance    Number of Stairs, Main Entrance three  -CD     Stair Railings, Main Entrance railings safe and in good condition  -CD       Row Name 05/21/24 1118          Stairs Within Home, Primary    Number of Stairs, Within Home, Primary none  -CD     Stair Railings, Within Home, Primary none  -CD       Row Name 05/21/24 1118          Cognition    Orientation Status (Cognition) oriented x 4  -CD       Row Name 05/21/24 1118          Safety Issues, Functional Mobility    Safety Issues Affecting Function (Mobility) awareness of need for assistance;insight into deficits/self-awareness;safety precautions follow-through/compliance;safety precaution awareness;sequencing abilities  -CD     Impairments Affecting Function (Mobility) balance;strength;pain  -CD     Comment, Safety Issues/Impairments (Mobility) C/O L HIP PAIN BUT NOT RELATED TO FALL PTA. GAIT IS SLOW AND GUARDED WITH WIDE EDUIN. PT UNSTEADY UPON STANDING.  -CD               User Key  (r) = Recorded By, (t) = Taken By, (c) = Cosigned By      Initials Name Provider Type    CD Chelle Bullock, PT Physical Therapist                   Mobility       Row Name 05/21/24 1127          Bed Mobility    Comment, (Bed Mobility) PT SITTING UIC UPON ARRIVAL ON RA. DTR PRESENT.  -CD       Row Name 05/21/24 1127          Transfers    Comment, (Transfers) CUES FOR HAND PLACEMENT. STS FROM RECLINER. UNSTEADY UPON STANDING, REACHING FOR EXTERNAL SUPPORT. REPORTS TO FEEL \"WEAKNESS IN LEGS\" IN STANDING . BP STANDING STABLE.  -CD       Row Name 05/21/24 1127 "          Sit-Stand Transfer    Sit-Stand [place] (Transfers) contact guard  -CD     Assistive Device (Sit-Stand Transfers) --  NO A.D.  -CD       Row Name 05/21/24 1127          Gait/Stairs (Locomotion)    [place] Level (Gait) contact guard;verbal cues  -CD     Assistive Device (Gait) walker, front-wheeled  -CD     Distance in Feet (Gait) 180  -CD     Deviations/Abnormal Patterns (Gait) stride length decreased;marika decreased;gait speed decreased;base of support, wide  -CD     Left Sided Gait Deviations weight shift ability decreased  -CD     Comment, (Gait/Stairs) AMBULATED WITHOUT A.D. X 90 FEET AND WITH R WALKER X 90 FEET. GAIT SLOW, GUARDED AND UNSTEADY WITHOUT A.D. PT DEMONSTRATED MUCH IMPROVED QUALITY AND SPEED OF GAIT WITH USE OF R WALKER.  -CD               User Key  (r) = Recorded By, (t) = Taken By, (c) = Cosigned By      Initials Name Provider Type    CD Chelle Bullock, PT Physical Therapist                   Obj/Interventions       Row Name 05/21/24 1133          Range of Motion Comprehensive    General Range of Motion bilateral lower extremity ROM WFL  -CD       Row Name 05/21/24 1133          Strength Comprehensive (MMT)    Comment, General Manual Muscle Testing (MMT) Assessment B HIP FLEX GROSSLY 4/5, R KNEE EXT 4-/5, L KNEE EXT 4/5, R DF 4/5, L DF 4-/5.  -CD       Row Name 05/21/24 1133          Motor Skills    Motor Skills coordination;functional endurance  -CD     Coordination left;lower extremity;heel to shin;minimal impairment  -CD     Functional Endurance PT FATIGUES QUICKLY IN STANDING AT BASELINE HAVING DIFFICULTY WASHING DISHES AT SINK. TOLERATED GAIT X 180 FEET. O2 SAT STABLE ON RA.  -CD       Row Name 05/21/24 1133          Balance    Static Sitting Balance modified independence  -CD     Dynamic Sitting Balance standby assist  -CD     Position, Sitting Balance unsupported;sitting in chair;sitting edge of bed  -CD     Static Standing Balance contact guard  -CD     Dynamic  Standing Balance contact guard  -CD     Position/Device Used, Standing Balance walker, front-wheeled  -CD     Balance Interventions sitting;standing;sit to stand;supported;static;dynamic  -CD     Comment, Balance SEE GAIT NOTE.  -CD               User Key  (r) = Recorded By, (t) = Taken By, (c) = Cosigned By      Initials Name Provider Type    CD Chelle Bullock, PT Physical Therapist                   Goals/Plan       Row Name 05/21/24 1150          Transfer Goal 1 (PT)    Activity/Assistive Device (Transfer Goal 1, PT) sit-to-stand/stand-to-sit;bed-to-chair/chair-to-bed;walker, rolling  -CD     [place] Level/Cues Needed (Transfer Goal 1, PT) independent  -CD     Time Frame (Transfer Goal 1, PT) long term goal (LTG);2 weeks  -CD       Row Name 05/21/24 1150          Gait Training Goal 1 (PT)    Activity/Assistive Device (Gait Training Goal 1, PT) gait (walking locomotion);assistive device use  WITH LEAST RESTRICTIVE DEVICE  -CD     [place] Level (Gait Training Goal 1, PT) independent  -CD     Distance (Gait Training Goal 1, PT) 400 FEET  -CD     Time Frame (Gait Training Goal 1, PT) long term goal (LTG);2 weeks  -CD       Row Name 05/21/24 1150          Stairs Goal 1 (PT)    Activity/Assistive Device (Stairs Goal 1, PT) ascending stairs;descending stairs;using handrail, right;using handrail, left  -CD     [place] Level/Cues Needed (Stairs Goal 1, PT) standby assist  -CD     Number of Stairs (Stairs Goal 1, PT) 3  -CD     Time Frame (Stairs Goal 1, PT) long term goal (LTG);2 weeks  -CD       Row Name 05/21/24 1150          Therapy Assessment/Plan (PT)    Planned Therapy Interventions (PT) balance training;bed mobility training;gait training;home exercise program;patient/family education;strengthening;stair training;postural re-education;neuromuscular re-education;transfer training;motor coordination training  -CD               User Key  (r) = Recorded By, (t) = Taken By, (c) = Cosigned By      Initials  Name Provider Type    CD Chelle Bullock, PT Physical Therapist                   Clinical Impression       Row Name 05/21/24 1140          Pain    Pretreatment Pain Rating 2/10  -CD     Posttreatment Pain Rating 2/10  -CD     Pain Location - Side/Orientation Left  -CD     Pain Location - hip  -CD     Pain Intervention(s) Repositioned;Ambulation/increased activity;Rest  -CD       Row Name 05/21/24 1140          Plan of Care Review    Plan of Care Reviewed With patient;daughter  -CD     Outcome Evaluation PT PRESENTS BELOW BASELINE WITH FUNCTIONAL MOBILITY DUE TO  IMPAIRED STRENGT/ BALANCE AND DECREASED ENDURANCE. GAIT IS SLOW, GUARDED AND UNSTEADY WITHOUT A.D. PT DEMONSTRATED MUCH IMPROVED QUALITY AND SPEED OF GAIT WITH USE OF R WALKER. RECOMMEND HOME WITH R WALKER AND OPPT AT D/C. DTR REPORTS PT WILL HAVE TRANSPORTATION.  -CD       Row Name 05/21/24 1140          Therapy Assessment/Plan (PT)    Patient/Family Therapy Goals Statement (PT) TO IMPROVE MOBILITY.  -CD     Rehab Potential (PT) good, to achieve stated therapy goals  -CD     Criteria for Skilled Interventions Met (PT) yes;meets criteria;skilled treatment is necessary  -     Therapy Frequency (PT) daily  -CD       Row Name 05/21/24 1140          Vital Signs    Pre Systolic BP Rehab 132  -CD     Pre Treatment Diastolic BP 63  -CD     Post Systolic BP Rehab 130  -CD     Post Treatment Diastolic BP 61  -CD     Posttreatment Heart Rate (beats/min) 71  -CD     Pre SpO2 (%) 97  -CD     O2 Delivery Pre Treatment room air  -CD     O2 Delivery Intra Treatment room air  -CD     Post SpO2 (%) 96  -CD     O2 Delivery Post Treatment room air  -CD     Pre Patient Position Sitting  -CD     Intra Patient Position Standing  -CD     Post Patient Position Sitting  -CD       Row Name 05/21/24 1140          Positioning and Restraints    Pre-Treatment Position sitting in chair/recliner  -CD     Post Treatment Position chair  -CD     In Chair reclined;call light within  reach;with family/caregiver;legs elevated;notified nsg  DTR TO NOTIFY NSG IF SHE LEAVES PT UNATTENDED SO NSG CAN ACTIVATE EXIT ALARM. PT/DTR AGREE TO CALL OUT FOR ASSISTANCE.  -CD               User Key  (r) = Recorded By, (t) = Taken By, (c) = Cosigned By      Initials Name Provider Type    Chelle Tracey, PT Physical Therapist                   Outcome Measures       Row Name 05/21/24 1151          How much help from another person do you currently need...    Turning from your back to your side while in flat bed without using bedrails? 4  -CD     Moving from lying on back to sitting on the side of a flat bed without bedrails? 3  -CD     Moving to and from a bed to a chair (including a wheelchair)? 3  -CD     Standing up from a chair using your arms (e.g., wheelchair, bedside chair)? 3  -CD     Climbing 3-5 steps with a railing? 3  -CD     To walk in hospital room? 3  -CD     AM-PAC 6 Clicks Score (PT) 19  -CD     Highest Level of Mobility Goal 6 --> Walk 10 steps or more  -CD       Row Name 05/21/24 1151          Modified [place] Scale    Modified [place] Scale 3 - Moderate disability.  Requiring some help, but able to walk without assistance.  -CD       Row Name 05/21/24 1151 05/21/24 0738       Functional Assessment    Outcome Measure Options AM-PAC 6 Clicks Basic Mobility (PT);Modified [place]  -CD AM-PAC 6 Clicks Daily Activity (OT)  -SD              User Key  (r) = Recorded By, (t) = Taken By, (c) = Cosigned By      Initials Name Provider Type    Shari Stanley, OT Occupational Therapist    CD Chelle Bullock, PT Physical Therapist                                 Physical Therapy Education       Title: PT OT SLP Therapies (Done)       Topic: Physical Therapy (Done)       Point: Mobility training (Done)       Learning Progress Summary             Patient Acceptance, E, VU,NR by CD at 5/21/2024 1152    Comment: BENEFITS OF OOB ACTIVITY, SAFETY WITH MOBILITY, GAIT TRAINING WITH R WALKER, PROGRESSION OF  POC, D/C PLANNING,   Family Acceptance, E, VU,NR by CD at 5/21/2024 1152    Comment: BENEFITS OF OOB ACTIVITY, SAFETY WITH MOBILITY, GAIT TRAINING WITH R WALKER, PROGRESSION OF POC, D/C PLANNING,                         Point: Home exercise program (Done)       Learning Progress Summary             Patient Acceptance, E, VU,NR by CD at 5/21/2024 1152    Comment: BENEFITS OF OOB ACTIVITY, SAFETY WITH MOBILITY, GAIT TRAINING WITH R WALKER, PROGRESSION OF POC, D/C PLANNING,   Family Acceptance, E, VU,NR by CD at 5/21/2024 1152    Comment: BENEFITS OF OOB ACTIVITY, SAFETY WITH MOBILITY, GAIT TRAINING WITH R WALKER, PROGRESSION OF POC, D/C PLANNING,                         Point: Body mechanics (Done)       Learning Progress Summary             Patient Acceptance, E, VU,NR by CD at 5/21/2024 1152    Comment: BENEFITS OF OOB ACTIVITY, SAFETY WITH MOBILITY, GAIT TRAINING WITH R WALKER, PROGRESSION OF POC, D/C PLANNING,   Family Acceptance, E, VU,NR by CD at 5/21/2024 1152    Comment: BENEFITS OF OOB ACTIVITY, SAFETY WITH MOBILITY, GAIT TRAINING WITH R WALKER, PROGRESSION OF POC, D/C PLANNING,                         Point: Precautions (Done)       Learning Progress Summary             Patient Acceptance, E, VU,NR by CD at 5/21/2024 1152    Comment: BENEFITS OF OOB ACTIVITY, SAFETY WITH MOBILITY, GAIT TRAINING WITH R WALKER, PROGRESSION OF POC, D/C PLANNING,   Family Acceptance, E, VU,NR by CD at 5/21/2024 1152    Comment: BENEFITS OF OOB ACTIVITY, SAFETY WITH MOBILITY, GAIT TRAINING WITH R WALKER, PROGRESSION OF POC, D/C PLANNING,                                         User Key       Initials Effective Dates Name Provider Type Discipline    CD 02/03/23 -  Chelle Bullock, PT Physical Therapist PT                  PT Recommendation and Plan  Planned Therapy Interventions (PT): balance training, bed mobility training, gait training, home exercise program, patient/family education, strengthening, stair training, postural  re-education, neuromuscular re-education, transfer training, motor coordination training  Plan of Care Reviewed With: patient, daughter  Outcome Evaluation: PT PRESENTS BELOW BASELINE WITH FUNCTIONAL MOBILITY DUE TO  IMPAIRED STRENGT/ BALANCE AND DECREASED ENDURANCE. GAIT IS SLOW, GUARDED AND UNSTEADY WITHOUT A.D. PT DEMONSTRATED MUCH IMPROVED QUALITY AND SPEED OF GAIT WITH USE OF R WALKER. RECOMMEND HOME WITH R WALKER AND OPPT AT D/C. DTR REPORTS PT WILL HAVE TRANSPORTATION.     Time Calculation:   PT Evaluation Complexity  History, PT Evaluation Complexity: 3 or more personal factors and/or comorbidities  Examination of Body Systems (PT Eval Complexity): total of 4 or more elements  Clinical Presentation (PT Evaluation Complexity): evolving  Clinical Decision Making (PT Evaluation Complexity): low complexity  Overall Complexity (PT Evaluation Complexity): low complexity     PT Charges       Row Name 05/21/24 1156             Time Calculation    Start Time 1045  -CD      PT Received On 05/21/24  -CD      PT Goal Re-Cert Due Date 05/31/24  -CD         Time Calculation- PT    Total Timed Code Minutes- PT 20 minute(s)  -CD         Timed Charges    82612 - Gait Training Minutes  20  -CD         Total Minutes    Timed Charges Total Minutes 20  -CD       Total Minutes 20  -CD                User Key  (r) = Recorded By, (t) = Taken By, (c) = Cosigned By      Initials Name Provider Type    CD Chelle Bullock, PT Physical Therapist                  Therapy Charges for Today       Code Description Service Date Service Provider Modifiers Qty    03297211837 HC GAIT TRAINING EA 15 MIN 5/21/2024 Chelle Bullock, PT GP 1    64400786859 HC PT EVAL LOW COMPLEXITY 4 5/21/2024 Chelle Bullock, PT GP 1            PT G-Codes  Outcome Measure Options: AM-PAC 6 Clicks Basic Mobility (PT), Modified [place]  AM-PAC 6 Clicks Score (PT): 19  AM-PAC 6 Clicks Score (OT): 20  Modified [place] Scale: 3 - Moderate disability.  Requiring some help, but  able to walk without assistance.  PT Discharge Summary  Anticipated Discharge Disposition (PT): home with outpatient therapy services, home with assist    Chelle Bullock, PT  5/21/2024

## 2024-05-21 NOTE — NURSING NOTE
Recs from PT for patient to receive walker for at home use. Walker delivered to patient at OR, DME form signed and returned to CM office, CM after hours and MD made aware. Patient and family with no other concerns/needs at this time

## 2024-05-21 NOTE — PROGRESS NOTES
"          Clinical Nutrition Assessment     Patient Name: Nely Grider  YOB: 1945  MRN: [id number]  Date of Encounter: 05/21/24 17:22 EDT  Admission date: 5/20/2024  Reason for Visit: MST score 2+, Unintentional weight loss, Reduced oral intake    Assessment   Nutrition Assessment   Admission Diagnosis:  Left sided numbness [R20.0]    Problem List:    Fall    Mixed hyperlipidemia    Essential hypertension    Chronic kidney disease (CKD), stage III (moderate)    Diabetic polyneuropathy associated with type 2 diabetes mellitus    Right sided numbness      PMH:   She  has a past medical history of Anemia (09/18/2016), Anesthesia complication, Anesthesia complication, Arthritis, Cancer, Cataract, Colon polyp (01/31/2019), Diabetes mellitus, Diverticulosis (01/31/2019), Full dentures, Full dentures, GERD (gastroesophageal reflux disease), High cholesterol, History of IBS, Hypertension, Neuropathy, Primary osteoarthritis of both hips (01/31/2018), Status post total replacement of left hip (04/19/2018), and Wears glasses.    PSH:  She  has a past surgical history that includes Wrist surgery; Tonsillectomy; Cardiac catheterization; Hysterectomy; Oophorectomy; Eye surgery; Cholecystectomy; Colonoscopy; Total hip arthroplasty (Right, 04/19/2018); Joint replacement; and Total hip arthroplasty (Left, 02/12/2019).    Applicable Nutrition History:       Anthropometrics     Height: Height: 165 cm (64.96\")  Last Filed Weight: Weight: 78 kg (171 lb 15.3 oz) (05/21/24 1207)  Method: Weight Method: Bed scale  BMI: BMI (Calculated): 28.7    UBW:  180# per patient   Weight change:  significant 5% weight loss in one month     Nutrition Focused Physical Exam    Date:  05/21/24        Pt does not meet criteria for malnutrition diagnosis, at this time.      Subjective   Reported/Observed/Food/Nutrition Related History:     05/21/24  Per patient and family at bedside, appetite and PO intake have decreased since patient " has aged. She hasn't cooked meals like she used to and would rather eat something like a bowl of cereal for a meal unless it has been made for her. Reportedly ate around 75% at dinner last night.     Current Nutrition Prescription   PO: Diet: Cardiac, Diabetic; Healthy Heart (2-3 Na+); Consistent Carbohydrate; Texture: Regular (IDDSI 7); Fluid Consistency: Thin (IDDSI 0)  Oral Nutrition Supplement: n/a  Intake: Insufficient data    Assessment & Plan   Nutrition Diagnosis   Date:  05/21/24             Updated:    Problem Unintended weight loss    Etiology Decrease in appetite   Signs/Symptoms 5% weight loss in one month   Status: New    Goal:   Nutrition to support treatment and Establish PO      Nutrition Intervention      Follow treatment progress, Care plan reviewed, Interview for preferences, Encourage intake    Patient does not meet malnutrition criteria at this time.  Discussed appropriate nutrition needs  Encourage adequate PO intake    Monitoring/Evaluation:   Per protocol, PO intake, Weight, POC/GOC    Rosemarie Moeller, MS,RD,LD  Time Spent: 30min

## 2024-05-21 NOTE — DISCHARGE SUMMARY
[facility] Medicine Services  DISCHARGE SUMMARY    Patient Name: Nely Grider  : 1945  MRN: [id number]    Date of Admission: 2024  1:43 PM  Date of Discharge:  2024  Primary Care Physician: Guy Foote MD    Consults       Date and Time Order Name Status Description    2024  1:15 PM Inpatient Neurology Consult Stroke Completed             Hospital Course     Presenting Problem: fall    Active Hospital Problems    Diagnosis  POA    **Fall [W19.XXXA]  Yes    Right sided numbness [R20.0]  Yes    Diabetic polyneuropathy associated with type 2 diabetes mellitus [E11.42]  Yes    Chronic kidney disease (CKD), stage III (moderate) [N18.30]  Yes    Essential hypertension [I10]  Yes    Mixed hyperlipidemia [E78.2]  Yes      Resolved Hospital Problems   No resolved problems to display.          Hospital Course:  Nely Grider is a 78 y.o. female with h/o T2DM, neuropathy on gabapentin, HTN, HL, TIA in past with left weakness that resolved s/p fall last night with left sided facial droop and RLE numbness.  MRI brain with atrophy and chronic microvascular ischemic change, but no acute intracranial process.  Bilateral hip x-ray with no evidence of fracture or hardware complication.  Right shoulder x-ray with no acute process.  CTA head/neck with right ICA atherosclerosis with 50 to 60% narrowing, ulcerated soft plaque with high risk embolic lesion.  Discussed CTA head/neck results with Dr. Jenkins, who recommended continued medical management of the atherosclerosis with aspirin, plavix, statin & stroke clinic follow-up in 2-3 months with carotid US. Stroke service felt that patient's presentation was not consistent with stroke and more likely due to fall.     Discharge Follow Up Recommendations for outpatient labs/diagnostics:  Follow-up with the stroke clinic in 2 to 3 months with repeat carotid ultrasound  Follow-up with primary care doctor in 5 to 7  days regarding this hospitalization, chronic disease management, repeat labs (CBC, BMP)    Day of Discharge     HPI:   Denied any numbness or tingling.  Feels more tired than usual.  NIH 1.  Had a bowel movement.  Eating and drinking okay.  No nursing concerns.  Patient's daughter at bedside and patient okay with her being updated.    Review of Systems  As above    Vital Signs:   Temp:  [97.6 °F (36.4 °C)-98.1 °F (36.7 °C)] 97.9 °F (36.6 °C)  Heart Rate:  [69-97] 74  Resp:  [16-18] 18  BP: (132-171)/(62-82) 137/70      Physical Exam:  Constitutional: No acute distress, awake, alert  HENT: NCAT, mucous membranes moist  Respiratory: Clear to auscultation bilaterally, respiratory effort normal   Cardiovascular: RRR  Gastrointestinal: Positive bowel sounds, soft, nontender, nondistended  Musculoskeletal: No bilateral ankle edema  Psychiatric: Appropriate affect, cooperative  Neurologic: Alert, oriented, strength symmetric in all extremities, Cranial Nerves grossly intact to confrontation, speech clear  Skin: No rashes    Pertinent  and/or Most Recent Results     LAB RESULTS:      Lab 05/21/24  0535 05/20/24  1354   WBC 7.85 8.74   HEMOGLOBIN 10.0* 10.6*   HEMATOCRIT 31.6* 34.8   PLATELETS 323 336   NEUTROS ABS  --  5.21   IMMATURE GRANS (ABS)  --  0.05   LYMPHS ABS  --  2.58   MONOS ABS  --  0.67   EOS ABS  --  0.17   MCV 92.7 98.0*   APTT  --  30.3         Lab 05/21/24  0535 05/20/24  1354   SODIUM 139 137   POTASSIUM 4.4 4.3   CHLORIDE 108* 102   CO2 25.0 24.0   ANION GAP 6.0 11.0   BUN 15 17   CREATININE 1.21* 1.30*   EGFR 45.4* 41.7*   GLUCOSE 144* 120*   CALCIUM 9.1 9.0   HEMOGLOBIN A1C 6.20* 6.30*         Lab 05/20/24  1354   TOTAL PROTEIN 6.2   ALBUMIN 3.8   GLOBULIN 2.4   ALT (SGPT) 13  13   AST (SGOT) 21  22   BILIRUBIN 0.3   ALK PHOS 77         Lab 05/20/24  1354   HSTROP T 14*         Lab 05/21/24  0535   CHOLESTEROL 114   LDL CHOL 50   HDL CHOL 39*   TRIGLYCERIDES 148             Brief Urine Lab Results        None          Microbiology Results (last 10 days)       ** No results found for the last 240 hours. **            Adult Transthoracic Echo Complete W/ Cont if Necessary Per Protocol (With Agitated Saline)    Result Date: 5/21/2024    Left ventricular systolic function is normal. Left ventricular ejection fraction appears to be 61 - 65%.   Left ventricular wall thickness is consistent with borderline concentric hypertrophy.     MRI Brain Without Contrast    Result Date: 5/21/2024  MRI BRAIN WO CONTRAST Date of Exam: 5/21/2024 1:59 AM EDT Indication: Stroke, follow up Left facial and lower extremity numbness.  Comparison: CT scan of the head dated 5/20/2024 at 1336 hours Technique:  Routine multiplanar/multisequence sequence images of the brain were obtained without contrast administration. Findings: There is mild diffuse generalized atrophy. There are areas of increased T2 and FLAIR signal scattered through the subcortical white matter and in the periventricular white matter regions consistent with chronic microvascular ischemic change. There is no mass, mass effect or midline shift. There are no abnormal extra-axial fluid collections. The major intracranial flow voids are preserved. There is no evidence of an acute hemorrhage. The diffusion weighted sequences are normal. The paranasal sinuses and mastoid air cells are clear.     Impression: Atrophy and chronic microvascular ischemic change. No acute intracranial process. Electronically Signed: Angel Wei MD  5/21/2024 3:24 AM EDT  Workstation ID: [id number]    XR Hip With or Without Pelvis 2 - 3 View Left    Result Date: 5/20/2024  XR HIP W OR WO PELVIS 2-3 VIEW LEFT Date of Exam: 5/20/2024 1:47 PM EDT Indication: Fall, left hip pain Comparison: Pelvis and left hip radiographs dated 2/27/2020 Findings: There are bilateral total hip prostheses. There is a left total hip prosthesis. The hardware appears in expected position. There is no evidence of periprosthetic  fracture or loosening. There is excreted contrast within the urinary bladder from prior contrast enhanced CT.     Impression: 1. Bilateral total hip prostheses without evidence of hardware complication. Electronically Signed: Tomas Frazier  5/20/2024 2:21 PM EDT  Workstation ID: [id number]    XR Shoulder 2+ View Right    Result Date: 5/20/2024  XR SHOULDER 2+ VW RIGHT Date of Exam: 5/20/2024 1:47 PM EDT Indication: fall, R shoulder pain Comparison: None available. Findings: Subtle findings are limited by diffuse osteopenia. No definite acute fracture or dislocation is noted. Mild degenerative changes are noted at the AC joint. Glenohumeral joint appears grossly unremarkable in appearance. Soft tissues and limited imaging of  the chest is unremarkable.     Impression: No acute osseous abnormality given limitations of exam Electronically Signed: Bairon Funes MD  5/20/2024 2:17 PM EDT  Workstation ID: [id number]    XR Chest 1 View    Result Date: 5/20/2024  XR CHEST 1 VW Date of Exam: 5/20/2024 1:47 PM EDT Indication: Acute Stroke Protocol (onset < 12 hrs) Comparison: 10/3/2019. FINDINGS: No focal airspace opacity. No pleural effusion or pneumothorax. Normal heart and mediastinal contours.     No evidence of acute disease in the chest. Electronically Signed: Richardson Ga MD  5/20/2024 2:10 PM EDT  Workstation ID: [id number]    CT Angiogram Head w AI Analysis of LVO    Result Date: 5/20/2024  CT ANGIOGRAM HEAD W AI ANALYSIS OF LVO, CT CEREBRAL PERFUSION W WO CONTRAST, CT ANGIOGRAM NECK Date of Exam: 5/20/2024 1:27 PM EDT Indication: Neuro Deficit, acute, Stroke suspected Neuro deficit, acute stroke suspected. Comparison: None available. Technique: Axial CT images of the brain were obtained prior to and after the administration of 115 mL Isovue-370. CT Perfusion protocol was utilized. Automated post processing was performed by RAPID software and submitted to PACS for interpretation.  CTA  of the head and neck were  performed after the administration of iodinated contrast.  Reconstructed coronal and sagittal images were also obtained. A 3-D volume rendered image was created for interpretation. Automated exposure control and iterative reconstruction methods were used.   Findings: CT perfusion: Color maps are symmetric, without evidence of core infarct or significant territorial ischemic tissue at risk. CT ANGIOGRAM: The lung apices are clear. Evaluation of the neck soft tissues demonstrates no pathologic cervical adenopathy or unexpected aerodigestive tract mass. The osseous structures demonstrate no evidence of acute fracture or suspicious osseous lesion. Patent aortic arch and great vessel origins, with common origin of the brachiocephalic and left common carotid arteries noted. The visualized subclavian arteries are patent bilaterally. Calcific atherosclerotic changes are present at the right ICA origin with associated approximate 50 to 60% stenosis present by NASCET criteria. Irregular soft plaque is present and there is a prominent area of focal ulceration, image 192 of series 9. Atherosclerotic changes are noted at the left ICA origin with  mild, less than 50% stenosis present by NASCET criteria. The vertebral arteries are normal in course and caliber bilaterally. Intracranially, the carotid siphons are patent. The anterior cerebral arteries are normal in course and caliber bilaterally. The right middle cerebral artery demonstrates some mild to moderate narrowing most apparent at the distal M1 and proximal M2 segments otherwise without evidence of high-grade stenosis or occlusion. The left middle cerebral artery appears patent. The vertebral basilar system is patent. The posterior cerebral arteries are normal in course and caliber bilaterally, with fetal configuration noted on the right.     Impression: Atherosclerotic changes are present in the head and neck as above, without evidence of associated high-grade  flow-limiting stenosis, large vessel occlusion or aneurysm. Atherosclerotic changes at the right ICA origin results in approximate 50 to 60% narrowing and there is also an immediately adjacent area of ulcerated soft plaque present, likely a high risk embolic lesion. CT perfusion demonstrates no evidence of core infarct or significant territorial ischemic tissue at risk. Electronically Signed: Richardson Ga MD  5/20/2024 1:54 PM EDT  Workstation ID: [id number]    CT Angiogram Neck    Result Date: 5/20/2024  CT ANGIOGRAM HEAD W AI ANALYSIS OF LVO, CT CEREBRAL PERFUSION W WO CONTRAST, CT ANGIOGRAM NECK Date of Exam: 5/20/2024 1:27 PM EDT Indication: Neuro Deficit, acute, Stroke suspected Neuro deficit, acute stroke suspected. Comparison: None available. Technique: Axial CT images of the brain were obtained prior to and after the administration of 115 mL Isovue-370. CT Perfusion protocol was utilized. Automated post processing was performed by RAPID software and submitted to PACS for interpretation.  CTA  of the head and neck were performed after the administration of iodinated contrast.  Reconstructed coronal and sagittal images were also obtained. A 3-D volume rendered image was created for interpretation. Automated exposure control and iterative reconstruction methods were used.   Findings: CT perfusion: Color maps are symmetric, without evidence of core infarct or significant territorial ischemic tissue at risk. CT ANGIOGRAM: The lung apices are clear. Evaluation of the neck soft tissues demonstrates no pathologic cervical adenopathy or unexpected aerodigestive tract mass. The osseous structures demonstrate no evidence of acute fracture or suspicious osseous lesion. Patent aortic arch and great vessel origins, with common origin of the brachiocephalic and left common carotid arteries noted. The visualized subclavian arteries are patent bilaterally. Calcific atherosclerotic changes are present at the right ICA origin  with associated approximate 50 to 60% stenosis present by NASCET criteria. Irregular soft plaque is present and there is a prominent area of focal ulceration, image 192 of series 9. Atherosclerotic changes are noted at the left ICA origin with  mild, less than 50% stenosis present by NASCET criteria. The vertebral arteries are normal in course and caliber bilaterally. Intracranially, the carotid siphons are patent. The anterior cerebral arteries are normal in course and caliber bilaterally. The right middle cerebral artery demonstrates some mild to moderate narrowing most apparent at the distal M1 and proximal M2 segments otherwise without evidence of high-grade stenosis or occlusion. The left middle cerebral artery appears patent. The vertebral basilar system is patent. The posterior cerebral arteries are normal in course and caliber bilaterally, with fetal configuration noted on the right.     Impression: Atherosclerotic changes are present in the head and neck as above, without evidence of associated high-grade flow-limiting stenosis, large vessel occlusion or aneurysm. Atherosclerotic changes at the right ICA origin results in approximate 50 to 60% narrowing and there is also an immediately adjacent area of ulcerated soft plaque present, likely a high risk embolic lesion. CT perfusion demonstrates no evidence of core infarct or significant territorial ischemic tissue at risk. Electronically Signed: Richardson Ga MD  5/20/2024 1:54 PM EDT  Workstation ID: [id number]    CT CEREBRAL PERFUSION WITH & WITHOUT CONTRAST    Result Date: 5/20/2024  CT ANGIOGRAM HEAD W AI ANALYSIS OF LVO, CT CEREBRAL PERFUSION W WO CONTRAST, CT ANGIOGRAM NECK Date of Exam: 5/20/2024 1:27 PM EDT Indication: Neuro Deficit, acute, Stroke suspected Neuro deficit, acute stroke suspected. Comparison: None available. Technique: Axial CT images of the brain were obtained prior to and after the administration of 115 mL Isovue-370. CT Perfusion  protocol was utilized. Automated post processing was performed by RAPID software and submitted to PACS for interpretation.  CTA  of the head and neck were performed after the administration of iodinated contrast.  Reconstructed coronal and sagittal images were also obtained. A 3-D volume rendered image was created for interpretation. Automated exposure control and iterative reconstruction methods were used.   Findings: CT perfusion: Color maps are symmetric, without evidence of core infarct or significant territorial ischemic tissue at risk. CT ANGIOGRAM: The lung apices are clear. Evaluation of the neck soft tissues demonstrates no pathologic cervical adenopathy or unexpected aerodigestive tract mass. The osseous structures demonstrate no evidence of acute fracture or suspicious osseous lesion. Patent aortic arch and great vessel origins, with common origin of the brachiocephalic and left common carotid arteries noted. The visualized subclavian arteries are patent bilaterally. Calcific atherosclerotic changes are present at the right ICA origin with associated approximate 50 to 60% stenosis present by NASCET criteria. Irregular soft plaque is present and there is a prominent area of focal ulceration, image 192 of series 9. Atherosclerotic changes are noted at the left ICA origin with  mild, less than 50% stenosis present by NASCET criteria. The vertebral arteries are normal in course and caliber bilaterally. Intracranially, the carotid siphons are patent. The anterior cerebral arteries are normal in course and caliber bilaterally. The right middle cerebral artery demonstrates some mild to moderate narrowing most apparent at the distal M1 and proximal M2 segments otherwise without evidence of high-grade stenosis or occlusion. The left middle cerebral artery appears patent. The vertebral basilar system is patent. The posterior cerebral arteries are normal in course and caliber bilaterally, with fetal configuration  noted on the right.     Impression: Atherosclerotic changes are present in the head and neck as above, without evidence of associated high-grade flow-limiting stenosis, large vessel occlusion or aneurysm. Atherosclerotic changes at the right ICA origin results in approximate 50 to 60% narrowing and there is also an immediately adjacent area of ulcerated soft plaque present, likely a high risk embolic lesion. CT perfusion demonstrates no evidence of core infarct or significant territorial ischemic tissue at risk. Electronically Signed: Richardson Ga MD  5/20/2024 1:54 PM EDT  Workstation ID: [id number]    CT Head Without Contrast Stroke Protocol    Result Date: 5/20/2024  CT HEAD WO CONTRAST STROKE PROTOCOL Date of Exam: 5/20/2024 1:27 PM EDT Indication: Neuro deficit, acute, stroke suspected, Neuro Deficit, acute, Stroke suspected. Comparison: Head CT 5/20/2022. Technique: Axial CT images were obtained of the head without contrast administration.  Reconstructed coronal images were also obtained. Automated exposure control and iterative construction methods were used. Scan Time: 13:19 Results discussed with the stroke team at the scanner by Benito Dumont MD at 13:22. Findings: No evidence of acute intracranial hemorrhage or mass effect. No extra-axial collection. The gray white matter differentiation is preserved. Ventricles and sulci are symmetric. The mastoid air cells and paranasal sinuses are well aerated. Globes and extraocular muscles are unremarkable. No acute or suspicious osseous abnormality. Soft tissues within normal limits.     Impression: No evidence of acute intracranial abnormality. Electronically Signed: Benito Dumont MD  5/20/2024 1:33 PM EDT  Workstation ID: [id number]             Results for orders placed during the hospital encounter of 05/20/24    Adult Transthoracic Echo Complete W/ Cont if Necessary Per Protocol (With Agitated Saline)    Interpretation Summary    Left ventricular systolic function  is normal. Left ventricular ejection fraction appears to be 61 - 65%.    Left ventricular wall thickness is consistent with borderline concentric hypertrophy.      Plan for Follow-up of Pending Labs/Results:     Discharge Details        Discharge Medications        New Medications        Instructions Start Date   aspirin 81 MG chewable tablet   81 mg, Oral, Daily   Start Date: May 22, 2024            Continue These Medications        Instructions Start Date   amLODIPine 5 MG tablet  Commonly known as: NORVASC   5 mg, Oral, Daily      atorvastatin 80 MG tablet  Commonly known as: LIPITOR   80 mg, Oral, Nightly      clopidogrel 75 MG tablet  Commonly known as: PLAVIX   75 mg, Oral, Daily      gabapentin 400 MG capsule  Commonly known as: NEURONTIN   400 mg, Oral, 3 Times Daily      glimepiride 4 MG tablet  Commonly known as: AMARYL   4 mg, Oral, Every Morning Before Breakfast      metFORMIN 500 MG tablet  Commonly known as: GLUCOPHAGE   TAKE 1 TABLET TWICE DAILY WITH MEALS      omeprazole 20 MG capsule  Commonly known as: priLOSEC   20 mg, Oral, 2 Times Daily               Allergies   Allergen Reactions    Codeine Hives         Discharge Disposition:  Home or Self Care    Diet:  Hospital:  Diet Order   Procedures    Diet: Cardiac, Diabetic; Healthy Heart (2-3 Na+); Consistent Carbohydrate; Texture: Regular (IDDSI 7); Fluid Consistency: Thin (IDDSI 0)       Diet Instructions       Diet: Cardiac Diets, Diabetic Diets; Healthy Heart (2-3 Na+); Regular (IDDSI 7); Thin (IDDSI 0); Consistent Carbohydrate      Discharge Diet:  Cardiac Diets  Diabetic Diets       Cardiac Diet: Healthy Heart (2-3 Na+)    Texture: Regular (IDDSI 7)    Fluid Consistency: Thin (IDDSI 0)    Diabetic Diet: Consistent Carbohydrate             Activity:      Restrictions or Other Recommendations:       CODE STATUS:    Code Status and Medical Interventions:   Ordered at: 05/20/24 1602     Medical Intervention Limits:    NO intubation (DNI)     Level  Of Support Discussed With:    Patient    Health Care Surrogate     Code Status (Patient has no pulse and is not breathing):    No CPR (Do Not Attempt to Resuscitate)     Medical Interventions (Patient has pulse or is breathing):    Limited Support     Comments:    DNR no vent or CPR but would want a feeding tube if needed.  d/w patient and her daughter.  Daugher and  are POA if needed       No future appointments.    Additional Instructions for the Follow-ups that You Need to Schedule       Call MD With Problems / Concerns   As directed      Please seek medical attention for any of the following: Strokelike symptoms, bleeding, and/or any other concerning symptoms    Order Comments: Please seek medical attention for any of the following: Strokelike symptoms, bleeding, and/or any other concerning symptoms         Discharge Follow-up with PCP   As directed       Currently Documented PCP:    Guy Foote MD    PCP Phone Number:    [phone]     Follow Up Details: Follow-up with primary care doctor in 5 to 7 days regarding this hospitalization, chronic disease management, repeat labs (CBC, BMP)        Discharge Follow-up with Specified Provider: Meghana Reyna from stroke clinic in 2-3 months for follow-up and repeat carotid US   As directed      To: Meghana Reyna from stroke clinic in 2-3 months for follow-up and repeat carotid US                      Michaela Kate MD  05/21/24      Time Spent on Discharge:  I spent 35 minutes on this discharge activity which included: face-to-face encounter with the patient, reviewing the data in the system, coordination of the care with the nursing staff as well as consultants, documentation, and entering orders.

## 2024-05-21 NOTE — CASE MANAGEMENT/SOCIAL WORK
Discharge Planning Assessment  [facility]     Patient Name: Nely Grider  MRN: [id number]  Today's Date: 5/21/2024    Admit Date: 5/20/2024    Plan: Home with spouse   Discharge Needs Assessment       Row Name 05/21/24 0816       Living Environment    People in Home spouse    Name(s) of People in Home Mir (spouse) [phone]    Current Living Arrangements home    Potentially Unsafe Housing Conditions none    Primary Care Provided by self    Provides Primary Care For no one    Family Caregiver if Needed spouse    Able to Return to Prior Arrangements yes       Resource/Environmental Concerns    Resource/Environmental Concerns none    Transportation Concerns none       Transition Planning    Patient/Family Anticipates Transition to home with family    Patient/Family Anticipated Services at Transition none    Transportation Anticipated family or friend will provide       Discharge Needs Assessment    Readmission Within the Last 30 Days no previous admission in last 30 days    Equipment Currently Used at Home cane, straight;walker, rolling;wheelchair    Concerns to be Addressed no discharge needs identified    Anticipated Changes Related to Illness none    Equipment Needed After Discharge none                   Discharge Plan       Row Name 05/21/24 0817       Plan    Plan Home with spouse    Patient/Family in Agreement with Plan yes    Plan Comments Spoke with patient at bedside. Lives with Mir (spouse) [phone] in [place]. Is independent with ADL's. No problems affording [employer] Medicare or medications. Uses no DME but has a striaght cane, rolling walker and transport chair. Has advanced directives. PCP is Natalio Borges MD. Plan is home with spouse. Family will transport. CM will continue to follow.    Final Discharge Disposition Code 01 - home or self-care                  Continued Care and Services - Admitted Since 5/20/2024    No active coordination exists for this encounter.           Demographic Summary       Row Name 05/21/24 0815       General Information    Admission Type observation    Arrived From emergency department    Referral Source admission list    Reason for Consult discharge planning    Preferred Language English       Contact Information    Permission Granted to Share Info With     Contact Information Obtained for                    Functional Status       Row Name 05/21/24 0816       Functional Status    Usual Activity Tolerance moderate    Current Activity Tolerance moderate       Functional Status, IADL    Medications independent    Meal Preparation independent    Housekeeping independent    Laundry independent    Shopping independent       Mental Status    General Appearance WDL WDL       Mental Status Summary    Recent Changes in Mental Status/Cognitive Functioning no changes       Employment/    Employment Status retired                   Psychosocial    No documentation.                  Abuse/Neglect    No documentation.                  Legal    No documentation.                  Substance Abuse    No documentation.                  Patient Forms    No documentation.                     Vahid Issa, RN

## 2024-05-21 NOTE — THERAPY EVALUATION
Patient Name: Nely Grider  : 1945    MRN: [id number]                              Today's Date: 2024       Admit Date: 2024    Visit Dx:     ICD-10-CM ICD-9-CM   1. Facial droop  R29.810 781.94   2. Left facial numbness  R20.0 782.0   3. Injury of head, initial encounter  S09.90XA 959.01   4. Contusion of right shoulder, initial encounter  S40.011A 923.00   5. Contusion of left hip, initial encounter  S70.02XA 924.01   6. Impaired mobility and ADLs  Z74.09 V49.89    Z78.9      Patient Active Problem List   Diagnosis    Gastroesophageal reflux disease without esophagitis    Mixed hyperlipidemia    Essential hypertension    Controlled type 2 diabetes mellitus without complication, without long-term current use of insulin    Chronic kidney disease (CKD), stage III (moderate)    Anemia    Diabetic polyneuropathy associated with type 2 diabetes mellitus    Cerebrovascular accident (CVA) due to thrombosis of right middle cerebral artery    Chronic pain syndrome    Osteopenia    Difficulty in walking, not elsewhere classified    Generalized muscle weakness    Type 2 diabetes mellitus with diabetic neuropathy, unspecified    Unspecified intracapsular fracture of right femur, initial encounter for closed fracture    Essential (primary) hypertension    Gastro-esophageal reflux disease without esophagitis    Presence of right artificial hip joint    Hyperlipidemia, unspecified    Pain in right hip    Bilateral primary osteoarthritis of hip    Osteoarthritis of hip, unspecified    Unspecified osteoarthritis, unspecified site    Diverticulitis of large intestine    Low back pain    Other staphylococcus as the cause of diseases classified elsewhere    Thrombophlebitis of superficial veins of upper extremities    Chronic renal failure syndrome    Polyneuropathy in diabetes    Effusion, right hip    Right sided numbness    Fall     Past Medical History:   Diagnosis Date    Anemia 2016    Anesthesia  complication     per patient with hip surgery last april did not remember being in hospital or going home can only rmember from rehad on    Anesthesia complication     with hip surgery in april 2018 had issues with swallowing after surgery called in speech    Arthritis     Cancer     cervical    Cataract     Colon polyp 01/31/2019    Cecal polyp 5 years Dr. Fatima awaiting pathology 1/19    Diabetes mellitus     type 2 dm, check blood sugar every morning, diagnosed 3 or 4 years    Diverticulosis 01/31/2019    Full dentures     Full dentures     GERD (gastroesophageal reflux disease)     High cholesterol     History of IBS     Hypertension     been off bp meds since last april 2018    Neuropathy     Primary osteoarthritis of both hips 01/31/2018    Added automatically from request for surgery [id number]    Status post total replacement of left hip 04/19/2018    Wears glasses      Past Surgical History:   Procedure Laterality Date    CARDIAC CATHETERIZATION      CHOLECYSTECTOMY      COLONOSCOPY      EYE SURGERY      bilateral cataract    HYSTERECTOMY      partial    JOINT REPLACEMENT      hip surgery 2018 in april    OOPHORECTOMY      one removed    TONSILLECTOMY      TOTAL HIP ARTHROPLASTY Right 04/19/2018    Procedure: RIGHT TOTAL HIP ARTHROPLASTY;  Surgeon: Carlos Pollack MD;  Location:  [facility];  Service: Orthopedics    TOTAL HIP ARTHROPLASTY Left 02/12/2019    Procedure: TOTAL HIP ARTHROPLASTY LEFT;  Surgeon: Carlos Pollack MD;  Location:  [facility];  Service: Orthopedics    WRIST SURGERY      metal plate      General Information       Row Name 05/21/24 0738          OT Time and Intention    Document Type evaluation  -SD     Mode of Treatment occupational therapy  -SD       Row Name 05/21/24 0738          General Information    Patient Profile Reviewed yes  -SD     Prior Level of Function independent:;all household mobility;community mobility;transfer;bed mobility;ADL's;using stairs;shopping;driving;home  management;feeding;grooming;dressing;bathing  -SD     Existing Precautions/Restrictions fall  -SD     Barriers to Rehab none identified  -SD       Row Name 05/21/24 0738          Living Environment    People in Home spouse  -SD       Row Name 05/21/24 0738          Home Main Entrance    Number of Stairs, Main Entrance three  -SD     Stair Railings, Main Entrance railings safe and in good condition  -SD       Row Name 05/21/24 0738          Stairs Within Home, Primary    Number of Stairs, Within Home, Primary none  -SD     Stair Railings, Within Home, Primary none  -SD       Row Name 05/21/24 0738          Cognition    Orientation Status (Cognition) oriented x 4  -SD       Row Name 05/21/24 0738          Safety Issues, Functional Mobility    Safety Issues Affecting Function (Mobility) awareness of need for assistance;insight into deficits/self-awareness;safety precaution awareness;safety precautions follow-through/compliance  -SD     Impairments Affecting Function (Mobility) balance;endurance/activity tolerance;strength;pain  -SD               User Key  (r) = Recorded By, (t) = Taken By, (c) = Cosigned By      Initials Name Provider Type    Shari Stanley OT Occupational Therapist                     Mobility/ADL's       Row Name 05/21/24 0811          Bed Mobility    Comment, (Bed Mobility) pt. up in chair upon therapist's arrival  -SD       Row Name 05/21/24 0811          Transfers    Transfers sit-stand transfer;stand-sit transfer  -SD       Row Name 05/21/24 0811          Sit-Stand Transfer    Sit-Stand [place] (Transfers) supervision  -SD     Assistive Device (Sit-Stand Transfers) walker, front-wheeled  -SD     Comment, (Sit-Stand Transfer) pt. initially taking steps without walker & with noticeable limp from pain in L hip, introduced rolling walker  -SD       Row Name 05/21/24 0811          Stand-Sit Transfer    Stand-Sit [place] (Transfers) supervision  -SD     Assistive Device (Stand-Sit  Transfers) walker, front-wheeled  -SD       Row Name 05/21/24 0811          Functional Mobility    Functional Mobility- Ind. Level supervision required;1 person  -SD     Functional Mobility- Device walker, front-wheeled  -SD     Functional Mobility-Distance (Feet) 150  -SD     Functional Mobility- Safety Issues balance decreased during turns  -SD     Patient was able to Ambulate yes  -SD       Row Name 05/21/24 0811          Activities of Daily Living    BADL Assessment/Intervention upper body dressing;grooming;feeding  -SD       Row Name 05/21/24 0811          Upper Body Dressing Assessment/Training    [place] Level (Upper Body Dressing) don;pajama/robe;contact guard assist  -SD     Position (Upper Body Dressing) unsupported standing  -SD       Row Name 05/21/24 0811          Grooming Assessment/Training    [place] Level (Grooming) wash face, hands;set up  -SD     Position (Grooming) supported sitting  -SD       [facility] Name 05/21/24 0811          Self-Feeding Assessment/Training    [place] Level (Feeding) liquids to mouth;finger foods;scoop food and bring to mouth;set up  -SD     Position (Self-Feeding) supported sitting  -SD               User Key  (r) = Recorded By, (t) = Taken By, (c) = Cosigned By      Initials Name Provider Type    SD Shari Velasco, OT Occupational Therapist                   Obj/Interventions       Row Name 05/21/24 0813          Sensory Assessment (Somatosensory)    Sensory Assessment (Somatosensory) right UE;left UE  -SD     Left UE Sensory Assessment general sensation;other (see comments)  slightly diminished  -SD     Right UE Sensory Assessment intact  -SD       Row Name 05/21/24 0813          Vision Assessment/Intervention    Visual Impairment/Limitations WFL;corrective lenses full-time  -SD       Row Name 05/21/24 0813          Range of Motion Comprehensive    General Range of Motion bilateral upper extremity ROM WFL  -SD       Row Name 05/21/24 0813           Strength Comprehensive (MMT)    Comment, General Manual Muscle Testing (MMT) Assessment R UE Strength: 4+/5 grossly; L UE: 4/5 proximally, 4+/5 distally  -SD       Row Name 05/21/24 0813          Balance    Balance Assessment sitting static balance;sitting dynamic balance;sit to stand dynamic balance;standing static balance;standing dynamic balance  -SD     Static Sitting Balance modified independence  -SD     Dynamic Sitting Balance standby assist  -SD     Position, Sitting Balance unsupported;sitting in chair  -SD     Sit to Stand Dynamic Balance supervision  -SD     Static Standing Balance supervision  -SD     Dynamic Standing Balance supervision  -SD     Position/Device Used, Standing Balance walker, front-wheeled  -SD               User Key  (r) = Recorded By, (t) = Taken By, (c) = Cosigned By      Initials Name Provider Type    Shari Stanley OT Occupational Therapist                   Goals/Plan       Row Name 05/21/24 0820          Bed Mobility Goal 1 (OT)    Activity/Assistive Device (Bed Mobility Goal 1, OT) sit to supine/supine to sit  -SD     [place] Level/Cues Needed (Bed Mobility Goal 1, OT) modified independence  -SD     Time Frame (Bed Mobility Goal 1, OT) short term goal (STG);by discharge  -SD     Progress/Outcomes (Bed Mobility Goal 1, OT) goal ongoing  -SD       Row Name 05/21/24 0820          Transfer Goal 1 (OT)    Activity/Assistive Device (Transfer Goal 1, OT) sit-to-stand/stand-to-sit;bed-to-chair/chair-to-bed;toilet;walker, rolling  -SD     [place] Level/Cues Needed (Transfer Goal 1, OT) standby assist  -SD     Time Frame (Transfer Goal 1, OT) short term goal (STG);by discharge  -SD     Progress/Outcome (Transfer Goal 1, OT) goal ongoing  -SD       Row Name 05/21/24 0820          Dressing Goal 1 (OT)    Activity/Device (Dressing Goal 1, OT) dressing skills, all  -SD     [place]/Cues Needed (Dressing Goal 1, OT) standby assist  -SD     Time Frame (Dressing Goal 1,  OT) short term goal (STG);by discharge  -SD     Progress/Outcome (Dressing Goal 1, OT) goal ongoing  -SD       Row Name 05/21/24 0820          Therapy Assessment/Plan (OT)    Planned Therapy Interventions (OT) activity tolerance training;BADL retraining;functional balance retraining;transfer/mobility retraining;strengthening exercise;ROM/therapeutic exercise;patient/caregiver education/training;occupation/activity based interventions  -SD               User Key  (r) = Recorded By, (t) = Taken By, (c) = Cosigned By      Initials Name Provider Type    Shari Stanley, OT Occupational Therapist                   Clinical Impression       Row Name 05/21/24 0816          Pain Assessment    Pretreatment Pain Rating 2/10  -SD     Posttreatment Pain Rating 2/10  -SD     Pain Location - Side/Orientation Right;Left  -SD     Pain Location generalized  -SD     Pain Location - shoulder;hip  -SD     Pain Intervention(s) Ambulation/increased activity;Emotional support;Repositioned;Nursing Notified  -SD       Row Name 05/21/24 0816          Plan of Care Review    Plan of Care Reviewed With patient;grandchild(marcus)  -SD     Progress no change  -SD     Outcome Evaluation OT IE completed. Pt. presents below functional baseline in balance, strength, and occupational endurance. Pt. with pain in L hip unrelated to fall, per pt. Pt. with slightly diminished L UE sensation & strength. OT skilled services are warranted to return pt. to PLOF. Recommend home with assist upon d/c.  -SD       [facility] Name 05/21/24 0816          Therapy Assessment/Plan (OT)    Rehab Potential (OT) good, to achieve stated therapy goals  -SD     Criteria for Skilled Therapeutic Interventions Met (OT) yes;meets criteria;skilled treatment is necessary  -SD     Therapy Frequency (OT) daily  -SD       Row Name 05/21/24 0816          Therapy Plan Review/Discharge Plan (OT)    Anticipated Discharge Disposition (OT) home with assist  -SD       Row Name 05/21/24 0816           Vital Signs    Pre Systolic BP Rehab 139  -SD     Pre Treatment Diastolic BP 63  -SD     Post Systolic BP Rehab 143  -SD     Post Treatment Diastolic BP 74  -SD     Pre SpO2 (%) 100  -SD     O2 Delivery Pre Treatment room air  -SD     O2 Delivery Intra Treatment room air  -SD     Post SpO2 (%) 98  -SD     O2 Delivery Post Treatment room air  -SD     Pre Patient Position Sitting  -SD     Intra Patient Position Standing  -SD     Post Patient Position Sitting  -SD       Row Name 05/21/24 0816          Positioning and Restraints    Pre-Treatment Position sitting in chair/recliner  -SD     Post Treatment Position chair  -SD     In Chair notified nsg;reclined;call light within reach;encouraged to call for assist;with family/caregiver;waffle cushion;legs elevated  -SD               User Key  (r) = Recorded By, (t) = Taken By, (c) = Cosigned By      Initials Name Provider Type    Shari Stanley OT Occupational Therapist                   Outcome Measures       Row Name 05/21/24 0738          How much help from another is currently needed...    Putting on and taking off regular lower body clothing? 3  -SD     Bathing (including washing, rinsing, and drying) 3  -SD     Toileting (which includes using toilet bed pan or urinal) 3  -SD     Putting on and taking off regular upper body clothing 3  -SD     Taking care of personal grooming (such as brushing teeth) 4  -SD     Eating meals 4  -SD     AM-PAC 6 Clicks Score (OT) 20  -SD       Row Name 05/21/24 0738          Functional Assessment    Outcome Measure Options AM-PAC 6 Clicks Daily Activity (OT)  -SD               User Key  (r) = Recorded By, (t) = Taken By, (c) = Cosigned By      Initials Name Provider Type    Shari Stanley OT Occupational Therapist                    Occupational Therapy Education       Title: PT OT SLP Therapies (Done)       Topic: Occupational Therapy (Done)       Point: ADL training (Done)       Description:   Instruct  learner(s) on proper safety adaptation and remediation techniques during self care or transfers.   Instruct in proper use of assistive devices.                  Learning Progress Summary             Patient Acceptance, E, VU,NR by SD at 5/21/2024 0821    Comment: Pt. & granddtr. educated on role of OT and both are agreeable with OT POC.   Family Acceptance, E, VU,NR by SD at 5/21/2024 0821    Comment: Pt. & granddtr. educated on role of OT and both are agreeable with OT POC.                         Point: Home exercise program (Done)       Description:   Instruct learner(s) on appropriate technique for monitoring, assisting and/or progressing therapeutic exercises/activities.                  Learning Progress Summary             Patient Acceptance, E, VU,NR by SD at 5/21/2024 0821    Comment: Pt. & granddtr. educated on role of OT and both are agreeable with OT POC.   Family Acceptance, E, VU,NR by SD at 5/21/2024 0821    Comment: Pt. & granddtr. educated on role of OT and both are agreeable with OT POC.                         Point: Precautions (Done)       Description:   Instruct learner(s) on prescribed precautions during self-care and functional transfers.                  Learning Progress Summary             Patient Acceptance, E, VU,NR by SD at 5/21/2024 0821    Comment: Pt. & granddtr. educated on role of OT and both are agreeable with OT POC.   Family Acceptance, E, VU,NR by SD at 5/21/2024 0821    Comment: Pt. & granddtr. educated on role of OT and both are agreeable with OT POC.                         Point: Body mechanics (Done)       Description:   Instruct learner(s) on proper positioning and spine alignment during self-care, functional mobility activities and/or exercises.                  Learning Progress Summary             Patient Acceptance, E, VU,NR by SD at 5/21/2024 0821    Comment: Pt. & granddtr. educated on role of OT and both are agreeable with OT POC.   Family Acceptance, E, VU,NR by  SD at 5/21/2024 0821    Comment: Pt. & granddtr. educated on role of OT and both are agreeable with OT POC.                                         User Key       Initials Effective Dates Name Provider Type Discipline    SD 07/11/23 -  Shari Velasco, OT Occupational Therapist OT                  OT Recommendation and Plan  Planned Therapy Interventions (OT): activity tolerance training, BADL retraining, functional balance retraining, transfer/mobility retraining, strengthening exercise, ROM/therapeutic exercise, patient/caregiver education/training, occupation/activity based interventions  Therapy Frequency (OT): daily  Plan of Care Review  Plan of Care Reviewed With: patient, grandchild(marcus)  Progress: no change  Outcome Evaluation: OT IE completed. Pt. presents below functional baseline in balance, strength, and occupational endurance. Pt. with pain in L hip unrelated to fall, per pt. Pt. with slightly diminished L UE sensation & strength. OT skilled services are warranted to return pt. to PLOF. Recommend home with assist upon d/c.     Time Calculation:   Evaluation Complexity (OT)  Review Occupational Profile/Medical/Therapy History Complexity: brief/low complexity  Assessment, Occupational Performance/Identification of Deficit Complexity: 1-3 performance deficits  Clinical Decision Making Complexity (OT): problem focused assessment/low complexity  Overall Complexity of Evaluation (OT): low complexity     Time Calculation- OT       Row Name 05/21/24 0822             Time Calculation- OT    OT Received On 05/21/24  -SD      OT Goal Re-Cert Due Date 05/31/24  -SD         Timed Charges    34086 - OT Therapeutic Activity Minutes 10  -SD         Untimed Charges    OT Eval/Re-eval Minutes 34  -SD         Total Minutes    Timed Charges Total Minutes 10  -SD      Untimed Charges Total Minutes 34  -SD       Total Minutes 44  -SD                User Key  (r) = Recorded By, (t) = Taken By, (c) = Cosigned By       Initials Name Provider Type    Shari Stanley OT Occupational Therapist                  Therapy Charges for Today       Code Description Service Date Service Provider Modifiers Qty    98373402992  OT THERAPEUTIC ACT EA 15 MIN 5/21/2024 Shari Velasco OT GO 1    [id number]  OT EVAL LOW COMPLEXITY 3 5/21/2024 Shari Velasco OT GO 1                 Shari Velasco OT  5/21/2024

## 2024-05-21 NOTE — OUTREACH NOTE
Prep Survey      Flowsheet Row Responses   [facility] patient discharged from? [place]   Is LACE score < 7 ? No   Eligibility [facility]   Date of Admission 05/20/24   Date of Discharge 05/21/24   Discharge Disposition Home or Self Care   Discharge diagnosis Fall   Does the patient have one of the following disease processes/diagnoses(primary or secondary)? Other   Does the patient have Home health ordered? No   Is there a DME ordered? No   Prep survey completed? Yes            TEO GALVEZ - Registered Nurse

## 2024-05-21 NOTE — CONSULTS
"Diabetes Education  Assessment/Teaching    Patient Name:  Nely Grider  YOB: 1945  MRN: [id number]  Admit Date:  5/20/2024      Assessment Date:  5/21/2024  Flowsheet Row Most Recent Value   General Information     Referral From: Other (comment)  [Per stroke protocol]   Height 165.1 cm (65\")   Height Method Stated   Weight 78.6 kg (173 lb 3 oz)   Weight Method Bed scale   Pregnancy Assessment    Diabetes History    What type of diabetes do you have? Type 2   Length of Diabetes Diagnosis 6 - 10 years   Current DM knowledge fair   Have you had diabetes education/teaching in the past? no   Do you test your blood sugar at home? no   How often do you check your feet? daily   When was the last time your doctor checked your feet? Two months ago, sees podiatrist every 3 months   Do you perform your own nail care? no   Do you have any diabetes complications? neuropathy   Education Preferences    What areas of diabetes would you like to learn about? avoiding high blood sugar, diabetes complications   Nutrition Information    Assessment Topics    Problem Solving - Assessment Needs education   Reducing Risk - Assessment Needs education   Healthy Coping - Assessment Needs education   DM Goals             Flowsheet Row Most Recent Value   DM Education Needs    Meter Has own   Frequency of Testing 2 times a week   Blood Glucose Target Range Ranges per ADa guidelines   Medication Oral, Actions, Side effects   Problem Solving Hypoglycemia, Hyperglycemia, Sick days, Signs, Symptoms, Treatment   Reducing Risks A1C testing, Other (comment)  [glucose monitoring]   Physical Activity Walking   Physical Activity Frequency Rarely   Healthy Coping Appropriate   Discharge Plan Home   Motivation Moderate   Teaching Method Discussion, Handouts   Patient Response Needs reinforcement        Total time spent reviewing chart, preparing education/materials, providing education at bedside, and coordinating care approx 30 " "minutes.    Consult for diabetes education received per stroke protocol. Chart reviewed. Pt was seen at bedside today. Permission given for visit. Patient does not qualify for the follow up stroke class based on the exclusion criteria of  a negative brain MRI showing no acute intracranial process.    Discussed and taught about type 2 diabetes self-management, risk factors, and importance of blood glucose control to reduce complications. Target blood glucose readings and A1c goals per ADA were reviewed. Reviewed  current A1c 6.2% and discussed its significance. Signs, symptoms, and treatment of hyperglycemia and hypoglycemia were discussed.     Lifestyle changes such as physical activity with MD approval and healthy eating were encouraged. Encouraged pt to monitor blood sugar at home 2-3 times per week and to call PCP if blood sugar is trending high. To see if her strips are outdated, for her meter, and to contact her PCP for a new prescription.Encouraged to keep record of blood glucose readings to take to follow up appointment with PCP. Provided patient with copy of [employer]'s \"What is Diabetes\" handout, \"Blood Glucose Goals\" handout, and \"What is A1c\" handout.     Thank you for this consult.     Electronically signed by:  Tennille Griggs RN [facility]  05/21/24 11:51 EDT  "

## 2024-05-22 ENCOUNTER — TRANSITIONAL CARE MANAGEMENT TELEPHONE ENCOUNTER (OUTPATIENT)
Dept: CALL CENTER | Facility: HOSPITAL | Age: 79
End: 2024-05-22
Payer: MEDICARE

## 2024-05-22 NOTE — OUTREACH NOTE
Call Center TCM Note      Flowsheet Row Responses   [facility] patient discharged from? [place]   Does the patient have one of the following disease processes/diagnoses(primary or secondary)? Other   TCM attempt successful? Yes   Call start time 1109   Call end time 1110   Discharge diagnosis Fall   Person spoke with today (if not patient) and relationship patient   Meds reviewed with patient/caregiver? Yes   Does the patient have all medications ordered at discharge? Yes   Prescription comments Started taking ASA- understands the importance of taking asa and plavix.   Is the patient taking all medications as directed (includes completed medication regime)? Yes   Comments Patient has a new pcp no longer sees Dr. Foote   Does the patient have an appointment with their PCP within 7-14 days of discharge? Other   Nursing Interventions Routed TCM call to PCP office   Has home health visited the patient within 72 hours of discharge? N/A   Psychosocial issues? No   What is the patient's perception of their health status since discharge? Improving   Is the patient/caregiver able to teach back signs and symptoms related to disease process for when to call PCP? Yes   Is the patient/caregiver able to teach back signs and symptoms related to disease process for when to call 911? Yes   Is the patient/caregiver able to teach back the hierarchy of who to call/visit for symptoms/problems? PCP, Specialist, Home health nurse, Urgent Care, ED, 911 Yes   TCM call completed? Yes   Wrap up additional comments Patient reports doing well. Discussed medications- she will see her pcp for hospital fu and discuss outpatient PT. No other concerns or questions noted.   Call end time 1110   Would this patient benefit from a Referral to Amb Social Work? No   Is the patient interested in additional calls from an ambulatory ? No            Mari Navarrete RN    5/22/2024, 11:11 EDT

## 2024-05-25 LAB
QT INTERVAL: 398 MS
QTC INTERVAL: 447 MS

## 2024-07-03 ENCOUNTER — OFFICE VISIT (OUTPATIENT)
Dept: NEUROLOGY | Facility: CLINIC | Age: 79
End: 2024-07-03
Payer: MEDICARE

## 2024-07-03 VITALS
BODY MASS INDEX: 27.99 KG/M2 | OXYGEN SATURATION: 96 % | HEART RATE: 72 BPM | TEMPERATURE: 98 F | HEIGHT: 65 IN | WEIGHT: 168 LBS | SYSTOLIC BLOOD PRESSURE: 110 MMHG | DIASTOLIC BLOOD PRESSURE: 58 MMHG

## 2024-07-03 DIAGNOSIS — E78.2 MIXED HYPERLIPIDEMIA: ICD-10-CM

## 2024-07-03 DIAGNOSIS — Z86.73 HISTORY OF TIA (TRANSIENT ISCHEMIC ATTACK): Primary | ICD-10-CM

## 2024-07-03 DIAGNOSIS — I65.23 BILATERAL CAROTID ARTERY STENOSIS: ICD-10-CM

## 2024-07-03 DIAGNOSIS — E11.9 CONTROLLED TYPE 2 DIABETES MELLITUS WITHOUT COMPLICATION, WITHOUT LONG-TERM CURRENT USE OF INSULIN: ICD-10-CM

## 2024-07-03 NOTE — PROGRESS NOTES
New Patient Office Visit      Encounter Date: 2024   Patient Name: Nely Grider  : 1945   MRN: [id number]   PCP: Alicia Robles MD    Referring Provider: Michaela Kate MD     Chief Complaint:    Chief Complaint   Patient presents with    Follow-up    Stroke       History of Present Illness: Nely Grider is a 78 y.o. female who is here today to establish care.  Patient has known medical diagnoses of hypertension, hyperlipidemia, diabetes mellitus type 2, palpitations, IBS, neuropathy, and remote stroke (residual mild right-sided weakness) patient presented to [facility] ED on  with left facial droop lower extremity numbness.  The previous evening patient reports that she had a visual hallucination and thought she was seeing her sister who recently passed away.  She got out of bed and sustained a fall with injury to her head.  At home patient was taking Plavix 75 mg daily without any missed doses.  CTA of the head and neck shows mild bilateral carotid narrowing with multifocal iCAD, specifically right ICA atherosclerosis of 50 to 60% with ulcerated soft plaque that could be a high risk lesion..  MRI of the brain was negative for acute ischemia.  TTE shows EF of 61 to 65%, normal left atrial size and volume noted.  Patient was discharged on aspirin, Plavix and high intensity statin.  Patient was recommended to get a follow-up carotid ultrasound in 3 months after discharge.    Clinic visit 7/3/2024: Patient presents to clinic accompanied by her sister.  She denies any new or worsening neurologic symptoms.  She reports that she continues to feel tired and occasionally has a dizzy spell which quickly resolves.  She has been taking her medication without issue or side effect.  We discussed scheduling a 3-month carotid ultrasound which she is in agreement to.  She has no other questions or concerns at this time.        Stroke Risk Factors: carotid stenosis, diabetes mellitus,  hyperlipidemia, and hypertension      Subjective      Review of Systems:   Review of Systems   Constitutional:  Positive for activity change and fatigue. Negative for appetite change, chills, diaphoresis, fever, unexpected weight gain and unexpected weight loss.   Eyes:  Negative for photophobia and visual disturbance.   Respiratory:  Negative for cough and shortness of breath.    Cardiovascular:  Negative for chest pain, palpitations and leg swelling.   Gastrointestinal:  Negative for blood in stool, nausea and vomiting.   Genitourinary:  Negative for hematuria.   Musculoskeletal:  Negative for gait problem.   Neurological:  Positive for dizziness. Negative for tremors, seizures, syncope, facial asymmetry, speech difficulty, weakness, light-headedness, numbness, headache, memory problem and confusion.       Past Medical History:   Past Medical History:   Diagnosis Date    Anemia 09/18/2016    Anesthesia complication     per patient with hip surgery last april did not remember being in hospital or going home can only rmember from rehad on    Anesthesia complication     with hip surgery in april 2018 had issues with swallowing after surgery called in speech    Arthritis     Cancer     cervical    Cataract     Colon polyp 01/31/2019    Cecal polyp 5 years Dr. Fatima awaiting pathology 1/19    Diabetes mellitus     type 2 dm, check blood sugar every morning, diagnosed 3 or 4 years    Diverticulosis 01/31/2019    Full dentures     Full dentures     GERD (gastroesophageal reflux disease)     High cholesterol     History of IBS     Hypertension     been off bp meds since last april 2018    Neuropathy     Primary osteoarthritis of both hips 01/31/2018    Added automatically from request for surgery [id number]    Status post total replacement of left hip 04/19/2018    Wears glasses        Past Surgical History:   Past Surgical History:   Procedure Laterality Date    CARDIAC CATHETERIZATION      CHOLECYSTECTOMY      COLONOSCOPY       EYE SURGERY      bilateral cataract    HYSTERECTOMY      partial    JOINT REPLACEMENT      hip surgery 2018 in april    OOPHORECTOMY      one removed    TONSILLECTOMY      TOTAL HIP ARTHROPLASTY Right 04/19/2018    Procedure: RIGHT TOTAL HIP ARTHROPLASTY;  Surgeon: Carlos Pollack MD;  Location:  [facility];  Service: Orthopedics    TOTAL HIP ARTHROPLASTY Left 02/12/2019    Procedure: TOTAL HIP ARTHROPLASTY LEFT;  Surgeon: Carlos Pollack MD;  Location:  [facility];  Service: Orthopedics    WRIST SURGERY      metal plate       Family History:   Family History   Problem Relation Age of Onset    Arthritis Mother     Heart attack Mother     Hypertension Mother     Hyperlipidemia Mother     Osteoporosis Mother     Heart disease Sister     Diabetes Sister     Arthritis Sister     Heart attack Sister     Hypertension Sister     Hyperlipidemia Sister     Bleeding Disorder Sister     Heart disease Brother     Cancer Brother     Diabetes Brother     Arthritis Brother     Heart attack Brother     Hypertension Brother     Hyperlipidemia Brother     Ovarian cancer Daughter     Cancer Daughter     Ovarian cancer Other         grandaughter    Ovarian cancer Other         granddaughter    Heart attack Father     Hypertension Father     Stroke Father     Kidney disease Paternal Uncle     Liver disease Paternal Uncle     Cancer Other     Stroke Other     Diabetes Other        Social History:   Social History     Socioeconomic History    Marital status:    Tobacco Use    Smoking status: Never     Passive exposure: Never    Smokeless tobacco: Never   Vaping Use    Vaping status: Never Used   Substance and Sexual Activity    Alcohol use: No    Drug use: No    Sexual activity: Defer     Comment:        Medications:     Current Outpatient Medications:     amLODIPine (NORVASC) 5 MG tablet, Take 1 tablet by mouth Daily., Disp: 90 tablet, Rfl: 3    aspirin 81 MG chewable tablet, Chew 1 tablet Daily., Disp: 30 tablet, Rfl: 0    " atorvastatin (LIPITOR) 80 MG tablet, TAKE 1 TABLET BY MOUTH EVERY NIGHT., Disp: 90 tablet, Rfl: 3    clopidogrel (PLAVIX) 75 MG tablet, Take 1 tablet by mouth Daily., Disp: 90 tablet, Rfl: 0    gabapentin (NEURONTIN) 400 MG capsule, Take 1 capsule by mouth 3 (Three) Times a Day., Disp: 270 capsule, Rfl: 1    glimepiride (AMARYL) 4 MG tablet, TAKE 1 TABLET BY MOUTH EVERY MORNING BEFORE BREAKFAST., Disp: 90 tablet, Rfl: 3    metFORMIN (GLUCOPHAGE) 500 MG tablet, TAKE 1 TABLET TWICE DAILY WITH MEALS, Disp: 180 tablet, Rfl: 1    omeprazole (priLOSEC) 20 MG capsule, Take 1 capsule by mouth 2 (Two) Times a Day., Disp: 180 capsule, Rfl: 3    Allergies:   Allergies   Allergen Reactions    Codeine Hives       Objective     Physical Exam:  Vital Signs:   Vitals:    07/03/24 0917   BP: 110/58   Pulse: 72   Temp: 98 °F (36.7 °C)   SpO2: 96%   Weight: 76.2 kg (168 lb)   Height: 165 cm (64.96\")     Body mass index is 27.99 kg/m².     Physical Exam  Vitals and nursing note reviewed.   Constitutional:       General: She is awake. She is not in acute distress.     Appearance: Normal appearance. She is normal weight. She is not ill-appearing.      Comments: 78-year-old  female   HENT:      Head: Normocephalic and atraumatic.      Nose: Nose normal.      Mouth/Throat:      Mouth: Mucous membranes are moist.   Eyes:      General: Lids are normal.      Extraocular Movements: Extraocular movements intact.      Pupils: Pupils are equal, round, and reactive to light.   Cardiovascular:      Rate and Rhythm: Normal rate and regular rhythm.      Pulses: Normal pulses.   Pulmonary:      Effort: Pulmonary effort is normal. No respiratory distress.   Skin:     General: Skin is warm and dry.   Neurological:      General: No focal deficit present.      Mental Status: She is alert and oriented to person, place, and time.      Motor: Motor strength is normal.     Coordination: Coordination is intact.   Psychiatric:         Mood and Affect: " Mood normal.         Speech: Speech normal.         Behavior: Behavior normal.       Neurological Exam  Mental Status  Awake and alert. Oriented to person, place, time and situation. Oriented to person, place, and time. Speech is normal. Language is fluent with no aphasia. Attention and concentration are normal. Fund of knowledge is appropriate for level of education.    Cranial Nerves  CN II: Visual acuity is normal. Visual fields full to confrontation.  CN III, IV, VI: Extraocular movements intact bilaterally. Normal lids and orbits bilaterally. Pupils equal round and reactive to light bilaterally.  CN V: Facial sensation is normal.  CN VII: Full and symmetric facial movement.  CN VIII: Hearing is normal to speech .  CN XI: Shoulder shrug strength is normal.  CN XII: Tongue midline without atrophy or fasciculations.    Motor  Normal muscle bulk throughout. No fasciculations present. Normal muscle tone. Strength is 5/5 throughout all four extremities.    Sensory  Light touch is normal in upper and lower extremities. Pinprick is normal in upper and lower extremities.     Coordination    Finger-to-nose, rapid alternating movements and heel-to-shin normal bilaterally without dysmetria.  No obvious dysmetria .    Gait  Casual gait is normal including stance, stride, and arm swing.     NIH 0  Modified Florinda Score: 2        0  No Symptoms    1 No significant disability. Able to carry out all usual activities, despite some symptoms.    2 Slight disability. Able to look after own affairs without assistance, but unable to carry out all previous activities.    3 Moderate disability. Requires some help, but able to walk unassisted.    4 Moderately severe disability. Unable to attend to own bodily needs without assistance, and unable to walk unassisted.    5 Severe disability. Requires constant nursing care and attention, bedridden, incontinent.    6 Dead       PHQ-9 Depression Screening  Little interest or pleasure in doing  "things? 0-->not at all   Feeling down, depressed, or hopeless? 0-->not at all   Trouble falling or staying asleep, or sleeping too much?     Feeling tired or having little energy?     Poor appetite or overeating?     Feeling bad about yourself - or that you are a failure or have let yourself or your family down?     Trouble concentrating on things, such as reading the newspaper or watching television?     Moving or speaking so slowly that other people could have noticed? Or the opposite - being so fidgety or restless that you have been moving around a lot more than usual?     Thoughts that you would be better off dead, or of hurting yourself in some way?     PHQ-9 Total Score 0   If you checked off any problems, how difficult have these problems made it for you to do your work, take care of things at home, or get along with other people?             STOP-Bang Score  Have you been diagnosed with Sleep Apnea?: no  Snoring?: yes  Tired?: no  Observed?: no  Pressure?: yes  Stop Score: 2  Body Mass Index more than 35 kg/m2?: no  Age older than 50 year old?: yes  Neck large? \">17\"/43cm-M, >16\"/41cm-F: no  Gender=Male?: no  Total Stop-Bang Score: 3       STEADI Fall Risk Clinician Key Questions   Have you fallen in the past year?: Yes  Do you feel unsteady with walking?: No  Are you worried about falling?: Yes    Imaging Reviewed:   MRI Brain Without Contrast    Result Date: 5/21/2024  Impression: Atrophy and chronic microvascular ischemic change. No acute intracranial process. Electronically Signed: Angel Wei MD  5/21/2024 3:24 AM EDT  Workstation ID: [id number]    XR Hip With or Without Pelvis 2 - 3 View Left    Result Date: 5/20/2024  Impression: 1. Bilateral total hip prostheses without evidence of hardware complication. Electronically Signed: Tomas Frazier  5/20/2024 2:21 PM EDT  Workstation ID: [id number]    XR Shoulder 2+ View Right    Result Date: 5/20/2024  Impression: No acute osseous abnormality given " limitations of exam Electronically Signed: Bairon Funes MD  5/20/2024 2:17 PM EDT  Workstation ID: [id number]    XR Chest 1 View    Result Date: 5/20/2024  No evidence of acute disease in the chest. Electronically Signed: Richardson Ga MD  5/20/2024 2:10 PM EDT  Workstation ID: [id number]    CT Angiogram Head w AI Analysis of LVO    Result Date: 5/20/2024  Impression: Atherosclerotic changes are present in the head and neck as above, without evidence of associated high-grade flow-limiting stenosis, large vessel occlusion or aneurysm. Atherosclerotic changes at the right ICA origin results in approximate 50 to 60% narrowing and there is also an immediately adjacent area of ulcerated soft plaque present, likely a high risk embolic lesion. CT perfusion demonstrates no evidence of core infarct or significant territorial ischemic tissue at risk. Electronically Signed: Richardson Ga MD  5/20/2024 1:54 PM EDT  Workstation ID: [id number]    CT Angiogram Neck    Result Date: 5/20/2024  Impression: Atherosclerotic changes are present in the head and neck as above, without evidence of associated high-grade flow-limiting stenosis, large vessel occlusion or aneurysm. Atherosclerotic changes at the right ICA origin results in approximate 50 to 60% narrowing and there is also an immediately adjacent area of ulcerated soft plaque present, likely a high risk embolic lesion. CT perfusion demonstrates no evidence of core infarct or significant territorial ischemic tissue at risk. Electronically Signed: Richardson Ga MD  5/20/2024 1:54 PM EDT  Workstation ID: [id number]    CT CEREBRAL PERFUSION WITH & WITHOUT CONTRAST    Result Date: 5/20/2024  Impression: Atherosclerotic changes are present in the head and neck as above, without evidence of associated high-grade flow-limiting stenosis, large vessel occlusion or aneurysm. Atherosclerotic changes at the right ICA origin results in approximate 50 to 60% narrowing and there is also an  immediately adjacent area of ulcerated soft plaque present, likely a high risk embolic lesion. CT perfusion demonstrates no evidence of core infarct or significant territorial ischemic tissue at risk. Electronically Signed: Richardson Ga MD  5/20/2024 1:54 PM EDT  Workstation ID: [id number]    CT Head Without Contrast Stroke Protocol    Result Date: 5/20/2024  Impression: No evidence of acute intracranial abnormality. Electronically Signed: Benito Dumont MD  5/20/2024 1:33 PM EDT  Workstation ID: [id number]      Laboratory Results:   Lab Results   Component Value Date    GLUCOSE 144 (H) 05/21/2024    BUN 15 05/21/2024    CREATININE 1.21 (H) 05/21/2024    EGFRRESULT 41.9 (L) 06/20/2023    EGFR 45.4 (L) 05/21/2024    BCR 12.4 05/21/2024    K 4.4 05/21/2024    CO2 25.0 05/21/2024    CALCIUM 9.1 05/21/2024    PROTENTOTREF 6.0 06/20/2023    ALBUMIN 3.8 05/20/2024    BILITOT 0.3 05/20/2024    AST 22 05/20/2024    AST 21 05/20/2024    ALT 13 05/20/2024    ALT 13 05/20/2024     Lipid Panel          5/21/2024    05:35   Lipid Panel   Total Cholesterol 114    Triglycerides 148    HDL Cholesterol 39    VLDL Cholesterol 25    LDL Cholesterol  50    LDL/HDL Ratio 1.16       Most Recent A1C          5/21/2024    05:35   HGBA1C Most Recent   Hemoglobin A1C 6.20            Assessment / Plan      Assessment/Plan:   Diagnoses and all orders for this visit:    1. History of TIA (transient ischemic attack) (Primary)  -Continue DAPT with ASA 81 mg and Plavix 75 mg  -Continue statin  -Normal BP goals, less than 130/80  -Reviewed signs and symptoms of stroke and when to call 911  -Reviewed lifestyle modifications such as heart healthy diet, increased activity and medication compliance  -Encourage patient to consider PT/OT but she declines at this time  -Follow-up in the stroke clinic in approximately 3 months    2. Bilateral carotid artery stenosis  -     Duplex Carotid Ultrasound     3. Mixed hyperlipidemia  -LDL 50, goal less than  70  -Continue Lipitor 80 mg nightly    4. Controlled type 2 diabetes mellitus   -A1c 6.2  -Goal less than 7%  -Continue to work closely with PCP on optimal medical management       Discussed the importance of medication compliance and lifestyle modifications (adequate blood pressure control, adequate control of hyperlipidemia, adequate glycemic control, increase physical activity, and healthy diet) to help reduce the risk of future cerebrovascular events.  Also discussed the signs symptoms that would warrant the patient return back to the emergency department including unilateral weakness, unilateral numbness, visual disturbances, loss of balance, speech difficulties, and/or a sudden severe headache.      Follow Up:   Return in about 3 months (around 10/3/2024).    SKYLAR Topete  [facility] [place] [facility]      Yes

## 2024-07-18 ENCOUNTER — TELEPHONE (OUTPATIENT)
Dept: ORTHOPEDIC SURGERY | Facility: CLINIC | Age: 79
End: 2024-07-18

## 2024-07-18 NOTE — TELEPHONE ENCOUNTER
Hub staff attempted to follow warm transfer process and was unsuccessful     Caller: Nely Grider    Relationship to patient: Self    Best call back number: [phone]    Patient is needing: PATIENT'S PRIMARY CARE DR. ORTIZ WANTS HER IN SOONER THAN 07/29- PLEASE REACH OUT AND SCHEDULE

## 2024-07-22 ENCOUNTER — OFFICE VISIT (OUTPATIENT)
Dept: ORTHOPEDIC SURGERY | Facility: CLINIC | Age: 79
End: 2024-07-22
Payer: MEDICARE

## 2024-07-22 ENCOUNTER — LAB (OUTPATIENT)
Dept: LAB | Facility: HOSPITAL | Age: 79
End: 2024-07-22
Payer: MEDICARE

## 2024-07-22 VITALS
BODY MASS INDEX: 27.32 KG/M2 | HEIGHT: 65 IN | WEIGHT: 164 LBS | DIASTOLIC BLOOD PRESSURE: 70 MMHG | SYSTOLIC BLOOD PRESSURE: 120 MMHG

## 2024-07-22 DIAGNOSIS — M25.552 LEFT HIP PAIN: ICD-10-CM

## 2024-07-22 DIAGNOSIS — M25.552 LEFT HIP PAIN: Primary | ICD-10-CM

## 2024-07-22 DIAGNOSIS — Z96.642 STATUS POST TOTAL REPLACEMENT OF LEFT HIP: ICD-10-CM

## 2024-07-22 LAB
BASOPHILS # BLD AUTO: 0.07 10*3/MM3 (ref 0–0.2)
BASOPHILS NFR BLD AUTO: 0.3 % (ref 0–1.5)
CRP SERPL-MCNC: <0.3 MG/DL (ref 0–0.5)
DEPRECATED RDW RBC AUTO: 45.1 FL (ref 37–54)
EOSINOPHIL # BLD AUTO: 0.18 10*3/MM3 (ref 0–0.4)
EOSINOPHIL NFR BLD AUTO: 0.9 % (ref 0.3–6.2)
ERYTHROCYTE [DISTWIDTH] IN BLOOD BY AUTOMATED COUNT: 13 % (ref 12.3–15.4)
ERYTHROCYTE [SEDIMENTATION RATE] IN BLOOD: 3 MM/HR (ref 0–30)
HCT VFR BLD AUTO: 34.7 % (ref 34–46.6)
HGB BLD-MCNC: 11.1 G/DL (ref 12–15.9)
IMM GRANULOCYTES # BLD AUTO: 0.1 10*3/MM3 (ref 0–0.05)
IMM GRANULOCYTES NFR BLD AUTO: 0.5 % (ref 0–0.5)
LYMPHOCYTES # BLD AUTO: 5.28 10*3/MM3 (ref 0.7–3.1)
LYMPHOCYTES NFR BLD AUTO: 26.4 % (ref 19.6–45.3)
MCH RBC QN AUTO: 30.4 PG (ref 26.6–33)
MCHC RBC AUTO-ENTMCNC: 32 G/DL (ref 31.5–35.7)
MCV RBC AUTO: 95.1 FL (ref 79–97)
MONOCYTES # BLD AUTO: 1.35 10*3/MM3 (ref 0.1–0.9)
MONOCYTES NFR BLD AUTO: 6.7 % (ref 5–12)
NEUTROPHILS NFR BLD AUTO: 13.03 10*3/MM3 (ref 1.7–7)
NEUTROPHILS NFR BLD AUTO: 65.2 % (ref 42.7–76)
NRBC BLD AUTO-RTO: 0 /100 WBC (ref 0–0.2)
PLATELET # BLD AUTO: 405 10*3/MM3 (ref 140–450)
PMV BLD AUTO: 9.9 FL (ref 6–12)
RBC # BLD AUTO: 3.65 10*6/MM3 (ref 3.77–5.28)
WBC NRBC COR # BLD AUTO: 20.01 10*3/MM3 (ref 3.4–10.8)

## 2024-07-22 PROCEDURE — 36415 COLL VENOUS BLD VENIPUNCTURE: CPT

## 2024-07-22 PROCEDURE — 86140 C-REACTIVE PROTEIN: CPT

## 2024-07-22 PROCEDURE — 1159F MED LIST DOCD IN RCRD: CPT | Performed by: ORTHOPAEDIC SURGERY

## 2024-07-22 PROCEDURE — 85025 COMPLETE CBC W/AUTO DIFF WBC: CPT

## 2024-07-22 PROCEDURE — 99214 OFFICE O/P EST MOD 30 MIN: CPT | Performed by: ORTHOPAEDIC SURGERY

## 2024-07-22 PROCEDURE — 1160F RVW MEDS BY RX/DR IN RCRD: CPT | Performed by: ORTHOPAEDIC SURGERY

## 2024-07-22 PROCEDURE — 3078F DIAST BP <80 MM HG: CPT | Performed by: ORTHOPAEDIC SURGERY

## 2024-07-22 PROCEDURE — 85652 RBC SED RATE AUTOMATED: CPT

## 2024-07-22 PROCEDURE — 3074F SYST BP LT 130 MM HG: CPT | Performed by: ORTHOPAEDIC SURGERY

## 2024-07-22 NOTE — PROGRESS NOTES
[facility] [place] [facility] [place]    Subjective     Chief Complaint   Patient presents with    Left Hip - Pain, Initial Evaluation        HPI    It has been 2  year(s) since Ms. Grider's last visit. She returns to clinic today for new evaluation and treatment of left hip pain. The issue has been ongoing for 2 month(s), off and on, but got worse last week. Previous/current treatments: cane/walker and NSAIDS. Current symptoms: pain, stiffness, and giving way/buckling. The pain is worse with walking, standing, climbing stairs, sleeping, and lying on affected side; heat and pain medication and/or NSAID improve the pain. Overall, she is doing better.  She is fallen twice over the past couple of months.  Pain started out in the lower back area, and then radiates to the anterior aspect of the hip and thigh.  Numbness and tingling in the lower extremity.  The pain has improved since she made the appointment, but still having some difficulty with the left hip.  The pain runs down to her knee.  Pain is intermittent.    Left total hip arthroplasty 2019.    I have reviewed the following portions of the patient's history and agree with: History of Present Illness and Review of Systems    Patient Active Problem List   Diagnosis    Gastroesophageal reflux disease without esophagitis    Mixed hyperlipidemia    Essential hypertension    Controlled type 2 diabetes mellitus without complication, without long-term current use of insulin    Chronic kidney disease (CKD), stage III (moderate)    Anemia    Diabetic polyneuropathy associated with type 2 diabetes mellitus    Cerebrovascular accident (CVA) due to thrombosis of right middle cerebral artery    Chronic pain syndrome    Osteopenia    Difficulty in walking, not elsewhere classified    Generalized muscle weakness    Type 2 diabetes mellitus with diabetic neuropathy, unspecified    Unspecified intracapsular fracture of right femur, initial encounter for closed fracture     Essential (primary) hypertension    Gastro-esophageal reflux disease without esophagitis    Presence of right artificial hip joint    Hyperlipidemia, unspecified    Pain in right hip    Bilateral primary osteoarthritis of hip    Osteoarthritis of hip, unspecified    Unspecified osteoarthritis, unspecified site    Diverticulitis of large intestine    Low back pain    Other staphylococcus as the cause of diseases classified elsewhere    Thrombophlebitis of superficial veins of upper extremities    Chronic renal failure syndrome    Polyneuropathy in diabetes    Effusion, right hip    Right sided numbness    Fall     Past Medical History:   Diagnosis Date    Anemia 09/18/2016    Anesthesia complication     per patient with hip surgery last april did not remember being in hospital or going home can only rmember from [facility]    Anesthesia complication     with hip surgery in april 2018 had issues with swallowing after surgery called in speech    Arthritis     Cancer     cervical    Cataract     Colon polyp 01/31/2019    Cecal polyp 5 years Dr. Fatima awaiting pathology 1/19    Diabetes mellitus     type 2 dm, check blood sugar every morning, diagnosed 3 or 4 years    Diverticulosis 01/31/2019    Full dentures     Full dentures     GERD (gastroesophageal reflux disease)     High cholesterol     History of IBS     Hypertension     been off bp meds since last april 2018    Neuropathy     Primary osteoarthritis of both hips 01/31/2018    Added automatically from request for surgery [id number]    Status post total replacement of left hip 04/19/2018    Wears glasses       Past Surgical History:   Procedure Laterality Date    CARDIAC CATHETERIZATION      CHOLECYSTECTOMY      COLONOSCOPY      EYE SURGERY      bilateral cataract    HYSTERECTOMY      partial    JOINT REPLACEMENT      hip surgery 2018 in april    OOPHORECTOMY      one removed    TONSILLECTOMY      TOTAL HIP ARTHROPLASTY Right 04/19/2018    Procedure: RIGHT TOTAL HIP  ARTHROPLASTY;  Surgeon: Carlos Pollack MD;  Location:  [facility];  Service: Orthopedics    TOTAL HIP ARTHROPLASTY Left 02/12/2019    Procedure: TOTAL HIP ARTHROPLASTY LEFT;  Surgeon: Carlos Pollack MD;  Location:  [facility];  Service: Orthopedics    WRIST SURGERY      metal plate      Family History   Problem Relation Age of Onset    Arthritis Mother     Heart attack Mother     Hypertension Mother     Hyperlipidemia Mother     Osteoporosis Mother     Heart disease Sister     Diabetes Sister     Arthritis Sister     Heart attack Sister     Hypertension Sister     Hyperlipidemia Sister     Bleeding Disorder Sister     Heart disease Brother     Cancer Brother     Diabetes Brother     Arthritis Brother     Heart attack Brother     Hypertension Brother     Hyperlipidemia Brother     Ovarian cancer Daughter     Cancer Daughter     Ovarian cancer Other         grandaughter    Ovarian cancer Other         granddaughter    Heart attack Father     Hypertension Father     Stroke Father     Kidney disease Paternal Uncle     Liver disease Paternal Uncle     Cancer Other     Stroke Other     Diabetes Other      Social History     Socioeconomic History    Marital status:    Tobacco Use    Smoking status: Never     Passive exposure: Never    Smokeless tobacco: Never   Vaping Use    Vaping status: Never Used   Substance and Sexual Activity    Alcohol use: No    Drug use: No    Sexual activity: Defer     Comment:       Current Outpatient Medications on File Prior to Visit   Medication Sig Dispense Refill    amLODIPine (NORVASC) 5 MG tablet Take 1 tablet by mouth Daily. 90 tablet 3    aspirin 81 MG chewable tablet Chew 1 tablet Daily. 30 tablet 0    atorvastatin (LIPITOR) 80 MG tablet TAKE 1 TABLET BY MOUTH EVERY NIGHT. 90 tablet 3    clopidogrel (PLAVIX) 75 MG tablet Take 1 tablet by mouth Daily. 90 tablet 0    gabapentin (NEURONTIN) 400 MG capsule Take 1 capsule by mouth 3 (Three) Times a Day. 270 capsule 1     glimepiride (AMARYL) 4 MG tablet TAKE 1 TABLET BY MOUTH EVERY MORNING BEFORE BREAKFAST. 90 tablet 3    metFORMIN (GLUCOPHAGE) 500 MG tablet TAKE 1 TABLET TWICE DAILY WITH MEALS 180 tablet 1    omeprazole (priLOSEC) 20 MG capsule Take 1 capsule by mouth 2 (Two) Times a Day. 180 capsule 3     No current facility-administered medications on file prior to visit.      Allergies   Allergen Reactions    Codeine Hives        Review of Systems   Constitutional:  Negative for activity change, appetite change, chills, diaphoresis, fatigue, fever and unexpected weight change.   HENT:  Negative for congestion, dental problem, drooling, ear discharge, ear pain, facial swelling, hearing loss, mouth sores, nosebleeds, postnasal drip, rhinorrhea, sinus pressure, sneezing, sore throat, tinnitus, trouble swallowing and voice change.    Eyes:  Negative for photophobia, pain, discharge, redness, itching and visual disturbance.   Respiratory:  Negative for apnea, cough, choking, chest tightness, shortness of breath, wheezing and stridor.    Cardiovascular:  Negative for chest pain, palpitations and leg swelling.   Gastrointestinal:  Negative for abdominal distention, abdominal pain, anal bleeding, blood in stool, constipation, diarrhea, nausea, rectal pain and vomiting.   Endocrine: Negative for cold intolerance, heat intolerance, polydipsia, polyphagia and polyuria.   Genitourinary:  Negative for decreased urine volume, difficulty urinating, dysuria, enuresis, flank pain, frequency, genital sores, hematuria and urgency.   Musculoskeletal:  Positive for arthralgias. Negative for back pain, gait problem, joint swelling, myalgias, neck pain and neck stiffness.   Skin:  Negative for color change, pallor, rash and wound.   Allergic/Immunologic: Negative for environmental allergies, food allergies and immunocompromised state.   Neurological:  Negative for dizziness, tremors, seizures, syncope, facial asymmetry, speech difficulty, weakness,  "light-headedness, numbness and headaches.   Hematological:  Negative for adenopathy. Does not bruise/bleed easily.   Psychiatric/Behavioral:  Negative for agitation, behavioral problems, confusion, decreased concentration, dysphoric mood, hallucinations, self-injury, sleep disturbance and suicidal ideas. The patient is not nervous/anxious and is not hyperactive.         Objective      Physical Exam  /70   Ht 165 cm (64.96\")   Wt 74.4 kg (164 lb)   BMI 27.32 kg/m²     Body mass index is 27.32 kg/m².  BMI is >= 25 and <30. (Overweight) The following options were offered after discussion;: referral to primary care      General:   Mental Status:  Alert   Appearance: Cooperative, in no acute distress   Build and Nutrition: Well-nourished well-developed female   Orientation: Alert and oriented to person, place and time   Posture: Normal   Gait: Limp on the left    Integument:              Left hip: Wound is well-healed with no signs of infection     Lower Extremity:              Left Hip:                          Tenderness:     Lateral tenderness                          Swelling:          None                          Crepitus:          None                          Range of motion:        External Rotation:       30°                                                              Internal Rotation:        30°                                                              Flexion:                       100°                                                              Extension:                   0°                       Deformities:     None  Functional testing: Negative Stinchfield                          No leg length discrepancy    Imaging/Studies  Imaging Results (Last 24 Hours)       Procedure Component Value Units Date/Time    XR Hip With or Without Pelvis 2 - 3 View Left [[id number]] Resulted: 07/22/24 0901     Updated: 07/22/24 0902    Narrative:      Left Hip Radiographs  Indication: status-post left total " hip arthroplasty  Views: low AP pelvis and lateral of the left hip    Comparison: no change compared to prior study, 5/20/2024    Findings:   The components are well aligned, with no signs of loosening or failure.    No acute bony abnormalities.                Assessment and Plan     Diagnoses and all orders for this visit:    1. Left hip pain (Primary)  -     XR Hip With or Without Pelvis 2 - 3 View Left  -     CBC & Differential; Future  -     C-reactive Protein; Future  -     Sedimentation Rate; Future    2. Status post total replacement of left hip        1. Left hip pain    2. Status post total replacement of left hip        I reviewed my findings with the patient and her great granddaughter.  At this point, I would like to get screening labs for infection, although I think is low likelihood.  No unusual features were noted with the components radiographically.  I will see her back in a week, but I will be happy to see her back sooner for any problems.  Possibility of this coming from her back has been discussed, and she may benefit from seeing a spine specialist which was briefly discussed today.  We will reevaluate on her next visit.  I will see her back sooner for any problems.    Return in about 1 week (around 7/29/2024).      Carlos Pollack MD  07/22/24  09:09 EDT    Dictated Utilizing Dragon Dictation

## 2024-07-26 ENCOUNTER — TELEPHONE (OUTPATIENT)
Dept: ORTHOPEDIC SURGERY | Facility: CLINIC | Age: 79
End: 2024-07-26
Payer: MEDICARE

## 2024-07-26 NOTE — TELEPHONE ENCOUNTER
I called the patient and discussed her white blood cell count, which is 20,000.  Her sed rate and her CRP are normal.  I have recommended an evaluation with her primary care physician.  She is going to contact Dr. Robles for an appointment.  I will have our office forward the results to Dr. Robles's office.  Patient will keep her follow-up visit with me in August.  Of note, her hip only bothers her intermittently at the current time.    Carlos Pollack MD  07/26/24  14:54 EDT

## 2024-08-05 ENCOUNTER — OFFICE VISIT (OUTPATIENT)
Dept: ORTHOPEDIC SURGERY | Facility: CLINIC | Age: 79
End: 2024-08-05
Payer: MEDICARE

## 2024-08-05 VITALS
WEIGHT: 164.02 LBS | HEIGHT: 65 IN | DIASTOLIC BLOOD PRESSURE: 60 MMHG | BODY MASS INDEX: 27.33 KG/M2 | SYSTOLIC BLOOD PRESSURE: 112 MMHG

## 2024-08-05 DIAGNOSIS — Z96.642 STATUS POST TOTAL REPLACEMENT OF LEFT HIP: ICD-10-CM

## 2024-08-05 DIAGNOSIS — M25.552 LEFT HIP PAIN: Primary | ICD-10-CM

## 2024-08-05 PROCEDURE — 99213 OFFICE O/P EST LOW 20 MIN: CPT | Performed by: ORTHOPAEDIC SURGERY

## 2024-08-05 PROCEDURE — 3078F DIAST BP <80 MM HG: CPT | Performed by: ORTHOPAEDIC SURGERY

## 2024-08-05 PROCEDURE — 3074F SYST BP LT 130 MM HG: CPT | Performed by: ORTHOPAEDIC SURGERY

## 2024-08-05 PROCEDURE — 1159F MED LIST DOCD IN RCRD: CPT | Performed by: ORTHOPAEDIC SURGERY

## 2024-08-05 PROCEDURE — 1160F RVW MEDS BY RX/DR IN RCRD: CPT | Performed by: ORTHOPAEDIC SURGERY

## 2024-08-05 RX ORDER — CYCLOBENZAPRINE HCL 5 MG
TABLET ORAL
COMMUNITY
Start: 2024-07-29

## 2024-08-05 NOTE — PROGRESS NOTES
[facility] [place]    Subjective     Chief Complaint   Patient presents with    Follow-up     2 week recheck- Left hip pain        HPI    It has been 2  week(s) since Ms. Grider's last visit. She returns to clinic today for follow-up of left hip pain. The issue has been ongoing for 2 month(s). She rates her pain a 8/10 on the pain scale. Previous/current treatments: cane/walker and NSAIDS. Current symptoms: same as prior visit. The pain is worse with standing, climbing stairs, and lying on affected side; resting and pain medication and/or NSAID improve the pain. Overall, she is doing better.     History of Present Illness  The patient is an 78-year-old female who presents for follow-up on her left hip. She is accompanied by her .    The patient reports experiencing pain in her left hip, which initially manifested in her lower back and subsequently radiated into her thigh. This pain was so severe that it impeded her ability to stand. The pain, which she describes as a deep toothache, has shown significant improvement since her last visit.  She denies any known back-related issues, but mentions a 40-year history of fatty tumors on her back. Additionally, she has neuropathy in both lower extremities and experiences nocturnal perspiration, which she reports as normal for her. Her primary care physician, Dr. Robles, has noted a slight decrease in her white blood cell count, which she attributes to the initiation of steroid therapy. She is scheduled to undergo additional blood work tomorrow. The patient describes her pain as constant, dull, and occasionally severe, rating it at 4 out of 10. She also experiences pain on the lateral side of her hip. The pain, which has been present for 2 weeks, is severe enough to disrupt her sleep.       I have reviewed the following portions of the patient's history and agree with: History of Present Illness and Review of Systems    Patient Active Problem  List   Diagnosis    Gastroesophageal reflux disease without esophagitis    Mixed hyperlipidemia    Essential hypertension    Controlled type 2 diabetes mellitus without complication, without long-term current use of insulin    Chronic kidney disease (CKD), stage III (moderate)    Anemia    Diabetic polyneuropathy associated with type 2 diabetes mellitus    Cerebrovascular accident (CVA) due to thrombosis of right middle cerebral artery    Chronic pain syndrome    Osteopenia    Difficulty in walking, not elsewhere classified    Generalized muscle weakness    Type 2 diabetes mellitus with diabetic neuropathy, unspecified    Unspecified intracapsular fracture of right femur, initial encounter for closed fracture    Essential (primary) hypertension    Gastro-esophageal reflux disease without esophagitis    Presence of right artificial hip joint    Hyperlipidemia, unspecified    Pain in right hip    Bilateral primary osteoarthritis of hip    Osteoarthritis of hip, unspecified    Unspecified osteoarthritis, unspecified site    Diverticulitis of large intestine    Low back pain    Other staphylococcus as the cause of diseases classified elsewhere    Thrombophlebitis of superficial veins of upper extremities    Chronic renal failure syndrome    Polyneuropathy in diabetes    Effusion, right hip    Right sided numbness    Fall     Past Medical History:   Diagnosis Date    Anemia 09/18/2016    Anesthesia complication     per patient with hip surgery last april did not remember being in hospital or going home can only rmember from [facility]    Anesthesia complication     with hip surgery in april 2018 had issues with swallowing after surgery called in speech    Arthritis     Cancer     cervical    Cataract     Colon polyp 01/31/2019    Cecal polyp 5 years Dr. Fatima awaiting pathology 1/19    Diabetes mellitus     type 2 dm, check blood sugar every morning, diagnosed 3 or 4 years    Diverticulosis 01/31/2019    Full dentures      Full dentures     GERD (gastroesophageal reflux disease)     High cholesterol     History of IBS     Hypertension     been off bp meds since last april 2018    Neuropathy     Primary osteoarthritis of both hips 01/31/2018    Added automatically from request for surgery [id number]    Status post total replacement of left hip 04/19/2018    Wears glasses       Past Surgical History:   Procedure Laterality Date    CARDIAC CATHETERIZATION      CHOLECYSTECTOMY      COLONOSCOPY      EYE SURGERY      bilateral cataract    HYSTERECTOMY      partial    JOINT REPLACEMENT      hip surgery 2018 in april    OOPHORECTOMY      one removed    TONSILLECTOMY      TOTAL HIP ARTHROPLASTY Right 04/19/2018    Procedure: RIGHT TOTAL HIP ARTHROPLASTY;  Surgeon: Carlos Pollack MD;  Location:  [facility];  Service: Orthopedics    TOTAL HIP ARTHROPLASTY Left 02/12/2019    Procedure: TOTAL HIP ARTHROPLASTY LEFT;  Surgeon: Carlos Pollack MD;  Location:  [facility];  Service: Orthopedics    WRIST SURGERY      metal plate      Family History   Problem Relation Age of Onset    Arthritis Mother     Heart attack Mother     Hypertension Mother     Hyperlipidemia Mother     Osteoporosis Mother     Heart disease Sister     Diabetes Sister     Arthritis Sister     Heart attack Sister     Hypertension Sister     Hyperlipidemia Sister     Bleeding Disorder Sister     Heart disease Brother     Cancer Brother     Diabetes Brother     Arthritis Brother     Heart attack Brother     Hypertension Brother     Hyperlipidemia Brother     Ovarian cancer Daughter     Cancer Daughter     Ovarian cancer Other         grandaughter    Ovarian cancer Other         granddaughter    Heart attack Father     Hypertension Father     Stroke Father     Kidney disease Paternal Uncle     Liver disease Paternal Uncle     Cancer Other     Stroke Other     Diabetes Other      Social History     Socioeconomic History    Marital status:    Tobacco Use    Smoking status: Never      Passive exposure: Never    Smokeless tobacco: Never   Vaping Use    Vaping status: Never Used   Substance and Sexual Activity    Alcohol use: No    Drug use: No    Sexual activity: Defer     Comment:       Current Outpatient Medications on File Prior to Visit   Medication Sig Dispense Refill    amLODIPine (NORVASC) 5 MG tablet Take 1 tablet by mouth Daily. 90 tablet 3    aspirin 81 MG chewable tablet Chew 1 tablet Daily. 30 tablet 0    atorvastatin (LIPITOR) 80 MG tablet TAKE 1 TABLET BY MOUTH EVERY NIGHT. 90 tablet 3    clopidogrel (PLAVIX) 75 MG tablet Take 1 tablet by mouth Daily. 90 tablet 0    cyclobenzaprine (FLEXERIL) 5 MG tablet Take 1 tablet 3 times a day by oral route for 7 days.      gabapentin (NEURONTIN) 400 MG capsule Take 1 capsule by mouth 3 (Three) Times a Day. 270 capsule 1    glimepiride (AMARYL) 4 MG tablet TAKE 1 TABLET BY MOUTH EVERY MORNING BEFORE BREAKFAST. 90 tablet 3    metFORMIN (GLUCOPHAGE) 500 MG tablet TAKE 1 TABLET TWICE DAILY WITH MEALS 180 tablet 1    omeprazole (priLOSEC) 20 MG capsule Take 1 capsule by mouth 2 (Two) Times a Day. 180 capsule 3     No current facility-administered medications on file prior to visit.      Allergies   Allergen Reactions    Codeine Hives        Review of Systems   Constitutional:  Negative for activity change, appetite change, chills, diaphoresis, fatigue, fever and unexpected weight change.   HENT:  Negative for congestion, dental problem, drooling, ear discharge, ear pain, facial swelling, hearing loss, mouth sores, nosebleeds, postnasal drip, rhinorrhea, sinus pressure, sneezing, sore throat, tinnitus, trouble swallowing and voice change.    Eyes:  Negative for photophobia, pain, discharge, redness, itching and visual disturbance.   Respiratory:  Negative for apnea, cough, choking, chest tightness, shortness of breath, wheezing and stridor.    Cardiovascular:  Negative for chest pain, palpitations and leg swelling.   Gastrointestinal:   "Negative for abdominal distention, abdominal pain, anal bleeding, blood in stool, constipation, diarrhea, nausea, rectal pain and vomiting.   Endocrine: Negative for cold intolerance, heat intolerance, polydipsia, polyphagia and polyuria.   Genitourinary:  Negative for decreased urine volume, difficulty urinating, dysuria, enuresis, flank pain, frequency, genital sores, hematuria and urgency.   Musculoskeletal:  Positive for arthralgias. Negative for back pain, gait problem, joint swelling, myalgias, neck pain and neck stiffness.   Skin:  Negative for color change, pallor, rash and wound.   Allergic/Immunologic: Negative for environmental allergies, food allergies and immunocompromised state.   Neurological:  Negative for dizziness, tremors, seizures, syncope, facial asymmetry, speech difficulty, weakness, light-headedness, numbness and headaches.   Hematological:  Negative for adenopathy. Does not bruise/bleed easily.   Psychiatric/Behavioral:  Negative for agitation, behavioral problems, confusion, decreased concentration, dysphoric mood, hallucinations, self-injury, sleep disturbance and suicidal ideas. The patient is not nervous/anxious and is not hyperactive.         Objective      Physical Exam  /60   Ht 165 cm (64.96\")   Wt 74.4 kg (164 lb 0.4 oz)   BMI 27.33 kg/m²     Body mass index is 27.33 kg/m².         General:   Mental Status:  Alert   Appearance: Cooperative, in no acute distress   Build and Nutrition: Well-nourished well-developed female   Orientation: Alert and oriented to person, place and time   Posture: Normal   Gait: Mild limp on the left    Physical Exam  Incision on the hip is well healed. No redness or erythema. Mild lateral tenderness is present. Negative Stinchfield. Internal and external rotation of the hip is 40 and 40 degrees respectively with no pain. Leg lengths are equal.         Imaging/Studies  Imaging Results (Last 24 Hours)       ** No results found for the last 24 hours. " **            Results  Laboratory Studies  Sed rate and CRP were normal. White blood cell count was elevated, and is being evaluated by her primary care physician.     Lab Results   Component Value Date    SEDRATE 3 07/22/2024    WBC 20.01 (H) 07/22/2024    CRP <0.30 07/22/2024         Assessment and Plan     Diagnoses and all orders for this visit:    1. Left hip pain (Primary)  -     Ambulatory Referral to Orthopedic Surgery    2. Status post total replacement of left hip        1. Left hip pain    2. Status post total replacement of left hip        Assessment & Plan  1. Left hip pain.  Her sed rate and CRP are normal, white blood cell count was elevated and being evaluated by her primary care physician, thought to be steroid related. The pain appears to be originating from the back. A referral to a spine specialist has been made for further evaluation.    Follow-up  A follow-up appointment is scheduled for 6 weeks from now, with the provision for an earlier visit if necessary.         Return in about 6 weeks (around 9/16/2024).      Carlos Pollack MD  08/05/24  08:28 EDT    Patient or patient representative verbalized consent for the use of Ambient Listening during the visit with  Carlos Pollack MD for chart documentation. 8/5/2024  08:13 EDT    Dictated Utilizing Dragon Dictation

## 2024-08-22 ENCOUNTER — HOSPITAL ENCOUNTER (OUTPATIENT)
Dept: CARDIOLOGY | Facility: HOSPITAL | Age: 79
Discharge: HOME | End: 2024-08-22
Admitting: NURSE PRACTITIONER
Payer: MEDICARE

## 2024-08-22 VITALS — WEIGHT: 164.02 LBS | HEIGHT: 65 IN | BODY MASS INDEX: 27.33 KG/M2

## 2024-08-22 DIAGNOSIS — I65.23 BILATERAL CAROTID ARTERY STENOSIS: ICD-10-CM

## 2024-08-22 LAB
BH CV XLRA MEAS LEFT DIST CCA EDV: 23.8 CM/SEC
BH CV XLRA MEAS LEFT DIST CCA PSV: 72.3 CM/SEC
BH CV XLRA MEAS LEFT DIST ICA EDV: 45.9 CM/SEC
BH CV XLRA MEAS LEFT DIST ICA PSV: 109.9 CM/SEC
BH CV XLRA MEAS LEFT ICA/CCA RATIO: 1.22
BH CV XLRA MEAS LEFT MID CCA EDV: 28.1 CM/SEC
BH CV XLRA MEAS LEFT MID CCA PSV: 90 CM/SEC
BH CV XLRA MEAS LEFT MID ICA EDV: 33.3 CM/SEC
BH CV XLRA MEAS LEFT MID ICA PSV: 95.6 CM/SEC
BH CV XLRA MEAS LEFT PROX CCA EDV: 23.4 CM/SEC
BH CV XLRA MEAS LEFT PROX CCA PSV: 83.1 CM/SEC
BH CV XLRA MEAS LEFT PROX ECA EDV: 9.5 CM/SEC
BH CV XLRA MEAS LEFT PROX ECA PSV: 72.7 CM/SEC
BH CV XLRA MEAS LEFT PROX ICA EDV: 32.9 CM/SEC
BH CV XLRA MEAS LEFT PROX ICA PSV: 97.4 CM/SEC
BH CV XLRA MEAS LEFT PROX SCLA PSV: 146.7 CM/SEC
BH CV XLRA MEAS LEFT VERTEBRAL A PSV: 45.4 CM/SEC
BH CV XLRA MEAS RIGHT DIST CCA EDV: 20.9 CM/SEC
BH CV XLRA MEAS RIGHT DIST CCA PSV: 69 CM/SEC
BH CV XLRA MEAS RIGHT DIST ICA EDV: 29.2 CM/SEC
BH CV XLRA MEAS RIGHT DIST ICA PSV: 74.7 CM/SEC
BH CV XLRA MEAS RIGHT ICA/CCA RATIO: 1.74
BH CV XLRA MEAS RIGHT MID CCA EDV: 18.7 CM/SEC
BH CV XLRA MEAS RIGHT MID CCA PSV: 80.3 CM/SEC
BH CV XLRA MEAS RIGHT MID ICA EDV: 35.1 CM/SEC
BH CV XLRA MEAS RIGHT MID ICA PSV: 97.4 CM/SEC
BH CV XLRA MEAS RIGHT PROX CCA EDV: 20.9 CM/SEC
BH CV XLRA MEAS RIGHT PROX CCA PSV: 85.2 CM/SEC
BH CV XLRA MEAS RIGHT PROX ECA EDV: 9.7 CM/SEC
BH CV XLRA MEAS RIGHT PROX ECA PSV: 98.7 CM/SEC
BH CV XLRA MEAS RIGHT PROX ICA EDV: 56.4 CM/SEC
BH CV XLRA MEAS RIGHT PROX ICA PSV: 148.2 CM/SEC
BH CV XLRA MEAS RIGHT PROX SCLA PSV: 169.25 CM/SEC
BH CV XLRA MEAS RIGHT VERTEBRAL A PSV: 47.4 CM/SEC
LEFT ARM BP: NORMAL MMHG
RIGHT ARM BP: NORMAL MMHG

## 2024-08-22 PROCEDURE — 93880 EXTRACRANIAL BILAT STUDY: CPT

## 2024-09-18 ENCOUNTER — OFFICE VISIT (OUTPATIENT)
Dept: ORTHOPEDIC SURGERY | Facility: CLINIC | Age: 79
End: 2024-09-18
Payer: MEDICARE

## 2024-09-18 VITALS
WEIGHT: 168 LBS | BODY MASS INDEX: 27.99 KG/M2 | DIASTOLIC BLOOD PRESSURE: 72 MMHG | SYSTOLIC BLOOD PRESSURE: 122 MMHG | HEIGHT: 65 IN

## 2024-09-18 DIAGNOSIS — Z96.642 STATUS POST TOTAL REPLACEMENT OF LEFT HIP: ICD-10-CM

## 2024-09-18 DIAGNOSIS — M25.552 LEFT HIP PAIN: Primary | ICD-10-CM

## 2024-09-18 PROCEDURE — 3078F DIAST BP <80 MM HG: CPT | Performed by: ORTHOPAEDIC SURGERY

## 2024-09-18 PROCEDURE — 99212 OFFICE O/P EST SF 10 MIN: CPT | Performed by: ORTHOPAEDIC SURGERY

## 2024-09-18 PROCEDURE — 1160F RVW MEDS BY RX/DR IN RCRD: CPT | Performed by: ORTHOPAEDIC SURGERY

## 2024-09-18 PROCEDURE — 3074F SYST BP LT 130 MM HG: CPT | Performed by: ORTHOPAEDIC SURGERY

## 2024-09-18 PROCEDURE — 1159F MED LIST DOCD IN RCRD: CPT | Performed by: ORTHOPAEDIC SURGERY

## 2024-09-18 RX ORDER — FLUTICASONE PROPIONATE 50 MCG
2 SPRAY, SUSPENSION (ML) NASAL DAILY PRN
COMMUNITY
Start: 2024-09-13

## 2024-09-18 RX ORDER — MUPIROCIN 20 MG/G
OINTMENT TOPICAL
COMMUNITY
Start: 2024-09-13

## 2024-10-03 ENCOUNTER — OFFICE VISIT (OUTPATIENT)
Dept: NEUROLOGY | Facility: CLINIC | Age: 79
End: 2024-10-03
Payer: MEDICARE

## 2024-10-03 VITALS
WEIGHT: 167 LBS | SYSTOLIC BLOOD PRESSURE: 112 MMHG | DIASTOLIC BLOOD PRESSURE: 64 MMHG | BODY MASS INDEX: 27.82 KG/M2 | OXYGEN SATURATION: 93 % | HEART RATE: 74 BPM | TEMPERATURE: 97.5 F | HEIGHT: 65 IN

## 2024-10-03 DIAGNOSIS — R20.0 RIGHT SIDED NUMBNESS: ICD-10-CM

## 2024-10-03 DIAGNOSIS — E11.9 CONTROLLED TYPE 2 DIABETES MELLITUS WITHOUT COMPLICATION, WITHOUT LONG-TERM CURRENT USE OF INSULIN: ICD-10-CM

## 2024-10-03 DIAGNOSIS — I10 ESSENTIAL HYPERTENSION: ICD-10-CM

## 2024-10-03 DIAGNOSIS — E78.2 MIXED HYPERLIPIDEMIA: ICD-10-CM
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
## 2024-10-03 NOTE — PROGRESS NOTES
Stroke Clinic Office Visit     Encounter Date: 10/03/2024   Patient Name: Nely Grider  : 1945  MRN: [id number]   PCP: Alicia Robles MD  Referring Provider: No ref. provider found     Chief Complaint Follow-up and Transient Ischemic Attack    History of Present Illness  Nely Grider is a right handed  78 y.o. female here for follow-up visit from May after a mechanical fall with right sided dysesthesias.  Patient was discharged 2024 with a diagnosis of stroke versus TIA.  The patient arrives today walking independently and denies any falls since her last episode in May.  The patient reports, she is back to her baseline strength of her right upper and lower extremity following PT.  The patient states, she is compliant with her medications.  The patient is taking 81 mg aspirin, 75 mg Plavix with no reports of uncontrolled/unusual bleeding or bruising.Patient denies any other neurological symptoms, including: headache, vision changes, dysesthesias, loss of consciousness, seizure or new areas of motor weakness.       Subjective     Past Medical History:   Diagnosis Date    Anemia 2016    Anesthesia complication     per patient with hip surgery last april did not remember being in hospital or going home can only rmember from rehad on    Anesthesia complication     with hip surgery in 2018 had issues with swallowing after surgery called in speech    Arthritis     Cancer     cervical    Cataract     Colon polyp 2019    Cecal polyp 5 years Dr. 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    TIA (transient ischemic attack)     Wears glasses       Past Surgical History:   Procedure Laterality Date    CARDIAC CATHETERIZATION      CHOLECYSTECTOMY      COLONOSCOPY      EYE SURGERY      bilateral cataract    HYSTERECTOMY      partial    JOINT REPLACEMENT      hip surgery 2018 in april    OOPHORECTOMY      one removed    TONSILLECTOMY      TOTAL HIP ARTHROPLASTY Right 04/19/2018    Procedure: RIGHT TOTAL HIP ARTHROPLASTY;  Surgeon: Carlos Pollack MD;  Location:  [facility];  Service: Orthopedics    TOTAL HIP ARTHROPLASTY Left 02/12/2019    Procedure: TOTAL HIP ARTHROPLASTY LEFT;  Surgeon: Carlos Pollack MD;  Location:  [facility];  Service: Orthopedics    WRIST SURGERY      metal plate     Family History   Problem Relation Age of Onset    Arthritis Mother     Heart attack Mother     Hypertension Mother     Hyperlipidemia Mother     Osteoporosis Mother     Heart disease Sister     Diabetes Sister     Arthritis Sister     Heart attack Sister     Hypertension Sister     Hyperlipidemia Sister     Bleeding Disorder Sister     Heart disease Brother     Cancer Brother     Diabetes Brother     Arthritis Brother     Heart attack Brother     Hypertension Brother     Hyperlipidemia Brother     Ovarian cancer Daughter     Cancer Daughter     Ovarian cancer Other         grandaughter    Ovarian cancer Other         granddaughter    Heart attack Father     Hypertension Father     Stroke Father     Kidney disease Paternal Uncle     Liver disease Paternal Uncle     Cancer Other     Stroke Other     Diabetes Other       Social History     Socioeconomic History    Marital status:    Tobacco Use    Smoking status: Never     Passive exposure: Never    Smokeless tobacco: Never   Vaping Use    Vaping status: Never Used   Substance and Sexual Activity    Alcohol use: No    Drug use: No    Sexual activity: Defer     Comment:        Current  "Outpatient Medications:     amLODIPine (NORVASC) 5 MG tablet, Take 1 tablet by mouth Daily., Disp: 90 tablet, Rfl: 3    aspirin 81 MG chewable tablet, Chew 1 tablet Daily., Disp: 30 tablet, Rfl: 0    atorvastatin (LIPITOR) 80 MG tablet, TAKE 1 TABLET BY MOUTH EVERY NIGHT., Disp: 90 tablet, Rfl: 3    clopidogrel (PLAVIX) 75 MG tablet, Take 1 tablet by mouth Daily., Disp: 90 tablet, Rfl: 0    fluticasone (FLONASE) 50 MCG/ACT nasal spray, 2 sprays by Each Nare route Daily As Needed., Disp: , Rfl:     gabapentin (NEURONTIN) 400 MG capsule, Take 1 capsule by mouth 3 (Three) Times a Day., Disp: 270 capsule, Rfl: 1    glimepiride (AMARYL) 4 MG tablet, TAKE 1 TABLET BY MOUTH EVERY MORNING BEFORE BREAKFAST., Disp: 90 tablet, Rfl: 3    metFORMIN (GLUCOPHAGE) 500 MG tablet, TAKE 1 TABLET TWICE DAILY WITH MEALS, Disp: 180 tablet, Rfl: 1    mupirocin (BACTROBAN) 2 % ointment, APPLY OINTMENT TOPICALLY THREE TIMES DAILY TO AFFECTED AREA (SMALL AMOUNT PER APPLICATIOIN), Disp: , Rfl:     omeprazole (priLOSEC) 20 MG capsule, Take 1 capsule by mouth 2 (Two) Times a Day., Disp: 180 capsule, Rfl: 3    cyclobenzaprine (FLEXERIL) 5 MG tablet, Take 1 tablet 3 times a day by oral route for 7 days. (Patient not taking: Reported on 10/3/2024), Disp: , Rfl:    Allergies   Allergen Reactions    Codeine Hives        Objective     Physical Exam:  Vitals:    10/03/24 0808   BP: 112/64   Pulse: 74   Temp: 97.5 °F (36.4 °C)   SpO2: 93%   Weight: 75.8 kg (167 lb)   Height: 165 cm (64.96\")      Body mass index is 27.82 kg/m².     Physical Exam:  General Appearance: Alert  Eyes: Anicteric sclera  HEENT: no scleral injection   Lungs: respirations appear comfortable, no obvious increased work of breathing  Extremities: No cyanosis or fingernail clubbing   Skin: No rashes in exposed skin areas     Neurological Examination:   Mental status: Alert and oriented to person, place, and time. Speech with no dysarthria, able to name and repeat with no difficulty. "    Cranial Nerves: Visual fields intact. Extraocular movements are intact with no nystagmus. Facial sensation intact. Face symmetrical. Hearing grossly intact. Palate movement is symmetric. Full shoulder shrug bilaterally. Tongue protrudes midline.   Sensory: Sensory exam to light touch in all four extremities distally is normal. Double simultaneous sensory stimulation shows no extinction  Motor: Normal tone throughout. Normal bulk. Pronator drift is absent.  Left upper extremity: 5/5 deltoid, tricep, bicep, interosseous, hand .   Right upper extremity: 5/5 deltoid, tricep, bicep, interosseous, hand .   Left lower extremity: 5/5 iliopsoas, knee extension/flexion, foot dorsi/plantarflexion.  Right lower extremity: 5/5 iliopsoas, knee extension/flexion, foot dorsi/plantarflexion.  Cerebellar: Finger-to-nose intact. Heel-to-shin intact. Rapid alternating movements are intact.   Gait: Normal.    NIHSS:     1a  Level of consciousness: 0=alert; keenly responsive   1b. LOC questions:  0=Performs both tasks correctly   1c. LOC commands: 0=Answers both questions correctly   2.  Best Gaze: 0=normal   3.  Visual: 0=No visual loss   4. Facial Palsy: 0=Normal symmetric movement   5a.  Motor left arm: 0=No drift, limb holds 90 (or 45) degrees for full 10 seconds   5b.  Motor right arm: 0=No drift, limb holds 90 (or 45) degrees for full 10 seconds   6a. motor left le=No drift, limb holds 90 (or 45) degrees for full 10 seconds   6b  Motor right le=No drift, limb holds 90 (or 45) degrees for full 10 seconds   7. Limb Ataxia: 0=Absent   8.  Sensory: 0=Normal; no sensory loss   9. Best Language:  0=No aphasia, normal   10. Dysarthria: 0=Normal   11. Extinction and Inattention: 0=No abnormality     Modified [place] Score based on in-office assessment of alertness, orientation, and physical mobility. Further assessment with neurocognitive testing may be needed to elucidate full extent of cognitive abilities: 0 = No  Symptoms    Over the past month…    Snoring:   Do you snore loudly (loud enough to be heard through closed doors or your bed partner elbows you for snoring at night)?    no   Tired:   Do you often feel tired, fatigued or sleepy during the daytime (such as falling asleep during driving or talking to someone)?     no   Observed:   Has anyone observed you stop breathing or choking/gasping during your sleep?     no   Pressure:   Do you have or are you being treated for high blood pressure?    yes   Body Mass Index:   Is the BMI > 35kg/m2 ?   BMI =     no   Age:   Older than 50?    yes   Neck Size:   Is your shirt collar > 16 inches/40 cm or larger? (measured around [place] apple).   Neck Size =      no   Gender:   Male/Female?    female   Total Score:      0-2 = JUAN Low Risk    3-4 = JUAN Intermediate Risk    5-8 = JUAN High Risk  Or >2 + male gender  Or >2 + BMI > 35kg/m2  Or > 2 + neck circumference 16 inches / 40cm       PHQ-9 Depression Screening  Little interest or pleasure in doing things? 0-->not at all   Feeling down, depressed, or hopeless? 0-->not at all   Trouble falling or staying asleep, or sleeping too much?     Feeling tired or having little energy?     Poor appetite or overeating?     Feeling bad about yourself - or that you are a failure or have let yourself or your family down?     Trouble concentrating on things, such as reading the newspaper or watching television?     Moving or speaking so slowly that other people could have noticed? Or the opposite - being so fidgety or restless that you have been moving around a lot more than usual?     Thoughts that you would be better off dead, or of hurting yourself in some way?     PHQ-9 Total Score 0   If you checked off any problems, how difficult have these problems made it for you to do your work, take care of things at home, or get along with other people?       KARLA Fall Risk Clinician Key Questions   Have you fallen in the past year?: Yes  Do you feel  unsteady with walking?: Yes (sometimes)  Are you worried about falling?: Yes  Stay Idependant Patient Questions   Patient Fall Risk Assessment Score : 0  Fall Risk Category  Fall Risk Category: High        Laboratory Results:   Hemoglobin   Date Value Ref Range Status   07/22/2024 11.1 (L) 12.0 - 15.9 g/dL Final     Hematocrit   Date Value Ref Range Status   07/22/2024 34.7 34.0 - 46.6 % Final     Platelets   Date Value Ref Range Status   07/22/2024 405 140 - 450 10*3/mm3 Final     Hemoglobin A1C   Date Value Ref Range Status   05/21/2024 6.20 (H) 4.80 - 5.60 % Final     LDL Cholesterol    Date Value Ref Range Status   05/21/2024 50 0 - 100 mg/dL Final     LDL Chol Calc (NIH)   Date Value Ref Range Status   06/20/2023 65 0 - 100 mg/dL Final     AST (SGOT)   Date Value Ref Range Status   05/20/2024 22 1 - 32 U/L Final   05/20/2024 21 1 - 32 U/L Final     ALT (SGPT)   Date Value Ref Range Status   05/20/2024 13 1 - 33 U/L Final   05/20/2024 13 1 - 33 U/L Final           Assessment / Plan    78 y.o. right-handed female with a recent mechanical fall and subsequent right sided dysesthesias.  The patient underwent a stroke workup at [facility] ED, which was negative for AIS.  Discharge diagnoses stroke versus TIA.  The patient has completed PT and is back to her baseline functioning.  She is taking 81 mg aspirin, 75 mg of Plavix with no unusual/uncontrolled bleeding or bruising.  Her exam is nonfocal.  MRI 5/2024 reveals atrophy and chronic small vessel disease, which is expected for patient age.  Bilateral ICA duplex is essentially normal, R ICA 50 to 60% L ICA 50%. Of note, the patient stopped taking aspirin prior to this event.  She will continue with 81 mg ASA and 75 mg Plavix.    Assessment and Plan  Diagnoses and all orders for this visit:    1. Fall, subsequent encounter (Primary)    2. Right sided numbness    3. Essential hypertension    4. Mixed hyperlipidemia    5. Controlled type 2 diabetes mellitus without  "complication, without long-term current use of insulin    -continue 81 mg ASA  -continue 75 mg Plavix  -continue 80 mg atorvastatin.    -exercise at least 30 minutes, 3 - 4 times a week.    -Patient educated on the early signs and symptoms of BEFAST.   -Consider Mediterranean diet  -The patient was advised to follow up with her primary care physician for ongoing management and screening for hypertension, hypercholesterolemia, and diabetes.   -The patient was counseled on long-term blood pressure goal less than 120/80, long-term LDL goal less than 70, and long-term hemoglobin A1c goal less than 7.0% for stroke prevention. This was also printed for the patient in the after visit summary.  -The patient was counseled she experiences symptoms of acute onset unilateral arm, leg, or face weakness/numbness/tingling, unilateral facial droop, slurred speech/word finding difficulty, visual disturbance (\"curtain falling\" or visual field loss), or severe headache she should call 911 and present to the nearest emergency department immediately.    I spent 30 minutes caring for Nely on this date of service. This time includes time spent by me in the following activities:reviewing tests, performing a medically appropriate examination and/or evaluation , counseling and educating the patient/family/caregiver, ordering medications, tests, or procedures, and documenting information in the medical record    Follow Up  No follow-ups on file.    SKYLAR Bynum  [facility] [place] NEURO STROKE     Part of this note may be an electronic transcription/translation of spoken language to printed text using the Dragon Dictation System.  "

## (undated) DEVICE — GLV SURG SENSICARE MICRO PF LF 8 STRL

## (undated) DEVICE — SPNG GZ WOVN 4X4IN 12PLY 10/BX STRL

## (undated) DEVICE — SYS SKIN CLS DERMABOND PRINEO W/22CM MESH TP

## (undated) DEVICE — GLV SURG SENSICARE W/ALOE PF LF 9 STRL

## (undated) DEVICE — ANTIBACTERIAL UNDYED BRAIDED (POLYGLACTIN 910), SYNTHETIC ABSORBABLE SUTURE: Brand: COATED VICRYL

## (undated) DEVICE — GLV SURG SENSICARE MICRO PF LF 7.5 STRL

## (undated) DEVICE — PK HIP TOTL UNIV 10

## (undated) DEVICE — TRY EPID SFTY 18G 3.5IN 1T7680

## (undated) DEVICE — STRYKER PERFORMANCE SERIES SAGITTAL BLADE: Brand: STRYKER PERFORMANCE SERIES

## (undated) DEVICE — CANN NASL CO2 DIVIDED A/

## (undated) DEVICE — HDRST POSTIN FM CRDL TRACH SLOT 9X8X4IN

## (undated) DEVICE — CVR HNDL LT SURG ACCSSRY BLU STRL

## (undated) DEVICE — ENCORE® LATEX MICRO SIZE 8.5, STERILE LATEX POWDER-FREE SURGICAL GLOVE: Brand: ENCORE

## (undated) DEVICE — CONTAINER,SPECIMEN,OR STERILE,4OZ: Brand: MEDLINE

## (undated) DEVICE — DISH PETRI 3.5IN MD STRL LF

## (undated) DEVICE — GLV SURG SENSICARE MICRO PF LF 7 STRL

## (undated) DEVICE — 2963 MEDIPORE SOFT CLOTH TAPE 3 IN X 10 YD 12 RLS/CS: Brand: 3M™ MEDIPORE™

## (undated) DEVICE — ENCORE® LATEX MICRO SIZE 8, STERILE LATEX POWDER-FREE SURGICAL GLOVE: Brand: ENCORE

## (undated) DEVICE — SPNG GZ STRL 2S 4X4 12PLY